# Patient Record
Sex: MALE | Race: BLACK OR AFRICAN AMERICAN
[De-identification: names, ages, dates, MRNs, and addresses within clinical notes are randomized per-mention and may not be internally consistent; named-entity substitution may affect disease eponyms.]

---

## 2017-03-29 ENCOUNTER — RX ONLY (RX ONLY)
Age: 57
End: 2017-03-29

## 2017-03-29 ENCOUNTER — DOCTOR'S OFFICE (OUTPATIENT)
Dept: URBAN - METROPOLITAN AREA CLINIC 137 | Facility: CLINIC | Age: 57
Setting detail: OPHTHALMOLOGY
End: 2017-03-29
Payer: COMMERCIAL

## 2017-03-29 DIAGNOSIS — H25.13: ICD-10-CM

## 2017-03-29 DIAGNOSIS — H04.123: ICD-10-CM

## 2017-03-29 DIAGNOSIS — H52.4: ICD-10-CM

## 2017-03-29 PROCEDURE — 92004 COMPRE OPH EXAM NEW PT 1/>: CPT | Performed by: OPHTHALMOLOGY

## 2017-03-29 PROCEDURE — 92015 DETERMINE REFRACTIVE STATE: CPT | Performed by: OPHTHALMOLOGY

## 2017-03-29 ASSESSMENT — REFRACTION_OUTSIDERX
OS_VA2: 20/20(J1+)
OU_VA: 20/
OS_VA1: 20/20
OD_VA1: 20/20
OD_SPHERE: PL
OS_SPHERE: PL
OD_ADD: +2.00
OS_ADD: +2.00
OS_VA3: 20/
OD_VA2: 20/20(J1+)
OD_VA3: 20/

## 2017-03-29 ASSESSMENT — REFRACTION_MANIFEST
OD_VA3: 20/
OS_VA2: 20/
OS_VA2: 20/
OD_VA2: 20/
OU_VA: 20/
OS_VA1: 20/
OS_VA1: 20/
OD_VA3: 20/
OS_VA3: 20/
OD_VA1: 20/
OD_VA1: 20/
OD_VA2: 20/
OS_VA3: 20/
OU_VA: 20/

## 2017-03-29 ASSESSMENT — VISUAL ACUITY
OD_BCVA: 20/20-2
OS_BCVA: 20/20

## 2017-03-29 ASSESSMENT — REFRACTION_AUTOREFRACTION
OS_SPHERE: -0.25
OS_CYLINDER: SPH
OD_SPHERE: PLANO
OD_CYLINDER: SPH

## 2017-03-29 ASSESSMENT — CONFRONTATIONAL VISUAL FIELD TEST (CVF)
OD_FINDINGS: FULL
OS_FINDINGS: FULL

## 2017-03-29 ASSESSMENT — REFRACTION_CURRENTRX
OS_OVR_VA: 20/
OS_OVR_VA: 20/
OD_OVR_VA: 20/
OS_OVR_VA: 20/

## 2017-08-04 ENCOUNTER — TRANSCRIBE ORDERS (OUTPATIENT)
Dept: ADMINISTRATIVE | Facility: HOSPITAL | Age: 57
End: 2017-08-04

## 2017-08-04 DIAGNOSIS — R59.0 SUPRACLAVICULAR ADENOPATHY: Primary | ICD-10-CM

## 2017-08-08 ENCOUNTER — HOSPITAL ENCOUNTER (OUTPATIENT)
Dept: RADIOLOGY | Facility: HOSPITAL | Age: 57
Discharge: HOME | End: 2017-08-08
Admitting: RADIOLOGY
Payer: COMMERCIAL

## 2017-08-08 DIAGNOSIS — R59.0 SUPRACLAVICULAR ADENOPATHY: ICD-10-CM

## 2017-08-08 PROCEDURE — 88305 TISSUE EXAM BY PATHOLOGIST: CPT | Performed by: SURGERY

## 2017-08-08 PROCEDURE — 88173 CYTOPATH EVAL FNA REPORT: CPT | Performed by: SURGERY

## 2017-08-08 PROCEDURE — 88184 FLOWCYTOMETRY/ TC 1 MARKER: CPT | Performed by: SURGERY

## 2017-08-08 PROCEDURE — 88185 FLOWCYTOMETRY/TC ADD-ON: CPT

## 2017-08-08 PROCEDURE — 88172 CYTP DX EVAL FNA 1ST EA SITE: CPT | Performed by: SURGERY

## 2017-08-08 PROCEDURE — 10022 HB FNA W/IMAGE: CPT

## 2017-08-08 PROCEDURE — 76942 ECHO GUIDE FOR BIOPSY: CPT

## 2017-08-08 RX ORDER — LIDOCAINE HYDROCHLORIDE 10 MG/ML
10 INJECTION, SOLUTION INFILTRATION; PERINEURAL ONCE
Status: COMPLETED | OUTPATIENT
Start: 2017-08-08 | End: 2017-08-08

## 2017-08-08 RX ADMIN — LIDOCAINE HYDROCHLORIDE 10 ML: 10 INJECTION, SOLUTION INFILTRATION; PERINEURAL at 11:51

## 2017-08-09 LAB — SCAN RESULT: NORMAL

## 2018-01-16 NOTE — MISCELLANEOUS
Provider Comments  Provider Comments:   Pt showed up over half hour late to his new pt  physical apt  when told he would have to re schedule he left with out rescheduling      Signatures   Electronically signed by : Toñito Strong DO; Aug 16 2016  2:00PM EST                       (Author)

## 2019-05-30 ENCOUNTER — DOCTOR'S OFFICE (OUTPATIENT)
Dept: URBAN - METROPOLITAN AREA CLINIC 136 | Facility: CLINIC | Age: 59
Setting detail: OPHTHALMOLOGY
End: 2019-05-30
Payer: MEDICAID

## 2019-05-30 DIAGNOSIS — H04.123: ICD-10-CM

## 2019-05-30 DIAGNOSIS — H25.13: ICD-10-CM

## 2019-05-30 DIAGNOSIS — H25.042: ICD-10-CM

## 2019-05-30 DIAGNOSIS — H35.373: ICD-10-CM

## 2019-05-30 PROCEDURE — 92014 COMPRE OPH EXAM EST PT 1/>: CPT | Performed by: OPHTHALMOLOGY

## 2019-05-30 PROCEDURE — 92134 CPTRZ OPH DX IMG PST SGM RTA: CPT | Performed by: OPHTHALMOLOGY

## 2019-05-30 ASSESSMENT — REFRACTION_CURRENTRX
OD_OVR_VA: 20/
OS_OVR_VA: 20/

## 2019-05-30 ASSESSMENT — REFRACTION_MANIFEST
OS_ADD: +2.00
OD_VA1: 20/
OS_VA2: 20/
OS_VA3: 20/
OU_VA: 20/
OS_VA1: 20/20
OS_VA1: 20/
OS_VA3: 20/
OD_VA3: 20/
OU_VA: 20/
OS_VA2: 20/20(J1+)
OD_VA2: 20/
OD_SPHERE: PL
OD_VA2: 20/20(J1+)
OD_VA1: 20/20
OD_ADD: +2.00
OD_VA3: 20/
OS_SPHERE: PL

## 2019-05-30 ASSESSMENT — VISUAL ACUITY
OS_BCVA: 20/20
OD_BCVA: 20/20

## 2019-05-30 ASSESSMENT — CONFRONTATIONAL VISUAL FIELD TEST (CVF)
OS_FINDINGS: FULL
OD_FINDINGS: FULL

## 2019-05-30 ASSESSMENT — REFRACTION_AUTOREFRACTION
OS_CYLINDER: SPH
OS_SPHERE: -0.25
OD_CYLINDER: SPH
OD_SPHERE: PLANO

## 2019-05-30 ASSESSMENT — CORNEAL DYSTROPHY - POSTERIOR
OD_POSTERIORDYSTROPHY: ABSENT
OS_POSTERIORDYSTROPHY: ABSENT

## 2019-08-01 ENCOUNTER — DOCTOR'S OFFICE (OUTPATIENT)
Dept: URBAN - METROPOLITAN AREA CLINIC 136 | Facility: CLINIC | Age: 59
Setting detail: OPHTHALMOLOGY
End: 2019-08-01
Payer: COMMERCIAL

## 2019-08-01 DIAGNOSIS — H25.042: ICD-10-CM

## 2019-08-01 DIAGNOSIS — H04.123: ICD-10-CM

## 2019-08-01 DIAGNOSIS — H25.13: ICD-10-CM

## 2019-08-01 DIAGNOSIS — H43.813: ICD-10-CM

## 2019-08-01 PROBLEM — H52.4 PRESBYOPIA ; BOTH EYES: Status: ACTIVE | Noted: 2017-03-29

## 2019-08-01 PROCEDURE — 92134 CPTRZ OPH DX IMG PST SGM RTA: CPT | Performed by: OPHTHALMOLOGY

## 2019-08-01 PROCEDURE — 92014 COMPRE OPH EXAM EST PT 1/>: CPT | Performed by: OPHTHALMOLOGY

## 2019-08-01 ASSESSMENT — VISUAL ACUITY
OS_BCVA: 20/20
OD_BCVA: 20/20

## 2019-08-01 ASSESSMENT — REFRACTION_CURRENTRX
OD_OVR_VA: 20/
OS_OVR_VA: 20/
OD_OVR_VA: 20/
OD_OVR_VA: 20/
OS_OVR_VA: 20/
OS_OVR_VA: 20/

## 2019-08-01 ASSESSMENT — REFRACTION_MANIFEST
OD_VA2: 20/20(J1+)
OS_VA3: 20/
OD_VA1: 20/20
OU_VA: 20/
OD_VA3: 20/
OS_ADD: +2.00
OS_VA3: 20/
OS_SPHERE: PL
OD_VA3: 20/
OS_VA2: 20/20(J1+)
OD_ADD: +2.00
OD_SPHERE: PL
OU_VA: 20/
OD_VA1: 20/
OS_VA1: 20/
OS_VA2: 20/
OS_VA1: 20/20
OD_VA2: 20/

## 2019-08-01 ASSESSMENT — REFRACTION_AUTOREFRACTION
OS_CYLINDER: SPH
OD_CYLINDER: SPH
OD_SPHERE: PLANO
OS_SPHERE: -0.25

## 2019-08-01 ASSESSMENT — CONFRONTATIONAL VISUAL FIELD TEST (CVF)
OD_FINDINGS: FULL
OS_FINDINGS: FULL

## 2019-08-01 ASSESSMENT — CORNEAL DYSTROPHY - POSTERIOR
OD_POSTERIORDYSTROPHY: ABSENT
OS_POSTERIORDYSTROPHY: ABSENT

## 2020-03-09 ENCOUNTER — TELEPHONE (OUTPATIENT)
Dept: SURGICAL ONCOLOGY | Facility: CLINIC | Age: 60
End: 2020-03-09

## 2020-03-09 NOTE — TELEPHONE ENCOUNTER
New Patient Encounter    New Patient Intake Form   Patient Details:  Natalia Cox  1960  [id number]    Background Information:  [address] starts by opening a telephone encounter and gathering the following information   Who is calling to schedule? If not self, relationship to patient? self   Referring Provider Dr Meera Cummings    pcp   What is the diagnosis? Mass in neck   Is this diagnosis confirmed? Yes   Ct scan   When was the diagnosis? 3/2020   Is there a confirmed diagnosis from a biopsy/tissue reviewed by pathology? no   Is patient aware of diagnosis? Yes   Is there a personal history of cancer and what kind? no   Is there a family history of cancer and what kind? unaware   Reason for visit? New Diagnosis   Have you had any imaging or labs done? If so: when, where? Yes   [facility]     Are records in [facility]? no   Was the patient told to bring a disk? yes   Does the patient smoke or Vape? no   If yes, how many packs or cartridges per day? Scheduling Information:   Preferred [place]:  Any     Requesting Specific Provider? laure   Are there any dates/time the patient cannot be seen? no   Miscellaneous: n/a   After completing the above information, please route to Financial Counselor and the appropriate Nurse Navigator for review

## 2020-03-10 ENCOUNTER — TELEPHONE (OUTPATIENT)
Dept: HEMATOLOGY ONCOLOGY | Facility: CLINIC | Age: 60
End: 2020-03-10

## 2020-03-23 ENCOUNTER — TELEPHONE (OUTPATIENT)
Dept: HEMATOLOGY ONCOLOGY | Facility: CLINIC | Age: 60
End: 2020-03-23

## 2020-03-23 NOTE — TELEPHONE ENCOUNTER
Patient called to confirm their appointment  Appointment confirmed for Dr Charlie Webster          Appointment date and time:          4/1/2020 @ 2:00      [place] location:              [facility]  Patient verbalized understanding of above

## 2020-04-01 ENCOUNTER — CONSULT (OUTPATIENT)
Dept: HEMATOLOGY ONCOLOGY | Facility: CLINIC | Age: 60
End: 2020-04-01
Payer: COMMERCIAL

## 2020-04-01 VITALS — RESPIRATION RATE: 16 BRPM | TEMPERATURE: 99.8 F | BODY MASS INDEX: 27.64 KG/M2 | WEIGHT: 176.5 LBS

## 2020-04-01 DIAGNOSIS — R59.0 CERVICAL LYMPHADENOPATHY: Primary | ICD-10-CM

## 2020-04-01 DIAGNOSIS — Z87.891 FORMER SMOKER: ICD-10-CM

## 2020-04-01 DIAGNOSIS — Z80.6 FAMILY HISTORY OF LEUKEMIA: ICD-10-CM

## 2020-04-01 DIAGNOSIS — D17.9 LIPOMA, UNSPECIFIED SITE: ICD-10-CM

## 2020-04-01 PROCEDURE — 99203 OFFICE O/P NEW LOW 30 MIN: CPT | Performed by: INTERNAL MEDICINE

## 2020-04-09 ENCOUNTER — HOSPITAL ENCOUNTER (OUTPATIENT)
Dept: RADIOLOGY | Age: 60
Discharge: HOME | End: 2020-04-09
Payer: COMMERCIAL

## 2020-04-09 ENCOUNTER — DOCUMENTATION (OUTPATIENT)
Dept: HEMATOLOGY ONCOLOGY | Facility: CLINIC | Age: 60
End: 2020-04-09

## 2020-04-09 ENCOUNTER — TELEPHONE (OUTPATIENT)
Dept: HEMATOLOGY ONCOLOGY | Facility: CLINIC | Age: 60
End: 2020-04-09

## 2020-04-09 DIAGNOSIS — R59.1 LYMPHADENOPATHY: Primary | ICD-10-CM

## 2020-04-09 DIAGNOSIS — D48.7 NEOPLASM OF UNCERTAIN BEHAVIOR OF LYMPH NODE: ICD-10-CM

## 2020-04-09 LAB — GLUCOSE SERPL-MCNC: 122 MG/DL (ref 65–140)

## 2020-04-09 PROCEDURE — A9552 F18 FDG: HCPCS

## 2020-04-09 PROCEDURE — 78815 PET IMAGE W/CT SKULL-THIGH: CPT

## 2020-04-09 PROCEDURE — 82948 REAGENT STRIP/BLOOD GLUCOSE: CPT

## 2020-04-10 ENCOUNTER — TELEPHONE (OUTPATIENT)
Dept: HEMATOLOGY ONCOLOGY | Facility: CLINIC | Age: 60
End: 2020-04-10

## 2020-04-10 DIAGNOSIS — R59.0 CERVICAL LYMPHADENOPATHY: Primary | ICD-10-CM

## 2020-04-22 ENCOUNTER — TELEPHONE (OUTPATIENT)
Dept: SURGICAL ONCOLOGY | Facility: CLINIC | Age: 60
End: 2020-04-22

## 2020-04-22 ENCOUNTER — TELEPHONE (OUTPATIENT)
Dept: HEMATOLOGY ONCOLOGY | Facility: CLINIC | Age: 60
End: 2020-04-22

## 2020-04-22 ENCOUNTER — HOSPITAL ENCOUNTER (OUTPATIENT)
Dept: RADIOLOGY | Facility: HOSPITAL | Age: 60
Discharge: HOME | End: 2020-04-22
Attending: INTERNAL MEDICINE

## 2020-04-22 DIAGNOSIS — R59.0 CERVICAL LYMPHADENOPATHY: ICD-10-CM

## 2020-04-22 RX ORDER — LIDOCAINE HYDROCHLORIDE 10 MG/ML
2 INJECTION, SOLUTION EPIDURAL; INFILTRATION; INTRACAUDAL; PERINEURAL ONCE
Status: DISCONTINUED | OUTPATIENT
Start: 2020-04-22 | End: 2020-04-22

## 2020-04-23 ENCOUNTER — TELEPHONE (OUTPATIENT)
Dept: HEMATOLOGY ONCOLOGY | Facility: CLINIC | Age: 60
End: 2020-04-23

## 2020-04-23 DIAGNOSIS — R59.0 MEDIASTINAL LYMPHADENOPATHY: Primary | ICD-10-CM

## 2020-04-24 ENCOUNTER — OFFICE VISIT (OUTPATIENT)
Dept: CARDIAC SURGERY | Facility: CLINIC | Age: 60
End: 2020-04-24
Payer: COMMERCIAL

## 2020-04-24 ENCOUNTER — APPOINTMENT (OUTPATIENT)
Dept: LAB | Facility: HOSPITAL | Age: 60
End: 2020-04-24
Payer: COMMERCIAL

## 2020-04-24 VITALS
TEMPERATURE: 98.2 F | WEIGHT: 177 LBS | HEIGHT: 67 IN | BODY MASS INDEX: 27.78 KG/M2 | HEART RATE: 98 BPM | SYSTOLIC BLOOD PRESSURE: 162 MMHG | DIASTOLIC BLOOD PRESSURE: 86 MMHG

## 2020-04-24 DIAGNOSIS — R59.0 MEDIASTINAL LYMPHADENOPATHY: ICD-10-CM

## 2020-04-24 DIAGNOSIS — R59.0 MEDIASTINAL LYMPHADENOPATHY: Primary | ICD-10-CM

## 2020-04-24 LAB
ANION GAP SERPL CALCULATED.3IONS-SCNC: 6 MMOL/L (ref 4–13)
APTT PPP: 29 SECONDS (ref 23–37)
BUN SERPL-MCNC: 14 MG/DL (ref 5–25)
CALCIUM SERPL-MCNC: 9.1 MG/DL (ref 8.3–10.1)
CHLORIDE SERPL-SCNC: 106 MMOL/L (ref 100–108)
CO2 SERPL-SCNC: 25 MMOL/L (ref 21–32)
CREAT SERPL-MCNC: 1.17 MG/DL (ref 0.6–1.3)
ERYTHROCYTE [DISTWIDTH] IN BLOOD BY AUTOMATED COUNT: 14.5 % (ref 11.6–15.1)
GFR SERPL CREATININE-BSD FRML MDRD: 67 ML/MIN/1.73SQ M
GLUCOSE SERPL-MCNC: 154 MG/DL (ref 65–140)
HCT VFR BLD AUTO: 49.7 % (ref 36.5–49.3)
HGB BLD-MCNC: 15.9 G/DL (ref 12–17)
INR PPP: 0.98 (ref 0.84–1.19)
MCH RBC QN AUTO: 27.9 PG (ref 26.8–34.3)
MCHC RBC AUTO-ENTMCNC: 32 G/DL (ref 31.4–37.4)
MCV RBC AUTO: 87 FL (ref 82–98)
PLATELET # BLD AUTO: 187 THOUSANDS/UL (ref 149–390)
PMV BLD AUTO: 10.6 FL (ref 8.9–12.7)
POTASSIUM SERPL-SCNC: 3.8 MMOL/L (ref 3.5–5.3)
PROTHROMBIN TIME: 12.6 SECONDS (ref 11.6–14.5)
RBC # BLD AUTO: 5.7 MILLION/UL (ref 3.88–5.62)
SODIUM SERPL-SCNC: 137 MMOL/L (ref 136–145)
WBC # BLD AUTO: 3.06 THOUSAND/UL (ref 4.31–10.16)

## 2020-04-24 PROCEDURE — 36415 COLL VENOUS BLD VENIPUNCTURE: CPT

## 2020-04-24 PROCEDURE — 85027 COMPLETE CBC AUTOMATED: CPT

## 2020-04-24 PROCEDURE — 85730 THROMBOPLASTIN TIME PARTIAL: CPT

## 2020-04-24 PROCEDURE — 85610 PROTHROMBIN TIME: CPT

## 2020-04-24 PROCEDURE — 80048 BASIC METABOLIC PNL TOTAL CA: CPT

## 2020-04-24 PROCEDURE — 99205 OFFICE O/P NEW HI 60 MIN: CPT | Performed by: PHYSICIAN ASSISTANT

## 2020-04-27 ENCOUNTER — ANESTHESIA EVENT (OUTPATIENT)
Dept: PERIOP | Facility: HOSPITAL | Age: 60
End: 2020-04-27
Payer: COMMERCIAL

## 2020-04-28 ENCOUNTER — HOSPITAL ENCOUNTER (OUTPATIENT)
Facility: HOSPITAL | Age: 60
Setting detail: OUTPATIENT SURGERY
Discharge: HOME | End: 2020-04-28
Attending: THORACIC SURGERY (CARDIOTHORACIC VASCULAR SURGERY) | Admitting: THORACIC SURGERY (CARDIOTHORACIC VASCULAR SURGERY)
Payer: COMMERCIAL

## 2020-04-28 ENCOUNTER — ANESTHESIA (OUTPATIENT)
Dept: PERIOP | Facility: HOSPITAL | Age: 60
End: 2020-04-28
Payer: COMMERCIAL

## 2020-04-28 VITALS
HEART RATE: 72 BPM | WEIGHT: 177 LBS | SYSTOLIC BLOOD PRESSURE: 126 MMHG | OXYGEN SATURATION: 98 % | TEMPERATURE: 96.6 F | BODY MASS INDEX: 27.78 KG/M2 | DIASTOLIC BLOOD PRESSURE: 86 MMHG | HEIGHT: 67 IN | RESPIRATION RATE: 16 BRPM

## 2020-04-28 DIAGNOSIS — R59.0 MEDIASTINAL LYMPHADENOPATHY: ICD-10-CM

## 2020-04-28 PROCEDURE — 88173 CYTOPATH EVAL FNA REPORT: CPT | Performed by: PATHOLOGY

## 2020-04-28 PROCEDURE — 88305 TISSUE EXAM BY PATHOLOGIST: CPT | Performed by: PATHOLOGY

## 2020-04-28 PROCEDURE — 31652 BRONCH EBUS SAMPLNG 1/2 NODE: CPT | Performed by: THORACIC SURGERY (CARDIOTHORACIC VASCULAR SURGERY)

## 2020-04-28 PROCEDURE — 88172 CYTP DX EVAL FNA 1ST EA SITE: CPT | Performed by: PATHOLOGY

## 2020-04-28 RX ORDER — ONDANSETRON 2 MG/ML
4 INJECTION INTRAMUSCULAR; INTRAVENOUS ONCE AS NEEDED
Status: DISCONTINUED | OUTPATIENT
Start: 2020-04-28 | End: 2020-04-28 | Stop reason: HOSPADM

## 2020-04-28 RX ORDER — LIDOCAINE HYDROCHLORIDE 10 MG/ML
0.5 INJECTION, SOLUTION EPIDURAL; INFILTRATION; INTRACAUDAL; PERINEURAL ONCE AS NEEDED
Status: COMPLETED | OUTPATIENT
Start: 2020-04-28 | End: 2020-04-28

## 2020-04-28 RX ORDER — PROPOFOL 10 MG/ML
INJECTION, EMULSION INTRAVENOUS AS NEEDED
Status: DISCONTINUED | OUTPATIENT
Start: 2020-04-28 | End: 2020-04-28 | Stop reason: SURG

## 2020-04-28 RX ORDER — MIDAZOLAM HYDROCHLORIDE 2 MG/2ML
INJECTION, SOLUTION INTRAMUSCULAR; INTRAVENOUS AS NEEDED
Status: DISCONTINUED | OUTPATIENT
Start: 2020-04-28 | End: 2020-04-28 | Stop reason: SURG

## 2020-04-28 RX ORDER — GLYCOPYRROLATE 0.2 MG/ML
INJECTION INTRAMUSCULAR; INTRAVENOUS AS NEEDED
Status: DISCONTINUED | OUTPATIENT
Start: 2020-04-28 | End: 2020-04-28 | Stop reason: SURG

## 2020-04-28 RX ORDER — FENTANYL CITRATE 50 UG/ML
INJECTION, SOLUTION INTRAMUSCULAR; INTRAVENOUS AS NEEDED
Status: DISCONTINUED | OUTPATIENT
Start: 2020-04-28 | End: 2020-04-28 | Stop reason: SURG

## 2020-04-28 RX ORDER — LABETALOL 20 MG/4 ML (5 MG/ML) INTRAVENOUS SYRINGE
AS NEEDED
Status: DISCONTINUED | OUTPATIENT
Start: 2020-04-28 | End: 2020-04-28 | Stop reason: SURG

## 2020-04-28 RX ORDER — SUCCINYLCHOLINE/SOD CL,ISO/PF 100 MG/5ML
SYRINGE (ML) INTRAVENOUS AS NEEDED
Status: DISCONTINUED | OUTPATIENT
Start: 2020-04-28 | End: 2020-04-28 | Stop reason: SURG

## 2020-04-28 RX ORDER — SODIUM CHLORIDE, SODIUM LACTATE, POTASSIUM CHLORIDE, CALCIUM CHLORIDE 600; 310; 30; 20 MG/100ML; MG/100ML; MG/100ML; MG/100ML
125 INJECTION, SOLUTION INTRAVENOUS CONTINUOUS
Status: DISCONTINUED | OUTPATIENT
Start: 2020-04-28 | End: 2020-04-28 | Stop reason: HOSPADM

## 2020-04-28 RX ORDER — ONDANSETRON 2 MG/ML
INJECTION INTRAMUSCULAR; INTRAVENOUS AS NEEDED
Status: DISCONTINUED | OUTPATIENT
Start: 2020-04-28 | End: 2020-04-28 | Stop reason: SURG

## 2020-04-28 RX ORDER — LIDOCAINE HYDROCHLORIDE 10 MG/ML
INJECTION, SOLUTION EPIDURAL; INFILTRATION; INTRACAUDAL; PERINEURAL AS NEEDED
Status: DISCONTINUED | OUTPATIENT
Start: 2020-04-28 | End: 2020-04-28 | Stop reason: SURG

## 2020-04-28 RX ORDER — FENTANYL CITRATE/PF 50 MCG/ML
25 SYRINGE (ML) INJECTION
Status: DISCONTINUED | OUTPATIENT
Start: 2020-04-28 | End: 2020-04-28 | Stop reason: HOSPADM

## 2020-04-28 RX ORDER — PROPOFOL 10 MG/ML
INJECTION, EMULSION INTRAVENOUS CONTINUOUS PRN
Status: DISCONTINUED | OUTPATIENT
Start: 2020-04-28 | End: 2020-04-28 | Stop reason: SURG

## 2020-04-28 RX ORDER — ROCURONIUM BROMIDE 10 MG/ML
INJECTION, SOLUTION INTRAVENOUS AS NEEDED
Status: DISCONTINUED | OUTPATIENT
Start: 2020-04-28 | End: 2020-04-28 | Stop reason: SURG

## 2020-04-28 RX ADMIN — LIDOCAINE HYDROCHLORIDE 50 MG: 10 INJECTION, SOLUTION EPIDURAL; INFILTRATION; INTRACAUDAL; PERINEURAL at 12:47

## 2020-04-28 RX ADMIN — FENTANYL CITRATE 50 MCG: 50 INJECTION, SOLUTION INTRAMUSCULAR; INTRAVENOUS at 12:54

## 2020-04-28 RX ADMIN — ROCURONIUM BROMIDE 20 MG: 10 INJECTION, SOLUTION INTRAVENOUS at 12:55

## 2020-04-28 RX ADMIN — ROCURONIUM BROMIDE 5 MG: 10 INJECTION, SOLUTION INTRAVENOUS at 13:15

## 2020-04-28 RX ADMIN — LIDOCAINE HYDROCHLORIDE 0.2 ML: 10 INJECTION, SOLUTION EPIDURAL; INFILTRATION; INTRACAUDAL; PERINEURAL at 10:58

## 2020-04-28 RX ADMIN — PROPOFOL 180 MG: 10 INJECTION, EMULSION INTRAVENOUS at 12:47

## 2020-04-28 RX ADMIN — MIDAZOLAM 2 MG: 1 INJECTION INTRAMUSCULAR; INTRAVENOUS at 12:36

## 2020-04-28 RX ADMIN — LABETALOL 20 MG/4 ML (5 MG/ML) INTRAVENOUS SYRINGE 5 MG: at 13:15

## 2020-04-28 RX ADMIN — ONDANSETRON 4 MG: 2 INJECTION INTRAMUSCULAR; INTRAVENOUS at 13:11

## 2020-04-28 RX ADMIN — SODIUM CHLORIDE, SODIUM LACTATE, POTASSIUM CHLORIDE, AND CALCIUM CHLORIDE: .6; .31; .03; .02 INJECTION, SOLUTION INTRAVENOUS at 13:40

## 2020-04-28 RX ADMIN — LABETALOL 20 MG/4 ML (5 MG/ML) INTRAVENOUS SYRINGE 5 MG: at 13:25

## 2020-04-28 RX ADMIN — SUGAMMADEX 200 MG: 100 INJECTION, SOLUTION INTRAVENOUS at 13:46

## 2020-04-28 RX ADMIN — Medication 160 MG: at 12:48

## 2020-04-28 RX ADMIN — GLYCOPYRROLATE 0.2 MG: 0.2 INJECTION, SOLUTION INTRAMUSCULAR; INTRAVENOUS at 13:01

## 2020-04-28 RX ADMIN — PROPOFOL 150 MCG/KG/MIN: 10 INJECTION, EMULSION INTRAVENOUS at 12:49

## 2020-04-28 RX ADMIN — SODIUM CHLORIDE, SODIUM LACTATE, POTASSIUM CHLORIDE, AND CALCIUM CHLORIDE 125 ML/HR: .6; .31; .03; .02 INJECTION, SOLUTION INTRAVENOUS at 10:58

## 2020-04-28 RX ADMIN — FENTANYL CITRATE 50 MCG: 50 INJECTION, SOLUTION INTRAMUSCULAR; INTRAVENOUS at 12:47

## 2020-04-28 RX ADMIN — ROCURONIUM BROMIDE 5 MG: 10 INJECTION, SOLUTION INTRAVENOUS at 13:30

## 2020-04-29 LAB — SCAN RESULT: NORMAL

## 2020-05-01 ENCOUNTER — TELEPHONE (OUTPATIENT)
Dept: CARDIAC SURGERY | Facility: CLINIC | Age: 60
End: 2020-05-01

## 2020-05-04 ENCOUNTER — TELEPHONE (OUTPATIENT)
Dept: CARDIAC SURGERY | Facility: CLINIC | Age: 60
End: 2020-05-04

## 2020-05-11 ENCOUNTER — TELEPHONE (OUTPATIENT)
Dept: CARDIAC SURGERY | Facility: CLINIC | Age: 60
End: 2020-05-11

## 2020-05-22 ENCOUNTER — TELEPHONE (OUTPATIENT)
Dept: HEMATOLOGY ONCOLOGY | Facility: CLINIC | Age: 60
End: 2020-05-22

## 2020-05-27 ENCOUNTER — TELEPHONE (OUTPATIENT)
Dept: HEMATOLOGY ONCOLOGY | Facility: CLINIC | Age: 60
End: 2020-05-27

## 2020-06-03 ENCOUNTER — OFFICE VISIT (OUTPATIENT)
Dept: HEMATOLOGY ONCOLOGY | Facility: CLINIC | Age: 60
End: 2020-06-03
Payer: COMMERCIAL

## 2020-06-03 VITALS
BODY MASS INDEX: 27 KG/M2 | RESPIRATION RATE: 16 BRPM | HEART RATE: 90 BPM | WEIGHT: 172 LBS | TEMPERATURE: 97.7 F | HEIGHT: 67 IN | DIASTOLIC BLOOD PRESSURE: 92 MMHG | SYSTOLIC BLOOD PRESSURE: 154 MMHG

## 2020-06-03 DIAGNOSIS — R59.0 MEDIASTINAL LYMPHADENOPATHY: Primary | ICD-10-CM

## 2020-06-03 DIAGNOSIS — D17.0 LIPOMA OF NECK: ICD-10-CM

## 2020-06-03 PROCEDURE — 3008F BODY MASS INDEX DOCD: CPT | Performed by: INTERNAL MEDICINE

## 2020-06-03 PROCEDURE — 99214 OFFICE O/P EST MOD 30 MIN: CPT | Performed by: INTERNAL MEDICINE

## 2020-06-03 PROCEDURE — 1036F TOBACCO NON-USER: CPT | Performed by: INTERNAL MEDICINE

## 2020-06-03 RX ORDER — LOSARTAN POTASSIUM AND HYDROCHLOROTHIAZIDE 12.5; 5 MG/1; MG/1
TABLET ORAL
COMMUNITY
Start: 2020-04-02 | End: 2020-06-03

## 2020-06-03 RX ORDER — PREDNISONE 20 MG/1
TABLET ORAL
COMMUNITY
Start: 2020-03-25 | End: 2020-06-15 | Stop reason: ALTCHOICE

## 2020-06-04 ENCOUNTER — TELEPHONE (OUTPATIENT)
Dept: CARDIAC SURGERY | Facility: CLINIC | Age: 60
End: 2020-06-04

## 2020-06-11 ENCOUNTER — TELEPHONE (OUTPATIENT)
Dept: CARDIAC SURGERY | Facility: CLINIC | Age: 60
End: 2020-06-11

## 2020-06-15 ENCOUNTER — OFFICE VISIT (OUTPATIENT)
Dept: FAMILY MEDICINE CLINIC | Facility: CLINIC | Age: 60
End: 2020-06-15
Payer: COMMERCIAL

## 2020-06-15 VITALS
SYSTOLIC BLOOD PRESSURE: 128 MMHG | RESPIRATION RATE: 18 BRPM | TEMPERATURE: 98.5 F | WEIGHT: 170.4 LBS | DIASTOLIC BLOOD PRESSURE: 80 MMHG | HEART RATE: 97 BPM | OXYGEN SATURATION: 97 % | HEIGHT: 67 IN | BODY MASS INDEX: 26.74 KG/M2

## 2020-06-15 DIAGNOSIS — M10.9 ARTHRITIS DUE TO GOUT: Primary | ICD-10-CM

## 2020-06-15 DIAGNOSIS — R60.0 LOCALIZED EDEMA: ICD-10-CM

## 2020-06-15 PROBLEM — I10 BENIGN ESSENTIAL HTN: Status: ACTIVE | Noted: 2018-01-11

## 2020-06-15 PROBLEM — R00.2 PALPITATIONS: Status: ACTIVE | Noted: 2018-01-11

## 2020-06-15 PROBLEM — M47.812 SPONDYLOSIS OF CERVICAL REGION WITHOUT MYELOPATHY OR RADICULOPATHY: Status: ACTIVE | Noted: 2019-03-27

## 2020-06-15 PROBLEM — M48.02 FORAMINAL STENOSIS OF CERVICAL REGION: Status: ACTIVE | Noted: 2019-03-27

## 2020-06-15 PROBLEM — G47.33 OSA ON CPAP: Status: ACTIVE | Noted: 2018-01-11

## 2020-06-15 PROBLEM — M50.30 DDD (DEGENERATIVE DISC DISEASE), CERVICAL: Status: ACTIVE | Noted: 2019-03-27

## 2020-06-15 PROBLEM — Z99.89 OSA ON CPAP: Status: ACTIVE | Noted: 2018-01-11

## 2020-06-15 PROBLEM — M19.022 OSTEOARTHRITIS OF LEFT ELBOW: Status: ACTIVE | Noted: 2018-03-08

## 2020-06-15 PROBLEM — Z98.890 HISTORY OF ELBOW SURGERY: Status: ACTIVE | Noted: 2018-10-10

## 2020-06-15 PROCEDURE — 3074F SYST BP LT 130 MM HG: CPT | Performed by: NURSE PRACTITIONER

## 2020-06-15 PROCEDURE — 1036F TOBACCO NON-USER: CPT | Performed by: NURSE PRACTITIONER

## 2020-06-15 PROCEDURE — 99213 OFFICE O/P EST LOW 20 MIN: CPT | Performed by: NURSE PRACTITIONER

## 2020-06-15 PROCEDURE — 3008F BODY MASS INDEX DOCD: CPT | Performed by: NURSE PRACTITIONER

## 2020-06-15 PROCEDURE — 3079F DIAST BP 80-89 MM HG: CPT | Performed by: NURSE PRACTITIONER

## 2020-06-15 RX ORDER — PREDNISONE 20 MG/1
20 TABLET ORAL 2 TIMES DAILY WITH MEALS
Qty: 6 TABLET | Refills: 0 | Status: SHIPPED | OUTPATIENT
Start: 2020-06-15 | End: 2020-06-18

## 2020-06-17 ENCOUNTER — APPOINTMENT (OUTPATIENT)
Dept: RADIOLOGY | Facility: CLINIC | Age: 60
End: 2020-06-17
Payer: COMMERCIAL

## 2020-06-17 DIAGNOSIS — R60.0 LOCALIZED EDEMA: ICD-10-CM

## 2020-06-17 DIAGNOSIS — M10.9 ARTHRITIS DUE TO GOUT: ICD-10-CM

## 2020-06-17 PROCEDURE — 73630 X-RAY EXAM OF FOOT: CPT

## 2020-06-18 ENCOUNTER — TELEMEDICINE (OUTPATIENT)
Dept: FAMILY MEDICINE CLINIC | Facility: CLINIC | Age: 60
End: 2020-06-18
Payer: COMMERCIAL

## 2020-06-18 DIAGNOSIS — I10 BENIGN ESSENTIAL HTN: Primary | ICD-10-CM

## 2020-06-18 DIAGNOSIS — R22.42 LOCALIZED SWELLING OF LEFT FOOT: ICD-10-CM

## 2020-06-18 DIAGNOSIS — R73.01 IMPAIRED FASTING GLUCOSE: ICD-10-CM

## 2020-06-18 LAB
ALBUMIN SERPL-MCNC: 4.5 G/DL (ref 3.8–4.8)
ALBUMIN/GLOB SERPL: 1.5 {RATIO} (ref 1.2–2.2)
ALP SERPL-CCNC: 71 IU/L (ref 39–117)
ALT SERPL-CCNC: 38 IU/L (ref 0–44)
AST SERPL-CCNC: 19 IU/L (ref 0–40)
BILIRUB SERPL-MCNC: 0.4 MG/DL (ref 0–1.2)
BUN SERPL-MCNC: 11 MG/DL (ref 8–27)
BUN/CREAT SERPL: 10 (ref 10–24)
CALCIUM SERPL-MCNC: 9.4 MG/DL (ref 8.6–10.2)
CHLORIDE SERPL-SCNC: 101 MMOL/L (ref 96–106)
CO2 SERPL-SCNC: 19 MMOL/L (ref 20–29)
CREAT SERPL-MCNC: 1.11 MG/DL (ref 0.76–1.27)
CRP SERPL-MCNC: 7 MG/L (ref 0–10)
ERYTHROCYTE [SEDIMENTATION RATE] IN BLOOD BY WESTERGREN METHOD: 47 MM/HR (ref 0–30)
GLOBULIN SER-MCNC: 3 G/DL (ref 1.5–4.5)
GLUCOSE SERPL-MCNC: 98 MG/DL (ref 65–99)
POTASSIUM SERPL-SCNC: 4.1 MMOL/L (ref 3.5–5.2)
PROT SERPL-MCNC: 7.5 G/DL (ref 6–8.5)
SL AMB EGFR AFRICAN AMERICAN: 82 ML/MIN/1.73
SL AMB EGFR NON AFRICAN AMERICAN: 71 ML/MIN/1.73
SODIUM SERPL-SCNC: 137 MMOL/L (ref 134–144)
URATE SERPL-MCNC: 7.3 MG/DL (ref 3.7–8.6)

## 2020-06-18 PROCEDURE — 99214 OFFICE O/P EST MOD 30 MIN: CPT | Performed by: FAMILY MEDICINE

## 2020-06-23 LAB — HBA1C MFR BLD: 6.2 % (ref 4.8–5.6)

## 2020-07-07 DIAGNOSIS — I10 ESSENTIAL HYPERTENSION: Primary | ICD-10-CM

## 2020-07-07 RX ORDER — LOSARTAN POTASSIUM 25 MG/1
25 TABLET ORAL DAILY
Qty: 90 TABLET | Refills: 1 | Status: SHIPPED | OUTPATIENT
Start: 2020-07-07 | End: 2020-07-16 | Stop reason: SDUPTHER

## 2020-07-13 DIAGNOSIS — I10 BENIGN ESSENTIAL HTN: Primary | ICD-10-CM

## 2020-07-13 RX ORDER — LOSARTAN POTASSIUM AND HYDROCHLOROTHIAZIDE 12.5; 5 MG/1; MG/1
1 TABLET ORAL DAILY
Qty: 90 TABLET | Refills: 1 | Status: SHIPPED | OUTPATIENT
Start: 2020-07-13 | End: 2020-07-16

## 2020-07-13 RX ORDER — LOSARTAN POTASSIUM AND HYDROCHLOROTHIAZIDE 12.5; 5 MG/1; MG/1
1 TABLET ORAL DAILY
COMMUNITY
End: 2020-07-13 | Stop reason: SDUPTHER

## 2020-07-14 DIAGNOSIS — I10 BENIGN ESSENTIAL HTN: Primary | ICD-10-CM

## 2020-07-14 RX ORDER — AMLODIPINE BESYLATE 10 MG/1
10 TABLET ORAL DAILY
COMMUNITY
End: 2020-07-14 | Stop reason: SDUPTHER

## 2020-07-15 RX ORDER — AMLODIPINE BESYLATE 10 MG/1
10 TABLET ORAL DAILY
Qty: 90 TABLET | Refills: 1 | Status: SHIPPED | OUTPATIENT
Start: 2020-07-15 | End: 2020-09-17

## 2020-07-16 ENCOUNTER — APPOINTMENT (OUTPATIENT)
Dept: RADIOLOGY | Facility: CLINIC | Age: 60
End: 2020-07-16
Payer: COMMERCIAL

## 2020-07-16 ENCOUNTER — OFFICE VISIT (OUTPATIENT)
Dept: FAMILY MEDICINE CLINIC | Facility: CLINIC | Age: 60
End: 2020-07-16
Payer: COMMERCIAL

## 2020-07-16 VITALS
OXYGEN SATURATION: 97 % | SYSTOLIC BLOOD PRESSURE: 130 MMHG | TEMPERATURE: 97.3 F | WEIGHT: 173.4 LBS | HEART RATE: 85 BPM | RESPIRATION RATE: 17 BRPM | HEIGHT: 67 IN | BODY MASS INDEX: 27.21 KG/M2 | DIASTOLIC BLOOD PRESSURE: 84 MMHG

## 2020-07-16 DIAGNOSIS — I10 ESSENTIAL HYPERTENSION: ICD-10-CM

## 2020-07-16 DIAGNOSIS — K21.9 GASTROESOPHAGEAL REFLUX DISEASE WITHOUT ESOPHAGITIS: ICD-10-CM

## 2020-07-16 DIAGNOSIS — M10.9 GOUT OF MULTIPLE SITES, UNSPECIFIED CAUSE, UNSPECIFIED CHRONICITY: ICD-10-CM

## 2020-07-16 DIAGNOSIS — I10 BENIGN ESSENTIAL HTN: Primary | ICD-10-CM

## 2020-07-16 DIAGNOSIS — M79.641 RIGHT HAND PAIN: ICD-10-CM

## 2020-07-16 DIAGNOSIS — Z23 ENCOUNTER FOR IMMUNIZATION: ICD-10-CM

## 2020-07-16 PROCEDURE — 3075F SYST BP GE 130 - 139MM HG: CPT | Performed by: FAMILY MEDICINE

## 2020-07-16 PROCEDURE — 90472 IMMUNIZATION ADMIN EACH ADD: CPT | Performed by: FAMILY MEDICINE

## 2020-07-16 PROCEDURE — 73130 X-RAY EXAM OF HAND: CPT

## 2020-07-16 PROCEDURE — 90750 HZV VACC RECOMBINANT IM: CPT | Performed by: FAMILY MEDICINE

## 2020-07-16 PROCEDURE — 1036F TOBACCO NON-USER: CPT | Performed by: FAMILY MEDICINE

## 2020-07-16 PROCEDURE — 3079F DIAST BP 80-89 MM HG: CPT | Performed by: FAMILY MEDICINE

## 2020-07-16 PROCEDURE — 90471 IMMUNIZATION ADMIN: CPT | Performed by: FAMILY MEDICINE

## 2020-07-16 PROCEDURE — 3008F BODY MASS INDEX DOCD: CPT | Performed by: FAMILY MEDICINE

## 2020-07-16 PROCEDURE — 90715 TDAP VACCINE 7 YRS/> IM: CPT | Performed by: FAMILY MEDICINE

## 2020-07-16 PROCEDURE — 99214 OFFICE O/P EST MOD 30 MIN: CPT | Performed by: FAMILY MEDICINE

## 2020-07-16 RX ORDER — LOSARTAN POTASSIUM 25 MG/1
25 TABLET ORAL DAILY
Qty: 90 TABLET | Refills: 1 | Status: SHIPPED | OUTPATIENT
Start: 2020-07-16 | End: 2020-09-17 | Stop reason: SDUPTHER

## 2020-07-16 RX ORDER — ALLOPURINOL 100 MG/1
100 TABLET ORAL DAILY
Qty: 90 TABLET | Refills: 1 | Status: SHIPPED | OUTPATIENT
Start: 2020-07-16 | End: 2020-09-17

## 2020-07-16 RX ORDER — OMEPRAZOLE 40 MG/1
40 CAPSULE, DELAYED RELEASE ORAL
Qty: 30 CAPSULE | Refills: 1 | Status: SHIPPED | OUTPATIENT
Start: 2020-07-16 | End: 2020-07-16

## 2020-07-16 RX ORDER — OMEPRAZOLE 40 MG/1
CAPSULE, DELAYED RELEASE ORAL
Qty: 90 CAPSULE | Refills: 1 | Status: SHIPPED | OUTPATIENT
Start: 2020-07-16 | End: 2022-02-02

## 2020-07-16 NOTE — PROGRESS NOTES
Assessment/Plan:    Will get x-ray of the right hand to evaluate for an for joint erosion will refer patient to orthopedic hand surgeon for evaluation  His uric acid level 7 6 with gouty attack recently and his kidney function sometime go up and blood pressure high even have gouty damage will protect his kidneys will start patient on allopurinol 100 mg daily  Advised to cut back insurance and stop drinking white vinegar because that can is acid-base his S reflux  Recent usage of prednisone for gout can also worsening gastro reflux  Will start patient on omeprazole 40 mg daily for 6 weeks will see him back in 6-8 weeks at that time is not improved will have him seen back with gastroenterologist for EGD  Continue amlodipine 5 mg and losartan 25 mg daily Tdap and Shingrix given to him today 2nd Shingrix read done in 2 months  Problem List Items Addressed This Visit        Digestive    Gastroesophageal reflux disease without esophagitis       Cardiovascular and Mediastinum    Benign essential HTN - Primary    Relevant Medications    losartan (COZAAR) 25 mg tablet       Other    Right hand pain    Relevant Orders    XR hand 3+ vw right    Ambulatory referral to Orthopedic Surgery    Gout of multiple sites    Relevant Medications    allopurinol (ZYLOPRIM) 100 mg tablet      Other Visit Diagnoses     Essential hypertension        Relevant Medications    losartan (COZAAR) 25 mg tablet    Encounter for immunization        Relevant Orders    Zoster Vaccine Recombinant IM (Completed)    TDAP VACCINE GREATER THAN OR EQUAL TO 6YO IM (Completed)            Subjective:      Patient ID: Shadi Summers is a 62 y o  male  20-year-old male follow-up essential hypertension and blood test results  Discussed blood test with patient show hemoglobin C6 0 2 with prediabetic range  His uric acid level 7 6  He had a gouty attack on his left foot that went away with diclofenac indomethacin and prednisone    X-rays show arthritis but no fracture  He also complained of right hand pain especially the base of the thumb for many months  Sharp stabbing achy pain and swollen the muscle at the palm size he wears brace that he try Tylenol and ice with no relief  He is right handed  He exercise and live weight only 5 lb but that does not any better  He felt that the past month his acid reflux is acting up  He was on omeprazole in the past that resolved  He had HD done 5 years ago that was a show some esophagitis  He is on amlodipine 5 mg daily and losartan 25 mg daily  He had lymph node biopsy that was benign for result  He denies any trauma to his hand  He eats a lot of stroke and he drinks organic white vinegar he heard the is good for his internal organs  The following portions of the patient's history were reviewed and updated as appropriate:   He has a past medical history of Arthritis, Asthma, Back pain, Gastroesophageal reflux disease without esophagitis (7/16/2020), GERD (gastroesophageal reflux disease), Gout of multiple sites (7/16/2020), Hiatal hernia, Hypertension, Impaired fasting glucose (6/18/2020), Lipoma of neck, Mediastinal lymphadenopathy, Osteoporosis, Right hand pain (7/16/2020), and Sleep disorder  ,  does not have any pertinent problems on file  ,   has a past surgical history that includes Lumbar fusion (07/11/2018); EGD (03/31/2017); Tonsillectomy (01/01/1980); Colonoscopy (10/07/2016); Elbow surgery (Left, 04/09/2018); Hernia repair (01/16/2019); Rotator cuff repair (Bilateral); Colonoscopy; Back surgery; pr bronchoscopy,diagnostic (N/A, 4/28/2020); and pr bronchoscopy needle bx trachea main stem&/bron (N/A, 4/28/2020)  ,  family history includes Hypertension in his father and mother  ,   reports that he quit smoking about 36 years ago  His smoking use included cigarettes  He has a 3 00 pack-year smoking history   He has never used smokeless tobacco  He reports that he drinks about 7 0 standard drinks of alcohol per week  He reports that he does not use drugs  ,  is allergic to nuts; peanut oil; codeine; latex; and dust mite extract     Current Outpatient Medications   Medication Sig Dispense Refill    amLODIPine (NORVASC) 10 mg tablet Take 1 tablet (10 mg total) by mouth daily 90 tablet 1    clotrimazole (LOTRIMIN) 1 % cream Apply topically 2 (two) times a day      Diclofenac Sodium 2 % SOLN Place on the skin      ketoconazole (NIZORAL) 2 % shampoo Apply topically once      allopurinol (ZYLOPRIM) 100 mg tablet Take 1 tablet (100 mg total) by mouth daily 90 tablet 1    fluticasone (FLONASE) 50 mcg/act nasal spray 2 sprays into each nostril daily      losartan (COZAAR) 25 mg tablet Take 1 tablet (25 mg total) by mouth daily 90 tablet 1    omeprazole (PriLOSEC) 40 MG capsule TAKE 1 CAPSULE(40 MG) BY MOUTH DAILY BEFORE BREAKFAST 90 capsule 1     No current facility-administered medications for this visit  Review of Systems   Constitutional: Negative for activity change, appetite change, fatigue, fever and unexpected weight change  HENT: Negative for dental problem and trouble swallowing  Eyes: Negative for photophobia and visual disturbance  Respiratory: Negative for cough and chest tightness  Cardiovascular: Negative for chest pain, palpitations and leg swelling  Gastrointestinal: Positive for nausea  Negative for abdominal pain, constipation and vomiting  Endocrine: Negative for cold intolerance, polydipsia and polyuria  Genitourinary: Negative for difficulty urinating, frequency and urgency  Musculoskeletal: Positive for arthralgias and joint swelling  Negative for myalgias and neck pain  Skin: Negative for color change, rash and wound  Allergic/Immunologic: Negative for environmental allergies  Neurological: Negative for dizziness, weakness and numbness  Hematological: Does not bruise/bleed easily     Psychiatric/Behavioral: Negative for decreased concentration, dysphoric mood, self-injury, sleep disturbance and suicidal ideas  Objective:  Vitals:    07/16/20 1338   BP: 130/84   BP Location: Left arm   Patient Position: Sitting   Cuff Size: Standard   Pulse: 85   Resp: 17   Temp: (!) 97 3 °F (36 3 °C)   TempSrc: Temporal   SpO2: 97%   Weight: 78 7 kg (173 lb 6 4 oz)   Height: 5' 7" (1 702 m)     Body mass index is 27 16 kg/m²  Physical Exam   Constitutional: He is oriented to person, place, and time  He appears well-developed and well-nourished  HENT:   Head: Normocephalic and atraumatic  Eyes: Pupils are equal, round, and reactive to light  EOM are normal    Neck: Normal range of motion  Neck supple  Pulmonary/Chest: Effort normal and breath sounds normal    Musculoskeletal: Normal range of motion  Right hand right thumb swollen tender on palpation no warmth no effusion   Neurological: He is alert and oriented to person, place, and time  Skin: Skin is warm and dry  Psychiatric: He has a normal mood and affect  His behavior is normal  Judgment and thought content normal    Nursing note and vitals reviewed

## 2020-07-21 ENCOUNTER — CONSULT (OUTPATIENT)
Dept: OBGYN CLINIC | Facility: CLINIC | Age: 60
End: 2020-07-21
Payer: COMMERCIAL

## 2020-07-21 VITALS
BODY MASS INDEX: 27.15 KG/M2 | HEIGHT: 67 IN | HEART RATE: 86 BPM | WEIGHT: 173 LBS | DIASTOLIC BLOOD PRESSURE: 86 MMHG | SYSTOLIC BLOOD PRESSURE: 145 MMHG

## 2020-07-21 DIAGNOSIS — M18.11 PRIMARY OSTEOARTHRITIS OF FIRST CARPOMETACARPAL JOINT OF RIGHT HAND: Primary | ICD-10-CM

## 2020-07-21 DIAGNOSIS — M79.641 RIGHT HAND PAIN: ICD-10-CM

## 2020-07-21 PROCEDURE — 20600 DRAIN/INJ JOINT/BURSA W/O US: CPT | Performed by: SURGERY

## 2020-07-21 PROCEDURE — 99204 OFFICE O/P NEW MOD 45 MIN: CPT | Performed by: SURGERY

## 2020-07-21 RX ORDER — TRIAMCINOLONE ACETONIDE 40 MG/ML
20 INJECTION, SUSPENSION INTRA-ARTICULAR; INTRAMUSCULAR
Status: COMPLETED | OUTPATIENT
Start: 2020-07-21 | End: 2020-07-21

## 2020-07-21 RX ORDER — BUPIVACAINE HYDROCHLORIDE 2.5 MG/ML
0.5 INJECTION, SOLUTION INFILTRATION; PERINEURAL
Status: COMPLETED | OUTPATIENT
Start: 2020-07-21 | End: 2020-07-21

## 2020-07-21 RX ADMIN — TRIAMCINOLONE ACETONIDE 20 MG: 40 INJECTION, SUSPENSION INTRA-ARTICULAR; INTRAMUSCULAR at 11:15

## 2020-07-21 RX ADMIN — BUPIVACAINE HYDROCHLORIDE 0.5 ML: 2.5 INJECTION, SOLUTION INFILTRATION; PERINEURAL at 11:15

## 2020-07-21 NOTE — PROGRESS NOTES
Vinay FRANCO  Attending, Orthopaedic Surgery  Hand, Wrist, and Elbow Surgery  [facility]      ORTHOPAEDIC HAND, WRIST, AND ELBOW OFFICE  VISIT       ASSESSMENT/PLAN:      63 yo male with right CMC arthritis  CSI done today and comfort cool brace provided  He was also given script for Voltaren gel and OT for hard brace to wear at night  Discussed that he can get these injections every 3 months for as long as they continue to help him  Down the line [facility] arthroplasty may be a possibility however would like to maximize non operative treatments 1st   Would like to follow up with him in about 6 weeks for repeat evaluation  The patient verbalized understanding of exam findings and treatment plan  We engaged in the shared decision-making process and treatment options were discussed at length with the patient  Surgical and conservative management discussed today along with risks and benefits  Diagnoses and all orders for this visit:    Primary osteoarthritis of first carpometacarpal joint of right hand  -     Ambulatory referral to PT/OT hand therapy; Future  -     diclofenac sodium (VOLTAREN) 1 %; Apply 2 g topically 4 (four) times a day    Right hand pain  -     Ambulatory referral to Orthopedic Surgery        Follow Up:  Return in about 6 weeks (around 9/1/2020) for Recheck  To Do Next Visit:  Re-evaluation of current issue      General Discussions:  [facility] Arthritis: The anatomy and physiology of carpometacarpal joint arthritis was discussed with the patient today in the office  Deterioration of the articular cartilage eventually leads to hypermobility at the thumb [facility] joint, resulting in joint subluxation, osteophyte formation, cystic changes within the trapezium and base of the first metacarpal, as well as subchondral sclerosis  Eventually, pain, limited mobility, and compensatory hyperextension at the metacarpophalangeal joint may develop    While normal activity and usage of the thumb joint may provide a painful experience to the patient, this typically does not result in damage to the thumb or hand  Treatment options include resting thumb spica splints to decreased joint edema, pain, and inflammation  Therapy exercises to strengthen the thenar musculature may relieve pain, but do not alter the overall continued development of osteoarthritis  Oral medications, topical medications, corticosteroid injections may decrease pain and increase overall function  Eventually, approximately 5% of patients may require surgical intervention  ____________________________________________________________________________________________________________________________________________      CHIEF COMPLAINT:  Chief Complaint   Patient presents with    Right Hand - Pain       SUBJECTIVE:  Radha Peña is a 62y o  year old RHD male seen in consultation requested by Dr Mignon Herzog who presents for evaluation of right thumb pain that has been ongoing for several months  He notes pain at the [facility] joint worse with lifting, gripping, and pinching  He does have a history of gout that attacks his first toe, currently on allopurinol  He denies numbness/tingling or injury to the area  He does wear a brace that his wife gave him but this does not help much         Pain/symptom timing:  Worse during the day when active  Pain/symptom context:  Worse with activites and work  Pain/symptom modifying factors:  Rest makes better, activities make worse  Pain/symptom associated signs/symptoms: none    Prior treatment   · NSAIDsYes   · Injections No   · Bracing/Orthotics Yes    Physical Therapy No     I have personally reviewed all the relevant PMH, PSH, SH, FH, Medications and allergies      PAST MEDICAL HISTORY:  Past Medical History:   Diagnosis Date    Arthritis     Asthma     Back pain     Gastroesophageal reflux disease without esophagitis 2020    GERD (gastroesophageal reflux disease)     Gout of multiple sites 2020    Hiatal hernia     High blood pressure     Hypertension     Impaired fasting glucose 2020    Lipoma of neck     Mediastinal lymphadenopathy     Osteoporosis     Right hand pain 2020    Sleep disorder        PAST SURGICAL HISTORY:  Past Surgical History:   Procedure Laterality Date    BACK SURGERY      COLONOSCOPY  10/07/2016    5 years (per dominique)    COLONOSCOPY      EGD  2017    ELBOW SURGERY Left 2018    open arthrotomy, capsulectomy, loose body removal (per dominique)    HERNIA REPAIR      umbilical with mesh (per dominique)    LUMBAR FUSION  2018    PA BRONCHOSCOPY NEEDLE BX TRACHEA MAIN STEM&/BRON N/A 2020    Procedure: ENDOBRONCHIAL ULTRASOUND (EBUS) WITH BIOPSY;  Surgeon: Mp Bansal MD;  [facility]: [facility];  Service: Thoracic    [address] N/A 2020    Procedure: Gely Logan;  Surgeon: Mp Bansal MD;  [facility];  Service: Thoracic    ROTATOR CUFF REPAIR Bilateral     TONSILLECTOMY  1980       FAMILY HISTORY:  Family History   Problem Relation Age of Onset    Hypertension Mother     Hypertension Father        SOCIAL HISTORY:  Social History     Tobacco Use    Smoking status: Former Smoker     Packs/day: 0 50     Years: 6 00     Pack years: 3 00     Types: Cigarettes     Last attempt to quit:      Years since quittin 5    Smokeless tobacco: Never Used   Substance Use Topics    Alcohol use: Yes     Alcohol/week: 7 0 standard drinks     Types: 7 Standard drinks or equivalent per week     Frequency: 4 or more times a week     Drinks per session: 1 or 2     Comment: one glass per night      Drug use: No       MEDICATIONS:    Current Outpatient Medications:    allopurinol (ZYLOPRIM) 100 mg tablet, Take 1 tablet (100 mg total) by mouth daily, Disp: 90 tablet, Rfl: 1    amLODIPine (NORVASC) 10 mg tablet, Take 1 tablet (10 mg total) by mouth daily, Disp: 90 tablet, Rfl: 1    clotrimazole (LOTRIMIN) 1 % cream, Apply topically 2 (two) times a day, Disp: , Rfl:     Diclofenac Sodium 2 % SOLN, Place on the skin, Disp: , Rfl:     ketoconazole (NIZORAL) 2 % shampoo, Apply topically once, Disp: , Rfl:     losartan (COZAAR) 25 mg tablet, Take 1 tablet (25 mg total) by mouth daily, Disp: 90 tablet, Rfl: 1    omeprazole (PriLOSEC) 40 MG capsule, TAKE 1 CAPSULE(40 MG) BY MOUTH DAILY BEFORE BREAKFAST, Disp: 90 capsule, Rfl: 1    diclofenac sodium (VOLTAREN) 1 %, Apply 2 g topically 4 (four) times a day, Disp: 1 Tube, Rfl: 2    fluticasone (FLONASE) 50 mcg/act nasal spray, 2 sprays into each nostril daily, Disp: , Rfl:     ALLERGIES:  Allergies   Allergen Reactions    Nuts Shortness Of Breath    Peanut Oil Anaphylaxis     Anything using peanuts    Codeine Other (See Comments)     Dizzy, light headed  Body of balance      Latex Rash     Allergic to products, wears cotton underwear    Dust Mite Extract            REVIEW OF SYSTEMS:  Review of Systems   Constitutional: Negative for chills, fatigue, fever and unexpected weight change  HENT: Negative for congestion, dental problem, facial swelling, hearing loss, sneezing, sore throat, trouble swallowing and voice change  Respiratory: Negative for chest tightness, shortness of breath, wheezing and stridor  Cardiovascular: Negative for chest pain, palpitations and leg swelling  Gastrointestinal: Negative for abdominal pain, diarrhea, nausea and vomiting  Endocrine: Negative for cold intolerance and heat intolerance  Musculoskeletal: Positive for arthralgias  Negative for joint swelling, myalgias and neck pain  Skin: Negative for color change, pallor, rash and wound     Neurological: Negative for tremors, facial asymmetry, speech difficulty, weakness and numbness  VITALS:  Vitals:    07/21/20 1031   BP: 145/86   Pulse: 86       LABS:  HgA1c:   Lab Results   Component Value Date    HGBA1C 6 2 (H) 06/17/2020     BMP:   Lab Results   Component Value Date    CALCIUM 9 1 04/24/2020    K 4 1 06/17/2020    CO2 19 (L) 06/17/2020     06/17/2020    BUN 11 06/17/2020    CREATININE 1 11 06/17/2020       _____________________________________________________  PHYSICAL EXAMINATION:  General: well developed and well nourished, alert, oriented times 3 and appears comfortable  Psychiatric: Normal  HEENT: Normocephalic, Atraumatic Trachea Midline, No torticollis  Pulmonary: No audible wheezing or respiratory distress   Cardiovascular: No pitting edema, 2+ radial pulse   Skin: No masses, erythema, lacerations, fluctation, ulcerations  Neurovascular: Sensation Intact to the Median, Ulnar, Radial Nerve, Motor Intact to the Median, Ulnar, Radial Nerve and Pulses Intact  Musculoskeletal: Normal, except as noted in detailed exam and in HPI        MUSCULOSKELETAL EXAMINATION:  right CMC Exam:  No adduction contracture  No hyperextension deformity of MCP joint  Positive localized tenderness over radial and dorsal aspect of thumb (CMC joint)  Grind test is Positive for pain and Positive for crepitus  Metacarpal load shift test positive for pain  No triggering or tenderness over the A1 pulley  No pain with Finkelsteins maneuver       ___________________________________________________  STUDIES REVIEWED:  I have personally reviewed AP lateral and oblique radiographs of right hand   which demonstrate severe cmc arthritis at first [facility] joint           PROCEDURES PERFORMED:  Small joint arthrocentesis: R thumb CMC  Date/Time: 7/21/2020 11:15 AM  Consent given by: patient  Site marked: site marked  Timeout: Immediately prior to procedure a time out was called to verify the correct patient, procedure, equipment, support staff and site/side marked as required   Supporting Documentation  Indications: pain   Procedure Details  Location: thumb - R thumb CMC  Needle size: 25 G  Ultrasound guidance: no  Approach: dorsal  Medications administered: 20 mg triamcinolone acetonide 40 mg/mL; 0 5 mL bupivacaine 0 25 %    Patient tolerance: patient tolerated the procedure well with no immediate complications  Dressing:  Sterile dressing applied             _____________________________________________________      Scribe Attestation    I,:   Katlyn Hussein PA-C am acting as a scribe while in the presence of the attending physician :        I,:   Gege Liao MD personally performed the services described in this documentation    as scribed in my presence :

## 2020-08-06 ENCOUNTER — OFFICE VISIT (OUTPATIENT)
Dept: OCCUPATIONAL THERAPY | Facility: CLINIC | Age: 60
End: 2020-08-06
Payer: COMMERCIAL

## 2020-08-06 DIAGNOSIS — M18.11 PRIMARY OSTEOARTHRITIS OF FIRST CARPOMETACARPAL JOINT OF RIGHT HAND: ICD-10-CM

## 2020-08-06 PROCEDURE — L3913 HFO W/O JOINTS CF: HCPCS | Performed by: OCCUPATIONAL THERAPIST

## 2020-08-06 NOTE — PROGRESS NOTES
Orthotic Fabrication     Diagnosis:   1  Arthritis of carpometacarpal (CMC) joint of right thumb  Ambulatory referral to PT/OT hand therapy     Indication: Joint Protection and Arthritic Condition    Location: Right  hand and thumb  Supplies: Custom Fit Orthotic  Splint type: Hand-Based SPICA  Wearing Schedule: Night only, wearing comfort cool during the day  Describe Position: Mid abduction and extension  Precautions: Universal (skin contact/breakdown)    Patient or Caregiver expresses understanding of wearing Schedule and Precautions? Yes  Patient or Caregiver able to don/doff orthotic independently? Yes    Written orders provided to patient?  Yes  Orders Obtained: Written  Orders Obtained from: [facility]    Return for evaluation and treatment No

## 2020-08-11 ENCOUNTER — OFFICE VISIT (OUTPATIENT)
Dept: PULMONOLOGY | Facility: CLINIC | Age: 60
End: 2020-08-11
Payer: COMMERCIAL

## 2020-08-11 VITALS
SYSTOLIC BLOOD PRESSURE: 148 MMHG | DIASTOLIC BLOOD PRESSURE: 86 MMHG | OXYGEN SATURATION: 98 % | TEMPERATURE: 98.1 F | BODY MASS INDEX: 27.06 KG/M2 | WEIGHT: 172.38 LBS | HEIGHT: 67 IN | HEART RATE: 82 BPM

## 2020-08-11 DIAGNOSIS — G47.33 OSA ON CPAP: Primary | ICD-10-CM

## 2020-08-11 DIAGNOSIS — Z99.89 OSA ON CPAP: Primary | ICD-10-CM

## 2020-08-11 DIAGNOSIS — I10 BENIGN ESSENTIAL HTN: ICD-10-CM

## 2020-08-11 DIAGNOSIS — R59.0 MEDIASTINAL LYMPHADENOPATHY: ICD-10-CM

## 2020-08-11 PROBLEM — G47.09 OTHER INSOMNIA: Status: ACTIVE | Noted: 2020-08-11

## 2020-08-11 PROCEDURE — 3079F DIAST BP 80-89 MM HG: CPT | Performed by: INTERNAL MEDICINE

## 2020-08-11 PROCEDURE — 99214 OFFICE O/P EST MOD 30 MIN: CPT | Performed by: INTERNAL MEDICINE

## 2020-08-11 PROCEDURE — 1036F TOBACCO NON-USER: CPT | Performed by: INTERNAL MEDICINE

## 2020-08-11 PROCEDURE — 3077F SYST BP >= 140 MM HG: CPT | Performed by: INTERNAL MEDICINE

## 2020-08-11 NOTE — ASSESSMENT & PLAN NOTE
Mr Genna Lee was diagnosed with obstructive sleep apnea before and was advised CPAP therapy  He had a repeat sleep study in view of his persistent insomnia on Feb 15 2019 and it showed moderate obstructive sleep apnea with oxygen desaturation and sleep fragmentation  During the same study it was determined that his sleep apnea can be treated with CPAP 7 cm of water  He was subsequently started on CPAP therapy and found it beneficial  he felt less tired and sleepy during the day  His sleep was also less interrupted  However he continues to have sleep maintenance insomnia  The snoring and reported apneic events had resolved  He has hypertension as comorbidity  Currently he is not using the CPAP regularly and his CPAP compliance is poor  His residual AHI however is low  I have highlighted to him the need for using CPAP regularly and the problems with untreated sleep apnea  He also has significant insomnia  I have instructed regarding sleep hygiene   I have referred him for a mask fit with  DME    I had a long discussion with him and answered all his questions

## 2020-08-11 NOTE — ASSESSMENT & PLAN NOTE
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

## 2020-08-11 NOTE — PROGRESS NOTES
Assessment/Plan:    JADYN on CPAP  Mr Ron Jordan was diagnosed with obstructive sleep apnea before and was advised CPAP therapy  He had a repeat sleep study in view of his persistent insomnia on Feb 15 2019 and it showed moderate obstructive sleep apnea with oxygen desaturation and sleep fragmentation  During the same study it was determined that his sleep apnea can be treated with CPAP 7 cm of water  He was subsequently started on CPAP therapy and found it beneficial  he felt less tired and sleepy during the day  His sleep was also less interrupted  However he continues to have sleep maintenance insomnia  The snoring and reported apneic events had resolved  He has hypertension as comorbidity  Currently he is not using the CPAP regularly and his CPAP compliance is poor  His residual AHI however is low  I have highlighted to him the need for using CPAP regularly and the problems with untreated sleep apnea  He also has significant insomnia  I have instructed regarding sleep hygiene   I have referred him for a mask fit with  DME    I had a long discussion with him and answered all his questions  Other insomnia  She has history of insomnia for a long time  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  Currently he sleeps for about 4 hours from 2 AM to 6 AM every night  His sleep is interrupted, though better after starting on CPAP therapy  He takes melatonin occasionally  He is not using oxycodone regularly  currently he has mainly sleep maintenance insomnia  I have advised him regarding sleep hygiene  He also consumes alcohol about 3 times a week  Mediastinal lymphadenopathy  He has history of mediastinal lymphadenopathy diagnosed in 2017 and had reportedly underwent bronchoscopy and biopsy at that time  He received prednisone therapy for some time  His CT PET scan from April 20, 2020 showed FDG avid mediastinal lymphadenopathy knee underwent EBUS and biopsy which were reportedly negative for malignancy  He also had cervical lymphadenopathy which was negative for malignancy  Follows with Dr Kameron Valle from Oncology    Benign essential HTN  He has history of hypertension and is currently on treatment with amlodipine and losartan       Diagnoses and all orders for this visit:    JADYN on CPAP  -     Mask fitting only; Future    Mediastinal lymphadenopathy    Benign essential HTN          Subjective:      Patient ID: Ino Wan is a 62 y o  male  Mr Kermit Shaffer has come for follow-up for his obstructive sleep apnea previously on CPAP therapy  He was diagnosed with mild to moderate obstructive sleep apnea with oxygen desaturation and was advised CPAP therapy at 7 cm of water  He used CPAP for some time and stopped using it after he had problems with the mask and pressure  When he used it he found benefit from CPAP therapy  Currently his sleep is disturbed and he has sleep maintenance insomnia  He also occasionally feels sleepy during the day  He has no morning headache  His sleep is fragmented  He was previously with [facility] and he is not satisfied with their service  He wants to try another DME  He has hypertension as comorbidity  He has sleep maintenance insomnia and has been having this for a while  He states that he used to work shifts that night and his sleep pattern has been always disturbed  Currently he uses melatonin and states that it is helping him  He also consumes cornea 3 to 4 times a week  He does not feel that alcohol is the cause for his insomnia  Currently he is not on CPAP therapy also  He has history of mediastinal lymphadenopathy diagnosed in 2017 and had underwent bronchoscopy and biopsy according to him  He was put on prednisone for few months by his pulmonologist at that time  His CT scan of the chest had shown  multiple lymphadenopathy including mediastinal and supraclavicular    He was found to have right posterior neck lymphadenopathy and underwent biopsy which did not show any evidence of malignancy  In April 2020 he also underwent EBUS biopsy for mediastinal lymphadenopathy which was FDG avid  The results from this biopsy were also not suggestive of any malignancy  He follows with Dr Igor Lam now  He denies any weight loss or anorexia or fever or chills  He denies any lymphadenopathy currently  The following portions of the patient's history were reviewed and updated as appropriate: allergies, current medications, past family history, past medical history, past social history, past surgical history and problem list     Review of Systems   Constitutional: Negative for chills, fatigue, fever and unexpected weight change  HENT: Positive for sneezing  Negative for congestion, dental problem, hearing loss, postnasal drip, rhinorrhea, sore throat, trouble swallowing and voice change  Respiratory: Negative for cough, chest tightness, shortness of breath, wheezing and stridor  Cardiovascular: Negative for palpitations and leg swelling  Gastrointestinal: Negative for abdominal distention, constipation, diarrhea and vomiting  Genitourinary: Negative for difficulty urinating, frequency and urgency  Musculoskeletal: Positive for arthralgias and back pain  Negative for gait problem and myalgias  Skin: Negative for pallor and rash  Allergic/Immunologic: Positive for environmental allergies  Neurological: Negative for dizziness, tremors, seizures, speech difficulty and light-headedness  Psychiatric/Behavioral: Positive for sleep disturbance  Negative for agitation and confusion  Objective:      /86 (BP Location: Left arm, Patient Position: Sitting, Cuff Size: Adult)   Pulse 82   Temp 98 1 °F (36 7 °C) (Tympanic)   Ht 5' 7" (1 702 m)   Wt 78 2 kg (172 lb 6 oz)   SpO2 98%   BMI 27 00 kg/m²          Physical Exam  Constitutional:       General: He is not in acute distress  Appearance: He is not ill-appearing, toxic-appearing or diaphoretic  HENT:      Head: Normocephalic  Eyes:      General: No scleral icterus  Conjunctiva/sclera: Conjunctivae normal    Neck:      Musculoskeletal: No neck rigidity or muscular tenderness  Cardiovascular:      Rate and Rhythm: Normal rate and regular rhythm  Heart sounds: Normal heart sounds  No murmur  Pulmonary:      Effort: Pulmonary effort is normal  No respiratory distress  Breath sounds: Normal breath sounds  No stridor  No wheezing, rhonchi or rales  Chest:      Chest wall: No tenderness  Abdominal:      General: Bowel sounds are normal       Palpations: Abdomen is soft  Musculoskeletal:         General: No swelling or tenderness  Right lower leg: No edema  Left lower leg: No edema  Lymphadenopathy:      Cervical: No cervical adenopathy  Skin:     General: Skin is warm and dry  Coloration: Skin is not jaundiced or pale  Findings: No rash  Neurological:      Mental Status: He is alert and oriented to person, place, and time  Psychiatric:         Mood and Affect: Mood normal          Thought Content:  Thought content normal          Judgment: Judgment normal

## 2020-08-11 NOTE — LETTER
August 14, 2020     MD [facility] [address]  [address]    Patient: Raymundo Sorto   YOB: 1960   Date of Visit: 8/11/2020       Dear Dr Katie Bland Recipients: Thank you for referring Alea Crawford to me for evaluation  Below are my notes for this consultation  If you have questions, please do not hesitate to call me  I look forward to following your patient along with you  Sincerely,        Leah Stevens MD        CC: No Recipients  Leah Stevens MD  8/14/2020 10:48 AM  Sign when Signing Visit  Assessment/Plan:    JADYN on CPAP  Mr Daron Alcantara was diagnosed with obstructive sleep apnea before and was advised CPAP therapy  He had a repeat sleep study in view of his persistent insomnia on Feb 15 2019 and it showed moderate obstructive sleep apnea with oxygen desaturation and sleep fragmentation  During the same study it was determined that his sleep apnea can be treated with CPAP 7 cm of water  He was subsequently started on CPAP therapy and found it beneficial  he felt less tired and sleepy during the day  His sleep was also less interrupted  However he continues to have sleep maintenance insomnia  The snoring and reported apneic events had resolved  He has hypertension as comorbidity  Currently he is not using the CPAP regularly and his CPAP compliance is poor  His residual AHI however is low  I have highlighted to him the need for using CPAP regularly and the problems with untreated sleep apnea  He also has significant insomnia  I have instructed regarding sleep hygiene   I have referred him for a mask fit with  DME    I had a long discussion with him and answered all his questions  Other insomnia  She has history of insomnia for a long time  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  Currently he sleeps for about 4 hours from 2 AM to 6 AM every night  His sleep is interrupted, though better after starting on CPAP therapy   He takes melatonin occasionally  He is not using oxycodone regularly  currently he has mainly sleep maintenance insomnia  I have advised him regarding sleep hygiene  He also consumes alcohol about 3 times a week  Mediastinal lymphadenopathy  He has history of mediastinal lymphadenopathy diagnosed in 2017 and had reportedly underwent bronchoscopy and biopsy at that time  He received prednisone therapy for some time  His CT PET scan from April 20, 2020 showed FDG avid mediastinal lymphadenopathy knee underwent EBUS and biopsy which were reportedly negative for malignancy  He also had cervical lymphadenopathy which was negative for malignancy  Follows with Dr Shankar Doctor from Oncology    Benign essential HTN  He has history of hypertension and is currently on treatment with amlodipine and losartan       Diagnoses and all orders for this visit:    JADYN on CPAP  -     Mask fitting only; Future    Mediastinal lymphadenopathy    Benign essential HTN          Subjective:      Patient ID: Kimmie Tariq is a 62 y o  male  Mr Dang Yanes has come for follow-up for his obstructive sleep apnea previously on CPAP therapy  He was diagnosed with mild to moderate obstructive sleep apnea with oxygen desaturation and was advised CPAP therapy at 7 cm of water  He used CPAP for some time and stopped using it after he had problems with the mask and pressure  When he used it he found benefit from CPAP therapy  Currently his sleep is disturbed and he has sleep maintenance insomnia  He also occasionally feels sleepy during the day  He has no morning headache  His sleep is fragmented  He was previously with [facility] and he is not satisfied with their service  He wants to try another DME  He has hypertension as comorbidity  He has sleep maintenance insomnia and has been having this for a while  He states that he used to work shifts that night and his sleep pattern has been always disturbed    Currently he uses melatonin and states that it is helping him  He also consumes cornea 3 to 4 times a week  He does not feel that alcohol is the cause for his insomnia  Currently he is not on CPAP therapy also  He has history of mediastinal lymphadenopathy diagnosed in 2017 and had underwent bronchoscopy and biopsy according to him  He was put on prednisone for few months by his pulmonologist at that time  His CT scan of the chest had shown  multiple lymphadenopathy including mediastinal and supraclavicular  He was found to have right posterior neck lymphadenopathy and underwent biopsy which did not show any evidence of malignancy  In April 2020 he also underwent EBUS biopsy for mediastinal lymphadenopathy which was FDG avid  The results from this biopsy were also not suggestive of any malignancy  He follows with Dr Summer Solis now  He denies any weight loss or anorexia or fever or chills  He denies any lymphadenopathy currently  The following portions of the patient's history were reviewed and updated as appropriate: allergies, current medications, past family history, past medical history, past social history, past surgical history and problem list     Review of Systems   Constitutional: Negative for chills, fatigue, fever and unexpected weight change  HENT: Positive for sneezing  Negative for congestion, dental problem, hearing loss, postnasal drip, rhinorrhea, sore throat, trouble swallowing and voice change  Respiratory: Negative for cough, chest tightness, shortness of breath, wheezing and stridor  Cardiovascular: Negative for palpitations and leg swelling  Gastrointestinal: Negative for abdominal distention, constipation, diarrhea and vomiting  Genitourinary: Negative for difficulty urinating, frequency and urgency  Musculoskeletal: Positive for arthralgias and back pain  Negative for gait problem and myalgias  Skin: Negative for pallor and rash  Allergic/Immunologic: Positive for environmental allergies     Neurological: Negative for dizziness, tremors, seizures, speech difficulty and light-headedness  Psychiatric/Behavioral: Positive for sleep disturbance  Negative for agitation and confusion  Objective:      /86 (BP Location: Left arm, Patient Position: Sitting, Cuff Size: Adult)   Pulse 82   Temp 98 1 °F (36 7 °C) (Tympanic)   Ht 5' 7" (1 702 m)   Wt 78 2 kg (172 lb 6 oz)   SpO2 98%   BMI 27 00 kg/m²          Physical Exam  Constitutional:       General: He is not in acute distress  Appearance: He is not ill-appearing, toxic-appearing or diaphoretic  HENT:      Head: Normocephalic  Eyes:      General: No scleral icterus  Conjunctiva/sclera: Conjunctivae normal    Neck:      Musculoskeletal: No neck rigidity or muscular tenderness  Cardiovascular:      Rate and Rhythm: Normal rate and regular rhythm  Heart sounds: Normal heart sounds  No murmur  Pulmonary:      Effort: Pulmonary effort is normal  No respiratory distress  Breath sounds: Normal breath sounds  No stridor  No wheezing, rhonchi or rales  Chest:      Chest wall: No tenderness  Abdominal:      General: Bowel sounds are normal       Palpations: Abdomen is soft  Musculoskeletal:         General: No swelling or tenderness  Right lower leg: No edema  Left lower leg: No edema  Lymphadenopathy:      Cervical: No cervical adenopathy  Skin:     General: Skin is warm and dry  Coloration: Skin is not jaundiced or pale  Findings: No rash  Neurological:      Mental Status: He is alert and oriented to person, place, and time  Psychiatric:         Mood and Affect: Mood normal          Thought Content:  Thought content normal          Judgment: Judgment normal

## 2020-08-12 ENCOUNTER — TELEPHONE (OUTPATIENT)
Dept: FAMILY MEDICINE CLINIC | Facility: CLINIC | Age: 60
End: 2020-08-12

## 2020-08-12 NOTE — TELEPHONE ENCOUNTER
Patient called as took 2 doses of losartan accidentally, contacted pharm and poison control and was told it is ok no need to go to ER but wanted to know if they should take dose of amlodopine too today

## 2020-08-14 NOTE — ASSESSMENT & PLAN NOTE
He has history of mediastinal lymphadenopathy diagnosed in 2017 and had reportedly underwent bronchoscopy and biopsy at that time  He received prednisone therapy for some time  His CT PET scan from April 20, 2020 showed FDG avid mediastinal lymphadenopathy knee underwent EBUS and biopsy which were reportedly negative for malignancy  He also had cervical lymphadenopathy which was negative for malignancy    Follows with Dr Anna Salmon from Oncology

## 2020-08-19 ENCOUNTER — TRANSCRIBE ORDERS (OUTPATIENT)
Dept: SLEEP CENTER | Facility: CLINIC | Age: 60
End: 2020-08-19

## 2020-08-20 ENCOUNTER — OFFICE VISIT (OUTPATIENT)
Dept: OBGYN CLINIC | Facility: CLINIC | Age: 60
End: 2020-08-20
Payer: COMMERCIAL

## 2020-08-20 VITALS
DIASTOLIC BLOOD PRESSURE: 90 MMHG | WEIGHT: 169 LBS | BODY MASS INDEX: 26.53 KG/M2 | SYSTOLIC BLOOD PRESSURE: 158 MMHG | HEIGHT: 67 IN | HEART RATE: 88 BPM

## 2020-08-20 DIAGNOSIS — M18.11 PRIMARY OSTEOARTHRITIS OF FIRST CARPOMETACARPAL JOINT OF RIGHT HAND: Primary | ICD-10-CM

## 2020-08-20 PROCEDURE — 3080F DIAST BP >= 90 MM HG: CPT | Performed by: SURGERY

## 2020-08-20 PROCEDURE — 99213 OFFICE O/P EST LOW 20 MIN: CPT | Performed by: SURGERY

## 2020-08-20 PROCEDURE — 1036F TOBACCO NON-USER: CPT | Performed by: SURGERY

## 2020-08-20 PROCEDURE — 3077F SYST BP >= 140 MM HG: CPT | Performed by: SURGERY

## 2020-08-20 PROCEDURE — 3008F BODY MASS INDEX DOCD: CPT | Performed by: SURGERY

## 2020-08-20 NOTE — PROGRESS NOTES
ASSESSMENT/PLAN:      61 yo male with right CMC arthritis   Patient had some improvement with 1st steroid injection but does still have pain  He was offered the injection today using Celestone and Toradol however he is unsure whether his insurance will cover this early  He would like to continue with conservative measures for another 6 weeks prior to trying another shot  Continue wearing braces and using Voltaren gel as needed  Follow-up in 6 week    The patient verbalized understanding of exam findings and treatment plan  We engaged in the shared decision-making process and treatment options were discussed at length with the patient  Surgical and conservative management discussed today along with risks and benefits  Diagnoses and all orders for this visit:    Primary osteoarthritis of first carpometacarpal joint of right hand        Follow Up:  Return in about 6 weeks (around 10/1/2020) for Recheck  To Do Next Visit:  Re-evaluation of current issue    ____________________________________________________________________________________________________________________________________________      CHIEF COMPLAINT:  Chief Complaint   Patient presents with    Right Hand - Follow-up       SUBJECTIVE:  Sally Osman is a 62y o  year old RHD male who presents for follow-up evaluation of right CMC arthritis  Patient received a steroid injection at his last visit, he was also provided a Comfort Cool brace and script for Voltaren gel  He was also provided with a custom hard brace by OT for nighttime use  Patient reports the shot helped to reduce his pain but he does still get twinges of pain  He wears his Comfort Cool brace regularly which does help  He has not tried hard brace at and has not gotten much relief from the Voltaren gel  I have personally reviewed all the relevant PMH, PSH, SH, FH, Medications and allergies       PAST MEDICAL HISTORY:  Past Medical History:   Diagnosis Date    Arthritis  Asthma     Back pain     Gastroesophageal reflux disease without esophagitis 2020    GERD (gastroesophageal reflux disease)     Gout of multiple sites 2020    Hiatal hernia     High blood pressure     Hypertension     Impaired fasting glucose 2020    Lipoma of neck     Mediastinal lymphadenopathy     Osteoporosis     Right hand pain 2020    Sleep disorder        PAST SURGICAL HISTORY:  Past Surgical History:   Procedure Laterality Date    BACK SURGERY      COLONOSCOPY  10/07/2016    5 years (per dominique)    COLONOSCOPY      EGD  2017    ELBOW SURGERY Left 2018    open arthrotomy, capsulectomy, loose body removal (per dominique)    HERNIA REPAIR      umbilical with mesh (per dominique)    LUMBAR FUSION  2018    IA BRONCHOSCOPY NEEDLE BX TRACHEA MAIN STEM&/BRON N/A 2020    Procedure: ENDOBRONCHIAL ULTRASOUND (EBUS) WITH BIOPSY;  Surgeon: Michelle Vásquez MD;  Location: [facility];  Service: Thoracic    [place] [address] N/A 2020    Procedure: Justin Cottrell;  Surgeon: Michelle Vásquez MD;  Location: [facility];  Service: Thoracic    ROTATOR CUFF REPAIR Bilateral     TONSILLECTOMY  1980       FAMILY HISTORY:  Family History   Problem Relation Age of Onset    Hypertension Mother     Hypertension Father        SOCIAL HISTORY:  Social History     Tobacco Use    Smoking status: Former Smoker     Packs/day: 0 50     Years: 6 00     Pack years: 3 00     Types: Cigarettes     Last attempt to quit:      Years since quittin 6    Smokeless tobacco: Never Used   Substance Use Topics    Alcohol use: Yes     Alcohol/week: 7 0 standard drinks     Types: 7 Standard drinks or equivalent per week     Frequency: 4 or more times a week     Drinks per session: 1 or 2     Comment: one glass per night      Drug use: No       MEDICATIONS:    Current Outpatient Medications:     allopurinol (ZYLOPRIM) 100 mg tablet, Take 1 tablet (100 mg total) by mouth daily, Disp: 90 tablet, Rfl: 1    amLODIPine (NORVASC) 10 mg tablet, Take 1 tablet (10 mg total) by mouth daily, Disp: 90 tablet, Rfl: 1    clotrimazole (LOTRIMIN) 1 % cream, Apply topically 2 (two) times a day, Disp: , Rfl:     diclofenac sodium (VOLTAREN) 1 %, Apply 2 g topically 4 (four) times a day, Disp: 1 Tube, Rfl: 2    Diclofenac Sodium 2 % SOLN, Place on the skin, Disp: , Rfl:     fluticasone (FLONASE) 50 mcg/act nasal spray, 2 sprays into each nostril daily, Disp: , Rfl:     ketoconazole (NIZORAL) 2 % shampoo, Apply topically once, Disp: , Rfl:     losartan (COZAAR) 25 mg tablet, Take 1 tablet (25 mg total) by mouth daily, Disp: 90 tablet, Rfl: 1    omeprazole (PriLOSEC) 40 MG capsule, TAKE 1 CAPSULE(40 MG) BY MOUTH DAILY BEFORE BREAKFAST, Disp: 90 capsule, Rfl: 1    ALLERGIES:  Allergies   Allergen Reactions    Nuts Shortness Of Breath    Peanut Oil Anaphylaxis     Anything using peanuts    Codeine Other (See Comments)     Dizzy, light headed  Body of balance      Latex Rash     Allergic to products, wears cotton underwear    Dust Mite Extract        REVIEW OF SYSTEMS:  Review of Systems   Constitutional: Negative for chills, fatigue, fever and unexpected weight change  HENT: Negative for congestion, dental problem, facial swelling, hearing loss, sneezing, sore throat, trouble swallowing and voice change  Respiratory: Negative for chest tightness, shortness of breath, wheezing and stridor  Cardiovascular: Negative for chest pain, palpitations and leg swelling  Gastrointestinal: Negative for abdominal pain, diarrhea, nausea and vomiting  Endocrine: Negative for cold intolerance and heat intolerance  Musculoskeletal: Positive for arthralgias  Negative for joint swelling, myalgias and neck pain  Skin: Negative for color change, pallor, rash and wound  Neurological: Negative for tremors, facial asymmetry, speech difficulty, weakness and numbness  VITALS:  Vitals:    08/20/20 0909   BP: 158/90   Pulse: 88       LABS:  HgA1c:   Lab Results   Component Value Date    HGBA1C 6 2 (H) 06/17/2020     BMP:   Lab Results   Component Value Date    CALCIUM 9 1 04/24/2020    K 4 1 06/17/2020    CO2 19 (L) 06/17/2020     06/17/2020    BUN 11 06/17/2020    CREATININE 1 11 06/17/2020       _____________________________________________________  PHYSICAL EXAMINATION:  General: well developed and well nourished, alert, oriented times 3 and appears comfortable  Psychiatric: Normal  HEENT: Normocephalic, Atraumatic Trachea Midline, No torticollis  Pulmonary: No audible wheezing or respiratory distress   Cardiovascular: No pitting edema, 2+ radial pulse   Skin: No masses, erythema, lacerations, fluctation, ulcerations  Neurovascular: Sensation Intact to the Median, Ulnar, Radial Nerve, Motor Intact to the Median, Ulnar, Radial Nerve and Pulses Intact  Musculoskeletal: Normal, except as noted in detailed exam and in HPI  MUSCULOSKELETAL EXAMINATION:  right CMC Exam:  No adduction contracture  No hyperextension deformity of MCP joint  Mild localized tenderness over radial and dorsal aspect of thumb (CMC joint)  Grind test is Positive for pain and Positive for crepitus  Metacarpal load shift test mildly positive  No triggering or tenderness over the A1 pulley  No pain with Finkelsteins maneuver       ___________________________________________________  STUDIES REVIEWED:  No new studies to review          PROCEDURES PERFORMED:  Procedures  No Procedures performed today    _____________________________________________________    NI Rodriguez Cosign: I supervised the physician assistant and discussed the case with them  I personally performed history and physical exam  I agree with the assessment and plan of care as documented by the physician assistant

## 2020-09-08 ENCOUNTER — TELEPHONE (OUTPATIENT)
Dept: HEMATOLOGY ONCOLOGY | Facility: MEDICAL CENTER | Age: 60
End: 2020-09-08

## 2020-09-08 NOTE — TELEPHONE ENCOUNTER
Patient called in to confirm his CT scan for 09/09/2020 asked if he had any oop expenses I advised to call his insurance

## 2020-09-09 ENCOUNTER — HOSPITAL ENCOUNTER (OUTPATIENT)
Dept: CT IMAGING | Facility: HOSPITAL | Age: 60
Discharge: HOME | End: 2020-09-09
Attending: INTERNAL MEDICINE
Payer: COMMERCIAL

## 2020-09-09 ENCOUNTER — OFFICE VISIT (OUTPATIENT)
Dept: FAMILY MEDICINE CLINIC | Facility: CLINIC | Age: 60
End: 2020-09-09
Payer: COMMERCIAL

## 2020-09-09 VITALS
RESPIRATION RATE: 16 BRPM | HEIGHT: 67 IN | OXYGEN SATURATION: 97 % | DIASTOLIC BLOOD PRESSURE: 76 MMHG | TEMPERATURE: 97.9 F | BODY MASS INDEX: 26.84 KG/M2 | SYSTOLIC BLOOD PRESSURE: 124 MMHG | WEIGHT: 171 LBS | HEART RATE: 82 BPM

## 2020-09-09 DIAGNOSIS — H60.331 ACUTE SWIMMER'S EAR OF RIGHT SIDE: Primary | ICD-10-CM

## 2020-09-09 DIAGNOSIS — R59.0 MEDIASTINAL LYMPHADENOPATHY: ICD-10-CM

## 2020-09-09 PROCEDURE — 99213 OFFICE O/P EST LOW 20 MIN: CPT | Performed by: NURSE PRACTITIONER

## 2020-09-09 PROCEDURE — 70491 CT SOFT TISSUE NECK W/DYE: CPT

## 2020-09-09 PROCEDURE — 71260 CT THORAX DX C+: CPT

## 2020-09-09 RX ORDER — NEOMYCIN SULFATE, POLYMYXIN B SULFATE AND HYDROCORTISONE 10; 3.5; 1 MG/ML; MG/ML; [USP'U]/ML
4 SUSPENSION/ DROPS AURICULAR (OTIC) 4 TIMES DAILY
Qty: 10 ML | Refills: 0 | Status: SHIPPED | OUTPATIENT
Start: 2020-09-09 | End: 2020-09-17

## 2020-09-09 RX ADMIN — IOHEXOL 100 ML: 350 INJECTION, SOLUTION INTRAVENOUS at 09:11

## 2020-09-14 ENCOUNTER — TELEPHONE (OUTPATIENT)
Dept: HEMATOLOGY ONCOLOGY | Facility: CLINIC | Age: 60
End: 2020-09-14

## 2020-09-14 NOTE — PATIENT INSTRUCTIONS
Otitis Externa   WHAT YOU NEED TO KNOW:   Otitis externa, or swimmer's ear, is an infection in the outer ear canal  This canal goes from the outside of the ear to the eardrum  DISCHARGE INSTRUCTIONS:   Return to the emergency department if:   · You have severe ear pain  · You are suddenly unable to hear at all  · You have new swelling in your face, behind your ears, or in your neck  · You suddenly cannot move part of your face  · Your face suddenly feels numb  Contact your healthcare provider if:   · You have a fever  · Your signs and symptoms do not get better after 2 days of treatment  · Your signs and symptoms go away for a time, but then come back  · You have questions or concerns about your condition or care  Medicines:   · NSAIDs , such as ibuprofen, help decrease swelling, pain, and fever  This medicine is available with or without a doctor's order  NSAIDs can cause stomach bleeding or kidney problems in certain people  If you take blood thinner medicine, always ask if NSAIDs are safe for you  Always read the medicine label and follow directions  Do not give these medicines to children under 10months of age without direction from your child's healthcare provider  · Acetaminophen  decreases pain and fever  It is available without a doctor's order  Ask how much to take and how often to take it  Follow directions  Acetaminophen can cause liver damage if not taken correctly  · Ear drops  that contain an antibiotic may be given  The antibiotic helps treat a bacterial infection  You may also be given steroid medicine  The steroid helps decrease redness, swelling, and pain  · Take your medicine as directed  Contact your healthcare provider if you think your medicine is not helping or if you have side effects  Tell him or her if you are allergic to any medicine  Keep a list of the medicines, vitamins, and herbs you take  Include the amounts, and when and why you take them  Bring the list or the pill bottles to follow-up visits  Carry your medicine list with you in case of an emergency  Follow up with your healthcare provider as directed:  Write down your questions so you remember to ask them during your visits  How to use eardrops:   · Lie down on your side with your infected ear facing up  · Carefully drip the correct number of eardrops into your ear  Have another person help you if possible  · Gently move the outside part of your ear back and forth to help the medicine reach your ear canal      · Stay lying down in the same position (with your ear facing up) for 3 to 5 minutes  Prevent otitis externa:   · Do not put cotton swabs or foreign objects in your ears  · Wrap a clean moist washcloth around your finger, and use it to clean your outer ear and remove extra ear wax  · Use ear plugs when you swim  Dry your outer ears completely after you swim or bathe  © 2017 [facility] Information is for End User's use only and may not be sold, redistributed or otherwise used for commercial purposes  All illustrations and images included in CareNotes® are the copyrighted property of [employer]  or [employer]  The above information is an  only  It is not intended as medical advice for individual conditions or treatments  Talk to your doctor, nurse or pharmacist before following any medical regimen to see if it is safe and effective for you

## 2020-09-14 NOTE — PROGRESS NOTES
Assessment/Plan:  Patient of Dr Robert Alas in office with right ear pain since this morning   He was cleaning his ears and noticed pain     Does have irritation to the ear canal and mild excoriation   Discussed not using q tips for cleaning daily as it irritates the ear canal    Discussed treatment plan TMs intact b/l            Problem List Items Addressed This Visit     None      Visit Diagnoses     Acute swimmer's ear of right side    -  Primary    Relevant Medications    neomycin-polymyxin-hydrocortisone (CORTISPORIN) 0 35%-10,000 units/mL-1% otic suspension            Subjective:      Patient ID: Lindsey Gray is a 56 y o  male  Patient of Dr Robert Alas in office with right ear pain since this morning   He was cleaning his ears and noticed pain     Denies headaches   Denies congestion   Denies fevers   Denies cough nausea or vomiting       The following portions of the patient's history were reviewed and updated as appropriate:   He has a past medical history of Arthritis, Asthma, Back pain, Gastroesophageal reflux disease without esophagitis (7/16/2020), GERD (gastroesophageal reflux disease), Gout of multiple sites (7/16/2020), Hiatal hernia, High blood pressure, Hypertension, Impaired fasting glucose (6/18/2020), Lipoma of neck, Mediastinal lymphadenopathy, Osteoporosis, Right hand pain (7/16/2020), and Sleep disorder  ,  does not have any pertinent problems on file  ,   has a past surgical history that includes Lumbar fusion (07/11/2018); EGD (03/31/2017); Tonsillectomy (01/01/1980); Colonoscopy (10/07/2016); Elbow surgery (Left, 04/09/2018); Hernia repair (01/16/2019); Rotator cuff repair (Bilateral); Colonoscopy; Back surgery; pr bronchoscopy,diagnostic (N/A, 4/28/2020); and pr bronchoscopy needle bx trachea main stem&/bron (N/A, 4/28/2020)  ,  family history includes Hypertension in his father and mother  ,   reports that he quit smoking about 36 years ago  His smoking use included cigarettes  He has a 3 00 pack-year smoking history  He has never used smokeless tobacco  He reports current alcohol use of about 7 0 standard drinks of alcohol per week  He reports that he does not use drugs  ,  is allergic to nuts; peanut oil; codeine; latex; and dust mite extract     Current Outpatient Medications   Medication Sig Dispense Refill    allopurinol (ZYLOPRIM) 100 mg tablet Take 1 tablet (100 mg total) by mouth daily 90 tablet 1    amLODIPine (NORVASC) 10 mg tablet Take 1 tablet (10 mg total) by mouth daily 90 tablet 1    clotrimazole (LOTRIMIN) 1 % cream Apply topically 2 (two) times a day      diclofenac sodium (VOLTAREN) 1 % Apply 2 g topically 4 (four) times a day 1 Tube 2    Diclofenac Sodium 2 % SOLN Place on the skin      ketoconazole (NIZORAL) 2 % shampoo Apply topically once      losartan (COZAAR) 25 mg tablet Take 1 tablet (25 mg total) by mouth daily 90 tablet 1    omeprazole (PriLOSEC) 40 MG capsule TAKE 1 CAPSULE(40 MG) BY MOUTH DAILY BEFORE BREAKFAST 90 capsule 1    fluticasone (FLONASE) 50 mcg/act nasal spray 2 sprays into each nostril daily      neomycin-polymyxin-hydrocortisone (CORTISPORIN) 0 35%-10,000 units/mL-1% otic suspension Administer 4 drops to the right ear 4 (four) times a day for 10 days 10 mL 0     No current facility-administered medications for this visit  Review of Systems   Constitutional: Negative for activity change, appetite change, fatigue, fever and unexpected weight change  HENT: Positive for ear pain (right ear pain )  Negative for dental problem and trouble swallowing  Eyes: Negative for photophobia and visual disturbance  Respiratory: Negative for cough and chest tightness  Cardiovascular: Negative for chest pain, palpitations and leg swelling  Gastrointestinal: Negative for abdominal pain, constipation, nausea and vomiting  Endocrine: Negative for cold intolerance, polydipsia and polyuria     Genitourinary: Negative for difficulty urinating, frequency and urgency  Musculoskeletal: Negative for arthralgias, joint swelling, myalgias and neck pain  Skin: Negative for color change, rash and wound  Allergic/Immunologic: Negative for environmental allergies  Neurological: Negative for dizziness, weakness and numbness  Hematological: Does not bruise/bleed easily  Psychiatric/Behavioral: Negative for decreased concentration, dysphoric mood, self-injury, sleep disturbance and suicidal ideas  Objective:  Vitals:    09/09/20 1147   BP: 124/76   BP Location: Right arm   Patient Position: Sitting   Cuff Size: Adult   Pulse: 82   Resp: 16   Temp: 97 9 °F (36 6 °C)   TempSrc: Temporal   SpO2: 97%   Weight: 77 6 kg (171 lb)   Height: 5' 7" (1 702 m)     Body mass index is 26 78 kg/m²  Physical Exam  Vitals signs and nursing note reviewed  Constitutional:       Appearance: Normal appearance  Comments: BMI 26    HENT:      Head: Normocephalic and atraumatic  Ears:      Comments: Erythema and excoriation to ear canal noted to right ear      Nose: Nose normal    Neck:      Musculoskeletal: Normal range of motion  Cardiovascular:      Rate and Rhythm: Normal rate and regular rhythm  Pulmonary:      Effort: Pulmonary effort is normal       Breath sounds: Normal breath sounds  Musculoskeletal: Normal range of motion  Neurological:      Mental Status: He is alert and oriented to person, place, and time     Psychiatric:         Mood and Affect: Mood normal

## 2020-09-15 ENCOUNTER — TELEMEDICINE (OUTPATIENT)
Dept: HEMATOLOGY ONCOLOGY | Facility: CLINIC | Age: 60
End: 2020-09-15
Payer: COMMERCIAL

## 2020-09-15 ENCOUNTER — TELEPHONE (OUTPATIENT)
Dept: HEMATOLOGY ONCOLOGY | Facility: CLINIC | Age: 60
End: 2020-09-15

## 2020-09-15 DIAGNOSIS — R59.0 CERVICAL LYMPHADENOPATHY: ICD-10-CM

## 2020-09-15 DIAGNOSIS — D17.0 LIPOMA OF NECK: ICD-10-CM

## 2020-09-15 DIAGNOSIS — R59.0 MEDIASTINAL LYMPHADENOPATHY: Primary | ICD-10-CM

## 2020-09-15 PROCEDURE — 99442 PR PHYS/QHP TELEPHONE EVALUATION 11-20 MIN: CPT | Performed by: INTERNAL MEDICINE

## 2020-09-15 NOTE — TELEPHONE ENCOUNTER
Patient called stating he has a head cold and does not want to come in for appt  Patient would like a telephone visit    I did inform patient it would not be exactly at 11:00am  Patient understood

## 2020-09-15 NOTE — PROGRESS NOTES
Virtual Brief Visit    Assessment/Plan:    Problem List Items Addressed This Visit     None                Reason for visit is No chief complaint on file  Encounter provider Yesica Gibbs MD PhD    Provider located at [address] [address]    Recent Visits  Date Type Provider Dept   09/14/20 Telephone Emily Shannon [facility]   Showing recent visits within past 7 days and meeting all other requirements     Future Appointments  No visits were found meeting these conditions  Showing future appointments within next 150 days and meeting all other requirements        After connecting through telephone, the patient was identified by name and date of birth  Gumaro Sheridan was informed that this is a telemedicine visit and that the visit is being conducted through telephone  My office door was closed  No one else was in the room  He acknowledged consent and understanding of privacy and security of the platform  The patient has agreed to participate and understands he can discontinue the visit at any time  Patient is aware this is a billable service  Subjective    Gumaro Sheridan is a 56 y o  male  Patient reported doing well, subjective there is no new lumps bumps, body weight stable no constitutional symptoms  Reported still feeling right side neck lump very mild pain when turning head  Assessment plan    -  Diffuse lymphadenopathy:  In the neck and mediastinum area, repeat CT scan showed condition is fairly stable, previously mediastinal lymph nodes biopsy has showed negative for malignancy  Okay to observe clinically  I will follow patient 6 months no need further lab or imaging study       - Lipoma:   In right neck, I believe this is will cause mild pain, offered patient to consider surgical resection by General surgery, patient deferred given this is only causing very mild symptoms, will clinically observe patient           HPI     Past Medical History:   Diagnosis Date    Arthritis     Asthma     Back pain     Gastroesophageal reflux disease without esophagitis 7/16/2020    GERD (gastroesophageal reflux disease)     Gout of multiple sites 7/16/2020    Hiatal hernia     High blood pressure     Hypertension     Impaired fasting glucose 6/18/2020    Lipoma of neck     Mediastinal lymphadenopathy     Osteoporosis     Right hand pain 7/16/2020    Sleep disorder        Past Surgical History:   Procedure Laterality Date    BACK SURGERY      COLONOSCOPY  10/07/2016    5 years (per dominique)    COLONOSCOPY      EGD  03/31/2017    ELBOW SURGERY Left 04/09/2018    open arthrotomy, capsulectomy, loose body removal (per dominique)   [address]  30/48/0511    umbilical with mesh (per dominique)    LUMBAR FUSION  07/11/2018    VT BRONCHOSCOPY NEEDLE BX TRACHEA MAIN STEM&/BRON N/A 4/28/2020    Procedure: ENDOBRONCHIAL ULTRASOUND (EBUS) WITH BIOPSY;  Surgeon: Cheryl Reid MD;  Location: [facility];  Service: Thoracic    VT [address] N/A 4/28/2020    Procedure: BRONCHOSCOPY FLEXIBLE;  Surgeon: Cheryl Reid MD;  Location: [facility];  Service: Thoracic    ROTATOR CUFF REPAIR Bilateral     TONSILLECTOMY  01/01/1980       Current Outpatient Medications   Medication Sig Dispense Refill    allopurinol (ZYLOPRIM) 100 mg tablet Take 1 tablet (100 mg total) by mouth daily 90 tablet 1    amLODIPine (NORVASC) 10 mg tablet Take 1 tablet (10 mg total) by mouth daily 90 tablet 1    clotrimazole (LOTRIMIN) 1 % cream Apply topically 2 (two) times a day      diclofenac sodium (VOLTAREN) 1 % Apply 2 g topically 4 (four) times a day 1 Tube 2    Diclofenac Sodium 2 % SOLN Place on the skin      fluticasone (FLONASE) 50 mcg/act nasal spray 2 sprays into each nostril daily      ketoconazole (NIZORAL) 2 % shampoo Apply topically once      losartan (COZAAR) 25 mg tablet Take 1 tablet (25 mg total) by mouth daily 90 tablet 1    neomycin-polymyxin-hydrocortisone (CORTISPORIN) 0 35%-10,000 units/mL-1% otic suspension Administer 4 drops to the right ear 4 (four) times a day for 10 days 10 mL 0    omeprazole (PriLOSEC) 40 MG capsule TAKE 1 CAPSULE(40 MG) BY MOUTH DAILY BEFORE BREAKFAST 90 capsule 1     No current facility-administered medications for this visit  Allergies   Allergen Reactions    Nuts Shortness Of Breath    Peanut Oil Anaphylaxis     Anything using peanuts    Codeine Other (See Comments)     Dizzy, light headed  Body of balance      Latex Rash     Allergic to products, wears cotton underwear    Dust Mite Extract        Review of Systems    There were no vitals filed for this visit  I spent 15 minutes directly with the patient during this visit    VIRTUAL VISIT [facility] acknowledges that he has consented to an online visit or consultation  He understands that the online visit is based solely on information provided by him, and that, in the absence of a face-to-face physical evaluation by the physician, the diagnosis he receives is both limited and provisional in terms of accuracy and completeness  This is not intended to replace a full medical face-to-face evaluation by the physician  Renuka Hale understands and accepts these terms

## 2020-09-17 ENCOUNTER — OFFICE VISIT (OUTPATIENT)
Dept: FAMILY MEDICINE CLINIC | Facility: CLINIC | Age: 60
End: 2020-09-17
Payer: COMMERCIAL

## 2020-09-17 VITALS
SYSTOLIC BLOOD PRESSURE: 136 MMHG | DIASTOLIC BLOOD PRESSURE: 84 MMHG | HEIGHT: 67 IN | TEMPERATURE: 97.5 F | BODY MASS INDEX: 26.78 KG/M2

## 2020-09-17 DIAGNOSIS — Z23 ENCOUNTER FOR IMMUNIZATION: ICD-10-CM

## 2020-09-17 DIAGNOSIS — B34.9 VIRAL ILLNESS: ICD-10-CM

## 2020-09-17 DIAGNOSIS — K21.9 GASTROESOPHAGEAL REFLUX DISEASE WITHOUT ESOPHAGITIS: Primary | ICD-10-CM

## 2020-09-17 DIAGNOSIS — I10 ESSENTIAL HYPERTENSION: ICD-10-CM

## 2020-09-17 DIAGNOSIS — I10 BENIGN ESSENTIAL HTN: ICD-10-CM

## 2020-09-17 DIAGNOSIS — M10.9 GOUT OF MULTIPLE SITES, UNSPECIFIED CAUSE, UNSPECIFIED CHRONICITY: ICD-10-CM

## 2020-09-17 PROCEDURE — 90750 HZV VACC RECOMBINANT IM: CPT | Performed by: FAMILY MEDICINE

## 2020-09-17 PROCEDURE — 90471 IMMUNIZATION ADMIN: CPT | Performed by: FAMILY MEDICINE

## 2020-09-17 PROCEDURE — 99214 OFFICE O/P EST MOD 30 MIN: CPT | Performed by: FAMILY MEDICINE

## 2020-09-17 PROCEDURE — U0003 INFECTIOUS AGENT DETECTION BY NUCLEIC ACID (DNA OR RNA); SEVERE ACUTE RESPIRATORY SYNDROME CORONAVIRUS 2 (SARS-COV-2) (CORONAVIRUS DISEASE [COVID-19]), AMPLIFIED PROBE TECHNIQUE, MAKING USE OF HIGH THROUGHPUT TECHNOLOGIES AS DESCRIBED BY CMS-2020-01-R: HCPCS | Performed by: FAMILY MEDICINE

## 2020-09-17 RX ORDER — LOSARTAN POTASSIUM 50 MG/1
50 TABLET ORAL DAILY
Qty: 30 TABLET | Refills: 1 | Status: SHIPPED | OUTPATIENT
Start: 2020-09-17 | End: 2020-09-17

## 2020-09-17 RX ORDER — LOSARTAN POTASSIUM 50 MG/1
TABLET ORAL
Qty: 90 TABLET | Refills: 1 | Status: ON HOLD | OUTPATIENT
Start: 2020-09-17 | End: 2021-01-23 | Stop reason: SDUPTHER

## 2020-09-17 NOTE — PROGRESS NOTES
Assessment/Plan:    Exam benign however with patient's symptoms will have him to get tested for covert 19 since his neighbor had covert  Advised for Flonase and A1c supplementation  His blood pressure is stable however he does not like to take medication was simplified regimen for him  Will stop amlodipine since it can cause periodically swelling in his legs will increase losartan to 50 mg daily advised to check blood pressure goal is less than 130/80  Will see him back in 1 month for blood pressure check at that time if blood pressure not improve with increased losartan 100 mg daily  He has normal gouty flare up so he does not take amlodipine allopurinol will stop that his GERD also resolved will stop PP I  Second shingle vaccine given to him today  Do not recommend flu vaccine currently since he does not feel well  I have spent 25 minutes with Patient  today in which greater than 50% of this time was spent in counseling/coordination of care regarding Risks and benefits of tx options  Problem List Items Addressed This Visit        Digestive    Gastroesophageal reflux disease without esophagitis - Primary       Cardiovascular and Mediastinum    Benign essential HTN    Relevant Medications    losartan (COZAAR) 50 mg tablet       Other    Gout of multiple sites      Other Visit Diagnoses     Viral illness        Relevant Orders    PAT Covid Screening    Essential hypertension        Relevant Medications    losartan (COZAAR) 50 mg tablet            Subjective:      Patient ID: Tobin Varner is a 56 y o  male  57-year-old male follow-up essential hypertension immune deficiency he needs checked 2nd round of shingle vaccine, gout and GERD  Last visit he was started on allopurinol 100 mg daily and omeprazole 40 mg daily  He took for 2 weeks then his gastritis improved and resolved so he stop and since then he has been doing well without medication    Regarding blood pressure he was on losartan/ hydrochlorothiazide medication was  discontinued due to gout flare he was then transitioned to amlodipine 10 mg daily and losartan 25 mg daily  His blood pressure then since then has been much improved  He only takes amlodipine 5 mg daily  He has seen orthopedic for his hand and he is using Voltaren gel and at brace  He also seen hematologist for live adenopathy in his neck imaging has been normal biopsy has been normal   Currently complaint one-week history of cough productive phlegm clear nasal drainage sneezing runny nose no sore throat no fever no myalgia  His neighbor has covert 23  He denied exposure to anyone else he denied exposure to his neighbors  He has not take any antipyretics within the 72 hours  The following portions of the patient's history were reviewed and updated as appropriate:   He has a past medical history of Arthritis, Asthma, Back pain, Gastroesophageal reflux disease without esophagitis (7/16/2020), GERD (gastroesophageal reflux disease), Gout of multiple sites (7/16/2020), Hiatal hernia, High blood pressure, Hypertension, Impaired fasting glucose (6/18/2020), Lipoma of neck, Mediastinal lymphadenopathy, Osteoporosis, Right hand pain (7/16/2020), and Sleep disorder  ,  does not have any pertinent problems on file  ,   has a past surgical history that includes Lumbar fusion (07/11/2018); EGD (03/31/2017); Tonsillectomy (01/01/1980); Colonoscopy (10/07/2016); Elbow surgery (Left, 04/09/2018); Hernia repair (01/16/2019); Rotator cuff repair (Bilateral); Colonoscopy; Back surgery; pr bronchoscopy,diagnostic (N/A, 4/28/2020); and pr bronchoscopy needle bx trachea main stem&/bron (N/A, 4/28/2020)  ,  family history includes Hypertension in his father and mother  ,   reports that he quit smoking about 36 years ago  His smoking use included cigarettes  He has a 3 00 pack-year smoking history   He has never used smokeless tobacco  He reports current alcohol use of about 7 0 standard drinks of alcohol per week  He reports that he does not use drugs  ,  is allergic to nuts; peanut oil; codeine; latex; and dust mite extract     Current Outpatient Medications   Medication Sig Dispense Refill    clotrimazole (LOTRIMIN) 1 % cream Apply topically 2 (two) times a day      diclofenac sodium (VOLTAREN) 1 % Apply 2 g topically 4 (four) times a day 1 Tube 2    Diclofenac Sodium 2 % SOLN Place on the skin      ketoconazole (NIZORAL) 2 % shampoo Apply topically once      losartan (COZAAR) 50 mg tablet Take 1 tablet (50 mg total) by mouth daily 30 tablet 1    fluticasone (FLONASE) 50 mcg/act nasal spray 2 sprays into each nostril daily       No current facility-administered medications for this visit  Review of Systems   Constitutional: Negative for activity change, appetite change, fatigue, fever and unexpected weight change  HENT: Positive for postnasal drip, rhinorrhea and sneezing  Negative for dental problem and trouble swallowing  Eyes: Negative for photophobia and visual disturbance  Respiratory: Positive for cough  Negative for chest tightness  Cardiovascular: Negative for chest pain, palpitations and leg swelling  Gastrointestinal: Negative for abdominal pain, constipation and vomiting  Endocrine: Negative for cold intolerance, polydipsia and polyuria  Genitourinary: Negative for difficulty urinating, frequency and urgency  Musculoskeletal: Negative for arthralgias, joint swelling, myalgias and neck pain  Skin: Negative for color change, rash and wound  Allergic/Immunologic: Negative for environmental allergies  Neurological: Negative for dizziness, weakness and numbness  Hematological: Does not bruise/bleed easily  Psychiatric/Behavioral: Negative for decreased concentration, dysphoric mood, self-injury, sleep disturbance and suicidal ideas           Objective:  Vitals:    09/17/20 1041   BP: 136/84   BP Location: Left arm   Patient Position: Sitting   Cuff Size: Standard   Temp: 97 5 °F (36 4 °C)   TempSrc: Temporal   Height: 5' 7" (1 702 m)     Body mass index is 26 78 kg/m²  Physical Exam  Vitals signs and nursing note reviewed  Constitutional:       Appearance: Normal appearance  He is normal weight  HENT:      Head: Normocephalic and atraumatic  Right Ear: Tympanic membrane normal       Left Ear: Tympanic membrane normal       Nose: Congestion and rhinorrhea present  Mouth/Throat:      Mouth: Mucous membranes are moist    Eyes:      Conjunctiva/sclera: Conjunctivae normal    Neck:      Musculoskeletal: Normal range of motion and neck supple  Cardiovascular:      Rate and Rhythm: Normal rate and regular rhythm  Heart sounds: Normal heart sounds  Pulmonary:      Effort: Pulmonary effort is normal       Breath sounds: Normal breath sounds  Musculoskeletal: Normal range of motion  General: No swelling  Neurological:      General: No focal deficit present  Mental Status: He is alert and oriented to person, place, and time  Mental status is at baseline  Psychiatric:         Mood and Affect: Mood normal          Behavior: Behavior normal          Thought Content:  Thought content normal          Judgment: Judgment normal

## 2020-09-18 LAB — SARS-COV-2 RNA SPEC QL NAA+PROBE: NOT DETECTED

## 2020-09-21 ENCOUNTER — TELEPHONE (OUTPATIENT)
Dept: FAMILY MEDICINE CLINIC | Facility: CLINIC | Age: 60
End: 2020-09-21

## 2020-09-21 NOTE — TELEPHONE ENCOUNTER
Pt states that he still has a head cold, runny nose and headache  Negative for COVID and saw Dr Chung Mast on Thursday for this  Is asking for an antibiotic      [facility] on [address]

## 2020-09-21 NOTE — TELEPHONE ENCOUNTER
Please add him as telehealth visit to re-evaluate and we can order something if needed   Or he can come in office

## 2020-09-22 ENCOUNTER — TELEPHONE (OUTPATIENT)
Dept: FAMILY MEDICINE CLINIC | Facility: CLINIC | Age: 60
End: 2020-09-22

## 2020-09-22 NOTE — TELEPHONE ENCOUNTER
Pt states that bottle of: losartan (COZAAR) 50 mg tablet says to take 1 tab a day, however states that Dr Fitz Castro said to take (2) a day for this med  Calling for clarification

## 2020-09-24 NOTE — TELEPHONE ENCOUNTER
Pharmacy faxed automatic refill order for Amlodipine 10mg and Allopurinol 100mg tablets on 9/15/2020  Patient was removed from medication during appointment on 9/17 with Dr Earnestine Gloria

## 2020-09-30 ENCOUNTER — TELEPHONE (OUTPATIENT)
Dept: OBGYN CLINIC | Facility: HOSPITAL | Age: 60
End: 2020-09-30

## 2020-09-30 NOTE — TELEPHONE ENCOUNTER
Patient has an appointment schedule for tomorrow 10/01 with Dr Argelia Ferreira, he wants to know, if he needs to pay a Copay? He is asking for a call back because if he needs to pay, he can't keep his appointment     # [phone]

## 2020-09-30 NOTE — TELEPHONE ENCOUNTER
cld pt, lvm to call back    Pt must pay copay as he has not paid a copay in the past and he does have a balance due in which I had a prior discussion w/him regarding the balance on the account at time of a previous check in when he did not pay the copay

## 2020-11-25 ENCOUNTER — OFFICE VISIT (OUTPATIENT)
Dept: FAMILY MEDICINE CLINIC | Facility: CLINIC | Age: 60
End: 2020-11-25
Payer: COMMERCIAL

## 2020-11-25 VITALS
BODY MASS INDEX: 26.87 KG/M2 | HEART RATE: 74 BPM | DIASTOLIC BLOOD PRESSURE: 88 MMHG | SYSTOLIC BLOOD PRESSURE: 138 MMHG | TEMPERATURE: 97.9 F | WEIGHT: 171.2 LBS | HEIGHT: 67 IN | OXYGEN SATURATION: 98 %

## 2020-11-25 DIAGNOSIS — L03.012 PARONYCHIA OF FINGER OF LEFT HAND: Primary | ICD-10-CM

## 2020-11-25 PROCEDURE — 99213 OFFICE O/P EST LOW 20 MIN: CPT | Performed by: NURSE PRACTITIONER

## 2020-11-25 RX ORDER — CEPHALEXIN 500 MG/1
500 CAPSULE ORAL EVERY 8 HOURS SCHEDULED
Qty: 15 CAPSULE | Refills: 0 | Status: SHIPPED | OUTPATIENT
Start: 2020-11-25 | End: 2020-11-30

## 2020-11-28 ENCOUNTER — APPOINTMENT (OUTPATIENT)
Dept: RADIOLOGY | Facility: CLINIC | Age: 60
End: 2020-11-28
Payer: COMMERCIAL

## 2020-11-28 DIAGNOSIS — L03.012 PARONYCHIA OF FINGER OF LEFT HAND: ICD-10-CM

## 2020-11-28 PROCEDURE — 73140 X-RAY EXAM OF FINGER(S): CPT

## 2020-11-30 ENCOUNTER — TELEPHONE (OUTPATIENT)
Dept: FAMILY MEDICINE CLINIC | Facility: CLINIC | Age: 60
End: 2020-11-30

## 2020-12-03 ENCOUNTER — TELEMEDICINE (OUTPATIENT)
Dept: FAMILY MEDICINE CLINIC | Facility: CLINIC | Age: 60
End: 2020-12-03
Payer: COMMERCIAL

## 2020-12-03 VITALS — WEIGHT: 171 LBS | HEIGHT: 67 IN | BODY MASS INDEX: 26.84 KG/M2

## 2020-12-03 DIAGNOSIS — L03.012 CELLULITIS OF INDEX FINGER, LEFT: Primary | ICD-10-CM

## 2020-12-03 PROCEDURE — 99213 OFFICE O/P EST LOW 20 MIN: CPT | Performed by: NURSE PRACTITIONER

## 2020-12-30 ENCOUNTER — TELEPHONE (OUTPATIENT)
Dept: SURGERY | Facility: CLINIC | Age: 60
End: 2020-12-30

## 2021-01-11 ENCOUNTER — TELEMEDICINE (OUTPATIENT)
Dept: FAMILY MEDICINE CLINIC | Facility: CLINIC | Age: 61
End: 2021-01-11
Payer: COMMERCIAL

## 2021-01-11 VITALS — TEMPERATURE: 99.9 F | HEIGHT: 67 IN | WEIGHT: 171 LBS | BODY MASS INDEX: 26.84 KG/M2

## 2021-01-11 DIAGNOSIS — Z11.52 ENCOUNTER FOR SCREENING FOR COVID-19: Primary | ICD-10-CM

## 2021-01-11 PROCEDURE — 99213 OFFICE O/P EST LOW 20 MIN: CPT | Performed by: NURSE PRACTITIONER

## 2021-01-11 PROCEDURE — 87637 SARSCOV2&INF A&B&RSV AMP PRB: CPT | Performed by: NURSE PRACTITIONER

## 2021-01-11 NOTE — PROGRESS NOTES
BMI Counseling: Body mass index is 26 78 kg/m²  The BMI is above normal  Nutrition recommendations include decreasing portion sizes, encouraging healthy choices of fruits and vegetables, decreasing fast food intake, consuming healthier snacks, limiting drinks that contain sugar, moderation in carbohydrate intake, increasing intake of lean protein, reducing intake of saturated and trans fat and reducing intake of cholesterol  Exercise recommendations include vigorous physical activity 75 minutes/week, exercising 3-5 times per week, obtaining a gym membership and strength training exercises  No pharmacotherapy was ordered  Patient referred to nutritionist due to patient being overweight  Depression Screening and Follow-up Plan: Clincally patient does not have depression  No treatment is required  COVID-19 Virtual Visit     Assessment/Plan:    Problem List Items Addressed This Visit        Other    Encounter for screening for COVID-19 - Primary         25 MINUTES COVID-19 COUNSELING  Encounter provider Jemima Zamudio    Provider located at [address] [place] [id number] [phone]    Recent Visits  No visits were found meeting these conditions  Showing recent visits within past 7 days and meeting all other requirements     Today's Visits  Date Type Provider Dept   01/11/21 Telemedicine HEATHER Zamudio 112 today's visits and meeting all other requirements     Future Appointments  No visits were found meeting these conditions  Showing future appointments within next 150 days and meeting all other requirements      This virtual check-in was done via [employer] and patient was informed that this is not a secure, HIPAA-compliant platform  He agrees to proceed      Patient agrees to participate in a virtual check in via telephone or video visit instead of presenting to the office to address urgent/immediate medical needs  Patient is aware this is a billable service  After connecting through [facility], the patient was identified by name and date of birth  Erika Still was informed that this was a telemedicine visit and that the exam was being conducted confidentially over secure lines  My office door was closed  No one else was in the room  Erika Still acknowledged consent and understanding of privacy and security of the telemedicine visit  I informed the patient that I have reviewed his record in Epic and presented the opportunity for him to ask any questions regarding the visit today  The patient agreed to participate  Subjective:   Erika Still is a 56 y o  male who is concerned about COVID-19  Patient's symptoms include fever, chills, nasal congestion and diarrhea  Patient denies fatigue, malaise, rhinorrhea, sore throat, anosmia, loss of taste, cough, shortness of breath, chest tightness, abdominal pain, nausea, vomiting, myalgias and headaches       Exposure:   Contact with a person who is under investigation (PUI) for or who is positive for COVID-19 within the last 14 days?: No    Hospitalized recently for fever and/or lower respiratory symptoms?: No      Currently a healthcare worker that is involved in direct patient care?: No      Works in a special setting where the risk of COVID-19 transmission may be high? (this may include long-term care, correctional and residential facilities; homeless shelters; assisted-living facilities and group homes ): No      Resident in a special setting where the risk of COVID-19 transmission may be high? (this may include long-term care, correctional and residential facilities; homeless shelters; assisted-living facilities and group homes ): No      Lab Results   Component Value Date    SARSCOV2 Not Detected 09/17/2020     Past Medical History:   Diagnosis Date    Arthritis     Asthma     Back pain     Gastroesophageal reflux disease without esophagitis 7/16/2020    GERD (gastroesophageal reflux disease)     Gout of multiple sites 7/16/2020    Hiatal hernia     High blood pressure     Hypertension     Impaired fasting glucose 6/18/2020    Lipoma of neck     Mediastinal lymphadenopathy     Osteoporosis     Right hand pain 7/16/2020    Sleep disorder      Past Surgical History:   Procedure Laterality Date    BACK SURGERY      COLONOSCOPY  10/07/2016    5 years (per dominique)    COLONOSCOPY      EGD  03/31/2017    ELBOW SURGERY Left 04/09/2018    open arthrotomy, capsulectomy, loose body removal (per dominique)    HERNIA REPAIR  37/35/5257    umbilical with mesh (per dominique)    LUMBAR FUSION  07/11/2018    NM BRONCHOSCOPY NEEDLE BX TRACHEA MAIN STEM&/BRON N/A 4/28/2020    Procedure: ENDOBRONCHIAL ULTRASOUND (EBUS) WITH BIOPSY;  Surgeon: Noel Webb MD;  [facility];  Service: Thoracic    NM [address] N/A 4/28/2020    Procedure: BRONCHOSCOPY FLEXIBLE;  Surgeon: Noel Webb MD;  [facility];  Service: Thoracic    ROTATOR CUFF REPAIR Bilateral     TONSILLECTOMY  01/01/1980     Current Outpatient Medications   Medication Sig Dispense Refill    clotrimazole (LOTRIMIN) 1 % cream Apply topically 2 (two) times a day      diclofenac sodium (VOLTAREN) 1 % Apply 2 g topically 4 (four) times a day 1 Tube 2    ketoconazole (NIZORAL) 2 % shampoo Apply topically once      losartan (COZAAR) 50 mg tablet TAKE 1 TABLET(50 MG) BY MOUTH DAILY 90 tablet 1    mupirocin (BACTROBAN) 2 % ointment Apply topically 3 (three) times a day 22 g 0    fluticasone (FLONASE) 50 mcg/act nasal spray 2 sprays into each nostril daily       No current facility-administered medications for this visit        Allergies   Allergen Reactions    Nuts Shortness Of Breath    Peanut Oil Anaphylaxis     Anything using peanuts    Codeine Other (See Comments)     Dizzy, light headed  Body of balance      Latex Rash     Allergic to products, wears cotton underwear    Dust Mite Extract        Review of Systems   Constitutional: Positive for chills and fever  Negative for fatigue  HENT: Positive for congestion  Negative for rhinorrhea and sore throat  Respiratory: Negative for cough, chest tightness and shortness of breath  Gastrointestinal: Positive for diarrhea  Negative for abdominal pain, nausea and vomiting  Musculoskeletal: Negative for myalgias  Neurological: Negative for headaches  Objective:    Vitals:    01/11/21 1330   Temp: 99 9 °F (37 7 °C)   TempSrc: Oral   Weight: 77 6 kg (171 lb)   Height: 5' 7" (1 702 m)       Physical Exam  VIRTUAL VISIT [facility] acknowledges that he has consented to an online visit or consultation  He understands that the online visit is based solely on information provided by him, and that, in the absence of a face-to-face physical evaluation by the physician, the diagnosis he receives is both limited and provisional in terms of accuracy and completeness  This is not intended to replace a full medical face-to-face evaluation by the physician  Laura Rendon understands and accepts these terms

## 2021-01-11 NOTE — PATIENT INSTRUCTIONS
COVID-19 (Coronavirus Disease 2019)   WHAT YOU NEED TO KNOW:   COVID-19 is the disease caused by the novel (new) coronavirus first discovered in December 2019  Coronaviruses generally cause upper respiratory (nose, throat, and lung) infections, such as a cold  The new virus can also cause serious lower respiratory conditions, such as pneumonia or acute respiratory distress syndrome (ARDS)  Anyone can develop serious problems from the new virus, but your risk is higher if you are 65 or older  A weak immune system, diabetes, or a heart or lung condition can also increase your risk  DISCHARGE INSTRUCTIONS:   If you think you or someone you know may be infected:  Do the following to protect others:  · If emergency care is needed,  tell the  about the possible infection, or call ahead and tell the emergency department  · Call a healthcare provider  for instructions if symptoms are mild  Anyone who may be infected should not  arrive without calling first  The provider will need to protect staff members and other patients  · The person who may be infected needs to wear a face covering  while getting medical care  This will help lower the risk of infecting others  Coverings are not used for anyone who is younger than 2 years, has breathing problems, or cannot remove it  The provider can give you instructions for anyone who cannot wear a covering  Call your local emergency number (911 in the [address] [facility]) or go to the emergency department if:   · You have trouble breathing or shortness of breath at rest     · You have chest pain or pressure that lasts longer than 5 minutes  · You become confused or hard to wake  · Your lips or face are blue  · You have a fever of 104°F (40°C) or higher  Call your doctor if:   · You do not  have symptoms of COVID-19 but had close physical contact within 14 days with someone who tested positive  · You have questions or concerns about your condition or care      Medicines: You may need any of the following for mild symptoms:  · Decongestants  help reduce nasal congestion and help you breathe more easily  If you take decongestant pills, they may make you feel restless or cause problems with your sleep  Do not use decongestant sprays for more than a few days  · Cough suppressants  help reduce coughing  Ask your healthcare provider which type of cough medicine is best for you  · Acetaminophen  decreases pain and fever  It is available without a doctor's order  Ask how much to take and how often to take it  Follow directions  Read the labels of all other medicines you are using to see if they also contain acetaminophen, or ask your doctor or pharmacist  Acetaminophen can cause liver damage if not taken correctly  Do not use more than 4 grams (4,000 milligrams) total of acetaminophen in one day  · NSAIDs , such as ibuprofen, help decrease swelling, pain, and fever  NSAIDs can cause stomach bleeding or kidney problems in certain people  If you take blood thinner medicine, always ask your healthcare provider if NSAIDs are safe for you  Always read the medicine label and follow directions  · Take your medicine as directed  Contact your healthcare provider if you think your medicine is not helping or if you have side effects  Tell him or her if you are allergic to any medicine  Keep a list of the medicines, vitamins, and herbs you take  Include the amounts, and when and why you take them  Bring the list or the pill bottles to follow-up visits  Carry your medicine list with you in case of an emergency  How the 2019 coronavirus spreads: The virus spreads quickly and easily  You can become infected if you are in contact with a large amount of the virus, even for a short time  You can also become infected by being around a small amount of virus for a long time   The following are ways the virus is thought to spread, but more information may be coming:  · Droplets are the most common way all coronaviruses spread  The virus can travel in droplets that form when a person talks, coughs, or sneezes  Anyone who breathes in the droplets or gets them in his or her eyes can become infected with the virus  Close personal contact with an infected person is thought to be the main way the virus spreads  Close personal contact means you are within 6 feet (2 meters) of the person  · Person-to-person contact can spread the virus  For example, a person with the virus on his or her hands can spread it by shaking hands with someone  At this time, it does not appear that the virus can be passed to a baby during pregnancy or delivery  The baby can be infected after he or she is born through person-to-person contact  The virus also does not appear to spread in breast milk  If you are pregnant or breastfeeding, talk to your healthcare provider or obstetrician about any concerns you have  · The virus can stay on objects and surfaces  A person can get the virus on his or her hands by touching the object or surface  Infection happens if the person then touches his or her eyes or mouth with unwashed hands  It is not yet known how long the virus can stay on an object or surface  That is why it is important to clean all surfaces that are used regularly  · An infected animal may be able to infect a person who touches it  This may happen at live markets or on a farm  How everyone can lower the risk for COVID-19:  The best way to prevent infection is to avoid anyone who is infected, but this can be hard to do  An infected person can spread the virus before signs or symptoms begin, or even if signs or symptoms never develop  The following can help lower the risk for infection:      · Wash your hands often throughout the day  Use soap and water  Rub your soapy hands together, lacing your fingers  Wash the front and back of each hand, and in between your fingers   Use the fingers of one hand to scrub under the fingernails of the other hand  Wash for at least 20 seconds  Rinse with warm, running water for several seconds  Then dry your hands with a clean towel or paper towel  Use hand  that contains alcohol if soap and water are not available  Do not touch your eyes, nose, or mouth without washing your hands first  Teach children how to wash their hands and use hand   · Cover a sneeze or cough  This prevents droplets from traveling from you to others  Turn your face away and cover your mouth and nose with a tissue  Throw the tissue away  Use the bend of your arm if a tissue is not available  Then wash your hands well with soap and water or use hand   Turn and cover your face if you are around someone who is sneezing or coughing  Teach children how to cover a cough or sneeze  · Follow worldwide, national, and local social distancing guidelines  Social distancing means people avoid close physical contact so the virus cannot spread from one person to another  Keep at least 6 feet (2 meters) between you and others  Also keep this distance from anyone who comes to your home, such as someone making a delivery  · Make a habit of not touching your face  It is not known how long the virus can stay on objects and surfaces  If you get the virus on your hands, you can transfer it to your eyes, nose, or mouth and become infected  You can also transfer it to objects, surfaces, or people  Be aware of what you touch when you go out  Examples include handrails and elevator buttons  Try not to touch anything with bare hands unless it is necessary  Wash your hands before you leave your home and when you return  · Clean and disinfect high-touch surfaces and objects often  Use a disinfecting solution or wipes  You can make a solution by diluting 4 teaspoons of bleach in 1 quart (4 cups) of water   Clean and disinfect even if you think no one living in or coming to your home is infected with the virus  You can wipe items with a disinfecting cloth before you bring them into your home  Wash your hands after you handle anything you bring into your home  · Make your immune system as healthy as possible  A weakened immune system makes you more vulnerable to the new coronavirus  No COVID-19 vaccine is available yet  Vaccines such as the flu and pneumonia vaccines can help your immune system  Your healthcare provider can tell you which vaccines to get, and when to get them  Keep your immune system as strong as possible  Do not smoke  Eat healthy foods, exercise regularly, and try to manage stress  Go to bed and wake up at the same times each day  Social distancing guidelines:  National and local social distancing rules vary  Rules may change over time as restrictions are lifted  Restrictions may return if an outbreak happens where you live  It is important to know and follow all current social distancing rules in your area  The following are general guidelines:  · Limit trips out of your home  You may be able to have food, medicines, and other supplies delivered  If possible, have delivered items left at your door or other area  Try not to have someone hand you an item  You will be so close to the person that the virus can spread between you  · Do not have close physical contact with anyone who does not live in your home  Do not shake hands with, hug, or kiss a person as a greeting  Stand or walk as far from others as possible  If you must use public transportation (such as a bus or subway), try to sit or stand away from others  You can stay safely connected with others through phone calls, e-mail messages, social media websites, and video chats  Check in on anyone who may be having a hard time socially distancing, or who lives alone  Ask administrators at nursing homes or long-term care facilities how you can safely communicate with someone living there      · Wear a cloth face covering around others who do not live in your home  Face coverings help prevent the virus from spreading to others in droplets  You can use a clear face covering if someone needs to read your lips  This is a cloth covering that has plastic over the mouth area so your lips can be seen  Do not use coverings that have breathing valves or vents  The virus can travel out of the valve or vent and be spread to others  Do not take your covering off to talk, cough, or sneeze  Do not use coverings on children younger than 2 years or on anyone who has breathing problems or cannot remove it  · Only allow medical or other necessary professionals into your home  Wear your face covering, and remind professionals to wear a face covering  Remind them to wash their hands when they arrive and before they leave  Do not  let anyone who does not live in your home in, even if the person is not sick  A person can pass the virus to others before symptoms of COVID-19 begin  Some people never even develop symptoms  Children commonly have mild symptoms or no symptoms  It may be hard to tell a child not to hug or kiss you  Explain that this is how he or she can help you stay healthy  · Do not go to someone else's home unless it is necessary  Do not go over to visit, even if the person is lonely  Only go if you need to help him or her  Make sure you both wear face coverings while you are there  · Avoid large gatherings and crowds  Gatherings or crowds of 10 or more individuals can cause the virus to spread  Examples of gatherings include parties, sporting events, Yazidism services, and conferences  Crowds may form at beaches, [place], and tourist attractions  Protect yourself by staying away from large gatherings and crowds  · Ask your healthcare provider for other ways to have appointments  You may be able to have appointments without having to go into the provider's office  Some providers offer phone, video, or other types of appointments  You may also be able to get prescriptions for a few months of your medicines at a time  · Stay safe if you must go out to work  You may have a job that can only be done outside your home  Keep physical distance between you and other workers as much as possible  Follow your employer's rules so everyone stays safe  If you have COVID-19 and are recovering at home:  Healthcare providers will give you specific instructions to follow  The following are general guidelines to remind you how to keep others safe until you are well:  · Wash your hands often  Use soap and water as much as possible  You can use hand  that contains alcohol if soap and water are not available  Do not share towels with anyone  If you use paper towels, throw them away in a lined trash can kept in your room or area  Use a covered trash can, if possible  · Do not go out of your home unless it is necessary  You may have to go to your healthcare provider's office for check-ups or to get prescription refills  Do not arrive at the provider's office without an appointment  Providers have to make their offices safe for staff and other patients  · Do not have close physical contact with anyone unless it is necessary  Only have close physical contact with a person giving direct care, or a baby or child you must care for  Family members and friends should not visit you  If possible, stay in a separate area or room of your home if you live with others  No one should go into the area or room except to give you care  You can visit with others by phone, video chat, e-mail, or similar systems  It is important to stay connected with others in your life while you recover  · Wear a face covering while others are near you  This can help prevent droplets from spreading the virus when you talk, sneeze, or cough  Put the covering on before anyone comes into your room or area   Remind the person to cover his or her nose and mouth before going in to provide care for you  · Do not share items  Do not share dishes, towels, or other items with anyone  Items need to be washed after you use them  · Protect your baby  Wash your hands with soap and water often throughout the day  Wear a clean face covering while you breastfeed, or while you express or pump breast milk  If possible, ask someone who is well to care for your baby  You can put breast milk in bottles for the person to use, if needed  Talk to your healthcare provider if you have any questions or concerns about caring for or bonding with your baby  He or she will tell you when to bring your baby in for check-ups and vaccines  He or she will also tell you what to do if you think your baby was infected with the new virus  · Do not handle live animals  Until more is known, it is best not to touch, play with, or handle live animals  Some animals, including pets, have been infected with the new coronavirus  Do not handle or care for animals until you are well  Care includes feeding, petting, and cuddling your pet  Do not let your pet lick you or share your food  Ask someone who is not infected to take care of your pet, if possible  If you must care for a pet, wear a face covering  Wash your hands before and after you give care  · Follow directions from your healthcare provider for being around others after you recover  You will need to wait at least 10 days after symptoms first appeared  Then you will need to have no fever for 24 hours without fever medicine, and no other symptoms  A loss of taste or smell may continue for several months  It is considered okay to be around others if this is your only symptom  It is not known for sure if or for how long a recovered person can pass the virus to others  Your provider may give you instructions, such as continuing social distancing or wearing a face covering around others      How to take care of someone who has COVID-19:  If the person lives in another home, arrange for a time to give care  Remember to bring a few pairs of disposable gloves and a cloth face covering  The following are general guidelines to help you safely care for anyone who has COVID-19:  · Wash your hands often  Wash before and after you go into the person's home, area, or room  Throw paper towels away in a lined trash can that has a lid, if possible  · Do not allow others to go near the person  No one should come into the person's home unless it is necessary  If possible, the person should be in a separate area or room if he or she lives with others  Keep the room's door shut unless you need to go in or out  Have others call, video chat, or e-mail the person if he or she is feeling well enough  The person may feel lonely if he or she is kept separate for a long period of time  Safe communication can help him or her stay connected to family and friends  · Make sure the person's room has good air flow  You may be able to open the window if the weather allows  An air conditioner can also be turned on to help air move  · Contact the person before you go in to give care  Make sure the person is wearing a face covering  Remind him or her to wash his or her hands with soap and water  He or she can use hand  that contains alcohol if soap and water are not available  Put on a face covering before you go in to give care  · Wear gloves while you give care and clean  Clean items the person uses often  Clean countertops, cooking surfaces, and the fronts and insides of the microwave and refrigerator  Clean the shower, toilet, the area around the toilet, the sink, the area around the sink, and faucets  Gather used laundry or bedding  Wash and dry items on the warmest settings the fabric allows  Wash dishes and silverware in hot, soapy water or in a   · Anything you throw away needs to go into a lined trash can    When you need to empty the trash, close the open end of the lining and tie it closed  This helps prevent items the virus is on from spilling out of the trash  Remove your gloves and throw them away  Wash your hands  Follow up with your doctor as directed:  Write down your questions so you remember to ask them during your visits  For more information:   · Centers for Disease Control and Prevention  [address] , [address]  Phone: 5- 160 - [phone]  Web Address: [employer] com br    © Copyright [address] Information is for End User's use only and may not be sold, redistributed or otherwise used for commercial purposes  All illustrations and images included in CareNotes® are the copyrighted property of [employer] D A M , Inc  or [facility] Marlon Sheehan   The above information is an  only  It is not intended as medical advice for individual conditions or treatments  Talk to your doctor, nurse or pharmacist before following any medical regimen to see if it is safe and effective for you  COVID-19: Slow the Coronavirus Spread   WHAT YOU NEED TO KNOW:   The virus that causes coronavirus disease 2019 (COVID-19) is highly contagious (easily spread)  COVID-19 can cause severe or life-threatening health problems in some people  Signs and symptoms of infection can take up to 14 days to begin, or may not develop at all  This means you or someone around you may have the virus and pass it to others without knowing it  No vaccine is available yet for the new coronavirus  You can help slow the spread of the virus by following worldwide and local guidelines for infection prevention  DISCHARGE INSTRUCTIONS:   Call your doctor if:   · You have questions about social distancing or ways to keep your home and family safe  · You have questions or concerns about the new coronavirus or COVID-19  How the 2019 coronavirus spreads: The virus spreads quickly and easily   You can become infected if you are in contact with a large amount of the virus, even for a short time  You can also become infected by being around a small amount of virus for a long time  The following are ways the virus is thought to spread, but more information may be coming:  · Droplets are the most common way all coronaviruses spread  The virus can travel in droplets that form when a person talks, coughs, or sneezes  Anyone who breathes in the droplets or gets them in his or her eyes can become infected with the virus  Close personal contact with an infected person is thought to be the main way the virus spreads  Close personal contact means you are within 6 feet (2 meters) of the person  · Person-to-person contact can spread the virus  For example, a person with the virus on his or her hands can spread it by shaking hands with someone  At this time, it does not appear that the virus can be passed to a baby during pregnancy or delivery  The baby can be infected after he or she is born through person-to-person contact  The virus also does not appear to spread in breast milk  If you are pregnant or breastfeeding, talk to your healthcare provider or obstetrician about any concerns you have  · The virus can stay on objects and surfaces  A person can get the virus on his or her hands by touching the object or surface  Infection happens if the person then touches his or her eyes or mouth with unwashed hands  It is not yet known how long the virus can stay on an object or surface  That is why it is important to clean all objects and surfaces that are used regularly  · An infected animal may be able to infect a person who touches it  This may happen at live markets or on a farm  How to wash your hands to help prevent the virus from spreading:  Use soap and water  Rub your soapy hands together, lacing your fingers  Wash the front and back of each hand, and in between your fingers  Use the fingers of one hand to scrub under the fingernails of the other hand  Wash for at least 20 seconds  Rinse with warm, running water for several seconds  Then dry your hands with a clean towel or paper towel  Do not touch your eyes, nose, or mouth without washing your hands first  Wash your hands often throughout the day  Use hand  that contains alcohol if soap and water are not available  Teach children how to wash their hands and use hand   How to cover sneezes and coughs to help prevent the virus from spreading:  Turn your face away and cover your mouth and nose with a tissue  Throw the tissue away  Use the bend of your arm if a tissue is not available  Then wash your hands well with soap and water or use hand   Turn your head and cover your face when you are around someone who is sneezing or coughing  Teach children how to cover a cough or sneeze  Remind them to wash their hands  How social distancing will help prevent the virus from spreading: The best way to prevent infection is to avoid anyone who is infected, but this can be hard to do  An infected person can spread the virus before signs or symptoms begin, or even if signs or symptoms never develop  Social distancing means people avoid close personal contact so the virus cannot spread from one person to another  National and local social distancing rules vary  Rules may change over time as restrictions are lifted  Restrictions may return if an outbreak happens where you live  It is important to know and follow all current social distancing rules in your area  The following are general guidelines:  · Limit trips out of your home  You may be able to have food, medicines, and other supplies delivered  If possible, have delivered items left at your door or other area  Try not to have someone hand you an item  You will be so close to the person that the virus can spread between you  · Do not have close physical contact with anyone who does not live in your home  Do not shake hands with, hug, or kiss a person as a greeting  Stand or walk as far from others as possible, especially around anyone who is sneezing or coughing  If you must use public transportation (such as a bus or subway), try to sit or stand away from others  You can stay safely connected with others through phone calls, e-mail messages, social media websites, and video chats  Check in on anyone who may be having a hard time socially distancing, or who lives alone  Ask administrators at nursing homes or long-term care facilities how you can safely communicate with someone living there  · Wear a cloth face covering (mask) when you must go out  A face covering helps prevent the virus from spreading in droplets  Make a covering out of a thick cloth to save medical masks for healthcare workers and first responders  Choose a fabric that holds its shape after it is washed and dried, such as cotton  Check that you can breathe through the fabric when it is folded into several layers  Put the top half of a paper coffee filter in the middle of the fabric to create an extra filter for you  Fold the fabric into a long band  Put each end through a rubber band or hair tie to create ear loops  Fold the ends of the fabric toward the middle  Hold the covering to your face  Adjust it so it covers your nose and mouth completely  Hook the loops onto your ears to keep the covering in place  The following are important points to remember:    ? Do not use a plastic face shield instead of a cloth covering  A face shield will not protect others from droplets  If a face shield must be used, choose one that wraps around both sides of the face and goes below the chin  Do not use disposable shields more than 1 time  Almer [employer]let that can be used again need to be cleaned and disinfected between uses  Do not put a face shield or a cloth covering on a  or infant  These increase the risk for sudden infant death syndrome (SIDS)  ? Continue social distancing while you are out    A face covering does not mean you can have close physical contact with others  You still need to stay at least 6 feet (2 meters) away from others  ? Do not touch your face covering or eyes while you are wearing the covering  Your eyes will not be covered by the cloth  You can be infected if the virus is on your hand and you touch your eyes  Wash your hands with soap and water for 20 seconds or use hand  before you remove your face covering  ? Do not remove your face covering  to talk, sneeze, or cough  ? Wash face coverings often  Wash them in the warmest water the fabric allows  Make sure the fabric is completely dry before you use the face covering again  Only wear clean coverings when you go out  Use a new coffee filter each time  ? Certain individuals should not use face coverings  These include children younger than 2 years and anyone who has breathing problems or cannot remove the covering  ? You can use a clear face covering if someone needs to read your lips  A clear covering is made of cloth but has plastic over the mouth area  This will help the person see your lips more easily  Do not use a plastic face shield instead  ? Do not use face coverings that have breathing valves or vents  Valves or vents on the sides are designed to allow breathed air to go out  This makes breathing easier, but the virus can travel out of the valve or vent and be spread to others  · Only allow medical or other necessary professionals into your home  Wear your face covering, and remind professionals to wear a face covering  Remind them to wash their hands when they arrive and before they leave  Do not  let in anyone who does not live in your home, even if the person is not sick  A person can pass the virus to others before symptoms of COVID-19 begin  Some people never even develop symptoms  Children commonly have mild symptoms or no symptoms  It may be hard to tell a child not to hug or kiss you   Explain that this is how he or she can help you stay healthy  · Do not go to someone else's home unless it is necessary  Do not go over to visit, even if the person is lonely  Only go if you need to help him or her  · Avoid large gatherings and crowds  Gatherings or crowds of 10 or more individuals can cause the virus to spread  Examples of gatherings include parties, sporting events, Scient[employer] services, and conferences  Crowds may form at beaches, [place], and tourist attractions  Protect yourself by staying away from large gatherings and crowds  · Ask your healthcare provider for other ways to have appointments  Some providers offer phone, video, or other types of appointments  You may also be able to get prescriptions for a few months of your medicines at a time  · Stay safe if you must go out to work  You may have a job that can only be done outside your home  Keep physical distance between you and other workers as much as possible  Follow your employer's rules so everyone stays safe  Prevent an infection in your home:   · Wash your hands often  Make it a habit to wash your hands throughout the day  Wash before and after you care for anyone who thinks or knows he or she has COVID-19  Use soap and water  Remember to wash for at least 20 seconds  You can use hand  that contains alcohol if soap and water are not available  · Clean and disinfect your home often  Do this even if you think no one living in or coming to your home is infected with the virus  A person can spread the virus before symptoms begin, or even if no symptoms develop  Clean and disinfect to kill and remove the virus from high-touch surfaces and items  This prevents anyone from getting the virus on his or her hands and becoming infected or spreading the virus to others  The following are general guidelines:    ? Wear disposable gloves while you are cleaning  Do not touch surfaces or items with your bare hands  ?  Use disinfecting wipes or a disinfecting solution  You can make a solution by diluting 4 teaspoons of bleach in 1 quart (4 cups) of water  Clean with a sponge or cloth that can be thrown away or washed in hot, soapy water and reused  ? Be careful with   Open windows or have circulating air as you clean  Do not  mix ammonia with bleach  This will create toxic fumes  Read the labels of all products you use  ? Clean and disinfect surfaces throughout your home  Surfaces include countertops, cupboard doors, desks, handrails, doorknobs, toilet handles, faucets, chairs, and light switches  ? Clean and disinfect items well  Objects include keys, phones, computer keyboards and mice, video games, remote controls, and children's toys  ? Clean used dishes and utensils well  Use hot, soapy water or a   ? Wash clothing and bedding well  You can use regular laundry detergent  Follow instructions on the clothing or bedding label  Wash and dry on the warmest settings for the fabric  · Do not share items with anyone  This includes food, drinks, dishes, and eating utensils  · Prepare surfaces any time you are going to bring something into your home  Find space you can use to place items you bring in  Find space you can clean and disinfect well, such as a kitchen countertop  · Safely handle packages delivered to your home  It is not known for sure how long the virus can stay on cardboard or other packaging  You may want to wipe packages with sanitizing wipes  Open the package  Wash your hands  Then move the contents of the package onto a clean surface  Throw the packaging away outside your home  Then wash your hands for at least 20 seconds with soap and water  Clean the surface you laid the package on when you brought it in  Remember to clean and disinfect anything else you touched, such as doorknobs or faucet handles      · Safely handle food you have delivered or  from a restaurant  If possible, have food left at your door or placed into your car  This helps prevent close physical contact with anyone  Open the delivery containers and put the contents into clean dishes or containers  Throw the delivery containers away outside your home  Wash your hands  · Keep anyone who is infected away from others in the home  A person who has COVID-19 needs to stay at home until healthcare providers say it is okay to be around others  This is important, but it can also lead to others in the home becoming infected  If possible, keep the infected person isolated (away from others in the home)  The person should have his or her own dishes and eating utensils  Items the person uses need to be cleaned separately  Anyone who touches the person's items, clothing, or bedding needs to wear gloves that can be thrown away after use  It is important for the person to feel connected with others  Isolation can be lonely  The person may feel anxious or depressed  He or she can safely stay connected through phone calls, video chats, social media, and e-mail messages  Stay safe if you must go out of your home: It is not known for sure how long the virus can live on objects and surfaces  Until more is known, follow these and any local recommendations to prevent infection:  · Before you go out:      ? Remember to wash your hands  Wash before you leave your home, so you do not bring the virus with you  Wash again when you get home, after you put away any items you bought  ? Bring disinfecting wipes or hand  with you  Hold a wipe or tissue as you open any doors  Use hand  after you touch elevator buttons or other commonly used surfaces or items  Apply hand  or use a wipe for your hands before you use the keys to unlock or start the car     ? Make a list of items to buy before you go out  This will limit the items you touch in the store   The virus may live on surfaces and objects for up to several days  The more items you handle, the more risk you take of getting the virus on your hands  ? Prepare surfaces for when you return  Find space you can use to place items you bought  Find space you can clean and disinfect well, such as a kitchen countertop  · While you are out:      ? Wear your cloth face covering  Do not touch the covering or your eyes while you are out  ? Use disinfecting wipes to clean items you need to use to shop  Clean shopping cart handles  Clean the area a toddler will sit on or where you will put a baby carrier  Wipe a cart made for children to drive in the store before your toddler gets into it  Wipe the seat, steering wheel, and any part your child must touch  ? If possible, use a debit or credit card to pay  The virus may be able to stay on paper money  You can become infected when you touch the money  · When you get home:      ? Carefully take the face covering off and wash your hands  Put used coverings together  If possible, keep them in a closed laundry bag or basket until you can wash them  ? Unpack your items safely  Wipe or wash your hands before you open packaging  Put your items on the clean surface you prepared  You can use a disinfecting wipe to clean items before you put them away  Wash fruits and vegetables before you put them away  If possible, scrub the skins with a vegetable brush  ? Clean countertops or other surfaces any shopping bags touched  When you are finished putting your items away, wash your hands  Use disinfecting wipes or a solution to clean surfaces  You can also wipe the vehicle you drove  Wipe the area the bags had been  Wipe anything you touched inside the car  Examples include the steering wheel, seatbelt, inner and outer door handles, turn signals, and radio or other buttons  After items are put away and areas cleaned, remember to wash your hands      For more information:   · Centers for Disease Control and Prevention  [address] [place] , [address]  Phone: 7- 093 - 4669249  Phone: 0- 784 - 9405052  Web Address: [employer] com br    © Copyright [address] [place] [address] Drive Information is for End User's use only and may not be sold, redistributed or otherwise used for commercial purposes  All illustrations and images included in CareNotes® are the copyrighted property of [employer]  or [facility] Marlon Sheehan   The above information is an  only  It is not intended as medical advice for individual conditions or treatments  Talk to your doctor, nurse or pharmacist before following any medical regimen to see if it is safe and effective for you

## 2021-01-12 LAB
FLUAV RNA NPH QL NAA+PROBE: NOT DETECTED
FLUBV RNA NPH QL NAA+PROBE: NOT DETECTED
RSV RNA NPH QL NAA+PROBE: NOT DETECTED
SARS-COV-2 RNA NPH QL NAA+PROBE: DETECTED

## 2021-01-15 ENCOUNTER — TELEPHONE (OUTPATIENT)
Dept: FAMILY MEDICINE CLINIC | Facility: CLINIC | Age: 61
End: 2021-01-15

## 2021-01-15 NOTE — TELEPHONE ENCOUNTER
Pt called and would like to know if there's anything that he should be doing about his Pos COVID diagnosis  He has very mild symptoms, fatigue and a bit nose congestion  He had initially asked if you could call him, but he is OK with directions from you

## 2021-01-21 ENCOUNTER — HOSPITAL ENCOUNTER (OUTPATIENT)
Facility: HOSPITAL | Age: 61
Setting detail: OBSERVATION
Discharge: HOME | End: 2021-01-23
Attending: EMERGENCY MEDICINE | Admitting: INTERNAL MEDICINE
Payer: COMMERCIAL

## 2021-01-21 ENCOUNTER — TELEMEDICINE (OUTPATIENT)
Dept: FAMILY MEDICINE CLINIC | Facility: CLINIC | Age: 61
End: 2021-01-21
Payer: COMMERCIAL

## 2021-01-21 ENCOUNTER — TELEPHONE (OUTPATIENT)
Dept: FAMILY MEDICINE CLINIC | Facility: HOSPITAL | Age: 61
End: 2021-01-21

## 2021-01-21 ENCOUNTER — APPOINTMENT (EMERGENCY)
Dept: RADIOLOGY | Facility: HOSPITAL | Age: 61
End: 2021-01-21
Payer: COMMERCIAL

## 2021-01-21 VITALS — BODY MASS INDEX: 26.84 KG/M2 | HEIGHT: 67 IN | WEIGHT: 171 LBS

## 2021-01-21 DIAGNOSIS — U07.1 COVID-19: ICD-10-CM

## 2021-01-21 DIAGNOSIS — U07.1 COVID-19 VIRUS INFECTION: ICD-10-CM

## 2021-01-21 DIAGNOSIS — N17.9 AKI (ACUTE KIDNEY INJURY) (HCC): Primary | ICD-10-CM

## 2021-01-21 DIAGNOSIS — T73.2XXA FATIGUE DUE TO EXPOSURE, INITIAL ENCOUNTER: Primary | ICD-10-CM

## 2021-01-21 DIAGNOSIS — R06.02 SHORTNESS OF BREATH: ICD-10-CM

## 2021-01-21 DIAGNOSIS — I10 ESSENTIAL HYPERTENSION: ICD-10-CM

## 2021-01-21 PROBLEM — R74.01 TRANSAMINITIS: Status: ACTIVE | Noted: 2021-01-21

## 2021-01-21 LAB
ALBUMIN SERPL BCP-MCNC: 2.9 G/DL (ref 3.5–5)
ALP SERPL-CCNC: 75 U/L (ref 46–116)
ALT SERPL W P-5'-P-CCNC: 242 U/L (ref 12–78)
ANION GAP SERPL CALCULATED.3IONS-SCNC: 10 MMOL/L (ref 4–13)
AST SERPL W P-5'-P-CCNC: 121 U/L (ref 5–45)
BASOPHILS # BLD MANUAL: 0 THOUSAND/UL (ref 0–0.1)
BASOPHILS NFR MAR MANUAL: 0 % (ref 0–1)
BILIRUB SERPL-MCNC: 0.69 MG/DL (ref 0.2–1)
BUN SERPL-MCNC: 48 MG/DL (ref 5–25)
CALCIUM ALBUM COR SERPL-MCNC: 9.5 MG/DL (ref 8.3–10.1)
CALCIUM SERPL-MCNC: 8.6 MG/DL (ref 8.3–10.1)
CHLORIDE SERPL-SCNC: 105 MMOL/L (ref 100–108)
CO2 SERPL-SCNC: 22 MMOL/L (ref 21–32)
CREAT SERPL-MCNC: 1.61 MG/DL (ref 0.6–1.3)
EOSINOPHIL # BLD MANUAL: 0 THOUSAND/UL (ref 0–0.4)
EOSINOPHIL NFR BLD MANUAL: 0 % (ref 0–6)
ERYTHROCYTE [DISTWIDTH] IN BLOOD BY AUTOMATED COUNT: 14.7 % (ref 11.6–15.1)
GFR SERPL CREATININE-BSD FRML MDRD: 45 ML/MIN/1.73SQ M
GLUCOSE SERPL-MCNC: 147 MG/DL (ref 65–140)
HCT VFR BLD AUTO: 57.3 % (ref 36.5–49.3)
HGB BLD-MCNC: 18 G/DL (ref 12–17)
HOLD SPECIMEN: NORMAL
LG PLATELETS BLD QL SMEAR: PRESENT
LYMPHOCYTES # BLD AUTO: 1 THOUSAND/UL (ref 0.6–4.47)
LYMPHOCYTES # BLD AUTO: 16 % (ref 14–44)
MCH RBC QN AUTO: 27.5 PG (ref 26.8–34.3)
MCHC RBC AUTO-ENTMCNC: 31.4 G/DL (ref 31.4–37.4)
MCV RBC AUTO: 88 FL (ref 82–98)
MONOCYTES # BLD AUTO: 0.19 THOUSAND/UL (ref 0–1.22)
MONOCYTES NFR BLD: 3 % (ref 4–12)
NEUTROPHILS # BLD MANUAL: 5.07 THOUSAND/UL (ref 1.85–7.62)
NEUTS BAND NFR BLD MANUAL: 1 % (ref 0–8)
NEUTS SEG NFR BLD AUTO: 80 % (ref 43–75)
NRBC BLD AUTO-RTO: 0 /100 WBCS
PLATELET # BLD AUTO: 286 THOUSANDS/UL (ref 149–390)
PLATELET BLD QL SMEAR: ADEQUATE
PMV BLD AUTO: 10.6 FL (ref 8.9–12.7)
POTASSIUM SERPL-SCNC: 4.5 MMOL/L (ref 3.5–5.3)
PROT SERPL-MCNC: 9.1 G/DL (ref 6.4–8.2)
RBC # BLD AUTO: 6.55 MILLION/UL (ref 3.88–5.62)
SODIUM SERPL-SCNC: 137 MMOL/L (ref 136–145)
TOTAL CELLS COUNTED SPEC: 100
WBC # BLD AUTO: 6.26 THOUSAND/UL (ref 4.31–10.16)

## 2021-01-21 PROCEDURE — 71045 X-RAY EXAM CHEST 1 VIEW: CPT

## 2021-01-21 PROCEDURE — 93005 ELECTROCARDIOGRAM TRACING: CPT

## 2021-01-21 PROCEDURE — 85027 COMPLETE CBC AUTOMATED: CPT | Performed by: EMERGENCY MEDICINE

## 2021-01-21 PROCEDURE — 99213 OFFICE O/P EST LOW 20 MIN: CPT | Performed by: FAMILY MEDICINE

## 2021-01-21 PROCEDURE — 99285 EMERGENCY DEPT VISIT HI MDM: CPT

## 2021-01-21 PROCEDURE — 80053 COMPREHEN METABOLIC PANEL: CPT | Performed by: EMERGENCY MEDICINE

## 2021-01-21 PROCEDURE — 99285 EMERGENCY DEPT VISIT HI MDM: CPT | Performed by: EMERGENCY MEDICINE

## 2021-01-21 PROCEDURE — 85007 BL SMEAR W/DIFF WBC COUNT: CPT | Performed by: EMERGENCY MEDICINE

## 2021-01-21 PROCEDURE — 99220 PR INITIAL OBSERVATION CARE/DAY 70 MINUTES: CPT | Performed by: INTERNAL MEDICINE

## 2021-01-21 PROCEDURE — 36415 COLL VENOUS BLD VENIPUNCTURE: CPT

## 2021-01-21 RX ORDER — LOPERAMIDE HYDROCHLORIDE 2 MG/1
2 CAPSULE ORAL 3 TIMES DAILY PRN
Status: DISCONTINUED | OUTPATIENT
Start: 2021-01-21 | End: 2021-01-23 | Stop reason: HOSPADM

## 2021-01-21 RX ORDER — MELATONIN
2000 DAILY
Status: DISCONTINUED | OUTPATIENT
Start: 2021-01-22 | End: 2021-01-23 | Stop reason: HOSPADM

## 2021-01-21 RX ORDER — ONDANSETRON 2 MG/ML
4 INJECTION INTRAMUSCULAR; INTRAVENOUS EVERY 6 HOURS PRN
Status: DISCONTINUED | OUTPATIENT
Start: 2021-01-21 | End: 2021-01-23 | Stop reason: HOSPADM

## 2021-01-21 RX ORDER — ZINC SULFATE 50(220)MG
220 CAPSULE ORAL DAILY
Status: DISCONTINUED | OUTPATIENT
Start: 2021-01-22 | End: 2021-01-23 | Stop reason: HOSPADM

## 2021-01-21 RX ORDER — FLUTICASONE PROPIONATE 50 MCG
2 SPRAY, SUSPENSION (ML) NASAL DAILY
Status: DISCONTINUED | OUTPATIENT
Start: 2021-01-22 | End: 2021-01-23 | Stop reason: HOSPADM

## 2021-01-21 RX ORDER — ASCORBIC ACID 500 MG
1000 TABLET ORAL EVERY 12 HOURS SCHEDULED
Status: DISCONTINUED | OUTPATIENT
Start: 2021-01-21 | End: 2021-01-23 | Stop reason: HOSPADM

## 2021-01-21 RX ORDER — ACETAMINOPHEN 325 MG/1
650 TABLET ORAL EVERY 6 HOURS PRN
Status: DISCONTINUED | OUTPATIENT
Start: 2021-01-21 | End: 2021-01-23 | Stop reason: HOSPADM

## 2021-01-21 RX ORDER — METOPROLOL TARTRATE 5 MG/5ML
5 INJECTION INTRAVENOUS EVERY 8 HOURS PRN
Status: DISCONTINUED | OUTPATIENT
Start: 2021-01-21 | End: 2021-01-22

## 2021-01-21 RX ORDER — MULTIVITAMIN/IRON/FOLIC ACID 18MG-0.4MG
1 TABLET ORAL DAILY
Status: DISCONTINUED | OUTPATIENT
Start: 2021-01-29 | End: 2021-01-23 | Stop reason: HOSPADM

## 2021-01-21 RX ORDER — SODIUM CHLORIDE 9 MG/ML
100 INJECTION, SOLUTION INTRAVENOUS CONTINUOUS
Status: DISCONTINUED | OUTPATIENT
Start: 2021-01-21 | End: 2021-01-23

## 2021-01-21 RX ORDER — HEPARIN SODIUM 5000 [USP'U]/ML
5000 INJECTION, SOLUTION INTRAVENOUS; SUBCUTANEOUS EVERY 8 HOURS SCHEDULED
Status: DISCONTINUED | OUTPATIENT
Start: 2021-01-21 | End: 2021-01-23 | Stop reason: HOSPADM

## 2021-01-21 RX ADMIN — HEPARIN SODIUM 5000 UNITS: 5000 INJECTION INTRAVENOUS; SUBCUTANEOUS at 22:13

## 2021-01-21 RX ADMIN — OXYCODONE HYDROCHLORIDE AND ACETAMINOPHEN 1000 MG: 500 TABLET ORAL at 22:13

## 2021-01-21 RX ADMIN — SODIUM CHLORIDE 1000 ML: 0.9 INJECTION, SOLUTION INTRAVENOUS at 19:48

## 2021-01-21 RX ADMIN — SODIUM CHLORIDE 100 ML/HR: 0.9 INJECTION, SOLUTION INTRAVENOUS at 22:20

## 2021-01-21 NOTE — ED PROVIDER NOTES
History  Chief Complaint   Patient presents with    Weakness - Generalized     covid + as of 21, c/o extreme weakness and unable to tolerate po intake x1 week  History provided by:  Patient   used: No      Patient is a 69-year-old male presenting to emergency department with weakness fatigue and unable to eat or drink  Patient was diagnosed with COVID 10 days ago  Has had diarrhea  No chest pain  Not short of breath  No fever at this time  Smoker  Social alcohol use  No drugs  No neuro deficit  MDM concern for dehydration, 1st nurse labs show a KI and dehydration  Started on fluids  Will admit to hospital      Prior to Admission Medications   Prescriptions Last Dose Informant Patient Reported? Taking?    clotrimazole (LOTRIMIN) 1 % cream More than a month at Unknown time  Yes No   Sig: Apply topically 2 (two) times a day   diclofenac sodium (VOLTAREN) 1 % More than a month at Unknown time  No No   Sig: Apply 2 g topically 4 (four) times a day   fluticasone (FLONASE) 50 mcg/act nasal spray Past Month at Unknown time  Yes Yes   Si sprays into each nostril daily   ketoconazole (NIZORAL) 2 % shampoo More than a month at Unknown time  Yes No   Sig: Apply topically once   losartan (COZAAR) 50 mg tablet Past Week at Unknown time  No Yes   Sig: TAKE 1 TABLET(50 MG) BY MOUTH DAILY   mupirocin (BACTROBAN) 2 % ointment   No No   Sig: Apply topically 3 (three) times a day      Facility-Administered Medications: None       Past Medical History:   Diagnosis Date    Arthritis     Asthma     Back pain     COVID-19 2021    Gastroesophageal reflux disease without esophagitis 2020    GERD (gastroesophageal reflux disease)     Gout of multiple sites 2020    Hiatal hernia     High blood pressure     Hypertension     Impaired fasting glucose 2020    Lipoma of neck     Mediastinal lymphadenopathy     Osteoporosis     Right hand pain 2020  Sleep disorder        Past Surgical History:   Procedure Laterality Date    BACK SURGERY      COLONOSCOPY  10/07/2016    5 years (per dominique)    COLONOSCOPY      EGD  2017    ELBOW SURGERY Left 2018    open arthrotomy, capsulectomy, loose body removal (per dominique)    HERNIA REPAIR      umbilical with mesh (per dominique)    LUMBAR FUSION  2018    IA BRONCHOSCOPY NEEDLE BX TRACHEA MAIN STEM&/BRON N/A 2020    Procedure: ENDOBRONCHIAL ULTRASOUND (EBUS) WITH BIOPSY;  Surgeon: Noel Webb MD;  [facility];  Service: Thoracic    [address] N/A 2020    Procedure: Gayla Singletary;  Surgeon: Noel Webb MD;  [facility];  Service: Thoracic    ROTATOR CUFF REPAIR Bilateral     TONSILLECTOMY  1980       Family History   Problem Relation Age of Onset    Hypertension Mother     Hypertension Father      I have reviewed and agree with the history as documented  E-Cigarette/Vaping    E-Cigarette Use Never User      E-Cigarette/Vaping Substances    Nicotine No     THC No     CBD No     Flavoring No     Other No     Unknown No      Social History     Tobacco Use    Smoking status: Former Smoker     Packs/day: 0 50     Years: 6 00     Pack years: 3 00     Types: Cigarettes     Quit date:      Years since quittin 0    Smokeless tobacco: Never Used   Substance Use Topics    Alcohol use: Yes     Alcohol/week: 7 0 standard drinks     Types: 7 Standard drinks or equivalent per week     Frequency: 4 or more times a week     Drinks per session: 1 or 2     Comment: one glass per night   Drug use: No       Review of Systems   Constitutional: Positive for fatigue  Negative for chills, diaphoresis and fever  Respiratory: Negative for cough, shortness of breath, wheezing and stridor  Cardiovascular: Negative for chest pain, palpitations and leg swelling  Gastrointestinal: Positive for nausea   Negative for abdominal pain, blood in stool, diarrhea and vomiting  Genitourinary: Negative for dysuria, frequency and urgency  Musculoskeletal: Negative for neck pain and neck stiffness  Skin: Negative for pallor and rash  Neurological: Positive for weakness  Negative for dizziness, syncope, light-headedness and headaches  All other systems reviewed and are negative  Physical Exam  Physical Exam  Vitals signs reviewed  Constitutional:       Appearance: He is well-developed  HENT:      Head: Normocephalic and atraumatic  Eyes:      Pupils: Pupils are equal, round, and reactive to light  Neck:      Musculoskeletal: Neck supple  Cardiovascular:      Rate and Rhythm: Normal rate and regular rhythm  Heart sounds: Normal heart sounds  Pulmonary:      Effort: Pulmonary effort is normal  No respiratory distress  Breath sounds: Normal breath sounds  Abdominal:      General: Bowel sounds are normal       Palpations: Abdomen is soft  Tenderness: There is no abdominal tenderness  Musculoskeletal: Normal range of motion  General: No swelling or tenderness  Skin:     General: Skin is warm and dry  Capillary Refill: Capillary refill takes less than 2 seconds  Neurological:      General: No focal deficit present  Mental Status: He is alert and oriented to person, place, and time           Vital Signs  ED Triage Vitals   Temperature Pulse Respirations Blood Pressure SpO2   01/21/21 1651 01/21/21 1651 01/21/21 1651 01/21/21 1841 01/21/21 1651   98 5 °F (36 9 °C) 99 18 142/95 94 %      Temp Source Heart Rate Source Patient Position - Orthostatic VS BP Location FiO2 (%)   01/21/21 1651 01/21/21 1651 01/21/21 1841 01/21/21 1841 --   Tympanic Monitor Sitting Left arm       Pain Score       --                  Vitals:    01/21/21 1651 01/21/21 1841   BP:  142/95   Pulse: 99    Patient Position - Orthostatic VS:  Sitting         Visual Acuity      ED Medications  Medications   sodium chloride 0 9 % bolus 1,000 mL (1,000 mL Intravenous New Bag 1/21/21 1948)       Diagnostic Studies  Results Reviewed     Procedure Component Value Units Date/Time    CBC and differential [[id number]]  (Abnormal) Collected: 01/21/21 1656    Lab Status: Final result Specimen: Blood from Arm, Right Updated: 01/21/21 1752     WBC 6 26 Thousand/uL      RBC 6 55 Million/uL      Hemoglobin 18 0 g/dL      Hematocrit 57 3 %      MCV 88 fL      MCH 27 5 pg      MCHC 31 4 g/dL      RDW 14 7 %      MPV 10 6 fL      Platelets 092 Thousands/uL      nRBC 0 /100 WBCs     Narrative: This is an appended report  These results have been appended to a previously verified report      Manual Differential(PHLEBS Do Not Order) [[id number]]  (Abnormal) Collected: 01/21/21 1656    Lab Status: Final result Specimen: Blood from Arm, Right Updated: 01/21/21 1752     Segmented % 80 %      Bands % 1 %      Lymphocytes % 16 %      Monocytes % 3 %      Eosinophils, % 0 %      Basophils % 0 %      Absolute Neutrophils 5 07 Thousand/uL      Lymphocytes Absolute 1 00 Thousand/uL      Monocytes Absolute 0 19 Thousand/uL      Eosinophils Absolute 0 00 Thousand/uL      Basophils Absolute 0 00 Thousand/uL      Total Counted 100     Platelet Estimate Adequate     Large Platelet Present    Comprehensive metabolic panel [[id number]]  (Abnormal) Collected: 01/21/21 1656    Lab Status: Final result Specimen: Blood from Arm, Right Updated: 01/21/21 1718     Sodium 137 mmol/L      Potassium 4 5 mmol/L      Chloride 105 mmol/L      CO2 22 mmol/L      ANION GAP 10 mmol/L      BUN 48 mg/dL      Creatinine 1 61 mg/dL      Glucose 147 mg/dL      Calcium 8 6 mg/dL      Corrected Calcium 9 5 mg/dL       U/L       U/L      Alkaline Phosphatase 75 U/L      Total Protein 9 1 g/dL      Albumin 2 9 g/dL      Total Bilirubin 0 69 mg/dL      eGFR 45 ml/min/1 73sq m     Narrative:      [place] guidelines for Chronic Kidney Disease (CKD):   Stage 1 with normal or high GFR (GFR > 90 mL/min/1 73 square meters)    Stage 2 Mild CKD (GFR = 60-89 mL/min/1 73 square meters)    Stage 3A Moderate CKD (GFR = 45-59 mL/min/1 73 square meters)    Stage 3B Moderate CKD (GFR = 30-44 mL/min/1 73 square meters)    Stage 4 Severe CKD (GFR = 15-29 mL/min/1 73 square meters)    Stage 5 End Stage CKD (GFR <15 mL/min/1 73 square meters)  Note: GFR calculation is accurate only with a steady state creatinine                 XR chest 1 view portable   ED Interpretation by Kristin Regalado MD (01/21 1207)   Viral pneumonia                 Procedures  Procedures         ED Course                                           MDM    Disposition  Final diagnoses:   [facility] kidney [facility]) [facility]   COVID-19     Time reflects when diagnosis was documented in both MDM as applicable and the Disposition within this note     Time User Action Codes Description Comment    1/21/2021  7:11 PM Tadevosyan, Martha Add [N17 9] [facility]     1/21/2021  7:11 PM Tadevosyan, Martha Add [E86 0] Dehydration     1/21/2021  7:11 PM Tadevosyan, Martha Add [U07 1] COVID-19     1/21/2021  7:11 PM Corrina Bence, Ara Remove [E86 0] Dehydration       ED Disposition     ED Disposition Condition Date/Time Comment    Admit Stable Thu Jan 21, 2021  7:11 PM Case was discussed with medicine resident and the patient's admission status was agreed to be Admission Status: observation status to the service of Dr Dalton Amos   Follow-up Information    None         Patient's Medications   Discharge Prescriptions    No medications on file     No discharge procedures on file      PDMP Review     None          ED Provider  Electronically Signed by           Kristin Regalado MD  01/21/21 2025

## 2021-01-21 NOTE — PROGRESS NOTES
Virtual Regular Visit      Assessment/Plan:    Problem List Items Addressed This Visit        Other    COVID-19      Other Visit Diagnoses     Fatigue due to exposure, initial encounter    -  Primary    Shortness of breath                   Reason for visit is   Chief Complaint   Patient presents with    COVID-19     Positive 1/11/2021 - Hoarseness, No appetite, Fatigue        Encounter provider Neptali Collins MD    Provider located at [address] [place] [id number] [phone]      Recent Visits  Date Type Provider Dept   01/15/21 Telephone Neptali Collins [place] [facility] [id number] [address] recent visits within past 7 days and meeting all other requirements     Today's Visits  Date Type Provider Dept   01/21/21 Telemedicine Neptali Collins MD [facility] [address] [facility] today's visits and meeting all other requirements     Future Appointments  No visits were found meeting these conditions  Showing future appointments within next 150 days and meeting all other requirements        The patient was identified by name and date of birth  Erika Still was informed that this is a telemedicine visit and that the visit is being conducted through {AMB CORONAVIRUS VISIT MAWYLD:83542}  {Telemedicine confidentiality :73610} {Telemedicine participants:59967}  He acknowledged consent and understanding of privacy and security of the video platform  The patient has agreed to participate and understands they can discontinue the visit at any time  Patient is aware this is a billable service  Subjective  Erika Still is a 56 y o  male ***         HPI     Past Medical History:   Diagnosis Date    Arthritis     Asthma     Back pain     COVID-19 1/21/2021 1/11/2021    Gastroesophageal reflux disease without esophagitis 7/16/2020    GERD (gastroesophageal reflux disease)     Gout of multiple sites 7/16/2020    Hiatal hernia     High blood pressure     Hypertension     Impaired fasting glucose 6/18/2020    Lipoma of neck     Mediastinal lymphadenopathy     Osteoporosis     Right hand pain 7/16/2020    Sleep disorder        Past Surgical History:   Procedure Laterality Date    BACK SURGERY      COLONOSCOPY  10/07/2016    5 years (per dominique)    COLONOSCOPY      EGD  03/31/2017    ELBOW SURGERY Left 04/09/2018    open arthrotomy, capsulectomy, loose body removal (per dominique)   [phone]  73/39/1577    umbilical with mesh (per dominique)    LUMBAR FUSION  07/11/2018    NH BRONCHOSCOPY NEEDLE BX TRACHEA MAIN STEM&/BRON N/A 4/28/2020    Procedure: ENDOBRONCHIAL ULTRASOUND (EBUS) WITH BIOPSY;  Surgeon: Meme Moss MD;  [facility];  Service: Thoracic    NH [address] N/A 4/28/2020    Procedure: Oral Kush;  Surgeon: Meme Moss MD;  [facility];  Service: Thoracic    ROTATOR CUFF REPAIR Bilateral     TONSILLECTOMY  01/01/1980       Current Outpatient Medications   Medication Sig Dispense Refill    clotrimazole (LOTRIMIN) 1 % cream Apply topically 2 (two) times a day      diclofenac sodium (VOLTAREN) 1 % Apply 2 g topically 4 (four) times a day 1 Tube 2    ketoconazole (NIZORAL) 2 % shampoo Apply topically once      losartan (COZAAR) 50 mg tablet TAKE 1 TABLET(50 MG) BY MOUTH DAILY 90 tablet 1    mupirocin (BACTROBAN) 2 % ointment Apply topically 3 (three) times a day 22 g 0    fluticasone (FLONASE) 50 mcg/act nasal spray 2 sprays into each nostril daily       No current facility-administered medications for this visit           Allergies   Allergen Reactions    Nuts Shortness Of Breath    Peanut Oil Anaphylaxis     Anything using peanuts    Codeine Other (See Comments)     Dizzy, light headed  Body of balance      Latex Rash     Allergic to products, wears cotton underwear    Dust Mite Extract        Review of Systems    Video Exam    Vitals:    01/21/21 1124 Weight: 77 6 kg (171 lb)   Height: 5' 7" (1 702 m)       Physical Exam     {covid time spent:79486}      VIRTUAL VISIT [facility] acknowledges that he has consented to an online visit or consultation  He understands that the online visit is based solely on information provided by him, and that, in the absence of a face-to-face physical evaluation by the physician, the diagnosis he receives is both limited and provisional in terms of accuracy and completeness  This is not intended to replace a full medical face-to-face evaluation by the physician  Madalyn Gaitan understands and accepts these terms

## 2021-01-21 NOTE — TELEPHONE ENCOUNTER
Pt's wife wanted me to let you know that she is currently taking pt to [facility]   She wanted me to let you know and asking to "keep an eye on him "

## 2021-01-21 NOTE — PROGRESS NOTES
COVID-19 Virtual Visit     Assessment/Plan:      Patient's appear very sick very ill weak voice shallow breathing  Has been going for 10 days since diagnosis of COVID 19  At this point recommendation for ER evaluation to assess for oxygen status and dehydration  Concern for azotemia with  unable to eat and continue fever  ER attending discussed and patient advised to come for evaluation close monitor needed  He has risk factor of obstructive sleep apnea on CPAP and lymphadenopathy has been follow-up with hematologist in the past   And high blood pressure  Problem List Items Addressed This Visit        Other    [id number]      Other Visit Diagnoses     Fatigue due to exposure, initial encounter    -  Primary    Shortness of breath             Disposition:     I referred patient to the Emergency Department at: [address] ED  I have spent 15 minutes directly with the patient  Greater than 50% of this time was spent in counseling/coordination of care regarding: prognosis, risks and benefits of treatment options, instructions for management, patient and family education and impressions  Encounter provider Akhil Rosa MD    Provider located at [address] [place] [id number] [phone]    Recent Visits  Date Type Provider Dept   01/15/21 Telephone Akhil Rosa MD [facility] [place]   Showing recent visits within past 7 days and meeting all other requirements     Today's Visits  Date Type Provider Dept   01/21/21 Telemedicine Akhil Rosa MD P[facility] [phone] [address] today's visits and meeting all other requirements     Future Appointments  No visits were found meeting these conditions  Showing future appointments within next 150 days and meeting all other requirements      This virtual check-in was done via [employer] and patient was informed that this is not a secure, HIPAA-compliant platform   He agrees to proceed  Patient agrees to participate in a virtual check in via telephone or video visit instead of presenting to the office to address urgent/immediate medical needs  Patient is aware this is a billable service  After connecting through [facility], the patient was identified by name and date of birth  Saji Isabel was informed that this was a telemedicine visit and that the exam was being conducted confidentially over secure lines  My office door was closed  No one else was in the room  Saji Isabel acknowledged consent and understanding of privacy and security of the telemedicine visit  I informed the patient that I have reviewed his record in Epic and presented the opportunity for him to ask any questions regarding the visit today  The patient agreed to participate  Subjective:   Saji Isabel is a 56 y o  male who is concerned about COVID-19  Patient's symptoms include fever, chills, fatigue, malaise, sore throat, anosmia, loss of taste, cough, shortness of breath, nausea, myalgias and headache       Date of symptom onset: 1/11/2021  Date of exposure: 1/11/2021    Exposure:   Contact with a person who is under investigation (PUI) for or who is positive for COVID-19 within the last 14 days?: Yes    Hospitalized recently for fever and/or lower respiratory symptoms?: Yes      Currently a healthcare worker that is involved in direct patient care?: Yes      Works in a special setting where the risk of COVID-19 transmission may be high? (this may include long-term care, correctional and [facility] facilities; homeless shelters; assisted-living facilities and group homes ): Yes      Resident in a special setting where the risk of COVID-19 transmission may be high? (this may include long-term care, correctional and [facility] facilities; homeless shelters; assisted-living facilities and group homes ): Yes      Continued with fever for 10 days, dark urine, unable to eat/drink, short of breath, coughing, myalgia    Lab Results   Component Value Date    SARSCOV2 Detected (A) 01/11/2021    SARSCOV2 Not Detected 09/17/2020     Past Medical History:   Diagnosis Date    Arthritis     Asthma     Back pain     COVID-19 1/21/2021 1/11/2021    Gastroesophageal reflux disease without esophagitis 7/16/2020    GERD (gastroesophageal reflux disease)     Gout of multiple sites 7/16/2020    Hiatal hernia     High blood pressure     Hypertension     Impaired fasting glucose 6/18/2020    Lipoma of neck     Mediastinal lymphadenopathy     Osteoporosis     Right hand pain 7/16/2020    Sleep disorder      Past Surgical History:   Procedure Laterality Date    BACK SURGERY      COLONOSCOPY  10/07/2016    5 years (per dominique)    COLONOSCOPY      EGD  03/31/2017    ELBOW SURGERY Left 04/09/2018    open arthrotomy, capsulectomy, loose body removal (per dominique)    HERNIA REPAIR  14/27/7752    umbilical with mesh (per dominique)    LUMBAR FUSION  07/11/2018    [place] BRONCHOSCOPY NEEDLE BX TRACHEA MAIN STEM&/BRON N/A 4/28/2020    Procedure: ENDOBRONCHIAL ULTRASOUND (EBUS) WITH BIOPSY;  Surgeon: Noel Webb MD;  [facility];  Service: Thoracic    [place] [address] N/A 4/28/2020    Procedure: BRONCHOSCOPY FLEXIBLE;  Surgeon: Noel Webb MD;  [facility];  Service: Thoracic    ROTATOR CUFF REPAIR Bilateral     TONSILLECTOMY  01/01/1980     Current Outpatient Medications   Medication Sig Dispense Refill    clotrimazole (LOTRIMIN) 1 % cream Apply topically 2 (two) times a day      diclofenac sodium (VOLTAREN) 1 % Apply 2 g topically 4 (four) times a day 1 Tube 2    ketoconazole (NIZORAL) 2 % shampoo Apply topically once      losartan (COZAAR) 50 mg tablet TAKE 1 TABLET(50 MG) BY MOUTH DAILY 90 tablet 1    mupirocin (BACTROBAN) 2 % ointment Apply topically 3 (three) times a day 22 g 0    fluticasone (FLONASE) 50 mcg/act nasal spray 2 sprays into each nostril daily No current facility-administered medications for this visit  Allergies   Allergen Reactions    Nuts Shortness Of Breath    Peanut Oil Anaphylaxis     Anything using peanuts    Codeine Other (See Comments)     Dizzy, light headed  Body of balance      Latex Rash     Allergic to products, wears cotton underwear    Dust Mite Extract        Review of Systems   Constitutional: Positive for chills, fatigue and fever  HENT: Positive for sore throat  Respiratory: Positive for cough and shortness of breath  Gastrointestinal: Positive for nausea  Musculoskeletal: Positive for myalgias  Neurological: Positive for headaches  Objective:    Vitals:    01/21/21 1124   Weight: 77 6 kg (171 lb)   Height: 5' 7" (1 702 m)       Physical Exam  Vitals signs and nursing note reviewed  Constitutional:       General: He is in acute distress  Appearance: He is ill-appearing  Pulmonary:      Comments: Shallow breathing  Neurological:      Mental Status: He is oriented to person, place, and time  Psychiatric:         Mood and Affect: Mood normal          Behavior: Behavior normal          Thought Content: Thought content normal          Judgment: Judgment normal        VIRTUAL VISIT [facility] acknowledges that he has consented to an online visit or consultation  He understands that the online visit is based solely on information provided by him, and that, in the absence of a face-to-face physical evaluation by the physician, the diagnosis he receives is both limited and provisional in terms of accuracy and completeness  This is not intended to replace a full medical face-to-face evaluation by the physician  Jamey Lowery understands and accepts these terms

## 2021-01-22 PROBLEM — I10 HYPERTENSION: Status: ACTIVE | Noted: 2021-01-22

## 2021-01-22 PROBLEM — R74.8 ELEVATED LIVER ENZYMES: Status: ACTIVE | Noted: 2021-01-21

## 2021-01-22 PROBLEM — I10 BENIGN ESSENTIAL HTN: Status: RESOLVED | Noted: 2018-01-11 | Resolved: 2021-01-22

## 2021-01-22 LAB
ALBUMIN SERPL BCP-MCNC: 2.7 G/DL (ref 3.5–5)
ALP SERPL-CCNC: 71 U/L (ref 46–116)
ALT SERPL W P-5'-P-CCNC: 226 U/L (ref 12–78)
ANION GAP SERPL CALCULATED.3IONS-SCNC: 8 MMOL/L (ref 4–13)
AST SERPL W P-5'-P-CCNC: 92 U/L (ref 5–45)
ATRIAL RATE: 105 BPM
BASOPHILS # BLD MANUAL: 0 THOUSAND/UL (ref 0–0.1)
BASOPHILS NFR MAR MANUAL: 0 % (ref 0–1)
BILIRUB SERPL-MCNC: 0.64 MG/DL (ref 0.2–1)
BUN SERPL-MCNC: 31 MG/DL (ref 5–25)
CALCIUM ALBUM COR SERPL-MCNC: 9.3 MG/DL (ref 8.3–10.1)
CALCIUM SERPL-MCNC: 8.3 MG/DL (ref 8.3–10.1)
CHLORIDE SERPL-SCNC: 107 MMOL/L (ref 100–108)
CO2 SERPL-SCNC: 26 MMOL/L (ref 21–32)
CREAT SERPL-MCNC: 1.35 MG/DL (ref 0.6–1.3)
CRP SERPL QL: 52.9 MG/L
D DIMER PPP FEU-MCNC: 1.56 UG/ML FEU
EOSINOPHIL # BLD MANUAL: 0 THOUSAND/UL (ref 0–0.4)
EOSINOPHIL NFR BLD MANUAL: 0 % (ref 0–6)
ERYTHROCYTE [DISTWIDTH] IN BLOOD BY AUTOMATED COUNT: 14.2 % (ref 11.6–15.1)
FERRITIN SERPL-MCNC: 1465 NG/ML (ref 8–388)
GFR SERPL CREATININE-BSD FRML MDRD: 56 ML/MIN/1.73SQ M
GLUCOSE SERPL-MCNC: 112 MG/DL (ref 65–140)
HCT VFR BLD AUTO: 54 % (ref 36.5–49.3)
HGB BLD-MCNC: 16.3 G/DL (ref 12–17)
LG PLATELETS BLD QL SMEAR: PRESENT
LYMPHOCYTES # BLD AUTO: 0.46 THOUSAND/UL (ref 0.6–4.47)
LYMPHOCYTES # BLD AUTO: 12 % (ref 14–44)
MCH RBC QN AUTO: 26.9 PG (ref 26.8–34.3)
MCHC RBC AUTO-ENTMCNC: 30.2 G/DL (ref 31.4–37.4)
MCV RBC AUTO: 89 FL (ref 82–98)
MONOCYTES # BLD AUTO: 0.23 THOUSAND/UL (ref 0–1.22)
MONOCYTES NFR BLD: 6 % (ref 4–12)
NEUTROPHILS # BLD MANUAL: 3.16 THOUSAND/UL (ref 1.85–7.62)
NEUTS SEG NFR BLD AUTO: 82 % (ref 43–75)
NRBC BLD AUTO-RTO: 0 /100 WBCS
P AXIS: 58 DEGREES
PLATELET # BLD AUTO: 285 THOUSANDS/UL (ref 149–390)
PLATELET BLD QL SMEAR: ADEQUATE
PMV BLD AUTO: 10.6 FL (ref 8.9–12.7)
POTASSIUM SERPL-SCNC: 4.4 MMOL/L (ref 3.5–5.3)
PR INTERVAL: 130 MS
PROCALCITONIN SERPL-MCNC: 0.32 NG/ML
PROT SERPL-MCNC: 8.4 G/DL (ref 6.4–8.2)
QRS AXIS: -11 DEGREES
QRSD INTERVAL: 68 MS
QT INTERVAL: 318 MS
QTC INTERVAL: 415 MS
RBC # BLD AUTO: 6.05 MILLION/UL (ref 3.88–5.62)
SODIUM SERPL-SCNC: 141 MMOL/L (ref 136–145)
T WAVE AXIS: 16 DEGREES
TOTAL CELLS COUNTED SPEC: 100
VENTRICULAR RATE: 102 BPM
WBC # BLD AUTO: 3.85 THOUSAND/UL (ref 4.31–10.16)

## 2021-01-22 PROCEDURE — 93010 ELECTROCARDIOGRAM REPORT: CPT | Performed by: INTERNAL MEDICINE

## 2021-01-22 PROCEDURE — 85027 COMPLETE CBC AUTOMATED: CPT | Performed by: INTERNAL MEDICINE

## 2021-01-22 PROCEDURE — 85379 FIBRIN DEGRADATION QUANT: CPT | Performed by: INTERNAL MEDICINE

## 2021-01-22 PROCEDURE — 86140 C-REACTIVE PROTEIN: CPT | Performed by: INTERNAL MEDICINE

## 2021-01-22 PROCEDURE — 85007 BL SMEAR W/DIFF WBC COUNT: CPT | Performed by: INTERNAL MEDICINE

## 2021-01-22 PROCEDURE — 82728 ASSAY OF FERRITIN: CPT | Performed by: INTERNAL MEDICINE

## 2021-01-22 PROCEDURE — 84145 PROCALCITONIN (PCT): CPT | Performed by: INTERNAL MEDICINE

## 2021-01-22 PROCEDURE — 99225 PR SBSQ OBSERVATION CARE/DAY 25 MINUTES: CPT | Performed by: INTERNAL MEDICINE

## 2021-01-22 PROCEDURE — 80053 COMPREHEN METABOLIC PANEL: CPT | Performed by: INTERNAL MEDICINE

## 2021-01-22 RX ORDER — HYDRALAZINE HYDROCHLORIDE 20 MG/ML
10 INJECTION INTRAMUSCULAR; INTRAVENOUS EVERY 6 HOURS PRN
Status: DISCONTINUED | OUTPATIENT
Start: 2021-01-22 | End: 2021-01-23 | Stop reason: HOSPADM

## 2021-01-22 RX ADMIN — HEPARIN SODIUM 5000 UNITS: 5000 INJECTION INTRAVENOUS; SUBCUTANEOUS at 21:31

## 2021-01-22 RX ADMIN — HEPARIN SODIUM 5000 UNITS: 5000 INJECTION INTRAVENOUS; SUBCUTANEOUS at 06:10

## 2021-01-22 RX ADMIN — HEPARIN SODIUM 5000 UNITS: 5000 INJECTION INTRAVENOUS; SUBCUTANEOUS at 13:23

## 2021-01-22 RX ADMIN — ZINC SULFATE 220 MG (50 MG) CAPSULE 220 MG: CAPSULE at 08:57

## 2021-01-22 RX ADMIN — SODIUM CHLORIDE 100 ML/HR: 0.9 INJECTION, SOLUTION INTRAVENOUS at 15:39

## 2021-01-22 RX ADMIN — OXYCODONE HYDROCHLORIDE AND ACETAMINOPHEN 1000 MG: 500 TABLET ORAL at 08:57

## 2021-01-22 RX ADMIN — Medication 2000 UNITS: at 08:57

## 2021-01-22 RX ADMIN — OXYCODONE HYDROCHLORIDE AND ACETAMINOPHEN 1000 MG: 500 TABLET ORAL at 21:31

## 2021-01-22 NOTE — ASSESSMENT & PLAN NOTE
· POA: [id number] POSITIVE on 1/11/21, 8 days of significant generalized weakness with decreased p o  Intake    ·  chest x-ray showing development of infiltrates bilaterally consistent with COVID-19 infection  ·  Patient is currently on 1 L of supplemental oxygen, will continue to wean as tolerated for O2 saturations >90%  · admitted under mild treatment protocol  · inflammatory markers, pending based on results well decided patient will require steroids or not  · continue vitamin-D, vitamin-C, zinc D1/7  ·  self pronng  If able  ·  frequent use incentive spirometry  · Procalcitonin currently pending, will hold on starting antibiotics for the  time being if procalcitonin is elevated will start antibiotics

## 2021-01-22 NOTE — PLAN OF CARE
Problem: PAIN - ADULT  Goal: Verbalizes/displays adequate comfort level or baseline comfort level  Description: Interventions:  - Encourage patient to monitor pain and request assistance  - Assess pain using appropriate pain scale  - Administer analgesics based on type and severity of pain and evaluate response  - Implement non-pharmacological measures as appropriate and evaluate response  - Consider cultural and social influences on pain and pain management  - Notify physician/advanced practitioner if interventions unsuccessful or patient reports new pain  Outcome: Progressing     Problem: INFECTION - ADULT  Goal: Absence or prevention of progression during hospitalization  Description: INTERVENTIONS:  - Assess and monitor for signs and symptoms of infection  - Monitor lab/diagnostic results  - Monitor all insertion sites, i e  indwelling lines, tubes, and drains  - Monitor endotracheal if appropriate and nasal secretions for changes in amount and color  - [place] appropriate cooling/warming therapies per order  - Administer medications as ordered  - Instruct and encourage patient and family to use good hand hygiene technique  - Identify and instruct in appropriate isolation precautions for identified infection/condition  Outcome: Progressing  Goal: Absence of fever/infection during neutropenic period  Description: INTERVENTIONS:  - Monitor WBC    Outcome: Progressing     Problem: SAFETY ADULT  Goal: Patient will remain free of falls  Description: INTERVENTIONS:  - Assess patient frequently for physical needs  -  Identify cognitive and physical deficits and behaviors that affect risk of falls    -  [place] fall precautions as indicated by assessment   - Educate patient/family on patient safety including physical limitations  - Instruct patient to call for assistance with activity based on assessment  - Modify environment to reduce risk of injury  - Consider OT/PT consult to assist with strengthening/mobility  Outcome: Progressing  Goal: Maintain or return to baseline ADL function  Description: INTERVENTIONS:  -  Assess patient's ability to carry out ADLs; assess patient's baseline for ADL function and identify physical deficits which impact ability to perform ADLs (bathing, care of mouth/teeth, toileting, grooming, dressing, etc )  - Assess/evaluate cause of self-care deficits   - Assess range of motion  - Assess patient's mobility; develop plan if impaired  - Assess patient's need for assistive devices and provide as appropriate  - Encourage maximum independence but intervene and supervise when necessary  - Involve family in performance of ADLs  - Assess for home care needs following discharge   - Consider OT consult to assist with ADL evaluation and planning for discharge  - Provide patient education as appropriate  Outcome: Progressing  Goal: Maintain or return mobility status to optimal level  Description: INTERVENTIONS:  - Assess patient's baseline mobility status (ambulation, transfers, stairs, etc )    - Identify cognitive and physical deficits and behaviors that affect mobility  - Identify mobility aids required to assist with transfers and/or ambulation (gait belt, sit-to-stand, lift, walker, cane, etc )  - [place] fall precautions as indicated by assessment  - Record patient progress and toleration of activity level on Mobility SBAR; progress patient to next Phase/Stage  - Instruct patient to call for assistance with activity based on assessment  - Consider rehabilitation consult to assist with strengthening/weightbearing, etc   Outcome: Progressing     Problem: DISCHARGE PLANNING  Goal: Discharge to home or other facility with appropriate resources  Description: INTERVENTIONS:  - Identify barriers to discharge w/patient and caregiver  - Arrange for needed discharge resources and transportation as appropriate  - Identify discharge learning needs (meds, wound care, etc )  - Arrange for interpretive services to assist at discharge as needed  - Refer to Case Management Department for coordinating discharge planning if the patient needs post-hospital services based on physician/advanced practitioner order or complex needs related to functional status, cognitive ability, or social support system  Outcome: Progressing     Problem: Potential for Falls  Goal: Patient will remain free of falls  Description: INTERVENTIONS:  - Assess patient frequently for physical needs  -  Identify cognitive and physical deficits and behaviors that affect risk of falls  -  [place] fall precautions as indicated by assessment   - Educate patient/family on patient safety including physical limitations  - Instruct patient to call for assistance with activity based on assessment  - Modify environment to reduce risk of injury  - Consider OT/PT consult to assist with strengthening/mobility  Outcome: Progressing     Problem: Knowledge Deficit  Goal: Patient/family/caregiver demonstrates understanding of disease process, treatment plan, medications, and discharge instructions  Description: Complete learning assessment and assess knowledge base  Interventions:  - Provide teaching at level of understanding  - Provide teaching via preferred learning methods  Outcome: Progressing     Problem: Nutrition/Hydration-ADULT  Goal: Nutrient/Hydration intake appropriate for improving, restoring or maintaining nutritional needs  Description: Monitor and assess patient's nutrition/hydration status for malnutrition  Collaborate with interdisciplinary team and initiate plan and interventions as ordered  Monitor patient's weight and dietary intake as ordered or per policy  Utilize nutrition screening tool and intervene as necessary  Determine patient's food preferences and provide high-protein, high-caloric foods as appropriate       INTERVENTIONS:  - Monitor oral intake, urinary output, labs, and treatment plans  - Assess nutrition and hydration status and recommend course of action  - Evaluate amount of meals eaten  - Assist patient with eating if necessary   - Allow adequate time for meals  - Recommend/ encourage appropriate diets, oral nutritional supplements, and vitamin/mineral supplements  - Order, calculate, and assess calorie counts as needed  - Recommend, monitor, and adjust tube feedings and TPN/PPN based on assessed needs  - Assess need for intravenous fluids  - Provide specific nutrition/hydration education as appropriate  - Include patient/family/caregiver in decisions related to nutrition  Outcome: Progressing

## 2021-01-22 NOTE — ASSESSMENT & PLAN NOTE
· Baseline creatinine 1 1  Likely secondary to prerenal azotemia secondary to poor oral intake    · Creatinine now back to baseline  · Ensure adequate oral fluid hydration  · Resume losartan tomorrow 1/24/2021

## 2021-01-22 NOTE — ASSESSMENT & PLAN NOTE
· Blood pressure currently stable   · Losartan on hold due to YOAV  · Add hydralazine p r n   For SBP >180  · Continue to monitor blood pressure

## 2021-01-22 NOTE — H&P
H&P- [address] 1960, 56 y o  male MRN: [id number]    Unit/Bed#: ED 06 Encounter: [id number]    Primary Care Provider: Manan Shabazz MD   Date and time admitted to hospital: 1/21/2021  5:49 PM        * YOAV (acute kidney injury) [facility])  Assessment & Plan  Baseline creatinine 1 1  Likely secondary to prerenal azotemia secondary to poor oral intake  Plan:  · Continue IV fluid hydration  · Monitor I&Os  · Avoid hypotension/nephrotoxins (losartan on hold)  · Monitor renal function      Transaminitis  Assessment & Plan  Likely secondary to COVID-19 infection  Continue to monitor    COVID-19 virus infection  Assessment & Plan  Lab Results   Component Value Date    SARSCOV2 Detected (A) 01/11/2021    [place] Not Detected 09/17/2020       Patient presenting with symptoms of COVID-19 SYMPTOMS: fatigue    Workup:  · Imaging:  No results found  · Labs:     No results for input(s): FERRITIN, CRP, DDIMER in the last 72 hours  · Oxygen:  Saturating 94% on room air    COVID Stage: MILD    Plan:  · Labs:  · Inflammatory Markers - CRP, D-Dimer, Ferritin  · CMP tomorrow a m   · CBC with diff tomorrow a m  · Meds  · Vitamin D3 2000 units daily  · Vitamin C 1000 BID  · Zinc 220 mg x 7 days then multivitamin    · Anticoag: Therapeutic Anticoagulation per guidelines  · Non-Pharmacologic interventions  · Self proning if able  · Incentive Spirometry    Benign essential HTN  Assessment & Plan  Blood pressure currently stable and in acceptable range    · Losartan on hold due to YOAV  · Continue to monitor blood pressure  · Lopressor p r n  For systolic blood pressure >770      VTE Prophylaxis: Heparin  / sequential compression device   Code Status:  Full code  POLST: POLST form is not discussed and not completed at this time  Anticipated Length of Stay:  Patient will be admitted on an Observation basis with an anticipated length of stay of  less than 2 midnights     Justification for Hospital Stay:  YOAV requiring IV fluid hydration    Chief Complaint:   Generalized weakness    History of Present Illness:    Kalpesh Limon is a 56 y o  male with medical history of hypertension who presents with 8 days of generalized weakness  He was tested positive for COVID-19 on 1/11/2021 and since then he has been having poor oral intake and intermittent diarrhea and worsening fatigue  He denies any fever, chills, sweating, shortness of breath, cough, focal weakness  He was found to have elevated creatinine in the ED and patient will be admitted to [facility] for observation for IV fluid hydration for his YOAV  Patient is saturating well on room air therefore will place patient on mild COVID protocol for supportive care  Will hold off on steroid, remdesivir as patient does not have any respiratory symptoms  Review of Systems:    Review of Systems   Constitutional: Positive for appetite change and fatigue  Negative for activity change, chills, diaphoresis and fever  HENT: Negative for congestion and sore throat  Respiratory: Negative for cough, shortness of breath and wheezing  Cardiovascular: Negative for chest pain, palpitations and leg swelling  Gastrointestinal: Negative for abdominal pain, nausea and vomiting  Genitourinary: Negative for difficulty urinating and dysuria  Musculoskeletal: Positive for myalgias  Negative for back pain and gait problem  Neurological: Negative for dizziness, light-headedness and headaches  Psychiatric/Behavioral: Negative for confusion  The patient is not nervous/anxious          Past Medical and Surgical History:     Past Medical History:   Diagnosis Date    Arthritis     Asthma     Back pain     COVID-19 1/21/2021 1/11/2021    Gastroesophageal reflux disease without esophagitis 7/16/2020    GERD (gastroesophageal reflux disease)     Gout of multiple sites 7/16/2020    Hiatal hernia     High blood pressure     Hypertension     Impaired fasting glucose 6/18/2020    Lipoma of neck     Mediastinal lymphadenopathy     Osteoporosis     Right hand pain 7/16/2020    Sleep disorder        Past Surgical History:   Procedure Laterality Date    BACK SURGERY      COLONOSCOPY  10/07/2016    5 years (per dominique)    COLONOSCOPY      EGD  03/31/2017    ELBOW SURGERY Left 04/09/2018    open arthrotomy, capsulectomy, loose body removal (per dominique)   [phone]  [id number]    umbilical with mesh (per dominique)    LUMBAR FUSION  07/11/2018    ME BRONCHOSCOPY NEEDLE BX TRACHEA MAIN STEM&/BRON N/A 4/28/2020    Procedure: ENDOBRONCHIAL ULTRASOUND (EBUS) WITH BIOPSY;  Surgeon: Marcelo Turner MD;  [facility];  Service: Thoracic    ME [address] N/A 4/28/2020    Procedure: Dayanara Dill;  Surgeon: Marcelo Turner MD;  [facility];  Service: Thoracic    ROTATOR CUFF REPAIR Bilateral     TONSILLECTOMY  01/01/1980       Meds/Allergies:    Prior to Admission medications    Medication Sig Start Date End Date Taking?  Authorizing Provider   fluticasone (FLONASE) 50 mcg/act nasal spray 2 sprays into each nostril daily 5/9/19 1/21/21 Yes Historical Provider, MD   losartan (COZAAR) 50 mg tablet TAKE 1 TABLET(50 MG) BY MOUTH DAILY 9/17/20  Yes Ade Robins MD   clotrimazole (LOTRIMIN) 1 % cream Apply topically 2 (two) times a day    Historical Provider, MD   diclofenac sodium (VOLTAREN) 1 % Apply 2 g topically 4 (four) times a day 7/21/20   Laura Rangel PA-C   ketoconazole (NIZORAL) 2 % shampoo Apply topically once    Historical Provider, MD   mupirocin (BACTROBAN) 2 % ointment Apply topically 3 (three) times a day 11/25/20   MEGHNA Melissa   allopurinol (ZYLOPRIM) 100 mg tablet Take 1 tablet (100 mg total) by mouth daily 7/16/20 9/17/20  Ade Robins MD   amLODIPine (NORVASC) 10 mg tablet Take 1 tablet (10 mg total) by mouth daily 7/15/20 9/17/20  Ade Robins MD   losartan-hydrochlorothiazide (HYZAAR) 50-12 5 mg per tablet Take 1 tablet by mouth daily 20  Ade Robins MD   neomycin-polymyxin-hydrocortisone (CORTISPORIN) 0 35%-10,000 units/mL-1% otic suspension Administer 4 drops to the right ear 4 (four) times a day for 10 days 20  TruongramezMEGHNA Mitchell   omeprazole (PriLOSEC) 40 MG capsule TAKE 1 CAPSULE(40 MG) BY MOUTH DAILY BEFORE BREAKFAST 20  Ade Robins MD     I have reviewed home medications with patient personally  Allergies: Allergies   Allergen Reactions    Nuts Shortness Of Breath    Peanut Oil Anaphylaxis     Anything using peanuts    Codeine Other (See Comments)     Dizzy, light headed  Body of balance      Latex Rash     Allergic to products, wears cotton underwear    Dust Mite Extract        Social History:     Marital Status: /Civil Union   Occupation:   Patient Pre-hospital Living Situation:  Home  Patient Pre-hospital Level of Mobility:  Independent  Patient Pre-hospital Diet Restrictions:  None  Substance Use History:   Social History     Substance and Sexual Activity   Alcohol Use Yes    Alcohol/week: 7 0 standard drinks    Types: 7 Standard drinks or equivalent per week    Frequency: 4 or more times a week    Drinks per session: 1 or 2    Comment: one glass per night       Social History     Tobacco Use   Smoking Status Former Smoker    Packs/day: 0 50    Years: 6 00    Pack years: 3 00    Types: Cigarettes    Quit date: 46    Years since quittin 0   Smokeless Tobacco Never Used     Social History     Substance and Sexual Activity   Drug Use No       Family History:    Family History   Problem Relation Age of Onset    Hypertension Mother     Hypertension Father        Physical Exam:     Vitals:   Blood Pressure: 142/95 (21 1841)  Pulse: 99 (21)  Temperature: 98 5 °F (36 9 °C) (21)  Temp Source: Tympanic (21)  Respirations: 18 (21)  SpO2: 94 % (21)    Physical Exam  Vitals signs and nursing note reviewed  Constitutional:       General: He is not in acute distress  Appearance: He is not diaphoretic  HENT:      Head: Normocephalic and atraumatic  Mouth/Throat:      Mouth: Mucous membranes are dry  Pharynx: Oropharynx is clear  Eyes:      General: No scleral icterus  Extraocular Movements: Extraocular movements intact  Conjunctiva/sclera: Conjunctivae normal    Neck:      Musculoskeletal: Neck supple  Cardiovascular:      Rate and Rhythm: Normal rate and regular rhythm  Heart sounds: Normal heart sounds  No murmur  Pulmonary:      Effort: Pulmonary effort is normal  No respiratory distress  Breath sounds: Normal breath sounds  No wheezing or rales  Abdominal:      General: Bowel sounds are normal       Palpations: Abdomen is soft  Tenderness: There is no abdominal tenderness  Musculoskeletal: Normal range of motion  Right lower leg: No edema  Left lower leg: No edema  Lymphadenopathy:      Cervical: No cervical adenopathy  Skin:     General: Skin is dry  Capillary Refill: Capillary refill takes 2 to 3 seconds  Neurological:      General: No focal deficit present  Mental Status: He is alert and oriented to person, place, and time  Psychiatric:         Mood and Affect: Mood normal          Behavior: Behavior normal              Additional Data:     Lab Results: I have personally reviewed pertinent reports  Results from last 7 days   Lab Units 01/21/21  1656   WBC Thousand/uL 6 26   HEMOGLOBIN g/dL 18 0*   HEMATOCRIT % 57 3*   PLATELETS Thousands/uL 286   LYMPHO PCT % 16   MONO PCT % 3*   EOS PCT % 0     Results from last 7 days   Lab Units 01/21/21  1656   POTASSIUM mmol/L 4 5   CHLORIDE mmol/L 105   CO2 mmol/L 22   BUN mg/dL 48*   CREATININE mg/dL 1 61*   CALCIUM mg/dL 8 6   ALK PHOS U/L 75   ALT U/L 242*   AST U/L 121*           Imaging: I have personally reviewed pertinent reports        No results found  EKG, Pathology, and Other Studies Reviewed on Admission:       [facility] / Care Everywhere Records Reviewed: Yes     ** Please Note: This note has been constructed using a voice recognition system   **

## 2021-01-22 NOTE — ASSESSMENT & PLAN NOTE
·  Blood pressure currently stable  ·  on losartan 50 mg daily, will continue to hold given Skyler   ·  add hydralazine p r n  for SBP >180  ·  closely monitor vital signs

## 2021-01-22 NOTE — ASSESSMENT & PLAN NOTE
Baseline creatinine 1 1  Likely secondary to prerenal azotemia secondary to poor oral intake      Plan:  · Continue IV fluid hydration  · Monitor I&Os  · Avoid hypotension/nephrotoxins (losartan on hold)  · Monitor renal function

## 2021-01-22 NOTE — ASSESSMENT & PLAN NOTE
Lab Results   Component Value Date    SARSCOV2 Detected (A) 01/11/2021    [address] [address] Not Detected 09/17/2020       Patient presenting with symptoms of COVID-19 SYMPTOMS: fatigue    Workup:  · Imaging:  No results found  · Labs:     No results for input(s): FERRITIN, CRP, DDIMER in the last 72 hours  · Oxygen:  Saturating 94% on room air    COVID Stage: MILD    Plan:  · Labs:  · Inflammatory Markers - CRP, D-Dimer, Ferritin  · CMP tomorrow a m   · CBC with diff tomorrow a m  · Meds  · Vitamin D3 2000 units daily  · Vitamin C 1000 BID  · Zinc 220 mg x 7 days then multivitamin    · Anticoag:  Therapeutic Anticoagulation per guidelines  · Non-Pharmacologic interventions  · PT and OT eval and treat  · Self proning if able  · Incentive Spirometry

## 2021-01-22 NOTE — ASSESSMENT & PLAN NOTE
Blood pressure currently stable and in acceptable range    · Losartan on hold due to YOAV  · Continue to monitor blood pressure  · Lopressor p r n   For systolic blood pressure >707

## 2021-01-22 NOTE — ASSESSMENT & PLAN NOTE
· Likely secondary to COVID-19 infection    · AST/ALT trending down  · Denies any abdominal pain  · Recommend repeat CMP in 1 week -- discuss with PCP

## 2021-01-22 NOTE — ASSESSMENT & PLAN NOTE
·  Blood pressure currently stable  ·  on losartan 50 mg daily, this was held on admission and during hospitalization due to YOAV, now that creatinine is back to baseline can resume losartan tomorrow 1/24/2021 · Paroxysmal atrial fibrillation anticoagulated on warfarin    Results from last 7 days   Lab Units 08/07/20  0436 08/06/20  0531 08/05/20  0547 08/04/20  1812   INR  2 93* 2 79* 2 71* 2 68*

## 2021-01-22 NOTE — ASSESSMENT & PLAN NOTE
· Baseline creatinine 1 1  Likely secondary to prerenal azotemia secondary to poor oral intake    · Continue current IV fluid hydration to be discontinued if creatinine back to baseline  · Monitor I&Os  · Avoid hypotension/nephrotoxins (losartan on hold)  · Monitor renal function

## 2021-01-22 NOTE — ASSESSMENT & PLAN NOTE
· POA: DLCSA-09 POSITIVE on 1/11/21, 8 days of significant generalized weakness with decreased p o  Intake    ·  chest x-ray showing development of infiltrates bilaterally consistent with COVID-19 infection  ·  Patient is currently room air with SpO2>90%, pulse oximetry while ambulating was also checked and noted to be 90% on room air  · Was on mild covid protocol during admission   · Continue Vitamin C and Zinc for 4 more days to complete a total of 7 days and then switch to multivitamins  · D dimer 1 8 -- no anticoagulation on discharge  · Follow up with PCP as soon as possible on discharge  · Continue isolation, social distancing, frequent handwashing  · Ensure adequate oral fluid hydration

## 2021-01-22 NOTE — PROGRESS NOTES
Progress Note - Peola Ebbing 1960, 56 y o  male MRN: [id number]    Unit/Bed#: S -01 Encounter: [id number]    Primary Care Provider: Linn Troy MD   Date and time admitted to hospital: 1/21/2021  5:49 PM        * COVID-19 virus infection  Assessment & Plan  · POA: COVID-19 POSITIVE on 1/11/21, 8 days of significant generalized weakness with decreased p o  Intake  ·  chest x-ray showing development of infiltrates bilaterally consistent with COVID-19 infection  ·  Patient is currently on 1 L of supplemental oxygen, will continue to wean as tolerated for O2 saturations >90%  · admitted under mild treatment protocol  · inflammatory markers:  Ferritin 1456, CRP 52 9  · continue vitamin-D, vitamin-C, zinc D1/7  ·  self pronng  If able  ·  frequent use incentive spirometry  · Procalcitonin currently pending, will hold on starting antibiotics for the  time being if procalcitonin is elevated will start antibiotics         SKYLER (acute kidney injury) ([facility] [address] )  Assessment & Plan  · Baseline creatinine 1 1  Likely secondary to prerenal azotemia secondary to poor oral intake  · Creatinine is currently improving 1 35  · Continue current IV fluid hydration to be discontinued if creatinine back to baseline  · Monitor I&Os  · Avoid hypotension/nephrotoxins (losartan on hold)  · Monitor renal function      Hypertension  Assessment & Plan  ·  Blood pressure currently stable  ·  on losartan 50 mg daily, will continue to hold given Skyler   ·  add hydralazine p r n  for SBP >180  ·  closely monitor vital signs    Elevated liver enzymes  Assessment & Plan  · Likely secondary to COVID-19 infection  · Continue to monitor      VTE Pharmacologic Prophylaxis:   Pharmacologic: Heparin  Mechanical VTE Prophylaxis in Place: Yes    Discussions with Specialists or Other Care Team Provider:  Primary medical team, case management  Education and Discussions with Family / Patient:  Discussed with patient his current plan of care  Attempt to call the patient's wife with no answer, voicemail was left    Current Length of Stay: 0 day(s)    Current Patient Status: Observation     Discharge Plan / Estimated Discharge Date: We were able to wean the patient off supplemental oxygen, and his kidney numbers improved back to his baseline will consider discharge  Code Status: Level 1 - Full Code      Subjective:   Patient is lying in bed comfortable in no acute distress, alert oriented, he still reported significant weakness very low energy levels  Denies any fevers, chills, chest pain, shortness breath abdominal pain or diarrhea  Objective:     Vitals:   Temp (24hrs), Av 1 °F (36 7 °C), Min:97 8 °F (36 6 °C), Max:98 5 °F (36 9 °C)    Temp:  [97 8 °F (36 6 °C)-98 5 °F (36 9 °C)] 97 8 °F (36 6 °C)  HR:  [73-99] 81  Resp:  [18-26] 26  BP: (142-149)/(73-95) 146/79  SpO2:  [92 %-95 %] 95 %  Body mass index is 26 63 kg/m²  Input and Output Summary (last 24 hours): Intake/Output Summary (Last 24 hours) at 2021 1513  Last data filed at 2021 2208  Gross per 24 hour   Intake 1000 ml   Output --   Net 1000 ml       Physical Exam:     Physical Exam  Vitals signs and nursing note reviewed  Constitutional:       General: He is not in acute distress  Appearance: He is not diaphoretic  HENT:      Head: Normocephalic and atraumatic  Mouth/Throat:      Mouth: Mucous membranes are dry  Pharynx: Oropharynx is clear  Eyes:      General: No scleral icterus  Extraocular Movements: Extraocular movements intact  Conjunctiva/sclera: Conjunctivae normal    Neck:      Musculoskeletal: Neck supple  Cardiovascular:      Rate and Rhythm: Normal rate and regular rhythm  Heart sounds: Normal heart sounds  No murmur  Pulmonary:      Effort: Pulmonary effort is normal  No respiratory distress  Breath sounds: Normal breath sounds  No wheezing or rales     Abdominal:      General: Bowel sounds are normal  Palpations: Abdomen is soft  Tenderness: There is no abdominal tenderness  Musculoskeletal: Normal range of motion  Right lower leg: No edema  Left lower leg: No edema  Lymphadenopathy:      Cervical: No cervical adenopathy  Skin:     General: Skin is dry  Neurological:      General: No focal deficit present  Mental Status: He is alert and oriented to person, place, and time  Psychiatric:         Mood and Affect: Mood normal          Behavior: Behavior normal            Additional Data:     Labs:    Results from last 7 days   Lab Units 01/22/21  1323   WBC Thousand/uL 3 85*   HEMOGLOBIN g/dL 16 3   HEMATOCRIT % 54 0*   PLATELETS Thousands/uL 285   LYMPHO PCT % 12*   MONO PCT % 6   EOS PCT % 0     Results from last 7 days   Lab Units 01/22/21  1323   POTASSIUM mmol/L 4 4   CHLORIDE mmol/L 107   CO2 mmol/L 26   BUN mg/dL 31*   CREATININE mg/dL 1 35*   CALCIUM mg/dL 8 3   ALK PHOS U/L 71   ALT U/L 226*   AST U/L 92*           * I Have Reviewed All Lab Data Listed Above  * Additional Pertinent Lab Tests Reviewed:  [address] Admission Reviewed    Imaging:    Imaging Reports Reviewed Today Include:  Chest x-ray  Imaging Personally Reviewed by Myself Includes:  Chest x-ray  Recent Cultures (last 7 days):           Last 24 Hours Medication List:   Current Facility-Administered Medications   Medication Dose Route Frequency Provider Last Rate    acetaminophen  650 mg Oral Q6H PRN Audrey Villavicencio MD      ascorbic acid  1,000 mg Oral Q12H [address] Audrey Villavicencio MD      cholecalciferol  2,000 Units Oral Daily Audrey Villavicencio MD      fluticasone  2 spray Nasal Daily Audrey Villavicencio MD      heparin (porcine)  5,000 Units Subcutaneous Q8H [address] Audrey Villavicencio MD      hydrALAZINE  10 mg Intravenous Q6H PRN Isaura Strong MD      loperamide  2 mg Oral TID PRN Audrey Villavicencio MD      zinc sulfate  220 mg Oral Daily Audrey Villavicencio MD      Followed by   Claudia Engel ON 1/29/2021] multivitamin-minerals  1 tablet Oral Daily Blank Chávez MD      ondansetron  4 mg Intravenous Q6H PRN Blank Chávez MD      sodium chloride  100 mL/hr Intravenous Continuous Blank Chávez  mL/hr (01/21/21 2220)        Today, Patient Was Seen By: John Beatty MD    ** Please Note: This note has been constructed using a voice recognition system   **

## 2021-01-22 NOTE — UTILIZATION REVIEW
Initial Clinical Review    Admission: Date/Time/Statement:   Admission Orders (From admission, onward)     Ordered        01/21/21 1912  Place in Observation  Once                   Orders Placed This Encounter   Procedures    Place in Observation     Standing Status:   Standing     Number of Occurrences:   1     Order Specific Question:   Level of Care     Answer:   Med Surg [16]     ED Arrival Information     Expected Arrival Acuity Means of Arrival Escorted By Service Admission Type    - 1/21/2021 15:52 Urgent Walk-In Spouse Hospitalist Urgent    Arrival Complaint    Weakness/COVID+        Chief Complaint   Patient presents with    Weakness - Generalized     covid + as of 1/11/21, c/o extreme weakness and unable to tolerate po intake x1 week  Assessment/Plan: 57 yo male to ED from home presents with 8 days of generalized weakness with + COVID test 1/11/2021  Reports poor oral intake w diarrhea & worsening fatigue  IN ED: elevated creatinine, CXR reveals bibasiliar infiltrates  suspicion for COVID PNA  IN ED he received IVF, Heparin sc & vit C  Admit OBSERVATION for COVID 19 infection, YOAV, transaminitis  Mild treatment pathway for COVID: vitamin cocktail, monitor daily inflammatory markers, CBC, CMP  Cont IV Hydration monitor I&O, avoid nephrotoxins, hypotension  Hold ARB monitor BP   Currently no O2    1/22/2021 Provider  On mild treatment for COVID, vit cocktail, IVF for YOAV; if creatinine at baseline may DC IVF  ED Triage Vitals   Temperature Pulse Respirations Blood Pressure SpO2   01/21/21 1651 01/21/21 1651 01/21/21 1651 01/21/21 1841 01/21/21 1651   98 5 °F (36 9 °C) 99 18 142/95 94 %      Temp Source Heart Rate Source Patient Position - Orthostatic VS BP Location FiO2 (%)   01/21/21 1651 01/21/21 1651 01/21/21 1841 01/21/21 1841 --   Tympanic Monitor Sitting Left arm       Pain Score       01/22/21 0334       No Pain          Wt Readings from Last 1 Encounters:   01/22/21 77 1 kg (170 lb) Additional Vital Signs:   Date/Time  Temp  Pulse  Resp  BP  MAP (mmHg)  SpO2  Calculated FIO2 (%) - Nasal Cannula  Nasal Cannula O2 Flow Rate (L/min)  O2 Device  Patient Position - Orthostatic VS   01/22/21 0700  97 8 °F (36 6 °C)  81  26Abnormal   146/79  104  95 %  24  1 L/min  Nasal cannula  Lying   01/22/21 0334  98 3 °F (36 8 °C)  73  22  146/73  101  92 %  24  1 L/min  Nasal cannula  Lying   01/21/21 2342  97 8 °F (36 6 °C)  82  18  149/79  108  95 %  --  --  None (Room air)  Lying   01/21/21 2208  --  84  18  147/90  --  93 %  --  --  None (Room air)  Sitting   01/21/21 2121  --  --  --  --  --  --  --  --  None (Room air)  --   01/21/21 1841  --  --  --  142/95  --  --  --  --  --  Sitting   01/21/21 1651  98 5 °F (36 9 °C)  99  18  --  --  94 %  --  --  None (Room air)  --      Weights (last 14 days)    Date/Time  Weight  Weight Method  Height   01/22/21 0334  77 1 kg (170 lb)  Bed scale  5' 7" (1 702 m)       Pertinent Labs/Diagnostic Test Results:       Results from last 7 days   Lab Units 01/21/21  1656   WBC Thousand/uL 6 26   HEMOGLOBIN g/dL 18 0*   HEMATOCRIT % 57 3*   PLATELETS Thousands/uL 286   BANDS PCT % 1         Results from last 7 days   Lab Units 01/21/21  1656   SODIUM mmol/L 137   POTASSIUM mmol/L 4 5   CHLORIDE mmol/L 105   CO2 mmol/L 22   ANION GAP mmol/L 10   BUN mg/dL 48*   CREATININE mg/dL 1 61*   EGFR ml/min/1 73sq m 45   CALCIUM mg/dL 8 6     Results from last 7 days   Lab Units 01/21/21  1656   AST U/L 121*   ALT U/L 242*   ALK PHOS U/L 75   TOTAL PROTEIN g/dL 9 1*   ALBUMIN g/dL 2 9*   TOTAL BILIRUBIN mg/dL 0 69         Results from last 7 days   Lab Units 01/21/21  1656   GLUCOSE RANDOM mg/dL 147*         Results from last 7 days   Lab Units 01/21/21  1656   TOTAL COUNTED  100     1/21/2021 ekg  Sinus tachycardia with Premature atrial complexes  Abnormal ECG  1/22 cxr Development of bibasal infiltrates, suspicious for Covid pneumonia      ED Treatment:   Medication Administration from 01/21/2021 1552 to 01/22/2021 0334       Date/Time Order Dose Route Action     01/21/2021 1948 sodium chloride 0 9 % bolus 1,000 mL 1,000 mL Intravenous New Bag     01/21/2021 2220 sodium chloride 0 9 % infusion 100 mL/hr Intravenous New Bag     01/21/2021 2213 heparin (porcine) subcutaneous injection 5,000 Units 5,000 Units Subcutaneous Given     01/21/2021 2213 ascorbic acid (VITAMIN C) tablet 1,000 mg 1,000 mg Oral Given        Past Medical History:   Diagnosis Date    Arthritis     Asthma     Back pain     COVID-19 1/21/2021 1/11/2021    Gastroesophageal reflux disease without esophagitis 7/16/2020    GERD (gastroesophageal reflux disease)     Gout of multiple sites 7/16/2020    Hiatal hernia     High blood pressure     Hypertension     Impaired fasting glucose 6/18/2020    Lipoma of neck     Mediastinal lymphadenopathy     Osteoporosis     Right hand pain 7/16/2020    Sleep disorder      Present on Admission:   Benign essential HTN   YOAV (acute kidney injury) ([facility] )   Transaminitis      Admitting Diagnosis: Weakness [R53 1]  YOAV (acute kidney injury) ([facility] ) [N17 9]  COVID-19 [U07 1]  Age/Sex: 56 y o  male  Admission Orders:  Contact& airborne isolation  scd  NC keep sats >= 90%   Scheduled Medications:  ascorbic acid, 1,000 mg, Oral, Q12H [address]  cholecalciferol, 2,000 Units, Oral, Daily  fluticasone, 2 spray, Nasal, Daily  heparin (porcine), 5,000 Units, Subcutaneous, Q8H [address]  zinc sulfate, 220 mg, Oral, Daily    Followed by  Farhad Gomez ON 1/29/2021] multivitamin-minerals, 1 tablet, Oral, Daily      Continuous IV Infusions:  sodium chloride, 100 mL/hr, Intravenous, Continuous      PRN Meds:  acetaminophen, 650 mg, Oral, Q6H PRN  loperamide, 2 mg, Oral, TID PRN  metoprolol, 5 mg, Intravenous, Q8H PRN  ondansetron, 4 mg, Intravenous, Q6H PRN      Network Utilization Review Department  ATTENTION: Please call with any questions or concerns to [phone] and carefully listen to the prompts so that you are directed to the right person  All voicemails are confidential   Delgadolizeth Ma all requests for admission clinical reviews, approved or denied determinations and any other requests to dedicated fax number below belonging to the campus where the patient is receiving treatment   List of dedicated fax numbers for the Facilities:  [address] DENIALS (Administrative/Medical Necessity) [phone]   [address] (Maternity/NICU/Pediatrics) [phone] [address] [place] [address] Dr Judith Herrera [phone] (  Christa Salas "Elise" 103) [address] [place] [address] Kermit Carroll 1481 [address] [place] [address] [phone]

## 2021-01-23 VITALS
SYSTOLIC BLOOD PRESSURE: 141 MMHG | HEART RATE: 80 BPM | OXYGEN SATURATION: 90 % | WEIGHT: 170 LBS | TEMPERATURE: 98.9 F | HEIGHT: 67 IN | BODY MASS INDEX: 26.68 KG/M2 | DIASTOLIC BLOOD PRESSURE: 94 MMHG | RESPIRATION RATE: 18 BRPM

## 2021-01-23 LAB
ALBUMIN SERPL BCP-MCNC: 2.2 G/DL (ref 3.5–5)
ALP SERPL-CCNC: 56 U/L (ref 46–116)
ALT SERPL W P-5'-P-CCNC: 156 U/L (ref 12–78)
ANION GAP SERPL CALCULATED.3IONS-SCNC: 8 MMOL/L (ref 4–13)
AST SERPL W P-5'-P-CCNC: 60 U/L (ref 5–45)
BASOPHILS # BLD MANUAL: 0 THOUSAND/UL (ref 0–0.1)
BASOPHILS NFR MAR MANUAL: 0 % (ref 0–1)
BILIRUB SERPL-MCNC: 0.55 MG/DL (ref 0.2–1)
BUN SERPL-MCNC: 20 MG/DL (ref 5–25)
CALCIUM ALBUM COR SERPL-MCNC: 9.2 MG/DL (ref 8.3–10.1)
CALCIUM SERPL-MCNC: 7.8 MG/DL (ref 8.3–10.1)
CHLORIDE SERPL-SCNC: 110 MMOL/L (ref 100–108)
CO2 SERPL-SCNC: 24 MMOL/L (ref 21–32)
CREAT SERPL-MCNC: 1.13 MG/DL (ref 0.6–1.3)
D DIMER PPP FEU-MCNC: 1.8 UG/ML FEU
EOSINOPHIL # BLD MANUAL: 0.04 THOUSAND/UL (ref 0–0.4)
EOSINOPHIL NFR BLD MANUAL: 1 % (ref 0–6)
ERYTHROCYTE [DISTWIDTH] IN BLOOD BY AUTOMATED COUNT: 14.2 % (ref 11.6–15.1)
GFR SERPL CREATININE-BSD FRML MDRD: 69 ML/MIN/1.73SQ M
GLUCOSE SERPL-MCNC: 94 MG/DL (ref 65–140)
HCT VFR BLD AUTO: 47.4 % (ref 36.5–49.3)
HGB BLD-MCNC: 14.3 G/DL (ref 12–17)
LG PLATELETS BLD QL SMEAR: PRESENT
LYMPHOCYTES # BLD AUTO: 0.13 THOUSAND/UL (ref 0.6–4.47)
LYMPHOCYTES # BLD AUTO: 3 % (ref 14–44)
MCH RBC QN AUTO: 26.6 PG (ref 26.8–34.3)
MCHC RBC AUTO-ENTMCNC: 30.2 G/DL (ref 31.4–37.4)
MCV RBC AUTO: 88 FL (ref 82–98)
MONOCYTES # BLD AUTO: 0.31 THOUSAND/UL (ref 0–1.22)
MONOCYTES NFR BLD: 7 % (ref 4–12)
NEUTROPHILS # BLD MANUAL: 3.79 THOUSAND/UL (ref 1.85–7.62)
NEUTS SEG NFR BLD AUTO: 86 % (ref 43–75)
NRBC BLD AUTO-RTO: 0 /100 WBCS
PLATELET # BLD AUTO: 262 THOUSANDS/UL (ref 149–390)
PLATELET BLD QL SMEAR: ADEQUATE
PMV BLD AUTO: 10.8 FL (ref 8.9–12.7)
POTASSIUM SERPL-SCNC: 4.5 MMOL/L (ref 3.5–5.3)
PROCALCITONIN SERPL-MCNC: 0.21 NG/ML
PROT SERPL-MCNC: 6.8 G/DL (ref 6.4–8.2)
RBC # BLD AUTO: 5.38 MILLION/UL (ref 3.88–5.62)
RBC MORPH BLD: NORMAL
SODIUM SERPL-SCNC: 142 MMOL/L (ref 136–145)
TOTAL CELLS COUNTED SPEC: 100
VARIANT LYMPHS # BLD AUTO: 3 %
WBC # BLD AUTO: 4.41 THOUSAND/UL (ref 4.31–10.16)

## 2021-01-23 PROCEDURE — 80053 COMPREHEN METABOLIC PANEL: CPT | Performed by: INTERNAL MEDICINE

## 2021-01-23 PROCEDURE — 99217 PR OBSERVATION CARE DISCHARGE MANAGEMENT: CPT | Performed by: INTERNAL MEDICINE

## 2021-01-23 PROCEDURE — 85027 COMPLETE CBC AUTOMATED: CPT | Performed by: INTERNAL MEDICINE

## 2021-01-23 PROCEDURE — 84145 PROCALCITONIN (PCT): CPT | Performed by: INTERNAL MEDICINE

## 2021-01-23 PROCEDURE — 85379 FIBRIN DEGRADATION QUANT: CPT | Performed by: INTERNAL MEDICINE

## 2021-01-23 PROCEDURE — 85007 BL SMEAR W/DIFF WBC COUNT: CPT | Performed by: INTERNAL MEDICINE

## 2021-01-23 RX ORDER — ZINC SULFATE 50(220)MG
220 CAPSULE ORAL DAILY
Qty: 5 CAPSULE | Refills: 0
Start: 2021-01-24 | End: 2021-03-10

## 2021-01-23 RX ORDER — DOXYCYCLINE HYCLATE 100 MG/1
100 CAPSULE ORAL EVERY 12 HOURS
Status: DISCONTINUED | OUTPATIENT
Start: 2021-01-23 | End: 2021-01-23

## 2021-01-23 RX ORDER — LOSARTAN POTASSIUM 50 MG/1
50 TABLET ORAL DAILY
Refills: 0
Start: 2021-01-24 | End: 2021-03-19 | Stop reason: ALTCHOICE

## 2021-01-23 RX ORDER — LOPERAMIDE HYDROCHLORIDE 2 MG/1
2 CAPSULE ORAL 2 TIMES DAILY PRN
Qty: 4 CAPSULE | Refills: 0 | Status: SHIPPED | OUTPATIENT
Start: 2021-01-23 | End: 2021-01-25

## 2021-01-23 RX ORDER — LOPERAMIDE HYDROCHLORIDE 2 MG/1
2 CAPSULE ORAL 2 TIMES DAILY
Status: DISCONTINUED | OUTPATIENT
Start: 2021-01-23 | End: 2021-01-23 | Stop reason: HOSPADM

## 2021-01-23 RX ADMIN — FLUTICASONE PROPIONATE 2 SPRAY: 50 SPRAY, METERED NASAL at 08:27

## 2021-01-23 RX ADMIN — ZINC SULFATE 220 MG (50 MG) CAPSULE 220 MG: CAPSULE at 08:27

## 2021-01-23 RX ADMIN — Medication 2000 UNITS: at 08:27

## 2021-01-23 RX ADMIN — OXYCODONE HYDROCHLORIDE AND ACETAMINOPHEN 1000 MG: 500 TABLET ORAL at 08:27

## 2021-01-23 RX ADMIN — SODIUM CHLORIDE 100 ML/HR: 0.9 INJECTION, SOLUTION INTRAVENOUS at 01:26

## 2021-01-23 RX ADMIN — HEPARIN SODIUM 5000 UNITS: 5000 INJECTION INTRAVENOUS; SUBCUTANEOUS at 05:05

## 2021-01-23 RX ADMIN — LOPERAMIDE HYDROCHLORIDE 2 MG: 2 CAPSULE ORAL at 09:55

## 2021-01-23 NOTE — DISCHARGE INSTR - AVS FIRST PAGE
Dear Delia Pendleton,     It was our pleasure to care for you here at [facility]  It is our hope that we were always able to exceed the expected standards for your care during your stay  You were hospitalized due to generalized weakness and poor oral intake  You were cared for on the 3rd floor by Rafaela Juarez MD under the service of Gwendolyn De Dios,  with the [facility] who covers for your primary care physician (PCP), Rohini Moscoso MD, while you were hospitalized  If you have any questions or concerns related to this hospitalization, you may contact us at [phone]  For follow up as well as any medication refills, we recommend that you follow up with your primary care physician  A registered nurse will reach out to you by phone within a few days after your discharge to answer any additional questions that you may have after going home  However, at this time we provide for you here, the most important instructions / recommendations at discharge:     · Notable Medication Adjustments -   · Resume losartan 50 mg once a day starting tomorrow 01/24/2021  · Testing Required after Discharge -   · Recommend checking CMP 1 week after discharge (please discuss this with your primary care doctor)  · Important follow up information -   · Please set up an appointment with your primary care doctor as soon as possible within 1 week of discharge  · Other Instructions -   · Please make sure you keep yourself hydrated  · Continue with self-isolation for 10 days after discharge  · Continue to take vitamin C 1g twice a day and Zinc 220 mg daily for 4 more days only and then switch to multivitamins daily (I did not send these medications to your pharmacy since you reported that you have these vitamins at home when it was given by your PCP)  · If you continue to have watery stools, you may take Imodium 2 mg every 12 hours as needed for 2 days only   If no improvement and worsening noted, please contact your PCP or return to ED if with worsening diarrhea, chest pain, shortness of breath, nausea or vomiting  · Continue frequent handwashing and social distancing  · Please review this entire after visit summary as additional general instructions including medication list, appointments, activity, diet, any pertinent wound care, and other additional recommendations from your care team that may be provided for you        Sincerely,     Jazmyne Sánchez MD

## 2021-01-23 NOTE — DISCHARGE SUMMARY
Discharge- [address] [facility] 1960, 56 y o  male MRN: [id number]    Unit/Bed#: S -01 Encounter: [id number]    Primary Care Provider: Ray Hernandes MD   Date and time admitted to hospital: 1/21/2021  5:49 PM        Hypertension  Assessment & Plan  ·  Blood pressure currently stable  ·  on losartan 50 mg daily, this was held on admission and during hospitalization due to YOAV, now that creatinine is back to baseline can resume losartan tomorrow 1/24/2021    Elevated liver enzymes  Assessment & Plan  · Likely secondary to COVID-19 infection  · AST/ALT trending down  · Denies any abdominal pain  · Recommend repeat CMP in 1 week -- discuss with PCP    YOAV (acute kidney injury) ([facility] )  Assessment & Plan  · Baseline creatinine 1 1  Likely secondary to prerenal azotemia secondary to poor oral intake  · Creatinine now back to baseline  · Ensure adequate oral fluid hydration  · Resume losartan tomorrow 1/24/2021      * COVID-19 virus infection  Assessment & Plan  · POA: COVID-19 POSITIVE on 1/11/21, 8 days of significant generalized weakness with decreased p o  Intake    ·  chest x-ray showing development of infiltrates bilaterally consistent with COVID-19 infection  ·  Patient is currently room air with SpO2>90%, pulse oximetry while ambulating was also checked and noted to be >90% on room air  · Was on mild covid protocol during admission   · Continue Vitamin C and Zinc for 4 more days to complete a total of 7 days and then switch to multivitamins  · D dimer 1 8 -- no anticoagulation on discharge  · Follow up with PCP as soon as possible on discharge  · Continue isolation, social distancing, frequent handwashing  · Ensure adequate oral fluid hydration        Discharging Resident Physician: Nidia Higgins MD  Attending: Fran Domingo DO  PCP: Ray Hernandes MD  Admission Date: 1/21/2021  Discharge Date: 01/23/21    Disposition:     Home    Reason for Admission:  Generalized weakness    Consultations During Hospital Stay:  · None    Procedures Performed:     · None    Significant Findings / Test Results:     · Chest x-ray on 01/22/2021: Development of bibasal infiltrates, suspicious for Covid pneumonia  · On admission, , ; on discharge, AST 60,     Incidental Findings:   · None    Test Results Pending at Discharge (will require follow up): · None     Outpatient Tests Requested:  · None (recommend checking CMP in 1 week to monitor AST/ALT which have been trending down)    Complications:  None    Hospital Course:     Saji Isabel is a 56 y o  male patient with past medical history of hypertension, who originally presented to the hospital on 1/21/2021 due to generalized weakness  Patient tested positive for COVID on 01/11/2021 and has been having poor oral intake, worsening fatigue and intermittent diarrhea since diagnosis  Patient then presented to the ED and was subsequently admitted  Was found to have elevated creatinine on admission, IV fluids were started, over the subsequent days creatinine was noted to be trending down and today on the day of discharge, is now back at baseline  LFTs were also noted to be elevated on admission, these have been trending down  Patient currently on room air and the maximum oxygen he needed during this admission was 1 liter/minute  Pulse oximetry was also checked while ambulating today and was noted to be 90% on room air  Patient also reports that subjectively he feels well and without increased shortness of breath when ambulating  Patient was instructed to resume losartan tomorrow (this was held initially on admission due to YOAV), he will also continue to take vitamin-C and zinc for 4 more days to complete a total of 7 days and then switch to multivitamins  He will set up an appointment with his primary care doctor as soon as possible      Patient also had 1 episode of nonbloody watery stool on the day of discharge, advised to take Imodium twice daily as needed for 2 days  Condition at Discharge: stable     Discharge Day Visit / Exam:     Subjective:  Patient reports he feels overall improved compared to prior to admission  He denies any chest pain, chest tightness, abdominal pain, nausea or vomiting, or cough  He did report 1 episode of nonbloody loose stool this morning  Vitals: Blood Pressure: 141/94 (01/23/21 0700)  Pulse: 80 (01/23/21 0700)  Temperature: 98 9 °F (37 2 °C) (01/23/21 0700)  Temp Source: Oral (01/23/21 0700)  Respirations: 18 (01/23/21 0700)  Height: 5' 7" (170 2 cm) (01/22/21 0334)  Weight - Scale: 77 1 kg (170 lb) (01/22/21 0334)  SpO2: 91 % (01/23/21 0700)     Exam:   Physical Exam  Vitals signs reviewed  Constitutional:       General: He is not in acute distress  Appearance: He is not ill-appearing  HENT:      Nose: No congestion  Eyes:      General: No scleral icterus  Cardiovascular:      Rate and Rhythm: Normal rate  Heart sounds: No murmur  Pulmonary:      Effort: Pulmonary effort is normal  No respiratory distress  Breath sounds: No wheezing or rales  Abdominal:      General: Bowel sounds are normal  There is no distension  Palpations: Abdomen is soft  Musculoskeletal:         General: No swelling  Skin:     General: Skin is warm  Capillary Refill: Capillary refill takes less than 2 seconds  Coloration: Skin is not jaundiced  Neurological:      General: No focal deficit present  Mental Status: He is alert and oriented to person, place, and time  Psychiatric:         Mood and Affect: Mood normal          Thought Content: Thought content normal           Discussion with Family:  Spoke with patient's wife over the phone    Discharge instructions/Information to patient and family:   See after visit summary for information provided to patient and family        Provisions for Follow-Up Care:  See after visit summary for information related to follow-up care and any pertinent home health orders  Planned Readmission: none     Discharge Medications:  See after visit summary for reconciled discharge medications provided to patient and family        ** Please Note: This note has been constructed using a voice recognition system **

## 2021-01-23 NOTE — UTILIZATION REVIEW
Continued Stay Review    Date: 1/23/21                          Current Patient Class: OBS  Current Level of Care: MS    HPI:60 y o  male with hx of positive COVID 1/11/21 initially admitted on 1/21/21 thru the ED as OBS  with COVID 19 infection-mild pathway, YOAV, transaminitis  CXR suspiciousfor covid 19 PNA  Plan is for IV hydration, vitamin cocktail,inflammatory marker trending, CBC, CMP,avoid nephrotoxins and hypotension, hold ARB, monitor BP, assess O2 needs  Assessment/Plan: 1/23/21  IVF d/c'ed 1/23 with creat wnl  Pt on RA with sat 91 %  RA sat with ambulation >90%   Pt written for d/c today  Creat back to baseline, resume losatran 1/24, AST/ALT trending down  Pertinent Labs/Diagnostic Results:       Results from last 7 days   Lab Units 01/23/21  0512 01/22/21  1323 01/21/21  1656   WBC Thousand/uL 4 41 3 85* 6 26   HEMOGLOBIN g/dL 14 3 16 3 18 0*   HEMATOCRIT % 47 4 54 0* 57 3*   PLATELETS Thousands/uL 262 285 286   BANDS PCT %  --   --  1         Results from last 7 days   Lab Units 01/23/21  0512 01/22/21  1323 01/21/21  1656   SODIUM mmol/L 142 141 137   POTASSIUM mmol/L 4 5 4 4 4 5   CHLORIDE mmol/L 110* 107 105   CO2 mmol/L 24 26 22   ANION GAP mmol/L 8 8 10   BUN mg/dL 20 31* 48*   CREATININE mg/dL 1 13 1 35* 1 61*   EGFR ml/min/1 73sq m 69 56 45   CALCIUM mg/dL 7 8* 8 3 8 6     Results from last 7 days   Lab Units 01/23/21  0512 01/22/21  1323 01/21/21  1656   AST U/L 60* 92* 121*   ALT U/L 156* 226* 242*   ALK PHOS U/L 56 71 75   TOTAL PROTEIN g/dL 6 8 8 4* 9 1*   ALBUMIN g/dL 2 2* 2 7* 2 9*   TOTAL BILIRUBIN mg/dL 0 55 0 64 0 69         Results from last 7 days   Lab Units 01/23/21  0512 01/22/21  1323 01/21/21  1656   GLUCOSE RANDOM mg/dL 94 112 147*           Results from last 7 days   Lab Units 01/23/21  0512 01/22/21  1841   D-DIMER QUANTITATIVE ug/ml FEU 1 80* 1 56*             Results from last 7 days   Lab Units 01/22/21  1323   PROCALCITONIN ng/ml 0 32*           Results from last 7 days Lab Units 01/22/21  1323   FERRITIN ng/mL 1,465*         Results from last 7 days   Lab Units 01/22/21  1323   CRP mg/L 52 9*       Results from last 7 days   Lab Units 01/23/21  0512 01/22/21  1323 01/21/21  1656   TOTAL COUNTED  100 100 100         Vital Signs:   Date/Time  Temp  Pulse  Resp  BP  MAP (mmHg)  SpO2  Calculated FIO2 (%) - Nasal Cannula  Nasal Cannula O2 Flow Rate (L/min)  O2 Device    01/23/21 0700  98 9 °F (37 2 °C)  80  18  141/94  113  91 %  --  --  None (Room air)    01/22/21 2304  98 2 °F (36 8 °C)  80  24Abnormal   167/86  --  92 %  --  --  None (Room air)    01/22/21 1624  97 7 °F (36 5 °C)  78  24Abnormal   130/94  --  94 %  --  --  None (Room air)    01/22/21 0700  97 8 °F (36 6 °C)  81  26Abnormal   146/79  104  95 %  24  1 L/min  Nasal cannula    01/22/21 0334  98 3 °F (36 8 °C)  73  22  146/73  101  92 %  24  1 L/min  Nasal cannula            Medications:   Scheduled Medications:  ascorbic acid, 1,000 mg, Oral, Q12H [address]  cholecalciferol, 2,000 Units, Oral, Daily  fluticasone, 2 spray, Nasal, Daily  heparin (porcine), 5,000 Units, Subcutaneous, Q8H IFRAH  loperamide, 2 mg, Oral, BID  zinc sulfate, 220 mg, Oral, Daily    Followed by  Yaron Zambrano ON 1/29/2021] multivitamin-minerals, 1 tablet, Oral, Daily      Continuous IV Infusions: sodium chloride 0 9 % infusion   Rate: 100 mL/hr Dose: 100 mL/hr  Freq: Continuous Route: IV  Last Dose: Stopped (01/23/21 0955)     PRN Meds:  acetaminophen, 650 mg, Oral, Q6H PRN  hydrALAZINE, 10 mg, Intravenous, Q6H PRN  loperamide, 2 mg, Oral, TID PRN  ondansetron, 4 mg, Intravenous, Q6H PRN        Discharge Plan:D  Network Utilization Review Department  ATTENTION: Please call with any questions or concerns to [phone] and carefully listen to the prompts so that you are directed to the right person   All voicemails are confidential   Roe Philippe all requests for admission clinical reviews, approved or denied determinations and any other requests to dedicated fax number below belonging to the campus where the patient is receiving treatment   List of dedicated fax numbers for the Facilities:  [address] [place] [address] DENIALS (Administrative/Medical Necessity) [phone]   [address] (Maternity/NICU/Pediatrics) [phone] [address] Dr Jduith Herrera 1638 (  Christa Salas "Elise" 103) [address] [address] Ganesh Acevedo 1481 [address] [place] [address] [phone]

## 2021-01-23 NOTE — ASSESSMENT & PLAN NOTE
· POA: [id number] POSITIVE on 1/11/21, 8 days of significant generalized weakness with decreased p o  Intake    ·  chest x-ray showing development of infiltrates bilaterally consistent with COVID-19 infection  ·  Patient is currently room air with SpO2>90%, pulse oximetry while ambulating was also checked and noted to be 90% on room air  · Was on mild covid protocol during admission   · Continue Vitamin C and Zinc for 4 more days to complete a total of 7 days and then switch to multivitamins   · D dimer 1 8 -- no anticoagulation on discharge  · Follow up with PCP as soon as possible on discharge  · Continue isolation, social distancing, frequent handwashing  · Ensure adequate oral fluid hydration

## 2021-01-23 NOTE — PLAN OF CARE
Problem: PAIN - ADULT  Goal: Verbalizes/displays adequate comfort level or baseline comfort level  Description: Interventions:  - Encourage patient to monitor pain and request assistance  - Assess pain using appropriate pain scale  - Administer analgesics based on type and severity of pain and evaluate response  - Implement non-pharmacological measures as appropriate and evaluate response  - Consider cultural and social influences on pain and pain management  - Notify physician/advanced practitioner if interventions unsuccessful or patient reports new pain  Outcome: Progressing     Problem: INFECTION - ADULT  Goal: Absence or prevention of progression during hospitalization  Description: INTERVENTIONS:  - Assess and monitor for signs and symptoms of infection  - Monitor lab/diagnostic results  - Monitor all insertion sites, i e  indwelling lines, tubes, and drains  - Monitor endotracheal if appropriate and nasal secretions for changes in amount and color  - [place] appropriate cooling/warming therapies per order  - Administer medications as ordered  - Instruct and encourage patient and family to use good hand hygiene technique  - Identify and instruct in appropriate isolation precautions for identified infection/condition  Outcome: Progressing  Goal: Absence of fever/infection during neutropenic period  Description: INTERVENTIONS:  - Monitor WBC    Outcome: Progressing     Problem: SAFETY ADULT  Goal: Patient will remain free of falls  Description: INTERVENTIONS:  - Assess patient frequently for physical needs  -  Identify cognitive and physical deficits and behaviors that affect risk of falls    -  [place] fall precautions as indicated by assessment   - Educate patient/family on patient safety including physical limitations  - Instruct patient to call for assistance with activity based on assessment  - Modify environment to reduce risk of injury  - Consider OT/PT consult to assist with strengthening/mobility  Outcome: Progressing  Goal: Maintain or return to baseline ADL function  Description: INTERVENTIONS:  -  Assess patient's ability to carry out ADLs; assess patient's baseline for ADL function and identify physical deficits which impact ability to perform ADLs (bathing, care of mouth/teeth, toileting, grooming, dressing, etc )  - Assess/evaluate cause of self-care deficits   - Assess range of motion  - Assess patient's mobility; develop plan if impaired  - Assess patient's need for assistive devices and provide as appropriate  - Encourage maximum independence but intervene and supervise when necessary  - Involve family in performance of ADLs  - Assess for home care needs following discharge   - Consider OT consult to assist with ADL evaluation and planning for discharge  - Provide patient education as appropriate  Outcome: Progressing  Goal: Maintain or return mobility status to optimal level  Description: INTERVENTIONS:  - Assess patient's baseline mobility status (ambulation, transfers, stairs, etc )    - Identify cognitive and physical deficits and behaviors that affect mobility  - Identify mobility aids required to assist with transfers and/or ambulation (gait belt, sit-to-stand, lift, walker, cane, etc )  - [place] fall precautions as indicated by assessment  - Record patient progress and toleration of activity level on Mobility SBAR; progress patient to next Phase/Stage  - Instruct patient to call for assistance with activity based on assessment  - Consider rehabilitation consult to assist with strengthening/weightbearing, etc   Outcome: Progressing     Problem: DISCHARGE PLANNING  Goal: Discharge to home or other facility with appropriate resources  Description: INTERVENTIONS:  - Identify barriers to discharge w/patient and caregiver  - Arrange for needed discharge resources and transportation as appropriate  - Identify discharge learning needs (meds, wound care, etc )  - Arrange for interpretive services to assist at discharge as needed  - Refer to Case Management Department for coordinating discharge planning if the patient needs post-hospital services based on physician/advanced practitioner order or complex needs related to functional status, cognitive ability, or social support system  Outcome: Progressing     Problem: Knowledge Deficit  Goal: Patient/family/caregiver demonstrates understanding of disease process, treatment plan, medications, and discharge instructions  Description: Complete learning assessment and assess knowledge base  Interventions:  - Provide teaching at level of understanding  - Provide teaching via preferred learning methods  Outcome: Progressing     Problem: Potential for Falls  Goal: Patient will remain free of falls  Description: INTERVENTIONS:  - Assess patient frequently for physical needs  -  Identify cognitive and physical deficits and behaviors that affect risk of falls  -  [place] fall precautions as indicated by assessment   - Educate patient/family on patient safety including physical limitations  - Instruct patient to call for assistance with activity based on assessment  - Modify environment to reduce risk of injury  - Consider OT/PT consult to assist with strengthening/mobility  Outcome: Progressing     Problem: Nutrition/Hydration-ADULT  Goal: Nutrient/Hydration intake appropriate for improving, restoring or maintaining nutritional needs  Description: Monitor and assess patient's nutrition/hydration status for malnutrition  Collaborate with interdisciplinary team and initiate plan and interventions as ordered  Monitor patient's weight and dietary intake as ordered or per policy  Utilize nutrition screening tool and intervene as necessary  Determine patient's food preferences and provide high-protein, high-caloric foods as appropriate       INTERVENTIONS:  - Monitor oral intake, urinary output, labs, and treatment plans  - Assess nutrition and hydration status and recommend course of action  - Evaluate amount of meals eaten  - Assist patient with eating if necessary   - Allow adequate time for meals  - Recommend/ encourage appropriate diets, oral nutritional supplements, and vitamin/mineral supplements  - Order, calculate, and assess calorie counts as needed  - Recommend, monitor, and adjust tube feedings and TPN/PPN based on assessed needs  - Assess need for intravenous fluids  - Provide specific nutrition/hydration education as appropriate  - Include patient/family/caregiver in decisions related to nutrition  Outcome: Progressing

## 2021-01-25 ENCOUNTER — TRANSITIONAL CARE MANAGEMENT (OUTPATIENT)
Dept: FAMILY MEDICINE CLINIC | Facility: CLINIC | Age: 61
End: 2021-01-25

## 2021-01-25 ENCOUNTER — OFFICE VISIT (OUTPATIENT)
Dept: FAMILY MEDICINE CLINIC | Facility: CLINIC | Age: 61
End: 2021-01-25
Payer: COMMERCIAL

## 2021-01-25 VITALS — WEIGHT: 170 LBS | HEIGHT: 67 IN | BODY MASS INDEX: 26.68 KG/M2

## 2021-01-25 DIAGNOSIS — R74.8 ELEVATED LIVER ENZYMES: ICD-10-CM

## 2021-01-25 DIAGNOSIS — J12.82 PNEUMONIA DUE TO COVID-19 VIRUS: ICD-10-CM

## 2021-01-25 DIAGNOSIS — U07.1 PNEUMONIA DUE TO COVID-19 VIRUS: ICD-10-CM

## 2021-01-25 DIAGNOSIS — N17.9 AKI (ACUTE KIDNEY INJURY) (HCC): ICD-10-CM

## 2021-01-25 DIAGNOSIS — U07.1 COVID-19 VIRUS INFECTION: Primary | ICD-10-CM

## 2021-01-25 PROCEDURE — 99496 TRANSJ CARE MGMT HIGH F2F 7D: CPT | Performed by: FAMILY MEDICINE

## 2021-01-25 RX ORDER — AZITHROMYCIN 250 MG/1
TABLET, FILM COATED ORAL
Qty: 6 TABLET | Refills: 0 | Status: SHIPPED | OUTPATIENT
Start: 2021-01-25 | End: 2021-01-29

## 2021-01-25 RX ORDER — PREDNISONE 20 MG/1
20 TABLET ORAL DAILY
Qty: 10 TABLET | Refills: 0 | Status: SHIPPED | OUTPATIENT
Start: 2021-01-25 | End: 2021-02-04

## 2021-01-25 NOTE — UTILIZATION REVIEW
Notification of Observation Admission/Observation Authorization Request   This is a Notification of Observation Admission for Spike  Be advised that this patient was admitted to our facility under Observation Status  Contact Marlene Domínguez at [phone] for additional admission information  [facility]  DEDICATED [phone]  Patient Name:   Marleny Worthy   YOB: 1960       [address]   [address]:   [address]  Tax ID: [id number]  NPI: [id number] Attending Provider/NPI: Zack Hook Do [[id number]]   Place of Service Code: 25     Place of Service Name:  CPT Code for Observation:  On [address] / CPT [address]   Start Date:      Discharge Date & Time: 1/23/2021  3:50 PM    Type of Admission: Observation Status Discharge Disposition   (if discharged): Home/Self Care   Patient Diagnoses: Weakness [R53 1]  YOAV (acute kidney injury) ([facility] [address] ) [N17 9]  COVID-19 [U07 1]     Orders: Admission Orders (From admission, onward)     Ordered        01/21/21 1912  Place in Observation  Once                    Assigned Utilization Review Contact: Marlene Domínguez  Utilization   Network Utilization Review Department  Phone: [phone]; Fax [phone]  Email: [email or web address]   ATTENTION PAYERS: Please call the assigned Utilization  directly with any questions or concerns ALL voicemails in the department are confidential  Send all requests for admission clinical reviews, approved or denied determinations and any other requests to dedicated fax number belonging to the campus where the patient is receiving treatment        Initial Clinical Review    Admission: Date/Time/Statement:   Admission Orders (From admission, onward)     Ordered        01/21/21 1912  Place in Observation  Once                   Orders Placed This Encounter   Procedures    Place in Observation     Standing Status:   Standing     Number of Occurrences:   1     Order Specific Question:   Level of Care     Answer:   Med Surg [16]     ED Arrival Information     Expected Arrival Acuity Means of Arrival Escorted By Service Admission Type    - 1/21/2021 15:52 Urgent Walk-In Spouse Hospitalist Urgent    Arrival Complaint    Weakness/COVID+        Chief Complaint   Patient presents with    Weakness - Generalized     covid + as of 1/11/21, c/o extreme weakness and unable to tolerate po intake x1 week  Assessment/Plan: 55 yo male to ED from home presents with 8 days of generalized weakness with + COVID test 1/11/2021  Reports poor oral intake w diarrhea & worsening fatigue  IN ED: elevated creatinine, CXR reveals bibasiliar infiltrates  suspicion for COVID PNA  IN ED he received IVF, Heparin sc & vit C  Admit OBSERVATION for COVID 19 infection, YOAV, transaminitis  Mild treatment pathway for COVID: vitamin cocktail, monitor daily inflammatory markers, CBC, CMP  Cont IV Hydration monitor I&O, avoid nephrotoxins, hypotension  Hold ARB monitor BP   Currently no O2    1/22/2021 Provider  On mild treatment for COVID, vit cocktail, IVF for YOAV; if creatinine at baseline may DC IVF  ED Triage Vitals   Temperature Pulse Respirations Blood Pressure SpO2   01/21/21 1651 01/21/21 1651 01/21/21 1651 01/21/21 1841 01/21/21 1651   98 5 °F (36 9 °C) 99 18 142/95 94 %      Temp Source Heart Rate Source Patient Position - Orthostatic VS BP Location FiO2 (%)   01/21/21 1651 01/21/21 1651 01/21/21 1841 01/21/21 1841 --   Tympanic Monitor Sitting Left arm       Pain Score       01/22/21 0334       No Pain          Wt Readings from Last 1 Encounters:   01/22/21 77 1 kg (170 lb)     Additional Vital Signs:   Date/Time  Temp  Pulse  Resp  BP  MAP (mmHg)  SpO2  Calculated FIO2 (%) - Nasal Cannula  Nasal Cannula O2 Flow Rate (L/min)  O2 Device  Patient Position - Orthostatic VS   01/22/21 0700  97 8 °F (36 6 °C)  81 26Abnormal   146/79  104  95 %  24  1 L/min  Nasal cannula  Lying   01/22/21 0334  98 3 °F (36 8 °C)  73  22  146/73  101  92 %  24  1 L/min  Nasal cannula  Lying   01/21/21 2342  97 8 °F (36 6 °C)  82  18  149/79  108  95 %  --  --  None (Room air)  Lying   01/21/21 2208  --  84  18  147/90  --  93 %  --  --  None (Room air)  Sitting   01/21/21 2121  --  --  --  --  --  --  --  --  None (Room air)  --   01/21/21 1841  --  --  --  142/95  --  --  --  --  --  Sitting   01/21/21 1651  98 5 °F (36 9 °C)  99  18  --  --  94 %  --  --  None (Room air)  --      Weights (last 14 days)    Date/Time  Weight  Weight Method  Height   01/22/21 0334  77 1 kg (170 lb)  Bed scale  5' 7" (1 702 m)       Pertinent Labs/Diagnostic Test Results:       Results from last 7 days   Lab Units 01/23/21  0512 01/22/21  1323 01/21/21  1656   WBC Thousand/uL 4 41 3 85* 6 26   HEMOGLOBIN g/dL 14 3 16 3 18 0*   HEMATOCRIT % 47 4 54 0* 57 3*   PLATELETS Thousands/uL 262 285 286   BANDS PCT %  --   --  1         Results from last 7 days   Lab Units 01/23/21 0512 01/22/21  1323 01/21/21  1656   SODIUM mmol/L 142 141 137   POTASSIUM mmol/L 4 5 4 4 4 5   CHLORIDE mmol/L 110* 107 105   CO2 mmol/L 24 26 22   ANION GAP mmol/L 8 8 10   BUN mg/dL 20 31* 48*   CREATININE mg/dL 1 13 1 35* 1 61*   EGFR ml/min/1 73sq m 69 56 45   CALCIUM mg/dL 7 8* 8 3 8 6     Results from last 7 days   Lab Units 01/23/21  0512 01/22/21  1323 01/21/21  1656   AST U/L 60* 92* 121*   ALT U/L 156* 226* 242*   ALK PHOS U/L 56 71 75   TOTAL PROTEIN g/dL 6 8 8 4* 9 1*   ALBUMIN g/dL 2 2* 2 7* 2 9*   TOTAL BILIRUBIN mg/dL 0 55 0 64 0 69         Results from last 7 days   Lab Units 01/23/21  0512 01/22/21  1323 01/21/21  1656   GLUCOSE RANDOM mg/dL 94 112 147*         Results from last 7 days   Lab Units 01/23/21  0512 01/22/21  1323 01/21/21  1656   TOTAL COUNTED  100 100 100     1/21/2021 ekg  Sinus tachycardia with Premature atrial complexes  Abnormal ECG  1/22 cxr Development of bibasal infiltrates, suspicious for Covid pneumonia      ED Treatment:   Medication Administration from 01/21/2021 1552 to 01/22/2021 0334       Date/Time Order Dose Route Action     01/21/2021 1948 sodium chloride 0 9 % bolus 1,000 mL 1,000 mL Intravenous New Bag     01/21/2021 2220 sodium chloride 0 9 % infusion 100 mL/hr Intravenous New Bag     01/21/2021 2213 heparin (porcine) subcutaneous injection 5,000 Units 5,000 Units Subcutaneous Given     01/21/2021 2213 ascorbic acid (VITAMIN C) tablet 1,000 mg 1,000 mg Oral Given        Past Medical History:   Diagnosis Date    Arthritis     Asthma     Back pain     COVID-19 1/21/2021 1/11/2021    Gastroesophageal reflux disease without esophagitis 7/16/2020    GERD (gastroesophageal reflux disease)     Gout of multiple sites 7/16/2020    Hiatal hernia     High blood pressure     Hypertension     Impaired fasting glucose 6/18/2020    Lipoma of neck     Mediastinal lymphadenopathy     Osteoporosis     Right hand pain 7/16/2020    Sleep disorder      Present on Admission:   YOAV (acute kidney injury) ([facility] [address] )   Elevated liver enzymes      Admitting Diagnosis: Weakness [R53 1]  YOAV (acute kidney injury) ([facility] [address] ) [N17 9]  COVID-19 [U07 1]  Age/Sex: 56 y o  male  Admission Orders:  Contact& airborne isolation  scd  NC keep sats >= 90%   Scheduled Medications:  ascorbic acid, 1,000 mg, Oral, Q12H [address]  cholecalciferol, 2,000 Units, Oral, Daily  fluticasone, 2 spray, Nasal, Daily  heparin (porcine), 5,000 Units, Subcutaneous, Q8H [address]  zinc sulfate, 220 mg, Oral, Daily    Followed by  Janeane Prime ON 1/29/2021] multivitamin-minerals, 1 tablet, Oral, Daily      Continuous IV Infusions:  sodium chloride, 100 mL/hr, Intravenous, Continuous      PRN Meds:  acetaminophen, 650 mg, Oral, Q6H PRN  loperamide, 2 mg, Oral, TID PRN  metoprolol, 5 mg, Intravenous, Q8H PRN  ondansetron, 4 mg, Intravenous, Q6H PRN      Network Utilization Review Department  ATTENTION: Please call with any questions or concerns to [phone] and carefully listen to the prompts so that you are directed to the right person  All voicemails are confidential   Eliezer Omid all requests for admission clinical reviews, approved or denied determinations and any other requests to dedicated fax number below belonging to the campus where the patient is receiving treatment   List of dedicated fax numbers for the Facilities:  [address] DENIALS (Administrative/Medical Necessity) [phone]   [address] (Maternity/NICU/Pediatrics) [phone]   [address] Judith Herrera 2600 (  Christa Salas "Elise" 103) [address] [place] [address] Kermit Ganesh [address] [place]) [address] [place] [address] [phone]

## 2021-01-25 NOTE — PROGRESS NOTES
Assessment/Plan:   Review hospitalization records lab results in great detail  Patient advised to resume losartan at half a tablet only daily until he is able to eat better and back on his feet that he can resume 50 mg losartan daily  Continue aspirin 325 mg daily for blood clot prevention  Will need to repeat liver enzyme and kidney function in a week will set up for nurse visit to home to draw blood  Advised for hydration 3 L today  Advised to sleep prone position  Will start azithromycin and prednisone for him to help with COVID pneumonia  Will recheck in a week  Close monitor needed  Problem List Items Addressed This Visit        Respiratory    Pneumonia due to COVID-19 virus    Relevant Medications    azithromycin (ZITHROMAX) 250 mg tablet    predniSONE 20 mg tablet       Genitourinary    YOAV (acute kidney injury) ([facility] [address] )       Other    COVID-19 virus infection - Primary    Elevated liver enzymes    Relevant Orders    Comprehensive metabolic panel    Amylase    Lipase             Reason for visit is TCM    Encounter provider Debbie Hernández MD       Provider located at [address] [place] [id number] [phone]      Recent Visits  Date Type Provider Dept   01/21/21 Telephone Debbie Hernández MD [facility] Covid Vaccine   01/21/21 Telemedicine Ade Horton MD [facility] [place]   Showing recent visits within past 7 days and meeting all other requirements     Today's Visits  Date Type Provider Dept   01/25/21 Office Visit Debbie Hernández MD [facility] [phone] [address] Shah today's visits and meeting all other requirements     Future Appointments  No visits were found meeting these conditions  Showing future appointments within next 150 days and meeting all other requirements        After connecting through [employer], the patient was identified by name and date of birth   Flavio Mackenzie was informed that this is a telemedicine visit and that the visit is being conducted through [employer] and patient was informed that this is not a secure, HIPAA-compliant platform  He agrees to proceed     My office door was closed  No one else was in the room  He acknowledged consent and understanding of privacy and security of the video platform  The patient has agreed to participate and understands they can discontinue the visit at any time  Patient is aware this is a billable service  Subjective:     Patient ID: Odin Spears is a 56 y o  male  66-year-old male follow-up transition care management  Patient was admitted January 21st to 1/ 23rd at [facility] for complication of [id number]  Patient was diagnosed COVID-19 on January 11th but then he consistently have fever weakness fatigue poor oral intake for 10 days after he was evaluated and referred to ER for evaluation  In hospital he was found to have prerenal azotemia and elevated liver enzyme which is new for patient  Baseline kidney functions 1 1 went up to 1 6 and liver enzyme went up to 200s  His D-dimer was high chest x-ray confirmed COVID pneumonia  Patient was on heparin injection for DVT prevention  He was hydrated on IVF, oxygenation because his pulse ox dropped to 90% after 40 hours hydration his kidney function back to normal he feels better and he was discharged home on no medication  Losartan was held due to kidney function elevation and dehydration  Currently he is able to eat a low bit more and his energy level is a little bit improved  Still have little bit cough but no more fever  Review of Systems   Constitutional: Positive for fatigue  Negative for activity change, appetite change, fever and unexpected weight change  HENT: Negative for dental problem and trouble swallowing  Eyes: Negative for photophobia and visual disturbance  Respiratory: Positive for cough  Negative for chest tightness      Cardiovascular: Negative for chest pain, palpitations and leg swelling  Gastrointestinal: Negative for abdominal pain, constipation and vomiting  Endocrine: Negative for cold intolerance, polydipsia and polyuria  Genitourinary: Negative for difficulty urinating, frequency and urgency  Musculoskeletal: Positive for myalgias  Negative for arthralgias, joint swelling and neck pain  Skin: Negative for color change, rash and wound  Allergic/Immunologic: Negative for environmental allergies  Neurological: Positive for weakness  Negative for dizziness and numbness  Hematological: Does not bruise/bleed easily  Psychiatric/Behavioral: Negative for decreased concentration, dysphoric mood, self-injury, sleep disturbance and suicidal ideas  Objective:    Vitals:    01/25/21 1826   Weight: 77 1 kg (170 lb)   Height: 5' 7" (1 702 m)       Physical Exam  Vitals signs and nursing note reviewed  Pulmonary:      Effort: Pulmonary effort is normal    Neurological:      Mental Status: He is alert and oriented to person, place, and time  Mental status is at baseline  Psychiatric:         Mood and Affect: Mood normal          Behavior: Behavior normal          Thought Content: Thought content normal          Judgment: Judgment normal              Transitional Care Management Review:  Jonas Dawson is a 56 y o  male here for TCM follow up  During the TCM phone call patient stated:    TCM Call (since 12/25/2020)     Date and time call was made  1/25/2021  5:30 PM    Hospital care reviewed  Records reviewed    Patient was hospitialized at  [address] [place] [address]        Date of Admission  01/21/21    Date of discharge  01/23/21    Diagnosis  Covid-19    Disposition  Home    Were the patients medications reviewed and updated  Yes    Current Symptoms  Weakness;  Fatigue    Weakness severity  Moderate    Fatigue severity  Moderate      TCM Call (since 12/25/2020)     Post hospital issues  Reduced activity    Should patient be enrolled in [employer] monitoring? No    Scheduled for follow up? Yes    Did you obtain your prescribed medications  No    Why were you unable to obtain your medications  None needed    Do you need help managing your prescriptions or medications  No    Is transportation to your appointment needed  No    I have advised the patient to call PCP with any new or worsening symptoms  Chanel Park LPN    Living Arrangements  Family members    Support System  Family    The type of support provided  Emotional; Physical    Do you have social support  Yes, as much as I need    Are you recieving home care services  No    Are you using any community resources  No    Have you fallen in the last 12 months  No    Interperter language line needed  No          I spent 15 minutes with the patient during this visit      Silvano Bello MD

## 2021-02-01 ENCOUNTER — TELEMEDICINE (OUTPATIENT)
Dept: FAMILY MEDICINE CLINIC | Facility: CLINIC | Age: 61
End: 2021-02-01
Payer: COMMERCIAL

## 2021-02-01 VITALS — WEIGHT: 170 LBS | BODY MASS INDEX: 26.68 KG/M2 | HEIGHT: 67 IN

## 2021-02-01 DIAGNOSIS — U07.1 PNEUMONIA DUE TO COVID-19 VIRUS: Primary | ICD-10-CM

## 2021-02-01 DIAGNOSIS — J12.82 PNEUMONIA DUE TO COVID-19 VIRUS: Primary | ICD-10-CM

## 2021-02-01 DIAGNOSIS — R74.8 ELEVATED LIVER ENZYMES: ICD-10-CM

## 2021-02-01 DIAGNOSIS — N17.9 AKI (ACUTE KIDNEY INJURY) (HCC): ICD-10-CM

## 2021-02-01 PROCEDURE — 99213 OFFICE O/P EST LOW 20 MIN: CPT | Performed by: FAMILY MEDICINE

## 2021-02-01 NOTE — PROGRESS NOTES
Virtual Regular Visit      Assessment/Plan:    Problem List Items Addressed This Visit        Respiratory    Pneumonia due to COVID-19 virus - Primary       Genitourinary    YOAV (acute kidney injury) ([facility] [address] )       Other    Elevated liver enzymes               Reason for visit is   Chief Complaint   Patient presents with    Follow-up     Covid-19        Encounter provider Adela Sierra MD    Provider located at [address] [facility] [id number] [phone]      Recent Visits  Date Type Provider Dept   01/25/21 Office Visit [facility]   Showing recent visits within past 7 days and meeting all other requirements     Today's Visits  Date Type Provider Dept   02/01/21 Telemedicine Adela Sierra MD [facility] [id number] [address] today's visits and meeting all other requirements     Future Appointments  No visits were found meeting these conditions  Showing future appointments within next 150 days and meeting all other requirements        The patient was identified by name and date of birth  Corie Colindres was informed that this is a telemedicine visit and that the visit is being conducted through [employer] and patient was informed that this is not a secure, HIPAA-compliant platform  He agrees to proceed     My office door was closed  No one else was in the room  He acknowledged consent and understanding of privacy and security of the video platform  The patient has agreed to participate and understands they can discontinue the visit at any time  Patient is aware this is a billable service  Subjective  Corie Colindres is a 56 y o  male fu covid 23 pneumonia dx on 1/11/2021  Pt developed YOAV and elevate LFT was hospitalized for 2 days  Pt finished azithromycin and prednisone, feeling better  Able to drink/eat/shower  Urine is clear, drinking bone broth  Pt advised to resume asa 325 mg daily until 2/11  Resume losartan 50 mg daily on 2/11, currently pt taking 25 mg losartan due to YOAV  Need f/u cr and LFT  F/u 3 m in office f/u bp  Pt refused covid vaccine      HPI     Past Medical History:   Diagnosis Date    Arthritis     Asthma     Back pain     COVID-19 1/21/2021 1/11/2021    Gastroesophageal reflux disease without esophagitis 7/16/2020    GERD (gastroesophageal reflux disease)     Gout of multiple sites 7/16/2020    Hiatal hernia     High blood pressure     Hypertension     Impaired fasting glucose 6/18/2020    Lipoma of neck     Mediastinal lymphadenopathy     Osteoporosis     Pneumonia due to COVID-19 virus 1/25/2021    Right hand pain 7/16/2020    Sleep disorder        Past Surgical History:   Procedure Laterality Date    BACK SURGERY      COLONOSCOPY  10/07/2016    5 years (per dominique)    COLONOSCOPY      EGD  03/31/2017    ELBOW SURGERY Left 04/09/2018    open arthrotomy, capsulectomy, loose body removal (per dominique)   [address]  11/18/7979    umbilical with mesh (per dominique)    LUMBAR FUSION  07/11/2018    MT BRONCHOSCOPY NEEDLE BX TRACHEA MAIN STEM&/BRON N/A 4/28/2020    Procedure: ENDOBRONCHIAL ULTRASOUND (EBUS) WITH BIOPSY;  Surgeon: Octavio Macias MD;  [facility];  Service: Thoracic    MT [address] N/A 4/28/2020    Procedure: BRONCHOSCOPY FLEXIBLE;  Surgeon: Octavio Macias MD;  [facility];  Service: Thoracic    ROTATOR CUFF REPAIR Bilateral     TONSILLECTOMY  01/01/1980       Current Outpatient Medications   Medication Sig Dispense Refill    clotrimazole (LOTRIMIN) 1 % cream Apply topically 2 (two) times a day as needed       diclofenac sodium (VOLTAREN) 1 % Apply 2 g topically 4 (four) times a day (Patient taking differently: Apply 2 g topically 4 (four) times a day as needed ) 1 Tube 2    ketoconazole (NIZORAL) 2 % shampoo Apply topically once      losartan (COZAAR) 50 mg tablet Take 1 tablet (50 mg total) by mouth daily  0    predniSONE 20 mg tablet Take 1 tablet (20 mg total) by mouth daily for 10 days 10 tablet 0    ascorbic acid (VITAMIN C) 1000 MG tablet Take 1 tablet (1,000 mg total) by mouth every 12 (twelve) hours for 5 days 10 tablet 0    fluticasone (FLONASE) 50 mcg/act nasal spray 2 sprays into each nostril daily      zinc sulfate (ZINCATE) 220 mg capsule Take 1 capsule (220 mg total) by mouth daily for 4 days 5 capsule 0     No current facility-administered medications for this visit  Allergies   Allergen Reactions    Nuts Shortness Of Breath    Peanut Oil Anaphylaxis     Anything using peanuts    Codeine Other (See Comments)     Dizzy, light headed  Body of balance      Latex Rash     Allergic to products, wears cotton underwear    Dust Mite Extract        Review of Systems   Constitutional: Negative for activity change, appetite change, fatigue, fever and unexpected weight change  HENT: Negative for dental problem and trouble swallowing  Eyes: Negative for photophobia and visual disturbance  Respiratory: Negative for cough and chest tightness  Cardiovascular: Negative for chest pain, palpitations and leg swelling  Gastrointestinal: Negative for abdominal pain, constipation and vomiting  Endocrine: Negative for cold intolerance, polydipsia and polyuria  Genitourinary: Negative for difficulty urinating, frequency and urgency  Musculoskeletal: Negative for arthralgias, joint swelling, myalgias and neck pain  Skin: Negative for color change, rash and wound  Allergic/Immunologic: Negative for environmental allergies  Neurological: Negative for dizziness, weakness and numbness  Hematological: Does not bruise/bleed easily  Psychiatric/Behavioral: Negative for decreased concentration, dysphoric mood, self-injury, sleep disturbance and suicidal ideas         Video Exam    Vitals:    02/01/21 1045   Weight: 77 1 kg (170 lb)   Height: 5' 7" (1 702 m)       Physical Exam  Vitals signs and nursing note reviewed  Constitutional:       Appearance: Normal appearance  Pulmonary:      Effort: Pulmonary effort is normal    Neurological:      Mental Status: He is alert and oriented to person, place, and time  Psychiatric:         Mood and Affect: Mood normal          Behavior: Behavior normal          Thought Content: Thought content normal          Judgment: Judgment normal           I spent 15 minutes directly with the patient during this visit      VIRTUAL VISIT [address] [facility] acknowledges that he has consented to an online visit or consultation  He understands that the online visit is based solely on information provided by him, and that, in the absence of a face-to-face physical evaluation by the physician, the diagnosis he receives is both limited and provisional in terms of accuracy and completeness  This is not intended to replace a full medical face-to-face evaluation by the physician  Manolo Ortiz understands and accepts these terms

## 2021-02-09 ENCOUNTER — LAB (OUTPATIENT)
Dept: LAB | Facility: CLINIC | Age: 61
End: 2021-02-09
Payer: COMMERCIAL

## 2021-02-09 ENCOUNTER — TRANSCRIBE ORDERS (OUTPATIENT)
Dept: LAB | Facility: CLINIC | Age: 61
End: 2021-02-09

## 2021-02-09 ENCOUNTER — TELEMEDICINE (OUTPATIENT)
Dept: FAMILY MEDICINE CLINIC | Facility: CLINIC | Age: 61
End: 2021-02-09
Payer: COMMERCIAL

## 2021-02-09 DIAGNOSIS — U07.1 LAB TEST POSITIVE FOR DETECTION OF COVID-19 VIRUS: ICD-10-CM

## 2021-02-09 DIAGNOSIS — R74.8 ELEVATED LIVER ENZYMES: ICD-10-CM

## 2021-02-09 DIAGNOSIS — J30.89 ENVIRONMENTAL AND SEASONAL ALLERGIES: Primary | ICD-10-CM

## 2021-02-09 LAB
ALBUMIN SERPL BCP-MCNC: 3.4 G/DL (ref 3.5–5)
ALP SERPL-CCNC: 73 U/L (ref 46–116)
ALT SERPL W P-5'-P-CCNC: 62 U/L (ref 12–78)
AMYLASE SERPL-CCNC: 114 IU/L (ref 25–115)
ANION GAP SERPL CALCULATED.3IONS-SCNC: 4 MMOL/L (ref 4–13)
AST SERPL W P-5'-P-CCNC: 19 U/L (ref 5–45)
BILIRUB SERPL-MCNC: 0.6 MG/DL (ref 0.2–1)
BUN SERPL-MCNC: 8 MG/DL (ref 5–25)
CALCIUM ALBUM COR SERPL-MCNC: 9.3 MG/DL (ref 8.3–10.1)
CALCIUM SERPL-MCNC: 8.8 MG/DL (ref 8.3–10.1)
CHLORIDE SERPL-SCNC: 105 MMOL/L (ref 100–108)
CO2 SERPL-SCNC: 28 MMOL/L (ref 21–32)
CREAT SERPL-MCNC: 1.09 MG/DL (ref 0.6–1.3)
GFR SERPL CREATININE-BSD FRML MDRD: 72 ML/MIN/1.73SQ M
GLUCOSE P FAST SERPL-MCNC: 107 MG/DL (ref 65–99)
LIPASE SERPL-CCNC: 210 U/L (ref 73–393)
POTASSIUM SERPL-SCNC: 4.5 MMOL/L (ref 3.5–5.3)
PROT SERPL-MCNC: 7.4 G/DL (ref 6.4–8.2)
SODIUM SERPL-SCNC: 137 MMOL/L (ref 136–145)

## 2021-02-09 PROCEDURE — 83690 ASSAY OF LIPASE: CPT

## 2021-02-09 PROCEDURE — 82150 ASSAY OF AMYLASE: CPT

## 2021-02-09 PROCEDURE — 99213 OFFICE O/P EST LOW 20 MIN: CPT | Performed by: NURSE PRACTITIONER

## 2021-02-09 PROCEDURE — 36415 COLL VENOUS BLD VENIPUNCTURE: CPT

## 2021-02-09 PROCEDURE — 80053 COMPREHEN METABOLIC PANEL: CPT

## 2021-02-09 RX ORDER — FLUTICASONE PROPIONATE 50 MCG
2 SPRAY, SUSPENSION (ML) NASAL DAILY
Qty: 1 BOTTLE | Refills: 5 | Status: SHIPPED | OUTPATIENT
Start: 2021-02-09 | End: 2021-02-22 | Stop reason: SDUPTHER

## 2021-02-09 NOTE — PROGRESS NOTES
Virtual Regular Visit    Presents in office post COVID follow up   He is feeling better   He is not as fatigued   BP improved and wanting to know if he should resume his BP meds and prior  Denies any cough   C/o nasal congestion possibly related to dry air - recommended humidifier , and using the Flonase with saline prior and after as he is getting some nose bleeds   Questions answered follow up as needed       Assessment/Plan:    Problem List Items Addressed This Visit     None      Visit Diagnoses     Environmental and seasonal allergies    -  Primary    Relevant Medications    fluticasone (FLONASE) 50 mcg/act nasal spray    Lab test positive for detection of COVID-19 virus        symptoms resolved           BMI Counseling: There is no height or weight on file to calculate BMI  The BMI is above normal  Nutrition recommendations include decreasing portion sizes, encouraging healthy choices of fruits and vegetables, decreasing fast food intake, consuming healthier snacks, limiting drinks that contain sugar, moderation in carbohydrate intake, increasing intake of lean protein, reducing intake of saturated and trans fat and reducing intake of cholesterol  Exercise recommendations include exercising 3-5 times per week  Patient referred to PCP due to patient being overweight  Reason for visit is   Chief Complaint   Patient presents with    Other     Medication questions, quarantined for over 14 days   Virtual Regular Visit        Encounter provider MEGHNA Woodruff    Provider located at [address] [place] [address] [place] [id number] [phone]      Recent Visits  No visits were found meeting these conditions     Showing recent visits within past 7 days and meeting all other requirements     Today's Visits  Date Type Provider Dept   02/09/21 Telemedicine MEGHNA Woodruff  [facility] [place]   Showing today's visits and meeting all other requirements     Future Appointments  No visits were found meeting these conditions  Showing future appointments within next 150 days and meeting all other requirements        The patient was identified by name and date of birth  Cheryle Hires was informed that this is a telemedicine visit and that the visit is being conducted through [employer] and patient was informed that this is not a secure, HIPAA-compliant platform  He agrees to proceed     My office door was closed  No one else was in the room  He acknowledged consent and understanding of privacy and security of the video platform  The patient has agreed to participate and understands they can discontinue the visit at any time  Patient is aware this is a billable service  Subjective  Cheryle Hires is a 56 y o  male   Patient Active Problem List   Diagnosis    Mediastinal lymphadenopathy    Spondylosis of cervical region without myelopathy or radiculopathy    Osteoarthritis of left elbow    Palpitations    JADYN on CPAP    History of elbow surgery    Foraminal stenosis of cervical region    DDD (degenerative disc disease), cervical    Impaired fasting glucose    Right hand pain    Gout of multiple sites    Gastroesophageal reflux disease without esophagitis    Other insomnia    Lipoma of neck    Cervical lymphadenopathy    Cellulitis of index finger, left    Encounter for screening for COVID-19    COVID-19 virus infection    YOAV [facility]acute [facility] injury) ([facility] [address] )    Elevated liver enzymes    Hypertension    Pneumonia due to COVID-19 virus     irtual Regular Visit    Presents in office post COVID follow up   He is feeling better   He is not as fatigued   BP improved and wanting to know if he should resume his BP meds and prior  Denies any cough   C/o nasal congestion possibly related to dry air - recommended humidifier , and using the Flonase with saline prior and after as he is getting some nose bleeds               Past Medical History:   Diagnosis Date    Arthritis     Asthma     Back pain     COVID-19 1/21/2021 1/11/2021    Gastroesophageal reflux disease without esophagitis 7/16/2020    GERD (gastroesophageal reflux disease)     Gout of multiple sites 7/16/2020    Hiatal hernia     High blood pressure     Hypertension     Impaired fasting glucose 6/18/2020    Lipoma of neck     Mediastinal lymphadenopathy     Osteoporosis     Pneumonia due to COVID-19 virus 1/25/2021    Right hand pain 7/16/2020    Sleep disorder        Past Surgical History:   Procedure Laterality Date    BACK SURGERY      COLONOSCOPY  10/07/2016    5 years (per dominique)    COLONOSCOPY      EGD  03/31/2017    ELBOW SURGERY Left 04/09/2018    open arthrotomy, capsulectomy, loose body removal (per dominique)    HERNIA REPAIR  58/45/0927    umbilical with mesh (per dominique)    LUMBAR FUSION  07/11/2018    NH BRONCHOSCOPY NEEDLE BX TRACHEA MAIN STEM&/BRON N/A 4/28/2020    Procedure: ENDOBRONCHIAL ULTRASOUND (EBUS) WITH BIOPSY;  Surgeon: Artis Osman MD;  [facility];  Service: Thoracic    [address] N/A 4/28/2020    Procedure: BRONCHOSCOPY FLEXIBLE;  Surgeon: Artis Osman MD;  [facility];  Service: Thoracic    ROTATOR CUFF REPAIR Bilateral     TONSILLECTOMY  01/01/1980       Current Outpatient Medications   Medication Sig Dispense Refill    ascorbic acid (VITAMIN C) 1000 MG tablet Take 1 tablet (1,000 mg total) by mouth every 12 (twelve) hours for 5 days 10 tablet 0    clotrimazole (LOTRIMIN) 1 % cream Apply topically 2 (two) times a day as needed       diclofenac sodium (VOLTAREN) 1 % Apply 2 g topically 4 (four) times a day (Patient taking differently: Apply 2 g topically 4 (four) times a day as needed ) 1 Tube 2    fluticasone (FLONASE) 50 mcg/act nasal spray 2 sprays into each nostril daily 1 Bottle 5    ketoconazole (NIZORAL) 2 % shampoo Apply topically once      losartan (COZAAR) 50 mg tablet Take 1 tablet (50 mg total) by mouth daily  0    zinc sulfate (ZINCATE) 220 mg capsule Take 1 capsule (220 mg total) by mouth daily for 4 days 5 capsule 0     No current facility-administered medications for this visit  Allergies   Allergen Reactions    Nuts Shortness Of Breath    Peanut Oil Anaphylaxis     Anything using peanuts    Codeine Other (See Comments)     Dizzy, light headed  Body of balance      Latex Rash     Allergic to products, wears cotton underwear    Dust Mite Extract        Review of Systems   Constitutional: Negative for chills, fatigue and fever  HENT: Negative for congestion, sinus pain and sore throat  Eyes: Negative  Respiratory: Negative for cough and shortness of breath  Cardiovascular: Negative for chest pain and palpitations  Gastrointestinal: Negative for abdominal distention and abdominal pain  Genitourinary: Negative for difficulty urinating and flank pain  Musculoskeletal: Negative for myalgias  Neurological: Negative for headaches  Psychiatric/Behavioral: Negative for sleep disturbance and suicidal ideas  The patient is not nervous/anxious  Video Exam    There were no vitals filed for this visit  Physical Exam  Constitutional:       Appearance: Normal appearance  HENT:      Head: Atraumatic  Eyes:      Extraocular Movements: Extraocular movements intact  Pulmonary:      Effort: Pulmonary effort is normal    Neurological:      Mental Status: He is alert and oriented to person, place, and time  Psychiatric:         Mood and Affect: Mood normal          Behavior: Behavior normal           I spent 20 minutes directly with the patient during this visit      VIRTUAL VISIT [address] [facility] acknowledges that he has consented to an online visit or consultation   He understands that the online visit is based solely on information provided by him, and that, in the absence of a face-to-face physical evaluation by the physician, the diagnosis he receives is both limited and provisional in terms of accuracy and completeness  This is not intended to replace a full medical face-to-face evaluation by the physician  Ghada Perry understands and accepts these terms

## 2021-02-10 ENCOUNTER — TELEPHONE (OUTPATIENT)
Dept: FAMILY MEDICINE CLINIC | Facility: CLINIC | Age: 61
End: 2021-02-10

## 2021-02-10 NOTE — TELEPHONE ENCOUNTER
----- Message from Luis Alberto Ramos MD sent at 2/10/2021 11:28 AM EST -----  Please let pt know his blood tests show his liver and kidney functions are back to normal, no concerns

## 2021-02-11 ENCOUNTER — TELEPHONE (OUTPATIENT)
Dept: FAMILY MEDICINE CLINIC | Facility: CLINIC | Age: 61
End: 2021-02-11

## 2021-02-11 NOTE — TELEPHONE ENCOUNTER
Called and left message on patient voicemail that liver and kidney functions are back to normal and if any  Questions to call office    Morelia Hernandez

## 2021-02-11 NOTE — TELEPHONE ENCOUNTER
----- Message from Andreea Leonard MD sent at 2/10/2021 11:28 AM EST -----  Please let pt know his blood tests show his liver and kidney functions are back to normal, no concerns

## 2021-02-13 NOTE — PATIENT INSTRUCTIONS
Healthy Lifestyle After a Heart Attack   AMBULATORY CARE:   A healthy lifestyle  after a heart attack can help you recover and also prevent another heart attack  A healthy lifestyle includes managing health conditions that increase your risk for another heart attack  It also includes eating healthy foods and exercising to give you more energy to do the things you enjoy in your life  Go to cardiac rehabilitation as directed: This is a program run by specialists who will help you safely strengthen your heart and prevent more heart disease  This plan includes exercise, relaxation, stress management, and heart-healthy nutrition  Healthcare providers will also check to make sure any medicines you are taking are working  The plan may also include instructions for when you can drive, return to work, and do other normal daily activities  Do not smoke:  Nicotine and other chemicals in cigarettes and cigars can cause lung and heart damage  Ask your healthcare provider for information if you currently smoke and need help to quit  E-cigarettes or smokeless tobacco still contain nicotine  Talk to your healthcare provider before you use these products  Manage other medical conditions:  Diabetes and high cholesterol increases your risk for another heart attack and stroke  Talk to your healthcare provider about your management plan  He or she will make a plan that helps you manage your conditions  Follow a heart-healthy diet:  A heart-healthy diet is an eating plan low in total fat, unhealthy fats, and sodium (salt)  A heart-healthy diet helps decrease your risk for heart disease and stroke  Limit the amount of fat you eat to 25% to 35% of your total daily calories  Your healthcare provider may recommend the DASH (Dietary Approaches to Stop Hypertension) Eating Plan to help lower high blood pressure and LDL (bad) cholesterol  The plan is low in sodium, sugar, unhealthy fats, and total fat   It is high in potassium, calcium, magnesium, and fiber  Ask for more information about this plan  Limit sodium (salt) as directed: Too much sodium can affect your fluid balance  Check labels to find low-sodium or no-salt-added foods  Some low-sodium foods use potassium salts for flavor  Too much potassium can also cause health problems  Your healthcare provider will tell you how much sodium and potassium are safe for you to have in a day  He or she may recommend that you limit sodium to 2,000 to 2,300 mg a day  Exercise as directed:  Ask your healthcare provider about the best exercise plan for you  Exercise makes your heart stronger, lowers blood pressure, and helps prevent a heart attack  The goal is 30 to 60 minutes a day, 5 to 7 days a week  You may have to work up to this goal  Healthcare providers can help you reach this goal, starting in cardiac rehab sessions  Maintain a healthy weight:  Extra weight puts stress on your heart and makes it work harder  Ask your healthcare provider how much you should weigh  He or she can help you create a safe weight loss plan if you are overweight  Manage stress:  Stress may increase your risk for another heart attack  Learn ways to control stress, such as relaxation, deep breathing, and music  Talk to someone about things that upset you  Check your blood pressure at home:  A high blood pressure can increase your risk for another heart attack  Sit and rest for 5 minutes before you take your blood pressure  Extend your arm and support it on a flat surface  Your arm should be at the same level as your heart  Follow the directions that came with your monitor  If possible, take at least 2 readings each time  Take your blood pressure at least 2 times each day at the same times, such as mornings and evenings  Keep a record of your readings and bring it to your follow-up visits  Ask your healthcare provider what your blood pressure should be        Get a flu vaccine every year as soon as it is available: The vaccine will help prevent the flu  A heart attack will make it harder for you to fight off the flu virus on your own  The flu may also be worse for you than for a person who has not had a heart attack  Ask about other vaccinations you may need  Do not take any medicines without talking to your healthcare provider first:  Some medicines, such as NSAIDs and hormones, may cause problems if taken with your heart medicines  Always ask your healthcare provider before you take any other medicines  Follow up with your doctor or cardiologist as directed:  Write down your questions so you remember to ask them during your visits  © Copyright [facility] [address] Information is for End User's use only and may not be sold, redistributed or otherwise used for commercial purposes  All illustrations and images included in CareNotes® are the copyrighted property of [employer]  or Shirley Romano  The above information is an  only  It is not intended as medical advice for individual conditions or treatments  Talk to your doctor, nurse or pharmacist before following any medical regimen to see if it is safe and effective for you

## 2021-02-17 ENCOUNTER — HOSPITAL ENCOUNTER (OUTPATIENT)
Dept: CT IMAGING | Facility: HOSPITAL | Age: 61
Discharge: HOME | End: 2021-02-17
Payer: COMMERCIAL

## 2021-02-17 ENCOUNTER — LAB (OUTPATIENT)
Dept: LAB | Facility: CLINIC | Age: 61
End: 2021-02-17
Payer: COMMERCIAL

## 2021-02-17 ENCOUNTER — OFFICE VISIT (OUTPATIENT)
Dept: FAMILY MEDICINE CLINIC | Facility: CLINIC | Age: 61
End: 2021-02-17
Payer: COMMERCIAL

## 2021-02-17 VITALS
TEMPERATURE: 98.4 F | HEIGHT: 67 IN | BODY MASS INDEX: 25.3 KG/M2 | HEART RATE: 96 BPM | DIASTOLIC BLOOD PRESSURE: 110 MMHG | RESPIRATION RATE: 17 BRPM | SYSTOLIC BLOOD PRESSURE: 182 MMHG | WEIGHT: 161.2 LBS | OXYGEN SATURATION: 98 %

## 2021-02-17 DIAGNOSIS — R00.0 TACHYCARDIA: ICD-10-CM

## 2021-02-17 DIAGNOSIS — R00.2 PALPITATIONS: ICD-10-CM

## 2021-02-17 DIAGNOSIS — R06.02 SHORTNESS OF BREATH: ICD-10-CM

## 2021-02-17 DIAGNOSIS — I10 ESSENTIAL HYPERTENSION: ICD-10-CM

## 2021-02-17 DIAGNOSIS — R79.89 POSITIVE D DIMER: ICD-10-CM

## 2021-02-17 DIAGNOSIS — I10 ESSENTIAL HYPERTENSION: Primary | ICD-10-CM

## 2021-02-17 LAB
ALBUMIN SERPL BCP-MCNC: 3.6 G/DL (ref 3.5–5)
ALP SERPL-CCNC: 85 U/L (ref 46–116)
ALT SERPL W P-5'-P-CCNC: 56 U/L (ref 12–78)
ANION GAP SERPL CALCULATED.3IONS-SCNC: 11 MMOL/L (ref 4–13)
AST SERPL W P-5'-P-CCNC: 23 U/L (ref 5–45)
BASOPHILS # BLD AUTO: 0.03 THOUSANDS/ΜL (ref 0–0.1)
BASOPHILS NFR BLD AUTO: 1 % (ref 0–1)
BILIRUB SERPL-MCNC: 0.69 MG/DL (ref 0.2–1)
BUN SERPL-MCNC: 8 MG/DL (ref 5–25)
CALCIUM SERPL-MCNC: 9 MG/DL (ref 8.3–10.1)
CHLORIDE SERPL-SCNC: 106 MMOL/L (ref 100–108)
CO2 SERPL-SCNC: 24 MMOL/L (ref 21–32)
CREAT SERPL-MCNC: 1.09 MG/DL (ref 0.6–1.3)
D DIMER PPP FEU-MCNC: 1.44 UG/ML FEU
EOSINOPHIL # BLD AUTO: 0.06 THOUSAND/ΜL (ref 0–0.61)
EOSINOPHIL NFR BLD AUTO: 2 % (ref 0–6)
ERYTHROCYTE [DISTWIDTH] IN BLOOD BY AUTOMATED COUNT: 14.4 % (ref 11.6–15.1)
GFR SERPL CREATININE-BSD FRML MDRD: 72 ML/MIN/1.73SQ M
GLUCOSE SERPL-MCNC: 96 MG/DL (ref 65–140)
HCT VFR BLD AUTO: 47.4 % (ref 36.5–49.3)
HGB BLD-MCNC: 14.8 G/DL (ref 12–17)
IMM GRANULOCYTES # BLD AUTO: 0.01 THOUSAND/UL (ref 0–0.2)
IMM GRANULOCYTES NFR BLD AUTO: 0 % (ref 0–2)
LYMPHOCYTES # BLD AUTO: 1.05 THOUSANDS/ΜL (ref 0.6–4.47)
LYMPHOCYTES NFR BLD AUTO: 29 % (ref 14–44)
MCH RBC QN AUTO: 26.8 PG (ref 26.8–34.3)
MCHC RBC AUTO-ENTMCNC: 31.2 G/DL (ref 31.4–37.4)
MCV RBC AUTO: 86 FL (ref 82–98)
MONOCYTES # BLD AUTO: 0.33 THOUSAND/ΜL (ref 0.17–1.22)
MONOCYTES NFR BLD AUTO: 9 % (ref 4–12)
NEUTROPHILS # BLD AUTO: 2.21 THOUSANDS/ΜL (ref 1.85–7.62)
NEUTS SEG NFR BLD AUTO: 59 % (ref 43–75)
NRBC BLD AUTO-RTO: 0 /100 WBCS
NT-PROBNP SERPL-MCNC: 47 PG/ML
PLATELET # BLD AUTO: 205 THOUSANDS/UL (ref 149–390)
PMV BLD AUTO: 10.1 FL (ref 8.9–12.7)
POTASSIUM SERPL-SCNC: 4 MMOL/L (ref 3.5–5.3)
PROT SERPL-MCNC: 7.9 G/DL (ref 6.4–8.2)
RBC # BLD AUTO: 5.52 MILLION/UL (ref 3.88–5.62)
SODIUM SERPL-SCNC: 141 MMOL/L (ref 136–145)
TROPONIN I SERPL-MCNC: <0.02 NG/ML
WBC # BLD AUTO: 3.69 THOUSAND/UL (ref 4.31–10.16)

## 2021-02-17 PROCEDURE — 84484 ASSAY OF TROPONIN QUANT: CPT

## 2021-02-17 PROCEDURE — 71250 CT THORAX DX C-: CPT

## 2021-02-17 PROCEDURE — G1004 CDSM NDSC: HCPCS

## 2021-02-17 PROCEDURE — 83880 ASSAY OF NATRIURETIC PEPTIDE: CPT

## 2021-02-17 PROCEDURE — 80053 COMPREHEN METABOLIC PANEL: CPT

## 2021-02-17 PROCEDURE — 85025 COMPLETE CBC W/AUTO DIFF WBC: CPT

## 2021-02-17 PROCEDURE — 36415 COLL VENOUS BLD VENIPUNCTURE: CPT

## 2021-02-17 PROCEDURE — 85379 FIBRIN DEGRADATION QUANT: CPT

## 2021-02-17 PROCEDURE — 99215 OFFICE O/P EST HI 40 MIN: CPT | Performed by: NURSE PRACTITIONER

## 2021-02-17 RX ORDER — METOPROLOL SUCCINATE 25 MG/1
25 TABLET, EXTENDED RELEASE ORAL DAILY
Qty: 30 TABLET | Refills: 1 | Status: SHIPPED | OUTPATIENT
Start: 2021-02-17 | End: 2021-02-23 | Stop reason: SDUPTHER

## 2021-02-17 RX ORDER — ASPIRIN 81 MG/1
81 TABLET, CHEWABLE ORAL DAILY
COMMUNITY
End: 2021-08-02

## 2021-02-17 NOTE — PROGRESS NOTES
Assessment/Plan:    Presents in office for follow up elevated Bps post COVID infection  1/11/2021  Initially they were running low - and he had decreased the Losaratant to half, restarted full dose few days ago after our follow up discussion via telemedicine however noticed at home Bps are high  180's/100  Repeated here 178/98 --> will start on Toprol daily   Continue Losartan at 50 mg po daily for now   HR is in the 90s   Still elevated   EKG done showed NSR with possible enlargement/ abnormal EKG  - there were EKG abnormalities in the hospital     Will order CT chest due to intermittent shortness of breath at night  And intermittent  Called and scheduled for Stat CT - will go from here and get labs done as discussed   If worsening of symptoms--> ER     Update 11pm - reviewed CT scan - d dimer elevated scheduled patient for CTA to r/o PE  Zithromax ordered to treat one more time and I will have him follow up with PULM     Will start elaquis if needed until CTA done do to weather - if  done tomorrow we will read and discuss      Problem List Items Addressed This Visit        Cardiovascular and Mediastinum    Hypertension - Primary    Relevant Medications    metoprolol succinate (Toprol XL) 25 mg 24 hr tablet    Other Relevant Orders    CT chest wo contrast (Completed)    Comprehensive metabolic panel (Completed)    CBC and differential (Completed)    Troponin I (Completed)    NT-BNP PRO (Completed)    D-dimer, quantitative (Completed)    POCT ECG    Ambulatory referral to Cardiology    CTA chest pe study       Other    Palpitations    Relevant Orders    CT chest wo contrast (Completed)    Comprehensive metabolic panel (Completed)    CBC and differential (Completed)    Troponin I (Completed)    NT-BNP PRO (Completed)    D-dimer, quantitative (Completed)    POCT ECG    Ambulatory referral to Cardiology    CTA chest pe study      Other Visit Diagnoses     Shortness of breath        Relevant Orders    CT chest wo contrast (Completed)    Comprehensive metabolic panel (Completed)    CBC and differential (Completed)    Troponin I (Completed)    NT-BNP PRO (Completed)    D-dimer, quantitative (Completed)    POCT ECG    Ambulatory referral to Cardiology    CTA chest pe study    Tachycardia        Relevant Orders    CT chest wo contrast (Completed)    Comprehensive metabolic panel (Completed)    CBC and differential (Completed)    Troponin I (Completed)    NT-BNP PRO (Completed)    D-dimer, quantitative (Completed)    POCT ECG    Ambulatory referral to Cardiology    CTA chest pe study    Positive D dimer        Relevant Orders    CTA chest pe study            Subjective:      Patient ID: Jamey Lowery is a 56 y o  male  HPI    The following portions of the patient's history were reviewed and updated as appropriate:   Past Medical History:  He has a past medical history of Arthritis, Asthma, Back pain, COVID-19 (1/21/2021), Gastroesophageal reflux disease without esophagitis (7/16/2020), GERD (gastroesophageal reflux disease), Gout of multiple sites (7/16/2020), Hiatal hernia, High blood pressure, Hypertension, Impaired fasting glucose (6/18/2020), Lipoma of neck, Mediastinal lymphadenopathy, Osteoporosis, Pneumonia due to COVID-19 virus (1/25/2021), Right hand pain (7/16/2020), and Sleep disorder  ,  _______________________________________________________________________  Medical Problems:  does not have any pertinent problems on file ,  _______________________________________________________________________  Past Surgical History:   has a past surgical history that includes Lumbar fusion (07/11/2018); EGD (03/31/2017); Tonsillectomy (01/01/1980); Colonoscopy (10/07/2016); Elbow surgery (Left, 04/09/2018); Hernia repair (01/16/2019); Rotator cuff repair (Bilateral);  Colonoscopy; Back surgery; pr bronchoscopy,diagnostic (N/A, 4/28/2020); and pr bronchoscopy needle bx trachea main stem&/bron (N/A, 4/28/2020)  ,  _______________________________________________________________________  Family History:  family history includes Hypertension in his father and mother ,  _______________________________________________________________________  Social History:   reports that he quit smoking about 37 years ago  His smoking use included cigarettes  He has a 3 00 pack-year smoking history  He has never used smokeless tobacco  He reports current alcohol use of about 7 0 standard drinks of alcohol per week  He reports that he does not use drugs  ,  _______________________________________________________________________  Allergies:  is allergic to nuts; peanut oil; codeine; latex; and dust mite extract     _______________________________________________________________________  Current Outpatient Medications   Medication Sig Dispense Refill    ascorbic acid (VITAMIN C) 1000 MG tablet Take 1 tablet (1,000 mg total) by mouth every 12 (twelve) hours for 5 days 10 tablet 0    aspirin 81 mg chewable tablet Chew 81 mg daily      clotrimazole (LOTRIMIN) 1 % cream Apply topically 2 (two) times a day as needed       diclofenac sodium (VOLTAREN) 1 % Apply 2 g topically 4 (four) times a day (Patient taking differently: Apply 2 g topically 4 (four) times a day as needed ) 1 Tube 2    fluticasone (FLONASE) 50 mcg/act nasal spray 2 sprays into each nostril daily 1 Bottle 5    ketoconazole (NIZORAL) 2 % shampoo Apply topically once      losartan (COZAAR) 50 mg tablet Take 1 tablet (50 mg total) by mouth daily  0    zinc sulfate (ZINCATE) 220 mg capsule Take 1 capsule (220 mg total) by mouth daily for 4 days 5 capsule 0    apixaban (ELIQUIS) 5 mg Take 1 tablet (5 mg total) by mouth 2 (two) times a day 60 tablet 0    azithromycin (ZITHROMAX) 500 MG tablet Take 1 tablet (500 mg total) by mouth daily for 5 days 5 tablet 0    metoprolol succinate (Toprol XL) 25 mg 24 hr tablet Take 1 tablet (25 mg total) by mouth daily 30 tablet 1 No current facility-administered medications for this visit       _______________________________________________________________________  Review of Systems   Constitutional: Positive for fatigue  Negative for chills and fever  HENT: Negative for congestion, sinus pain and sore throat  Eyes: Negative  Respiratory: Positive for shortness of breath (intermittent shortness of breath )  Negative for cough  Cardiovascular: Negative for chest pain and palpitations  Abnormal EKG   Elevated BP and HR    Gastrointestinal: Negative for abdominal distention, abdominal pain and nausea  Endocrine: Negative  Genitourinary: Negative for difficulty urinating and flank pain  Musculoskeletal: Positive for arthralgias  Allergic/Immunologic: Positive for environmental allergies  Neurological: Negative for weakness and headaches  Psychiatric/Behavioral: Negative for sleep disturbance and suicidal ideas  The patient is not nervous/anxious  Objective:  Vitals:    02/17/21 1622   BP: (!) 182/110   BP Location: Left arm   Patient Position: Sitting   Cuff Size: Standard   Pulse: 96   Resp: 17   Temp: 98 4 °F (36 9 °C)   TempSrc: Temporal   SpO2: 98%   Weight: 73 1 kg (161 lb 3 2 oz)   Height: 5' 7" (1 702 m)     Body mass index is 25 25 kg/m²  Physical Exam  Vitals signs and nursing note reviewed  Constitutional:       Appearance: Normal appearance  HENT:      Head: Atraumatic  Eyes:      Extraocular Movements: Extraocular movements intact  Neck:      Musculoskeletal: Normal range of motion  Cardiovascular:      Rate and Rhythm: Normal rate and regular rhythm  Pulses: Normal pulses  Heart sounds: Normal heart sounds  Comments: Elevated HR   Pulmonary:      Effort: Pulmonary effort is normal       Breath sounds: Normal breath sounds  Abdominal:      Palpations: Abdomen is soft  Neurological:      Mental Status: He is alert and oriented to person, place, and time  Psychiatric:         Mood and Affect: Mood normal          Behavior: Behavior normal        Contains abnormal data Comprehensive metabolic panel  Order: [id number]  Status:  Final result   Visible to patient:  Yes ([facility])   Next appt:  02/22/2021 at 10:00 AM in [facility] [place], [address])   Dx:  Elevated liver enzymes  (important suggestion)  Newer results are available  Click to view them now  Ref Range & Units 2/9/21 11:46 AM   Sodium 136 - 145 mmol/L 137    Potassium 3 5 - 5 3 mmol/L 4 5    Chloride 100 - 108 mmol/L 105    CO2 21 - 32 mmol/L 28    ANION GAP 4 - 13 mmol/L 4    BUN 5 - 25 mg/dL 8    Creatinine 0 60 - 1 30 mg/dL 1 09    Comment: Standardized to IDMS reference method   Glucose, Fasting 65 - 99 mg/dL 107High     Comment: Specimen collection should occur prior to Sulfasalazine administration due to the potential for falsely depressed results  Specimen collection should occur prior to Sulfapyridine administration due to the potential for falsely elevated results  Calcium 8 3 - 10 1 mg/dL 8 8    Corrected Calcium 8 3 - 10 1 mg/dL 9 3    AST 5 - 45 U/L 19    Comment: Specimen collection should occur prior to Sulfasalazine administration due to the potential for falsely depressed results  ALT 12 - 78 U/L 62    Comment: Specimen collection should occur prior to Sulfasalazine administration due to the potential for falsely depressed results  Alkaline Phosphatase 46 - 116 U/L 73    Total Protein 6 4 - 8 2 g/dL 7 4    Albumin 3 5 - 5 0 g/dL 3 4Low     Total Bilirubin 0 20 - 1 00 mg/dL 0 60    Comment: Use of this assay is not recommended for patients undergoing treatment with eltrombopag due to the potential for falsely elevated results     eGFR ml/min/1 73sq m 72       Narrative    National Kidney Disease Foundation guidelines for Chronic Kidney Disease (CKD):     Stage 1 with normal or high GFR (GFR > 90 mL/min/1 73 square meters)     Stage 2 Mild CKD (GFR = 60-89 mL/min/1 73 square meters)     Stage 3A Moderate CKD (GFR = 45-59 mL/min/1 73 square meters)     Stage 3B Moderate CKD (GFR = 30-44 mL/min/1 73 square meters)     Stage 4 Severe CKD (GFR = 15-29 mL/min/1 73 square meters)     Stage 5 End Stage CKD (GFR <15 mL/min/1 73 square meters)   Note: GFR calculation is accurate only with a steady state creatinine      Specimen Collected: 02/09/21 11:46 AM   Last Resulted: 02/09/21  1:14 PM        Lab Flowsheet     Order Details     View Encounter     Lab and Collection Details     Routing     Result History           Related Result Highlights           Amylase  Final result 2/9/2021             Lipase  Final result 2/9/2021               Result Communications          Comprehensive metabolic panel  Order: [id number]  Status:  Final result   Visible to patient:  Yes (  [facility])   Next appt:  02/22/2021 at 10:00 AM in [facility] [place])   Dx:  Palpitations; Shortness of breath; Es    Ref Range & Units 2/17/21 5:24 PM   Sodium 136 - 145 mmol/L 141    Potassium 3 5 - 5 3 mmol/L 4 0    Chloride 100 - 108 mmol/L 106    CO2 21 - 32 mmol/L 24    ANION GAP 4 - 13 mmol/L 11    BUN 5 - 25 mg/dL 8    Creatinine 0 60 - 1 30 mg/dL 1 09    Comment: Standardized to IDMS reference method   Glucose 65 - 140 mg/dL 96    Comment: If the patient is fasting, the ADA then defines impaired fasting glucose as > 100 mg/dL and diabetes as > or equal to 123 mg/dL  Specimen collection should occur prior to Sulfasalazine administration due to the potential for falsely depressed results  Specimen collection should occur prior to Sulfapyridine administration due to the potential for falsely elevated results  Calcium 8 3 - 10 1 mg/dL 9 0    AST 5 - 45 U/L 23    Comment: Specimen collection should occur prior to Sulfasalazine administration due to the potential for falsely depressed results      ALT 12 - 78 U/L 56    Comment: Specimen collection should occur prior to Sulfasalazine administration due to the potential for falsely depressed results  Alkaline Phosphatase 46 - 116 U/L 85    Total Protein 6 4 - 8 2 g/dL 7 9    Albumin 3 5 - 5 0 g/dL 3 6    Total Bilirubin 0 20 - 1 00 mg/dL 0 69    Comment: Use of this assay is not recommended for patients undergoing treatment with eltrombopag due to the potential for falsely elevated results  eGFR ml/min/1 73sq m 72            Contains abnormal data CBC and differential  Order: [id number]  Status:  Final result   Visible to patient:  Yes ([facility])   Next appt:  02/22/2021 at 10:00 AM in [facility] [place])   Dx:  Palpitations; Shortness of breath; Es    Ref Range & Units 2/17/21 5:24 PM   WBC 4 31 - 10 16 Thousand/uL 3 69Low     RBC 3 88 - 5 62 Million/uL 5 52    Hemoglobin 12 0 - 17 0 g/dL 14 8    Hematocrit 36 5 - 49 3 % 47 4    MCV 82 - 98 fL 86    MCH 26 8 - 34 3 pg 26 8    MCHC 31 4 - 37 4 g/dL 31 2Low     RDW 11 6 - 15 1 % 14 4    MPV 8 9 - 12 7 fL 10 1    Platelets 182 - 813 Thousands/uL 205    nRBC /100 WBCs 0    Neutrophils Relative 43 - 75 % 59    Immat GRANS % 0 - 2 % 0    Lymphocytes Relative 14 - 44 % 29    Monocytes Relative 4 - 12 % 9    Eosinophils Relative 0 - 6 % 2    Basophils Relative 0 - 1 % 1    Neutrophils Absolute 1 85 - 7 62 Thousands/µL 2 21    Immature Grans Absolute 0 00 - 0 20 Thousand/uL 0 01    Lymphocytes Absolute 0 60 - 4 47 Thousands/µL 1 05    Monocytes Absolute 0 17 - 1 22 Thousand/µL 0 33    Eosinophils Absolute 0 00 - 0 61 Thousand/µL 0 06    Basophils Absolute 0 00 - 0 10 Thousands/µL 0 03          Contains abnormal data CBC and differential  Order: [id number]  Status:  Final result   Visible to patient:  Yes ([facility])   Next appt:  02/22/2021 at 10:00 AM in [facility] [place] CR[facility])   Dx:  Palpitations; Shortness of breath; Es       Ref Range & Units 2/17/21 5:24 PM   WBC 4 31 - 10 16 Thousand/uL 3 69Low     RBC 3 88 - 5 62 Million/uL 5 52    Hemoglobin 12 0 - 17 0 g/dL 14 8    Hematocrit 36 5 - 49 3 % 47 4    MCV 82 - 98 fL 86    MCH 26 8 - 34 3 pg 26 8    MCHC 31 4 - 37 4 g/dL 31 2Low     RDW 11 6 - 15 1 % 14 4    MPV 8 9 - 12 7 fL 10 1    Platelets 577 - 694 Thousands/uL 205    nRBC /100 WBCs 0    Neutrophils Relative 43 - 75 % 59    Immat GRANS % 0 - 2 % 0    Lymphocytes Relative 14 - 44 % 29    Monocytes Relative 4 - 12 % 9    Eosinophils Relative 0 - 6 % 2    Basophils Relative 0 - 1 % 1    Neutrophils Absolute 1 85 - 7 62 Thousands/µL 2 21    Immature Grans Absolute 0 00 - 0 20 Thousand/uL 0 01    Lymphocytes Absolute 0 60 - 4 47 Thousands/µL 1 05    Monocytes Absolute 0 17 - 1 22 Thousand/µL 0 33    Eosinophils Absolute 0 00 - 0 61 Thousand/µL 0 06    Basophils Absolute 0 00 - 0 10 Thousands/µL 0 03          NT-BNP PRO  Order: [id number]  Status:  Final result   Visible to patient:  Yes ([facility])   Next appt:  [facility]21 [facility]   Dx:  Palpitations; Shortness of breath; Es    Ref Range & Units 2/17/21 5:24 PM   NT-proBNP <125 pg/mL 47          Specimen Collected: 02/17/21  5:24 PM   Last Resulted: 02/17/21  7:13 PM           Contains abnormal data D-dimer, quantitative  Order: [id number]  Status:  Final result   Visible to patient:  Yes ([facility])   [facility]  [facility]   Dx:  Palpitations; Shortness of breath; Es    Ref Range & Units 2/17/21 5:24 PM   D-Dimer, Quant <0 50 ug/ml FEU 1  44High     Comment: Reference and upper limits to exclude DVT and PE are the same   Do not use to exclude if clinical symptoms are present           Specimen Collected: 02/17/21  5:24 PM   Last Resulted: 02/17/21  5:56 PM              FINDINGS:     LUNGS:  Very minimal scattered bilateral groundglass opacities are noted for example in the right upper lobe image 3/47 and in the left lower lobe image 3/87 in keeping with mild Covid 19 pneumonia    There is no tracheal or endobronchial lesion      PLEURA:  Unremarkable      HEART/GREAT VESSELS:  Unremarkable for patient's age      MEDIASTINUM AND KANDY:  Stable upper paraesophageal nodes measuring 2 1 x 1 8 cm on the right and 2 2 x 1 6 cm on the left      CHEST WALL AND LOWER NECK:   Unremarkable      VISUALIZED STRUCTURES IN THE UPPER ABDOMEN:  Unremarkable      OSSEOUS STRUCTURES:  No acute fracture or destructive osseous lesion      IMPRESSION:     Very minimal scattered bilateral groundglass opacities have developed for example in the right upper lobe image 3/47 and in the left lower lobe image 3/87 in keeping with mild Covid 19 pneumonia        Stable upper paraesophageal nodes measuring 2 1 x 1 8 cm on the right and 2 2 x 1 6 cm on the left

## 2021-02-18 ENCOUNTER — HOSPITAL ENCOUNTER (OUTPATIENT)
Dept: CT IMAGING | Facility: HOSPITAL | Age: 61
Discharge: HOME | End: 2021-02-18
Payer: COMMERCIAL

## 2021-02-18 DIAGNOSIS — I10 ESSENTIAL HYPERTENSION: ICD-10-CM

## 2021-02-18 DIAGNOSIS — R00.2 PALPITATIONS: ICD-10-CM

## 2021-02-18 DIAGNOSIS — R00.0 TACHYCARDIA: ICD-10-CM

## 2021-02-18 DIAGNOSIS — R79.89 POSITIVE D DIMER: ICD-10-CM

## 2021-02-18 DIAGNOSIS — R06.02 SHORTNESS OF BREATH: ICD-10-CM

## 2021-02-18 LAB — ECG INTERP BEFORE EX: ABNORMAL MS

## 2021-02-18 PROCEDURE — G1004 CDSM NDSC: HCPCS

## 2021-02-18 PROCEDURE — 93000 ELECTROCARDIOGRAM COMPLETE: CPT | Performed by: NURSE PRACTITIONER

## 2021-02-18 PROCEDURE — 71275 CT ANGIOGRAPHY CHEST: CPT

## 2021-02-18 RX ADMIN — IOHEXOL 100 ML: 350 INJECTION, SOLUTION INTRAVENOUS at 16:00

## 2021-02-18 NOTE — PATIENT INSTRUCTIONS
Hypertension   WHAT YOU NEED TO KNOW:   Hypertension is high blood pressure  Your blood pressure is the force of your blood moving against the walls of your arteries  Hypertension causes your blood pressure to get so high that your heart has to work much harder than normal  This can damage your heart  The cause of hypertension may not be known  This is called essential or primary hypertension  Hypertension caused by another medical condition, such as kidney disease, is called secondary hypertension  DISCHARGE INSTRUCTIONS:   Call 911 for any of the following:   · You have chest pain  · You have any of the following signs of a heart attack:      ? Squeezing, pressure, or pain in your chest    ? You may  also have any of the following:     § Discomfort or pain in your back, neck, jaw, stomach, or arm    § Shortness of breath    § Nausea or vomiting    § Lightheadedness or a sudden cold sweat    · You become confused or have difficulty speaking  · You suddenly feel lightheaded or have trouble breathing  Return to the emergency department if:   · You have a severe headache or vision loss  · You have weakness in an arm or leg  Contact your healthcare provider if:   · You feel faint, dizzy, confused, or drowsy  · You have been taking your blood pressure medicine but your pressure is higher than your provider says it should be  · You have questions or concerns about your condition or care  Medicines: You may  need any of the following:  · Antihypertensives  may be used to help lower your blood pressure  Several kinds of medicines are available  Your healthcare provider will choose medicines based on the kind of hypertension you have  You may need more than one type of medicine  Take the medicine exactly as directed  · Diuretics  help decrease extra fluid that collects in your body  This will help lower your blood pressure   You may urinate more often while you take this medicine  · Cholesterol medicine  helps lower your cholesterol level  A low cholesterol level helps prevent heart disease and makes it easier to control your blood pressure  · Take your medicine as directed  Contact your healthcare provider if you think your medicine is not helping or if you have side effects  Tell him or her if you are allergic to any medicine  Keep a list of the medicines, vitamins, and herbs you take  Include the amounts, and when and why you take them  Bring the list or the pill bottles to follow-up visits  Carry your medicine list with you in case of an emergency  Follow up with your healthcare provider as directed: You will need to return to have your blood pressure checked and to have other lab tests done  Write down your questions so you remember to ask them during your visits  Stages of hypertension:       · Normal blood pressure is 119/79 or lower   Your healthcare provider may only check your blood pressure each year if it stays at a normal level  · Elevated blood pressure is 120/79 to 129/79   This is sometimes called prehypertension  Your healthcare provider may suggest lifestyle changes to help lower your blood pressure to a normal level  He or she may then check it again in 3 to 6 months  · Stage 1 hypertension is 130/80  to 139/89   Your provider may recommend lifestyle changes, medication, and checks every 3 to 6 months until your blood pressure is controlled  · Stage 2 hypertension is 140/90 or higher   Your provider will recommend lifestyle changes and have you take 2 kinds of hypertension medicines  You will also need to have your blood pressure checked monthly until it is controlled  Manage hypertension:   · Check your blood pressure at home  Avoid smoking, caffeine, and exercise at least 30 minutes before checking your blood pressure  Sit and rest for 5 minutes before you take your blood pressure  Extend your arm and support it on a flat surface   Your arm should be at the same level as your heart  Follow the directions that came with your blood pressure monitor  Check your blood pressure 2 times, 1 minute apart, before you take your medicine in the morning  Also check your blood pressure before your evening meal  Keep a record of your readings and bring it to your follow-up visits  Ask your healthcare provider what your blood pressure should be  · Manage any other health conditions you have  Health conditions such as diabetes can increase your risk for hypertension  Follow your healthcare provider's instructions and take all your medicines as directed  · Ask about all medicines  Certain medicines can increase your blood pressure  Examples include oral birth control pills, decongestants, herbal supplements, and NSAIDs, such as ibuprofen  Your healthcare provider can tell you which medicines are safe for you to take  This includes prescription and over-the-counter medicines  Lifestyle changes you can make to manage hypertension:   · Limit sodium (salt) as directed  Too much sodium can affect your fluid balance  Check labels to find low-sodium or no-salt-added foods  Some low-sodium foods use potassium salts for flavor  Too much potassium can also cause health problems  Your healthcare provider will tell you how much sodium and potassium are safe for you to have in a day  He or she may recommend that you limit sodium to 2,300 mg a day  · Follow the meal plan recommended by your healthcare provider  A dietitian or your provider can give you more information on low-sodium plans or the DASH (Dietary Approaches to Stop Hypertension) eating plan  The DASH plan is low in sodium, unhealthy fats, and total fat  It is high in potassium, calcium, and fiber  · Exercise to maintain a healthy weight  Exercise at least 30 minutes per day, on most days of the week  This will help decrease your blood pressure   Ask your healthcare provider about the best exercise plan for you  · Decrease stress  This may help lower your blood pressure  Learn ways to relax, such as deep breathing or listening to music  · Limit alcohol as directed  Alcohol can increase your blood pressure  A drink of alcohol is 12 ounces of beer, 5 ounces of wine, or 1½ ounces of liquor  · Do not smoke  Nicotine and other chemicals in cigarettes and cigars can increase your blood pressure and also cause lung damage  Ask your healthcare provider for information if you currently smoke and need help to quit  E-cigarettes or smokeless tobacco still contain nicotine  Talk to your healthcare provider before you use these products  © Copyright [address] Information is for End User's use only and may not be sold, redistributed or otherwise used for commercial purposes  All illustrations and images included in CareNotes® are the copyrighted property of A D A M , Inc  or [facility] Marlon Sheehan   The above information is an  only  It is not intended as medical advice for individual conditions or treatments  Talk to your doctor, nurse or pharmacist before following any medical regimen to see if it is safe and effective for you

## 2021-02-22 ENCOUNTER — OFFICE VISIT (OUTPATIENT)
Dept: FAMILY MEDICINE CLINIC | Facility: CLINIC | Age: 61
End: 2021-02-22
Payer: COMMERCIAL

## 2021-02-22 VITALS
OXYGEN SATURATION: 98 % | WEIGHT: 164.4 LBS | RESPIRATION RATE: 16 BRPM | DIASTOLIC BLOOD PRESSURE: 80 MMHG | HEART RATE: 72 BPM | TEMPERATURE: 98.2 F | HEIGHT: 67 IN | SYSTOLIC BLOOD PRESSURE: 146 MMHG | BODY MASS INDEX: 25.8 KG/M2

## 2021-02-22 DIAGNOSIS — Z00.00 MEDICARE ANNUAL WELLNESS VISIT, SUBSEQUENT: ICD-10-CM

## 2021-02-22 DIAGNOSIS — I10 ESSENTIAL HYPERTENSION: Primary | ICD-10-CM

## 2021-02-22 DIAGNOSIS — J18.9 PNEUMONIA OF BOTH UPPER LOBES DUE TO INFECTIOUS ORGANISM: ICD-10-CM

## 2021-02-22 DIAGNOSIS — H54.7 DECREASED VISION: ICD-10-CM

## 2021-02-22 DIAGNOSIS — Z12.5 SCREENING FOR PROSTATE CANCER: ICD-10-CM

## 2021-02-22 DIAGNOSIS — J30.89 ENVIRONMENTAL AND SEASONAL ALLERGIES: ICD-10-CM

## 2021-02-22 DIAGNOSIS — R73.01 ELEVATED FASTING GLUCOSE: ICD-10-CM

## 2021-02-22 DIAGNOSIS — F51.01 PRIMARY INSOMNIA: ICD-10-CM

## 2021-02-22 DIAGNOSIS — Z12.11 SCREENING FOR COLON CANCER: ICD-10-CM

## 2021-02-22 PROCEDURE — G0439 PPPS, SUBSEQ VISIT: HCPCS | Performed by: NURSE PRACTITIONER

## 2021-02-22 PROCEDURE — 99214 OFFICE O/P EST MOD 30 MIN: CPT | Performed by: NURSE PRACTITIONER

## 2021-02-22 RX ORDER — FLUTICASONE PROPIONATE 50 MCG
2 SPRAY, SUSPENSION (ML) NASAL DAILY
Qty: 1 BOTTLE | Refills: 5 | Status: SHIPPED | OUTPATIENT
Start: 2021-02-22 | End: 2022-07-28

## 2021-02-22 RX ORDER — HYDROXYZINE HYDROCHLORIDE 10 MG/1
10 TABLET, FILM COATED ORAL
Qty: 30 TABLET | Refills: 0 | Status: SHIPPED | OUTPATIENT
Start: 2021-02-22 | End: 2021-03-11 | Stop reason: HOSPADM

## 2021-02-22 NOTE — PROGRESS NOTES
Assessment and Plan:     Problem List Items Addressed This Visit     None        BMI Counseling: Body mass index is 25 75 kg/m²  The BMI is above normal  Nutrition recommendations include decreasing portion sizes, encouraging healthy choices of fruits and vegetables, decreasing fast food intake, consuming healthier snacks, limiting drinks that contain sugar, moderation in carbohydrate intake, increasing intake of lean protein, reducing intake of saturated and trans fat and reducing intake of cholesterol  Exercise recommendations include exercising 3-5 times per week and strength training exercises  No pharmacotherapy was ordered  Patient referred to PCP due to patient being overweight  Depression Screening and Follow-up Plan: Patient's depression screening was positive with a PHQ-2 score of 1  Clincally patient does not have depression  No treatment is required  Preventive health issues were discussed with patient, and age appropriate screening tests were ordered as noted in patient's After Visit Summary  Personalized health advice and appropriate referrals for health education or preventive services given if needed, as noted in patient's After Visit Summary       History of Present Illness:     Patient presents for Medicare Annual Wellness visit    Patient Care Team:  Debbie Hernández MD as PCP - General (Family Medicine)  Gonzalez Irizarry MD as Surgeon (Thoracic Surgery)     Problem List:     Patient Active Problem List   Diagnosis    Mediastinal lymphadenopathy    Spondylosis of cervical region without myelopathy or radiculopathy    Osteoarthritis of left elbow    Palpitations    JADYN on CPAP    History of elbow surgery    Foraminal stenosis of cervical region    DDD (degenerative disc disease), cervical    Impaired fasting glucose    Right hand pain    Gout of multiple sites    Gastroesophageal reflux disease without esophagitis    Other insomnia    Lipoma of neck    Cervical lymphadenopathy    Cellulitis of index finger, left    Encounter for screening for COVID-19    COVID-19 virus infection    YOAV (acute kidney injury) ([facility] [address] )    Elevated liver enzymes    Hypertension    Pneumonia due to COVID-19 virus      Past Medical and Surgical History:     Past Medical History:   Diagnosis Date    Arthritis     Asthma     Back pain     COVID-19 1/21/2021 1/11/2021    Gastroesophageal reflux disease without esophagitis 7/16/2020    GERD (gastroesophageal reflux disease)     Gout of multiple sites 7/16/2020    Hiatal hernia     High blood pressure     Hypertension     Impaired fasting glucose 6/18/2020    Lipoma of neck     Mediastinal lymphadenopathy     Osteoporosis     Pneumonia due to COVID-19 virus 1/25/2021    Right hand pain 7/16/2020    Sleep disorder      Past Surgical History:   Procedure Laterality Date    BACK SURGERY      COLONOSCOPY  10/07/2016    5 years (per dominique)    COLONOSCOPY      EGD  03/31/2017    ELBOW SURGERY Left 04/09/2018    open arthrotomy, capsulectomy, loose body removal (per dominique)    HERNIA REPAIR  13/54/8898    umbilical with mesh (per dominique)    LUMBAR FUSION  07/11/2018    GA BRONCHOSCOPY NEEDLE BX TRACHEA MAIN STEM&/BRON N/A 4/28/2020    Procedure: ENDOBRONCHIAL ULTRASOUND (EBUS) WITH BIOPSY;  Surgeon: Jani Carrillo MD;  [facility];  Service: Thoracic    GA [address] N/A 4/28/2020    Procedure: Hung Perez;  Surgeon: Jani Carrillo MD;  [facility];  Service: Thoracic    ROTATOR CUFF REPAIR Bilateral     TONSILLECTOMY  01/01/1980      Family History:     Family History   Problem Relation Age of Onset    Hypertension Mother     Hypertension Father       Social History:     E-Cigarette/Vaping    E-Cigarette Use Never User      E-Cigarette/Vaping Substances    Nicotine No     THC No     CBD No     Flavoring No     Other No     Unknown No      Social History Socioeconomic History    Marital status: /Civil Union     Spouse name: Not on file    Number of children: Not on file    Years of education: Not on file    Highest education level: Not on file   Occupational History    Occupation: unemployed   Social Needs    Financial resource strain: Not on file    Food insecurity     Worry: Not on file     Inability: Not on file   [employer] needs     Medical: Not on file     Non-medical: Not on file   Tobacco Use    Smoking status: Former Smoker     Packs/day: 0 50     Years: 6 00     Pack years: 3 00     Types: Cigarettes     Quit date:      Years since quittin 1    Smokeless tobacco: Never Used   Substance and Sexual Activity    Alcohol use: Yes     Alcohol/week: 7 0 standard drinks     Types: 7 Standard drinks or equivalent per week     Frequency: 4 or more times a week     Drinks per session: 1 or 2     Comment: one glass per night   Drug use: No    Sexual activity: Yes   Lifestyle    Physical activity     Days per week: Not on file     Minutes per session: Not on file    Stress: Not on file   Relationships    Social connections     Talks on phone: Not on file     Gets together: Not on file     Attends Mu-ism service: Not on file     Active member of club or organization: Not on file     Attends meetings of clubs or organizations: Not on file     Relationship status: Not on file    Intimate partner violence     Fear of current or ex partner: Not on file     Emotionally abused: Not on file     Physically abused: Not on file     Forced sexual activity: Not on file   Other Topics Concern    Not on file   Social History Narrative    Most recent tobacco use screenin2020      Do you currently or have you served in the [employer]:  No      Were you activated, into active duty, as a member of the [employer] or as a Reservist:  No      Sexual orientation:  Heterosexual      Exercise level:   Moderate      Diet: Regular      General stress level:  Medium      Caffeine intake:  Occasional      Guns present in home:  No      Seat belts used routinely:  Yes      Smoke alarm in home:  Yes      Advance directive:  No      Passive smoke exposure:  No      Live alone or with others:  with others      Are you currently employed:  No      Asbestos exposure:  No      TB exposure:  No      Environmental exposure:  No      Animal exposure:  Yes 1 dog     (per dominique)      Medications and Allergies:     Current Outpatient Medications   Medication Sig Dispense Refill    ascorbic acid (VITAMIN C) 1000 MG tablet Take 1 tablet (1,000 mg total) by mouth every 12 (twelve) hours for 5 days 10 tablet 0    aspirin 81 mg chewable tablet Chew 81 mg daily      azithromycin (ZITHROMAX) 500 MG tablet Take 1 tablet (500 mg total) by mouth daily for 5 days 5 tablet 0    clotrimazole (LOTRIMIN) 1 % cream Apply topically 2 (two) times a day as needed       diclofenac sodium (VOLTAREN) 1 % Apply 2 g topically 4 (four) times a day (Patient taking differently: Apply 2 g topically 4 (four) times a day as needed ) 1 Tube 2    fluticasone (FLONASE) 50 mcg/act nasal spray 2 sprays into each nostril daily 1 Bottle 5    ketoconazole (NIZORAL) 2 % shampoo Apply topically once      losartan (COZAAR) 50 mg tablet Take 1 tablet (50 mg total) by mouth daily  0    metoprolol succinate (Toprol XL) 25 mg 24 hr tablet Take 1 tablet (25 mg total) by mouth daily 30 tablet 1    zinc sulfate (ZINCATE) 220 mg capsule Take 1 capsule (220 mg total) by mouth daily for 4 days 5 capsule 0     No current facility-administered medications for this visit        Allergies   Allergen Reactions    Nuts Shortness Of Breath    Peanut Oil Anaphylaxis     Anything using peanuts    Codeine Other (See Comments)     Dizzy, light headed  Body of balance      Latex Rash     Allergic to products, wears cotton underwear    Dust Mite Extract       Immunizations:     Immunization History   Administered Date(s) Administered    Tdap 07/16/2020    Zoster Vaccine Recombinant 07/16/2020, 09/17/2020      Health Maintenance:         Topic Date Due    Hepatitis C Screening  1960    HIV Screening  08/21/1973    Colorectal Cancer Screening  08/21/2008         Topic Date Due    Pneumococcal Vaccine: Pediatrics (0 to 5 Years) and At-Risk Patients (6 to 59 Years) (1 of 3 - PCV13) 08/21/1966      Medicare Health Risk Assessment:     There were no vitals taken for this visit  Jacki Tanishaalexa is here for his Welcome to Medicare visit  Last Medicare Wellness visit information reviewed, patient interviewed, no change since last AWV  Health Risk Assessment:   Patient rates overall health as good  Patient feels that their physical health rating is slightly better  Eyesight was rated as same  Hearing was rated as same  Patient feels that their emotional and mental health rating is same  Pain experienced in the last 7 days has been a lot  Patient's pain rating has been 7/10  Patient states that he has experienced no weight loss or gain in last 6 months  Depression Screening:   PHQ-2 Score: 1      Fall Risk Screening: In the past year, patient has experienced: history of falling in past year    Number of falls: 1  Injured during fall?: No    Feels unsteady when standing or walking?: Yes    Worried about falling?: Yes      Home Safety:  Patient does not have trouble with stairs inside or outside of their home  Patient has working smoke alarms and has working carbon monoxide detector  Home safety hazards include: loose rugs on the floor  Nutrition:   Current diet is Other (please comment)  Vegetarian diet; no red meat    Medications:   Patient is currently taking over-the-counter supplements  OTC medications include: see medication list  Patient is able to manage medications       Activities of Daily Living (ADLs)/Instrumental Activities of Daily Living (IADLs):   Walk and transfer into and out of bed and chair?: Yes  Dress and groom yourself?: Yes    Bathe or shower yourself?: Yes    Feed yourself? Yes  Do your laundry/housekeeping?: Yes  Manage your money, pay your bills and track your expenses?: Yes  Make your own meals?: Yes    Do your own shopping?: Yes    Previous Hospitalizations:   Any hospitalizations or ED visits within the last 12 months?: Yes      Advance Care Planning:   Living will: No    Advanced directive: No    Advanced directive counseling given: Yes    Five wishes given: Yes    End of Life Decisions reviewed with patient: No      Cognitive Screening:   Provider or family/friend/caregiver concerned regarding cognition?: No    PREVENTIVE SCREENINGS        Diabetes Screening:     General: Screening Current      Colorectal Cancer Screening:     General: Risks and Benefits Discussed and Screening Current    Due for: Cologuard      Prostate Cancer Screening:    General: Risks and Benefits Discussed    Due for: PSA      Abdominal Aortic Aneurysm (AAA) Screening:    Risk factors include: tobacco use        Lung Cancer Screening:     General: Screening Not Indicated, Risks and Benefits Discussed and Screening Current      Preventive Screening Comments: Juts had CT of the lungs     Other Counseling Topics:   Alcohol use counseling, car/seat belt/driving safety, skin self-exam, sunscreen and calcium and vitamin D intake and regular weightbearing exercise         MEGHNA Sawyer

## 2021-02-22 NOTE — PATIENT INSTRUCTIONS

## 2021-02-22 NOTE — PROGRESS NOTES
BMI Counseling: Body mass index is 25 75 kg/m²  The BMI is above normal  Nutrition recommendations include decreasing portion sizes, encouraging healthy choices of fruits and vegetables, decreasing fast food intake, consuming healthier snacks, limiting drinks that contain sugar, moderation in carbohydrate intake, increasing intake of lean protein, reducing intake of saturated and trans fat and reducing intake of cholesterol  Exercise recommendations include exercising 3-5 times per week and strength training exercises  No pharmacotherapy was ordered  Patient referred to PCP due to patient being overweight  BMI 25 75      Depression Screening and Follow-up Plan: Patient's depression screening was positive with a PHQ-2 score of 1  Clincally patient does not have depression  No treatment is required  Assessment/Plan:    Presents in office for follow up   HTN - much improved now on both Losartan 50 mg and Metoprolol 25 mg po daily  Pneumonia - treated with z pack almost done --> discussed follow up with PULM at some point - will repeta x rays in 4-6 weeks / prior to next appt in 3 months   Labs reviewed  Liver functions and kidney functions stable   Lab counts showed decreased  WBC  Will follow up in 3 months  Elevated D dimer CTA done and was negative for PEs   Reviewed results of CT of lungs / underlying pneumonia probably due to recent history of COVID     Discussed reportable s/s and follow up as needed   Problem List Items Addressed This Visit        Cardiovascular and Mediastinum    Hypertension - Primary    Relevant Orders    CBC and differential    Comprehensive metabolic panel    Lipid panel    Hepatitis C antibody    HIV 1/2 Antigen/Antibody (4th Generation) w Reflex [facility]    TSH, 3rd generation    HEMOGLOBIN A1C W/ EAG ESTIMATION      Other Visit Diagnoses     Pneumonia of both upper lobes due to infectious organism        Relevant Orders    CBC and differential    Comprehensive metabolic panel Lipid panel    Hepatitis C antibody    HIV 1/2 Antigen/Antibody (4th Generation) w Reflex [facility]    TSH, 3rd generation    HEMOGLOBIN A1C W/ EAG ESTIMATION    XR chest pa & lateral    Medicare annual wellness visit, subsequent        Relevant Orders    CBC and differential    Comprehensive metabolic panel    Lipid panel    Hepatitis C antibody    HIV 1/2 Antigen/Antibody (4th Generation) w Reflex [facility]    TSH, 3rd generation    HEMOGLOBIN A1C W/ EAG ESTIMATION    Screening for colon cancer        Relevant Orders    Cologuard    CBC and differential    Comprehensive metabolic panel    Lipid panel    Hepatitis C antibody    HIV 1/2 Antigen/Antibody (4th Generation) w Reflex [facility]    TSH, 3rd generation    HEMOGLOBIN A1C W/ EAG ESTIMATION    Screening for prostate cancer        Relevant Orders    PSA, Total Screen    Elevated fasting glucose        Relevant Orders    CBC and differential    Comprehensive metabolic panel    Lipid panel    Hepatitis C antibody    HIV 1/2 Antigen/Antibody (4th Generation) w Reflex [facility]    TSH, 3rd generation    HEMOGLOBIN A1C W/ EAG ESTIMATION    Primary insomnia        Relevant Medications    hydrOXYzine HCL (ATARAX) 10 mg tablet    Decreased vision        Relevant Orders    Visual acuity screening    Ambulatory referral to Ophthalmology            Subjective:      Patient ID: Jonas Dawson is a 56 y o  male  Presents in office for follow up   HTN - much improved now on both Losartan 50 mg and Metoprolol 25 mg po daily  Pneumonia - treated with z pack almost done --> discussed follow up with PULM at some point - will repeta x rays in 4-6 weeks / prior to next appt in 3 months   Labs reviewed  Liver functions and kidney functions stable   Lab counts showed decreased  WBC  Will follow up in 3 months  Elevated D dimer CTA done and was negative for PEs   Reviewed results of CT of lungs / underlying pneumonia probably due to recent history of COVID           The following portions of the patient's history were reviewed and updated as appropriate:   Past Medical History:  He has a past medical history of Arthritis, Asthma, Back pain, COVID-19 (1/21/2021), Gastroesophageal reflux disease without esophagitis (7/16/2020), GERD (gastroesophageal reflux disease), Gout of multiple sites (7/16/2020), Hiatal hernia, High blood pressure, Hypertension, Impaired fasting glucose (6/18/2020), Lipoma of neck, Mediastinal lymphadenopathy, Osteoporosis, Pneumonia due to COVID-19 virus (1/25/2021), Right hand pain (7/16/2020), and Sleep disorder  ,  _______________________________________________________________________  Medical Problems:  does not have any pertinent problems on file ,  _______________________________________________________________________  Past Surgical History:   has a past surgical history that includes Lumbar fusion (07/11/2018); EGD (03/31/2017); Tonsillectomy (01/01/1980); Colonoscopy (10/07/2016); Elbow surgery (Left, 04/09/2018); Hernia repair (01/16/2019); Rotator cuff repair (Bilateral); Colonoscopy; Back surgery; pr bronchoscopy,diagnostic (N/A, 4/28/2020); and pr bronchoscopy needle bx trachea main stem&/bron (N/A, 4/28/2020)  ,  _______________________________________________________________________  Family History:  family history includes Hypertension in his father and mother ,  _______________________________________________________________________  Social History:   reports that he quit smoking about 37 years ago  His smoking use included cigarettes  He has a 3 00 pack-year smoking history  He has never used smokeless tobacco  He reports current alcohol use of about 7 0 standard drinks of alcohol per week  He reports that he does not use drugs  ,  _______________________________________________________________________  Allergies:  is allergic to nuts; peanut oil; codeine; latex; and dust mite extract     _______________________________________________________________________  Current Outpatient Medications   Medication Sig Dispense Refill    ascorbic acid (VITAMIN C) 1000 MG tablet Take 1 tablet (1,000 mg total) by mouth every 12 (twelve) hours for 5 days 10 tablet 0    aspirin 81 mg chewable tablet Chew 81 mg daily      azithromycin (ZITHROMAX) 500 MG tablet Take 1 tablet (500 mg total) by mouth daily for 5 days 5 tablet 0    clotrimazole (LOTRIMIN) 1 % cream Apply topically 2 (two) times a day as needed       diclofenac sodium (VOLTAREN) 1 % Apply 2 g topically 4 (four) times a day (Patient taking differently: Apply 2 g topically 4 (four) times a day as needed ) 1 Tube 2    fluticasone (FLONASE) 50 mcg/act nasal spray 2 sprays into each nostril daily 1 Bottle 5    ketoconazole (NIZORAL) 2 % shampoo Apply topically once      losartan (COZAAR) 50 mg tablet Take 1 tablet (50 mg total) by mouth daily  0    metoprolol succinate (Toprol XL) 25 mg 24 hr tablet Take 1 tablet (25 mg total) by mouth daily 30 tablet 1    zinc sulfate (ZINCATE) 220 mg capsule Take 1 capsule (220 mg total) by mouth daily for 4 days 5 capsule 0    hydrOXYzine HCL (ATARAX) 10 mg tablet Take 1 tablet (10 mg total) by mouth daily at bedtime 30 tablet 0     No current facility-administered medications for this visit       _______________________________________________________________________  Review of Systems   Constitutional: Negative for chills, fatigue and fever  HENT: Negative for congestion, sinus pain and sore throat  Eyes: Negative  Respiratory: Negative for cough and shortness of breath (intermittent shortness of breath )  Cardiovascular: Negative for chest pain and palpitations  Gastrointestinal: Negative for abdominal distention, abdominal pain and nausea  Endocrine: Negative  Genitourinary: Negative for difficulty urinating and flank pain  Musculoskeletal: Negative for arthralgias  Allergic/Immunologic: Positive for environmental allergies  Neurological: Negative for weakness and headaches  Psychiatric/Behavioral: Negative for sleep disturbance and suicidal ideas  The patient is not nervous/anxious  Objective:  Vitals:    02/22/21 1014 02/22/21 1018   BP: 132/78 146/80   BP Location: Right arm Left arm   Patient Position: Sitting    Cuff Size: Large    Pulse: 72    Resp: 16    Temp: 98 2 °F (36 8 °C)    TempSrc: Temporal    SpO2: 98%    Weight: 74 6 kg (164 lb 6 4 oz)    Height: 5' 7" (1 702 m)      Body mass index is 25 75 kg/m²  Physical Exam  Vitals signs and nursing note reviewed  Constitutional:       Appearance: Normal appearance  Comments: BMI 25 75   HENT:      Mouth/Throat:      Mouth: Mucous membranes are dry  Eyes:      Extraocular Movements: Extraocular movements intact  Neck:      Musculoskeletal: Normal range of motion  Cardiovascular:      Rate and Rhythm: Normal rate and regular rhythm  Pulses: Normal pulses  Heart sounds: Normal heart sounds  Pulmonary:      Effort: Pulmonary effort is normal       Breath sounds: Normal breath sounds  Abdominal:      Palpations: Abdomen is soft  Musculoskeletal: Normal range of motion  Skin:     General: Skin is warm  Capillary Refill: Capillary refill takes less than 2 seconds  Neurological:      Mental Status: He is alert and oriented to person, place, and time  Psychiatric:         Mood and Affect: Mood normal          Behavior: Behavior normal        Study Result    CTA - CHEST WITH IV CONTRAST - PULMONARY ANGIOGRAM     INDICATION:   I10: Essential (primary) hypertension  R06 02: Shortness of breath  R00 0: Tachycardia, unspecified  R00 2: Palpitations  R79 89: Other specified abnormal findings of blood chemistry  Patient had confirmed COVID-19  Tested positive on 1/11/2021    Paratracheal lymph node biopsy on 4/28/2020 negative for malignancy      COMPARISON: Chest CT from 2/17/2021 and 7/25/2017  PET CT from 4/9/2020      TECHNIQUE: CT angiogram tailored to evaluate for pulmonary embolism  Axial, sagittal, and coronal 2D reformatted images created from source data  Coronal 3D MIP postprocessing on the acquisition scanner        Radiation dose length product (DLP):  292 mGy-cm   Techniques to minimize radiation dose exposure include iterative reconstruction and automated exposure control      IV Contrast:  100 mL of iohexol (OMNIPAQUE)     FINDINGS:     PULMONARY ARTERIES:  No pulmonary embolus       LUNGS: Evaluation of the lung parenchyma compromised by respiratory motion      Redemonstration of mild patchy bilateral groundglass opacity in the right upper lobe, with slight increase in groundglass opacity in the dependent portion of both lower lobes      AIRWAYS: No significant filling defects in the airways      PLEURA:  Unremarkable      HEART/GREAT VESSELS:  Moderate coronary artery calcification indicating atherosclerotic heart disease      MEDIASTINUM AND KANDY:  Redemonstration of bilateral high paratracheal nodes, 2 2 x 2 1 cm on the right (401/45) and 2 1 x 2 0 cm on the left (401/54), present since 2017  Small hiatal hernia      CHEST WALL AND LOWER NECK:   Surgical clips in the right lower neck      UPPER ABDOMEN:  Unremarkable      OSSEOUS STRUCTURES:  Mild degenerative disease in the spine  Lumbar fusion      IMPRESSION:     No pulmonary embolus      Patchy groundglass opacity in the right upper lobe similar to yesterday with slight increase in bilateral lower lobe groundglass opacity  This could be due to atelectasis or pneumonia      Redemonstration of enlarged high paratracheal nodes         Study Result    CT CHEST WITHOUT IV CONTRAST     INDICATION:   I10: Essential (primary) hypertension  R06 02: Shortness of breath  R00 0: Tachycardia, unspecified  R00 2: Palpitations  Former smoker  Patient has confirmed COVID-19    History of mediastinal adenopathy with benign biopsy      COMPARISON:  CT chest 9/9/2020     TECHNIQUE: CT examination of the chest was performed without intravenous contrast   Axial, sagittal, and coronal 2D reformatted images were created from the source data and submitted for interpretation      Radiation dose length product (DLP) for this visit:  205 mGy-cm   This examination, like all CT scans performed in the [facility], was performed utilizing techniques to minimize radiation dose exposure, including the use of iterative   reconstruction and automated exposure control       FINDINGS:     LUNGS:  Very minimal scattered bilateral groundglass opacities are noted for example in the right upper lobe image 3/47 and in the left lower lobe image 3/87 in keeping with mild Covid 19 pneumonia  There is no tracheal or endobronchial lesion      PLEURA:  Unremarkable      HEART/GREAT VESSELS:  Unremarkable for patient's age      MEDIASTINUM AND KANDY:  Stable upper paraesophageal nodes measuring 2 1 x 1 8 cm on the right and 2 2 x 1 6 cm on the left      CHEST WALL AND LOWER NECK:   Unremarkable      VISUALIZED STRUCTURES IN THE UPPER ABDOMEN:  Unremarkable      OSSEOUS STRUCTURES:  No acute fracture or destructive osseous lesion      IMPRESSION:     Very minimal scattered bilateral groundglass opacities have developed for example in the right upper lobe image 3/47 and in the left lower lobe image 3/87 in keeping with mild Covid 19 pneumonia        Stable upper paraesophageal nodes measuring 2 1 x 1 8 cm on the right and 2 2 x 1 6 cm on the left         Workstation performed: [id number]        D-dimer, quantitative  Order: [id number]  Status:  Final result   Visible to patient:  Yes ([facility])   Next appt:  03/17/2021 at 11:00 AM in Hematology and Oncology So [place] MD PhD)   Dx:  Palpitations; Shortness of breath; Es    Ref Range & Units 2/17/21 5:24 PM   D-Dimer, Quant <0 50 ug/ml FEU 1  44High     Comment: Reference and upper limits to exclude DVT and PE are the same   Do not use to exclude if clinical symptoms are present  Specimen Collected: 02/17/21  5:24 PM   Last Resulted: 02/17/21  5:56 PM              D-dimer, quantitative  Order: [id number]  Status:  Final result   Visible to patient:  Yes ([facility])   Next appt:  03/17/2021 at 11:00 AM in Hematology and Oncology Cuca Almaguer MD PhD)   Dx:  Palpitations; Shortness of breath; Es    Ref Range & Units 2/17/21 5:24 PM   D-Dimer, Quant <0 50 ug/ml FEU 1  44High     Comment: Reference and upper limits to exclude DVT and PE are the same   Do not use to exclude if clinical symptoms are present  Specimen Collected: 02/17/21  5:24 PM   Last Resulted: 02/17/21  5:56 PM            Troponin I  Order: [id number]  Status:  Final result   Visible to patient:  Yes ([facility])   Next appt:  03/17/2021 at 11:00 AM in Hematology and Oncology Cuca Almaguer MD PhD)   Dx:  Palpitations; Shortness of breath; Es    Ref Range & Units 2/17/21 5:24 PM   Troponin I <=0 04 ng/mL <0 02    Comment: Siemens Chemistry analyzer 99% cutoff is > 0 04 ng/mL in network labs            Contains abnormal data CBC and differential  Order: [id number]  Status:  Final result   Visible to patient:  Yes ([facility])   Next appt:  03/17/2021 at 11:00 AM in Hematology and Oncology Cuca Almaguer MD PhD)   Dx:  Palpitations; Shortness of breath; Es       Ref Range & Units 2/17/21 5:24 PM   WBC 4 31 - 10 16 Thousand/uL 3 69Low     RBC 3 88 - 5 62 Million/uL 5 52    Hemoglobin 12 0 - 17 0 g/dL 14 8    Hematocrit 36 5 - 49 3 % 47 4    MCV 82 - 98 fL 86    MCH 26 8 - 34 3 pg 26 8    MCHC 31 4 - 37 4 g/dL 31 2Low     RDW 11 6 - 15 1 % 14 4    MPV 8 9 - 12 7 fL 10 1    Platelets 619 - 373 Thousands/uL 205    nRBC /100 WBCs 0    Neutrophils Relative 43 - 75 % 59    Immat GRANS % 0 - 2 % 0    Lymphocytes Relative 14 - 44 % 29    Monocytes Relative 4 - 12 % 9    Eosinophils Relative 0 - 6 % 2 Basophils Relative 0 - 1 % 1    Neutrophils Absolute 1 85 - 7 62 Thousands/µL 2 21    Immature Grans Absolute 0 00 - 0 20 Thousand/uL 0 01    Lymphocytes Absolute 0 60 - 4 47 Thousands/µL 1 05    Monocytes Absolute 0 17 - 1 22 Thousand/µL 0 33    Eosinophils Absolute 0 00 - 0 61 Thousand/µL 0 06    Basophils Absolute 0 00 - 0 10 Thousands/µL 0 03          Specimen Collected: 02/17/21  5:24 PM   Last Resulted: 02/17/21  5:45 PM           Comprehensive metabolic panel  Order: [id number]  Status:  Final result   Visible to patient:  Yes ([facility]   Next appt:  03/17/2021 at 11:00 AM in Hematology and Oncology Keena Schultz MD PhD)   Dx:  Palpitations; Shortness of breath; Es    Ref Range & Units 2/17/21 5:24 PM   Sodium 136 - 145 mmol/L 141    Potassium 3 5 - 5 3 mmol/L 4 0    Chloride 100 - 108 mmol/L 106    CO2 21 - 32 mmol/L 24    ANION GAP 4 - 13 mmol/L 11    BUN 5 - 25 mg/dL 8    Creatinine 0 60 - 1 30 mg/dL 1 09    Comment: Standardized to IDMS reference method   Glucose 65 - 140 mg/dL 96    Comment: If the patient is fasting, the ADA then defines impaired fasting glucose as > 100 mg/dL and diabetes as > or equal to 123 mg/dL  Specimen collection should occur prior to Sulfasalazine administration due to the potential for falsely depressed results  Specimen collection should occur prior to Sulfapyridine administration due to the potential for falsely elevated results  Calcium 8 3 - 10 1 mg/dL 9 0    AST 5 - 45 U/L 23    Comment: Specimen collection should occur prior to Sulfasalazine administration due to the potential for falsely depressed results  ALT 12 - 78 U/L 56    Comment: Specimen collection should occur prior to Sulfasalazine administration due to the potential for falsely depressed results      Alkaline Phosphatase 46 - 116 U/L 85    Total Protein 6 4 - 8 2 g/dL 7 9    Albumin 3 5 - 5 0 g/dL 3 6    Total Bilirubin 0 20 - 1 00 mg/dL 0 69    Comment: Use of this assay is not recommended for patients undergoing treatment with eltrombopag due to the potential for falsely elevated results  eGFR ml/min/1 73sq m 72          Lipase  Order: [id number]  Status:  Final result   Visible to patient:  Yes ([address])   Next appt:  03/17/2021 at 11:00 AM in Hematology and Oncology Yaya Corona MD PhD)   Dx:  Elevated liver enzymes   Ref Range & Units 2/9/21 11:46 AM   Lipase 73 - 393 u/L 210          Specimen Collected: 02/09/21 11:46 AM   Last Resulted: 02/09/21  1:14 PM              Amylase  Order: [id number]  Status:  Final result   Visible to patient:  Yes ([address])   Next appt:  03/17/2021 at 11:00 AM in Hematology and Oncology Yaya Corona MD PhD)   Dx:  Elevated liver enzymes   Ref Range & Units 2/9/21 11:46 AM   Amylase 25 - 115 IU/L 114          Specimen Collected: 02/09/21 11:46 AM   Last Resulted: 02/09/21  1:14 PM           Contains abnormal data Comprehensive metabolic panel  Order: [id number]  Status:  Final result   Visible to patient:  Yes ([address])   Next appt:  03/17/2021 at 11:00 AM in Hematology and Oncology Yaya Corona MD PhD)   Dx:  Elevated liver enzymes  (important suggestion)  Newer results are available  Click to view them now  Ref Range & Units 2/9/21 11:46 AM   Sodium 136 - 145 mmol/L 137    Potassium 3 5 - 5 3 mmol/L 4 5    Chloride 100 - 108 mmol/L 105    CO2 21 - 32 mmol/L 28    ANION GAP 4 - 13 mmol/L 4    BUN 5 - 25 mg/dL 8    Creatinine 0 60 - 1 30 mg/dL 1 09    Comment: Standardized to IDMS reference method   Glucose, Fasting 65 - 99 mg/dL 107High     Comment: Specimen collection should occur prior to Sulfasalazine administration due to the potential for falsely depressed results  Specimen collection should occur prior to Sulfapyridine administration due to the potential for falsely elevated results     Calcium 8 3 - 10 1 mg/dL 8 8    Corrected Calcium 8 3 - 10 1 mg/dL 9 3    AST 5 - 45 U/L 19    Comment: Specimen collection should occur prior to Sulfasalazine administration due to the potential for falsely depressed results  ALT 12 - 78 U/L 62    Comment: Specimen collection should occur prior to Sulfasalazine administration due to the potential for falsely depressed results  Alkaline Phosphatase 46 - 116 U/L 73    Total Protein 6 4 - 8 2 g/dL 7 4    Albumin 3 5 - 5 0 g/dL 3 4Low     Total Bilirubin 0 20 - 1 00 mg/dL 0 60    Comment: Use of this assay is not recommended for patients undergoing treatment with eltrombopag due to the potential for falsely elevated results     eGFR ml/min/1 73sq m 72

## 2021-02-23 DIAGNOSIS — I10 ESSENTIAL HYPERTENSION: ICD-10-CM

## 2021-02-23 RX ORDER — METOPROLOL SUCCINATE 25 MG/1
25 TABLET, EXTENDED RELEASE ORAL DAILY
Qty: 30 TABLET | Refills: 1 | Status: SHIPPED | OUTPATIENT
Start: 2021-02-23 | End: 2021-04-09 | Stop reason: ALTCHOICE

## 2021-03-02 ENCOUNTER — TELEPHONE (OUTPATIENT)
Dept: HEMATOLOGY ONCOLOGY | Facility: CLINIC | Age: 61
End: 2021-03-02

## 2021-03-02 ENCOUNTER — TELEPHONE (OUTPATIENT)
Dept: FAMILY MEDICINE CLINIC | Facility: CLINIC | Age: 61
End: 2021-03-02

## 2021-03-02 NOTE — TELEPHONE ENCOUNTER
Pt is seeing Dr Betsey Tapia next month - he is going to call them to try to get in sooner  In the mean time he is asking for you to call him regarding what to do with the post - covid sxs  Can you call him, or would you like a VV scheduled?

## 2021-03-02 NOTE — TELEPHONE ENCOUNTER
Pt called - he was recently in to see Jani Vogel and is still feeling short winded post Covid pneumonia - he wanted to ask Jani Vogel a few questions about what he could possibly do for it?  [phone]

## 2021-03-02 NOTE — TELEPHONE ENCOUNTER
Reschedule Appointment     Who is calling in Patient    Doctor Appointment Scheduled with Dr Hagan    Original date and time 3-17-21 @ 11:00am    New date and time 3-9-2021 @ 3:20pm   Location [facility]    Patient verbalized understanding

## 2021-03-03 NOTE — TELEPHONE ENCOUNTER
Please let pt know it's common to feel fatigue after covid, takes up to 6 months after infection  He was hospitalized and was very sick    I can see him for follow up if he wants

## 2021-03-03 NOTE — TELEPHONE ENCOUNTER
We move his appt with Dr Prabhu Mendez for next week   Discussed long ravindraulers findings with COVID   Discussed they can last for months     If needed he can come in again and get evaluated   Thankful for the phone call   Will continues to follow up     He is also going to follow up with Hem onc

## 2021-03-09 ENCOUNTER — HOSPITAL ENCOUNTER (EMERGENCY)
Facility: HOSPITAL | Age: 61
Discharge: HOME | End: 2021-03-09
Attending: EMERGENCY MEDICINE | Admitting: EMERGENCY MEDICINE
Payer: COMMERCIAL

## 2021-03-09 ENCOUNTER — OFFICE VISIT (OUTPATIENT)
Dept: PULMONOLOGY | Facility: CLINIC | Age: 61
End: 2021-03-09
Payer: COMMERCIAL

## 2021-03-09 ENCOUNTER — OFFICE VISIT (OUTPATIENT)
Dept: HEMATOLOGY ONCOLOGY | Facility: CLINIC | Age: 61
End: 2021-03-09
Payer: COMMERCIAL

## 2021-03-09 VITALS
WEIGHT: 168 LBS | TEMPERATURE: 97.2 F | SYSTOLIC BLOOD PRESSURE: 162 MMHG | HEIGHT: 67 IN | BODY MASS INDEX: 26.37 KG/M2 | DIASTOLIC BLOOD PRESSURE: 98 MMHG | RESPIRATION RATE: 18 BRPM

## 2021-03-09 VITALS
BODY MASS INDEX: 26.37 KG/M2 | TEMPERATURE: 98.2 F | DIASTOLIC BLOOD PRESSURE: 95 MMHG | HEIGHT: 67 IN | OXYGEN SATURATION: 96 % | HEART RATE: 79 BPM | WEIGHT: 168 LBS | SYSTOLIC BLOOD PRESSURE: 170 MMHG

## 2021-03-09 VITALS
DIASTOLIC BLOOD PRESSURE: 87 MMHG | OXYGEN SATURATION: 97 % | TEMPERATURE: 98.6 F | RESPIRATION RATE: 18 BRPM | WEIGHT: 166.23 LBS | SYSTOLIC BLOOD PRESSURE: 154 MMHG | HEART RATE: 70 BPM | BODY MASS INDEX: 26.03 KG/M2

## 2021-03-09 DIAGNOSIS — G47.09 OTHER INSOMNIA: ICD-10-CM

## 2021-03-09 DIAGNOSIS — Z99.89 OSA ON CPAP: Primary | ICD-10-CM

## 2021-03-09 DIAGNOSIS — J12.82 PNEUMONIA DUE TO COVID-19 VIRUS: ICD-10-CM

## 2021-03-09 DIAGNOSIS — I10 ESSENTIAL HYPERTENSION: Primary | ICD-10-CM

## 2021-03-09 DIAGNOSIS — G47.33 OSA ON CPAP: Primary | ICD-10-CM

## 2021-03-09 DIAGNOSIS — R59.0 MEDIASTINAL LYMPHADENOPATHY: Primary | ICD-10-CM

## 2021-03-09 DIAGNOSIS — U07.1 PNEUMONIA DUE TO COVID-19 VIRUS: ICD-10-CM

## 2021-03-09 DIAGNOSIS — R59.0 MEDIASTINAL LYMPHADENOPATHY: ICD-10-CM

## 2021-03-09 DIAGNOSIS — I10 ESSENTIAL HYPERTENSION: ICD-10-CM

## 2021-03-09 LAB
ANION GAP SERPL CALCULATED.3IONS-SCNC: 6 MMOL/L (ref 4–13)
BASOPHILS # BLD AUTO: 0.04 THOUSANDS/ΜL (ref 0–0.1)
BASOPHILS NFR BLD AUTO: 1 % (ref 0–1)
BUN SERPL-MCNC: 12 MG/DL (ref 5–25)
CALCIUM SERPL-MCNC: 9.3 MG/DL (ref 8.3–10.1)
CHLORIDE SERPL-SCNC: 106 MMOL/L (ref 100–108)
CO2 SERPL-SCNC: 27 MMOL/L (ref 21–32)
CREAT SERPL-MCNC: 1.14 MG/DL (ref 0.6–1.3)
EOSINOPHIL # BLD AUTO: 0.09 THOUSAND/ΜL (ref 0–0.61)
EOSINOPHIL NFR BLD AUTO: 2 % (ref 0–6)
ERYTHROCYTE [DISTWIDTH] IN BLOOD BY AUTOMATED COUNT: 15.6 % (ref 11.6–15.1)
GFR SERPL CREATININE-BSD FRML MDRD: 79 ML/MIN/1.73SQ M
GLUCOSE SERPL-MCNC: 143 MG/DL (ref 65–140)
HCT VFR BLD AUTO: 46 % (ref 36.5–49.3)
HGB BLD-MCNC: 14.5 G/DL (ref 12–17)
IMM GRANULOCYTES # BLD AUTO: 0.01 THOUSAND/UL (ref 0–0.2)
IMM GRANULOCYTES NFR BLD AUTO: 0 % (ref 0–2)
LYMPHOCYTES # BLD AUTO: 1.3 THOUSANDS/ΜL (ref 0.6–4.47)
LYMPHOCYTES NFR BLD AUTO: 33 % (ref 14–44)
MCH RBC QN AUTO: 27.2 PG (ref 26.8–34.3)
MCHC RBC AUTO-ENTMCNC: 31.5 G/DL (ref 31.4–37.4)
MCV RBC AUTO: 86 FL (ref 82–98)
MONOCYTES # BLD AUTO: 0.43 THOUSAND/ΜL (ref 0.17–1.22)
MONOCYTES NFR BLD AUTO: 11 % (ref 4–12)
NEUTROPHILS # BLD AUTO: 2.05 THOUSANDS/ΜL (ref 1.85–7.62)
NEUTS SEG NFR BLD AUTO: 53 % (ref 43–75)
NRBC BLD AUTO-RTO: 0 /100 WBCS
PLATELET # BLD AUTO: 173 THOUSANDS/UL (ref 149–390)
PMV BLD AUTO: 11.1 FL (ref 8.9–12.7)
POTASSIUM SERPL-SCNC: 3.8 MMOL/L (ref 3.5–5.3)
RBC # BLD AUTO: 5.33 MILLION/UL (ref 3.88–5.62)
SODIUM SERPL-SCNC: 139 MMOL/L (ref 136–145)
TROPONIN I SERPL-MCNC: <0.02 NG/ML
WBC # BLD AUTO: 3.92 THOUSAND/UL (ref 4.31–10.16)

## 2021-03-09 PROCEDURE — 96374 THER/PROPH/DIAG INJ IV PUSH: CPT

## 2021-03-09 PROCEDURE — 99214 OFFICE O/P EST MOD 30 MIN: CPT | Performed by: INTERNAL MEDICINE

## 2021-03-09 PROCEDURE — 99284 EMERGENCY DEPT VISIT MOD MDM: CPT | Performed by: EMERGENCY MEDICINE

## 2021-03-09 PROCEDURE — 80048 BASIC METABOLIC PNL TOTAL CA: CPT | Performed by: EMERGENCY MEDICINE

## 2021-03-09 PROCEDURE — 85025 COMPLETE CBC W/AUTO DIFF WBC: CPT | Performed by: EMERGENCY MEDICINE

## 2021-03-09 PROCEDURE — 36415 COLL VENOUS BLD VENIPUNCTURE: CPT | Performed by: EMERGENCY MEDICINE

## 2021-03-09 PROCEDURE — 99283 EMERGENCY DEPT VISIT LOW MDM: CPT

## 2021-03-09 PROCEDURE — 84484 ASSAY OF TROPONIN QUANT: CPT | Performed by: EMERGENCY MEDICINE

## 2021-03-09 PROCEDURE — 93005 ELECTROCARDIOGRAM TRACING: CPT

## 2021-03-09 RX ORDER — LABETALOL 20 MG/4 ML (5 MG/ML) INTRAVENOUS SYRINGE
10 ONCE
Status: COMPLETED | OUTPATIENT
Start: 2021-03-09 | End: 2021-03-09

## 2021-03-09 RX ADMIN — LABETALOL 20 MG/4 ML (5 MG/ML) INTRAVENOUS SYRINGE 10 MG: at 19:54

## 2021-03-09 NOTE — PROGRESS NOTES
HEMATOLOGY / [address] NOTE    Primary Care Provider: MEGHNA Garcia  Referring Provider:    MRN: [id number]  : 1960    Reason for Encounter:    Chief Complaint   Patient presents with    Follow-up         History of Hematology / Oncology Illness:     Mireille Duron is a 56 y o  male who came in  to establish care with oncology      1,   New cervical lymphadenopathy    -  2017 :  Patient developed nonspecific right cervical lymphadenopathy status post biopsy,  Atypical cellular changes  Predominant population of small lymphocytes with rare large lymphocytes, scattered  admixed histiocytes and small histiocyte clusters, some with tingible bodies  See Note Flow cytometry analysis- No evidence of a B-cell or T-cell lymphoma  Subsequently this  Enlarged lymph node resolved     - 2019 : Patient felt a posterior cervical lump, imaging study favor lipoma  size has been fairly stable  - 3/2020 : Repeat CT  Neck ( outside [facility], CT imaging to be uploaded )  Showed 1)  Right posterior lump corresponds to a lipoma by previous CT scan and ultrasound, unchanged  2)  3 additional abnormal mass are seen, 2 are in the paramidline soft tissues of superior mediastinum or infrahoyid region of the neck, the 3rd mass is in the right jugular chain  These are indetermined, malignancy is not ruled out  - 2020 : PET-CT showed  FDG avid lymphadenopathy, biopsy  By bronchoscopy to mediastinal lymphadenopathy is negative for malignancy    - 2021 :  Patient developed COVID, CT scan during hospitalization showed stable mediastinum lymphadenopathy, appears has been stable since 2017  No new lymphadenopathy    Assessment / Plan:      1  Mediastinal lymphadenopathy  - per CT scan, condition is fairly stable, will observe patient clinically, plan to follow patient 6 months, will exam patient then, no indication for workups    Has not patient stable, no need for any further imaging study for now          2  Lipoma of neck     - previously referred patient to surgeon, patient deferred         Made patient aware regarding side effects of chemotherapy, including but not limited to fatigue cytopenia, increased risk for infection, potential kidney, liver injuries neuropathies et al    Made patient aware to call MD or go to ED for any fever,  Chills, bleeding, easy bruise, unhealed wound, chest pain, abdominal pain et al           25        minutes were spent face to face with patient with greater than 50% of the time spent in counseling or coordination of care including discussions of treatment instructions  All of the patient's questions were answered to their satisfactory during this discussion  Advised pt to call if there is any further questions  Interval History:      4/1/2020 : Patient is a 78-year-old  male history of hypertension, lymphoma, former smoker, referred to Hematology for new cervical lymphadenopathy  Patient reported has been doing well, subjectively he is not feeling any new lumps bumps  He does reported a lipoma in the right posterior neck and mediastinum of right upper arm, the size has been stable  No constitutional symptoms  No bleeding no frequent infection  Otherwise patient remains very active, exercising every day, body weight stable  Patient was a former smoker stopped 30 years ago, father had history of leukemia diagnosed at 36         6/3/2020 : Patient reported doing well, no new lumps bumps body weight stable no other constitutional symptoms       3/9/2021 :  Patient came in for follow-up, doing well  Body weight stable, subjectively there is no new lumps bumps  Recently developed COVID has completely recovered          Problem list:       Patient Active Problem List   Diagnosis    Mediastinal lymphadenopathy    Spondylosis of cervical region without myelopathy or radiculopathy    Osteoarthritis of left elbow    Palpitations  JADYN on CPAP    History of elbow surgery    Foraminal stenosis of cervical region    DDD (degenerative disc disease), cervical    Impaired fasting glucose    Right hand pain    Gout of multiple sites    Gastroesophageal reflux disease without esophagitis    Other insomnia    Lipoma of neck    Cervical lymphadenopathy    Cellulitis of index finger, left    Encounter for screening for COVID-19    COVID-19 virus infection    YOAV (acute kidney injury) ([facility] [address] )    Elevated liver enzymes    Hypertension    Pneumonia due to COVID-19 virus         PHYSICIAL EXAMINATION:     Vital Signs:   /98 (BP Location: Left arm, Patient Position: Sitting, Cuff Size: Adult)   Temp (!) 97 2 °F (36 2 °C)   Resp 18   Ht 5' 7" (1 702 m)   Wt 76 2 kg (168 lb)   BMI 26 31 kg/m²   Body surface area is 1 88 meters squared  Ht Readings from Last 3 Encounters:   03/09/21 5' 7" (1 702 m)   03/09/21 5' 7" (1 702 m)   02/22/21 5' 7" (1 702 m)       Wt Readings from Last 3 Encounters:   03/09/21 76 2 kg (168 lb)   03/09/21 76 2 kg (168 lb)   02/22/21 74 6 kg (164 lb 6 4 oz)        Temp Readings from Last 3 Encounters:   03/09/21 (!) 97 2 °F (36 2 °C)   03/09/21 98 2 °F (36 8 °C) (Tympanic)   02/22/21 98 2 °F (36 8 °C) (Temporal)        BP Readings from Last 3 Encounters:   03/09/21 162/98   03/09/21 170/95   02/22/21 146/80         Pulse Readings from Last 3 Encounters:   03/09/21 79   02/22/21 72   02/17/21 96       Palpable 2 cm in diameter lump soft mobile any right posterior neck, no other new palpable lymph nodes, no other major finding on examination            GEN: Alert, awake oriented x3, in no acute distress  HEENT- No pallor, icterus, cyanosis, no oral mucosal lesions,   LAD - no palpable cervical, clavicle, axillary, inguinal LAD  Heart- normal S1 S2, regular rate and rhythm, No murmur, rubs     Lungs- decreased breathing sound bilateral    Abdomen- soft, Non tender, bowel sounds present  Extremities- No cyanosis, clubbing, edema  Neuro- No focal neurological deficit           PAST MEDICAL HISTORY:   has a past medical history of Arthritis, Asthma, Back pain, COVID-19 (1/21/2021), Gastroesophageal reflux disease without esophagitis (7/16/2020), GERD (gastroesophageal reflux disease), Gout of multiple sites (7/16/2020), Hiatal hernia, High blood pressure, Hypertension, Impaired fasting glucose (6/18/2020), Lipoma of neck, Mediastinal lymphadenopathy, Osteoporosis, Pneumonia due to COVID-19 virus (1/25/2021), Right hand pain (7/16/2020), and Sleep disorder  PAST SURGICAL HISTORY:   has a past surgical history that includes Lumbar fusion (07/11/2018); EGD (03/31/2017); Tonsillectomy (01/01/1980); Colonoscopy (10/07/2016); Elbow surgery (Left, 04/09/2018); Hernia repair (01/16/2019); Rotator cuff repair (Bilateral); Colonoscopy; Back surgery; pr bronchoscopy,diagnostic (N/A, 4/28/2020); and pr bronchoscopy needle bx trachea main stem&/bron (N/A, 4/28/2020)      CURRENT MEDICATIONS:     Current Outpatient Medications   Medication Sig Dispense Refill    ascorbic acid (VITAMIN C) 1000 MG tablet Take 1 tablet (1,000 mg total) by mouth every 12 (twelve) hours for 5 days 10 tablet 0    aspirin 81 mg chewable tablet Chew 81 mg daily      clotrimazole (LOTRIMIN) 1 % cream Apply topically 2 (two) times a day as needed       diclofenac sodium (VOLTAREN) 1 % Apply 2 g topically 4 (four) times a day (Patient taking differently: Apply 2 g topically 4 (four) times a day as needed ) 1 Tube 2    fluticasone (FLONASE) 50 mcg/act nasal spray 2 sprays into each nostril daily 1 Bottle 5    hydrOXYzine HCL (ATARAX) 10 mg tablet Take 1 tablet (10 mg total) by mouth daily at bedtime 30 tablet 0    ketoconazole (NIZORAL) 2 % shampoo Apply topically once      losartan (COZAAR) 50 mg tablet Take 1 tablet (50 mg total) by mouth daily  0    metoprolol succinate (Toprol XL) 25 mg 24 hr tablet Take 1 tablet (25 mg total) by mouth daily 30 tablet 1    zinc sulfate (ZINCATE) 220 mg capsule Take 1 capsule (220 mg total) by mouth daily for 4 days 5 capsule 0     No current facility-administered medications for this visit  [unfilled]    SOCIAL HISTORY:   reports that he quit smoking about 37 years ago  His smoking use included cigarettes  He has a 3 00 pack-year smoking history  He has never used smokeless tobacco  He reports current alcohol use of about 7 0 standard drinks of alcohol per week  He reports that he does not use drugs  FAMILY HISTORY:  family history includes Hypertension in his father and mother  ALLERGIES:  is allergic to nuts; peanut oil; codeine; latex; and dust mite extract  REVIEW OF SYSTEMS:  Please note that a 14-point review of systems was performed to include Constitutional, HEENT, Respiratory, CVS, GI, , Musculoskeletal, Integumentary, Neurologic, Rheumatologic, Endocrinologic, Psychiatric, Lymphatic, and Hematologic/Oncologic systems were reviewed and are negative unless otherwise stated in HPI  Positive and negative findings pertinent to this evaluation are incorporated into the history of present illness            Lab Re  Lab Results   Component Value Date    WBC 3 69 (L) 02/17/2021    HGB 14 8 02/17/2021    HCT 47 4 02/17/2021    MCV 86 02/17/2021     02/17/2021   sults   Component Value Date    WBC 3 06 (L) 04/24/2020    HGB 15 9 04/24/2020    HCT 49 7 (H) 04/24/2020    MCV 87 04/24/2020     04/24/2020      Component Value Date    SODIUM 141 02/17/2021    K 4 0 02/17/2021     02/17/2021    CO2 24 02/17/2021    AGAP 11 02/17/2021    BUN 8 02/17/2021    CREATININE 1 09 02/17/2021    GLUC 96 02/17/2021    GLUF 107 (H) 02/09/2021    CALCIUM 9 0 02/17/2021    AST 23 02/17/2021    ALT 56 02/17/2021    ALKPHOS 85 02/17/2021    TP 7 9 02/17/2021    TBILI 0 69 02/17/2021    EGFR 72 02/17/2021       CBC with diff:       Invalid input(s):  RBC, TOTALCELLSCOUNTED, SEGS%, GRANS%, LYMPHS%, EOS%, BASO%, ABNMONIKA, DEDE, KELSEY CANO ABEOS, ABBASO    CMP:      Invalid input(s): ALBUMIN    IMAGING:    No orders to display     No results found

## 2021-03-09 NOTE — ASSESSMENT & PLAN NOTE
He had mediastinal lymphadenopathy diagnosed in 2017 and had underwent bronchoscopy and biopsy according to him  He was put on prednisone for few months by his pulmonologist at that time  His CT scan of the chest had shown  multiple lymphadenopathy including mediastinal and supraclavicular  He was found to have right posterior neck lymphadenopathy and underwent biopsy which did not show any evidence of malignancy  In April 2020 he also underwent EBUS biopsy for mediastinal lymphadenopathy which was FDG avid  The results from this biopsy were also not suggestive of any malignancy  He follows with Dr Thuan Medina now  He denies any weight loss or anorexia or fever or chills

## 2021-03-09 NOTE — ASSESSMENT & PLAN NOTE
Mr Scot Rinne has obstructive sleep apnea    He had a repeat sleep study in view of his persistent insomnia on Feb 15 2019 and it showed moderate obstructive sleep apnea with oxygen desaturation and sleep fragmentation  During the same study it was determined that his sleep apnea can be treated with CPAP 7 cm of water  He was subsequently started on CPAP therapy and found it beneficial  he felt less tired and sleepy during the day  His sleep was also less interrupted  However he continues to have sleep maintenance insomnia  The snoring and reported apneic events had resolved  He has hypertension as comorbidity  CPAP compliance has been poor  He did not feel comfortable with the mask or pressure  He stated that he had nasal congestion that was interfering with the CPAP use  Currently he is not using CPAP  I have highlighted to him the need for using CPAP regularly and the problems with untreated sleep apnea  He also has significant insomnia  I have instructed regarding sleep hygiene   I have referred him for a mask fit with  DME  I counseled him regarding alcohol use    I had a long discussion with him and answered all his questions

## 2021-03-09 NOTE — PROGRESS NOTES
Assessment/Plan:    JADYN on CPAP  Mr Kimberly Fleischer has obstructive sleep apnea    He had a repeat sleep study in view of his persistent insomnia on Feb 15 2019 and it showed moderate obstructive sleep apnea with oxygen desaturation and sleep fragmentation  During the same study it was determined that his sleep apnea can be treated with CPAP 7 cm of water  He was subsequently started on CPAP therapy and found it beneficial  he felt less tired and sleepy during the day  His sleep was also less interrupted  However he continues to have sleep maintenance insomnia  The snoring and reported apneic events had resolved  He has hypertension as comorbidity  CPAP compliance has been poor  He did not feel comfortable with the mask or pressure  He stated that he had nasal congestion that was interfering with the CPAP use  Currently he is not using CPAP  I have highlighted to him the need for using CPAP regularly and the problems with untreated sleep apnea  He also has significant insomnia  I have instructed regarding sleep hygiene   I have referred him for a mask fit with  DME  I counseled him regarding alcohol use    I had a long discussion with him and answered all his questions        Mediastinal lymphadenopathy  He had mediastinal lymphadenopathy diagnosed in 2017 and had underwent bronchoscopy and biopsy according to him  He was put on prednisone for few months by his pulmonologist at that time  His CT scan of the chest had shown  multiple lymphadenopathy including mediastinal and supraclavicular  He was found to have right posterior neck lymphadenopathy and underwent biopsy which did not show any evidence of malignancy  In April 2020 he also underwent EBUS biopsy for mediastinal lymphadenopathy which was FDG avid  The results from this biopsy were also not suggestive of any malignancy  He follows with Dr Armandina Scheuermann now  He denies any weight loss or anorexia or fever or chills       Other insomnia  He has history of insomnia for a long time  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  Currently he sleeps for about 4 hours from 2 AM to 6 AM every night  His sleep is still interrupted  Thelda Fare He tried melatonin before  Thelda Fare He is not using oxycodone regularly  currently he has mainly sleep maintenance insomnia  I have advised him regarding sleep hygiene  I have counseled him regarding alcohol use which could also be interfering significantly with his sleep  Pneumonia due to COVID-19 virus  He previous had COVID infection with pneumonia  He was hospitalized for 2-3 days and was on oxygen  His last chest x-ray showed improvement except for some mild ground-glass infiltrates  Currently he denies any significant shortness of breath or cough or phlegm or wheeze or chest pain  Diagnoses and all orders for this visit:    JADYN on CPAP  -     Mask fitting only; Future    Mediastinal lymphadenopathy    Other insomnia    Pneumonia due to COVID-19 virus    Essential hypertension          Subjective:      Patient ID: Madalyn Gaitan is a 56 y o  male  Mr Camila Rodriguez has obstructive sleep apnea and has been on CPAP therapy  He is not using the CPAP regularly and still has problems with the mask  He is using a nasal interface and also has nasal congestion  He has insomnia  He states that he used to do night shifts for a long time  He states that he also consumes alcohol to get sleep  He previously tried melatonin which did not work  He has high blood pressure as comorbidity  Recently had COVID infection with pneumonia  He was hospitalized for 2-3 days and was on oxygen  His last chest x-ray showed improvement except for some mild ground-glass infiltrates  Currently he denies any significant shortness of breath or cough or phlegm or wheeze or chest pain  He has runny nose and nasal congestion    He has had mediastinal and hilar lymphadenopathy which has been present since 2017   This has been biopsied and found to be nonmalignant  Currently he denies any weight loss or anorexia  The following portions of the patient's history were reviewed and updated as appropriate: allergies, current medications, past family history, past medical history, past social history, past surgical history and problem list     Review of Systems   Constitutional: Negative for appetite change, chills, fatigue and fever  HENT: Positive for rhinorrhea and sneezing  Negative for hearing loss, trouble swallowing and voice change  Eyes: Negative for visual disturbance  Respiratory: Positive for shortness of breath  Negative for cough, chest tightness and wheezing  Cardiovascular: Negative for chest pain, palpitations and leg swelling  Gastrointestinal: Negative for abdominal pain, constipation, nausea and vomiting  Endocrine: Negative for polyuria  Genitourinary: Negative for dysuria, frequency and urgency  Musculoskeletal: Positive for arthralgias  Negative for joint swelling  Skin: Negative for rash  Allergic/Immunologic: Positive for environmental allergies  Neurological: Negative for dizziness, seizures and light-headedness  Psychiatric/Behavioral: Positive for sleep disturbance  The patient is nervous/anxious  Objective:      /95 (BP Location: Left arm, Patient Position: Sitting)   Pulse 79   Temp 98 2 °F (36 8 °C) (Tympanic)   Ht 5' 7" (1 702 m)   Wt 76 2 kg (168 lb)   SpO2 96%   BMI 26 31 kg/m²          Physical Exam  Vitals signs reviewed  Constitutional:       General: He is not in acute distress  Appearance: He is not ill-appearing or toxic-appearing  HENT:      Head: Normocephalic  Eyes:      General: No scleral icterus  Conjunctiva/sclera: Conjunctivae normal    Neck:      Musculoskeletal: No neck rigidity  Comments: No cervical or supraclavicular or axillary lymphadenopathy  Cardiovascular:      Rate and Rhythm: Normal rate and regular rhythm        Heart sounds: Normal heart sounds  No murmur  Pulmonary:      Effort: No respiratory distress  Breath sounds: Normal breath sounds  No stridor  No wheezing, rhonchi or rales  Chest:      Chest wall: No tenderness  Abdominal:      General: Bowel sounds are normal  There is distension  Palpations: Abdomen is soft  Tenderness: There is no abdominal tenderness  There is no guarding  Musculoskeletal:      Right lower leg: No edema  Left lower leg: No edema  Lymphadenopathy:      Cervical: No cervical adenopathy  Skin:     General: Skin is warm  Coloration: Skin is not jaundiced or pale  Neurological:      Mental Status: He is alert and oriented to person, place, and time  Gait: Gait normal    Psychiatric:         Mood and Affect: Mood normal          Behavior: Behavior normal          Thought Content:  Thought content normal          Judgment: Judgment normal

## 2021-03-10 ENCOUNTER — APPOINTMENT (EMERGENCY)
Dept: RADIOLOGY | Facility: HOSPITAL | Age: 61
End: 2021-03-10
Payer: COMMERCIAL

## 2021-03-10 ENCOUNTER — APPOINTMENT (EMERGENCY)
Dept: CT IMAGING | Facility: HOSPITAL | Age: 61
End: 2021-03-10
Payer: COMMERCIAL

## 2021-03-10 ENCOUNTER — HOSPITAL ENCOUNTER (OUTPATIENT)
Facility: HOSPITAL | Age: 61
Setting detail: OBSERVATION
Discharge: HOME | End: 2021-03-11
Attending: EMERGENCY MEDICINE | Admitting: INTERNAL MEDICINE
Payer: COMMERCIAL

## 2021-03-10 ENCOUNTER — DOCUMENTATION (OUTPATIENT)
Dept: FAMILY MEDICINE CLINIC | Facility: CLINIC | Age: 61
End: 2021-03-10

## 2021-03-10 ENCOUNTER — TELEPHONE (OUTPATIENT)
Dept: CT IMAGING | Facility: HOSPITAL | Age: 61
End: 2021-03-10

## 2021-03-10 ENCOUNTER — TELEPHONE (OUTPATIENT)
Dept: FAMILY MEDICINE CLINIC | Facility: CLINIC | Age: 61
End: 2021-03-10

## 2021-03-10 DIAGNOSIS — R59.0 MEDIASTINAL LYMPHADENOPATHY: ICD-10-CM

## 2021-03-10 DIAGNOSIS — I10 ESSENTIAL HYPERTENSION: Primary | ICD-10-CM

## 2021-03-10 DIAGNOSIS — R51.9 HEADACHE: ICD-10-CM

## 2021-03-10 DIAGNOSIS — I10 HYPERTENSION: Primary | ICD-10-CM

## 2021-03-10 DIAGNOSIS — I16.0 HYPERTENSIVE URGENCY: ICD-10-CM

## 2021-03-10 DIAGNOSIS — R06.00 DYSPNEA: ICD-10-CM

## 2021-03-10 LAB
ALBUMIN SERPL BCP-MCNC: 3.7 G/DL (ref 3.5–5)
ALP SERPL-CCNC: 78 U/L (ref 46–116)
ALT SERPL W P-5'-P-CCNC: 36 U/L (ref 12–78)
ANION GAP SERPL CALCULATED.3IONS-SCNC: 9 MMOL/L (ref 4–13)
APTT PPP: 33 SECONDS (ref 23–37)
AST SERPL W P-5'-P-CCNC: 17 U/L (ref 5–45)
ATRIAL RATE: 68 BPM
BASOPHILS # BLD AUTO: 0.04 THOUSANDS/ΜL (ref 0–0.1)
BASOPHILS NFR BLD AUTO: 1 % (ref 0–1)
BILIRUB SERPL-MCNC: 1.16 MG/DL (ref 0.2–1)
BUN SERPL-MCNC: 14 MG/DL (ref 5–25)
CALCIUM SERPL-MCNC: 9.5 MG/DL (ref 8.3–10.1)
CHLORIDE SERPL-SCNC: 105 MMOL/L (ref 100–108)
CO2 SERPL-SCNC: 26 MMOL/L (ref 21–32)
CREAT SERPL-MCNC: 1.1 MG/DL (ref 0.6–1.3)
D DIMER PPP FEU-MCNC: 0.88 UG/ML FEU
EOSINOPHIL # BLD AUTO: 0.11 THOUSAND/ΜL (ref 0–0.61)
EOSINOPHIL NFR BLD AUTO: 3 % (ref 0–6)
ERYTHROCYTE [DISTWIDTH] IN BLOOD BY AUTOMATED COUNT: 15.9 % (ref 11.6–15.1)
ERYTHROCYTE [SEDIMENTATION RATE] IN BLOOD: 15 MM/HOUR (ref 0–19)
GFR SERPL CREATININE-BSD FRML MDRD: 83 ML/MIN/1.73SQ M
GLUCOSE SERPL-MCNC: 106 MG/DL (ref 65–140)
HCT VFR BLD AUTO: 45.7 % (ref 36.5–49.3)
HGB BLD-MCNC: 14.1 G/DL (ref 12–17)
IMM GRANULOCYTES # BLD AUTO: 0.03 THOUSAND/UL (ref 0–0.2)
IMM GRANULOCYTES NFR BLD AUTO: 1 % (ref 0–2)
INR PPP: 1.08 (ref 0.84–1.19)
LACTATE SERPL-SCNC: 0.6 MMOL/L (ref 0.5–2)
LYMPHOCYTES # BLD AUTO: 1.35 THOUSANDS/ΜL (ref 0.6–4.47)
LYMPHOCYTES NFR BLD AUTO: 35 % (ref 14–44)
MCH RBC QN AUTO: 26.8 PG (ref 26.8–34.3)
MCHC RBC AUTO-ENTMCNC: 30.9 G/DL (ref 31.4–37.4)
MCV RBC AUTO: 87 FL (ref 82–98)
MONOCYTES # BLD AUTO: 0.51 THOUSAND/ΜL (ref 0.17–1.22)
MONOCYTES NFR BLD AUTO: 13 % (ref 4–12)
NEUTROPHILS # BLD AUTO: 1.77 THOUSANDS/ΜL (ref 1.85–7.62)
NEUTS SEG NFR BLD AUTO: 47 % (ref 43–75)
NRBC BLD AUTO-RTO: 0 /100 WBCS
NT-PROBNP SERPL-MCNC: 73 PG/ML
P AXIS: 43 DEGREES
PLATELET # BLD AUTO: 173 THOUSANDS/UL (ref 149–390)
PMV BLD AUTO: 11.2 FL (ref 8.9–12.7)
POTASSIUM SERPL-SCNC: 4 MMOL/L (ref 3.5–5.3)
PR INTERVAL: 154 MS
PROT SERPL-MCNC: 7.6 G/DL (ref 6.4–8.2)
PROTHROMBIN TIME: 14.1 SECONDS (ref 11.6–14.5)
QRS AXIS: -29 DEGREES
QRSD INTERVAL: 76 MS
QT INTERVAL: 384 MS
QTC INTERVAL: 408 MS
RBC # BLD AUTO: 5.27 MILLION/UL (ref 3.88–5.62)
SODIUM SERPL-SCNC: 140 MMOL/L (ref 136–145)
T WAVE AXIS: 11 DEGREES
TROPONIN I SERPL-MCNC: <0.02 NG/ML
VENTRICULAR RATE: 68 BPM
WBC # BLD AUTO: 3.81 THOUSAND/UL (ref 4.31–10.16)

## 2021-03-10 PROCEDURE — 85379 FIBRIN DEGRADATION QUANT: CPT | Performed by: EMERGENCY MEDICINE

## 2021-03-10 PROCEDURE — 85610 PROTHROMBIN TIME: CPT | Performed by: EMERGENCY MEDICINE

## 2021-03-10 PROCEDURE — 70450 CT HEAD/BRAIN W/O DYE: CPT

## 2021-03-10 PROCEDURE — 83880 ASSAY OF NATRIURETIC PEPTIDE: CPT | Performed by: EMERGENCY MEDICINE

## 2021-03-10 PROCEDURE — 85652 RBC SED RATE AUTOMATED: CPT | Performed by: EMERGENCY MEDICINE

## 2021-03-10 PROCEDURE — 85730 THROMBOPLASTIN TIME PARTIAL: CPT | Performed by: EMERGENCY MEDICINE

## 2021-03-10 PROCEDURE — 83605 ASSAY OF LACTIC ACID: CPT | Performed by: EMERGENCY MEDICINE

## 2021-03-10 PROCEDURE — 71045 X-RAY EXAM CHEST 1 VIEW: CPT

## 2021-03-10 PROCEDURE — 99285 EMERGENCY DEPT VISIT HI MDM: CPT

## 2021-03-10 PROCEDURE — 84484 ASSAY OF TROPONIN QUANT: CPT | Performed by: EMERGENCY MEDICINE

## 2021-03-10 PROCEDURE — 80053 COMPREHEN METABOLIC PANEL: CPT | Performed by: EMERGENCY MEDICINE

## 2021-03-10 PROCEDURE — 85025 COMPLETE CBC W/AUTO DIFF WBC: CPT | Performed by: EMERGENCY MEDICINE

## 2021-03-10 PROCEDURE — 99285 EMERGENCY DEPT VISIT HI MDM: CPT | Performed by: EMERGENCY MEDICINE

## 2021-03-10 PROCEDURE — 71275 CT ANGIOGRAPHY CHEST: CPT

## 2021-03-10 PROCEDURE — 87040 BLOOD CULTURE FOR BACTERIA: CPT | Performed by: EMERGENCY MEDICINE

## 2021-03-10 PROCEDURE — 36415 COLL VENOUS BLD VENIPUNCTURE: CPT | Performed by: EMERGENCY MEDICINE

## 2021-03-10 PROCEDURE — 93010 ELECTROCARDIOGRAM REPORT: CPT | Performed by: INTERNAL MEDICINE

## 2021-03-10 PROCEDURE — G1004 CDSM NDSC: HCPCS

## 2021-03-10 PROCEDURE — 93005 ELECTROCARDIOGRAM TRACING: CPT

## 2021-03-10 RX ORDER — ACETAMINOPHEN 325 MG/1
650 TABLET ORAL ONCE
Status: DISCONTINUED | OUTPATIENT
Start: 2021-03-10 | End: 2021-03-11 | Stop reason: HOSPADM

## 2021-03-10 NOTE — TELEPHONE ENCOUNTER
Spoke to patient   Discussed doubling dose up to 50 mg on Toprol  100 mg on Losaratan     - he took this morning 25 mg of Toprol and 50 mg on the Losartan   He was instructed to take one more of each tonight and call me in few hours     Starting tomorrow 50 mg of Toprol and 100 mg of the Losartan  And follow up with CARDIOLOGY   Referral and phone number provided again

## 2021-03-10 NOTE — ASSESSMENT & PLAN NOTE
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

## 2021-03-10 NOTE — TELEPHONE ENCOUNTER
Increase losartan 100 mg daily, metoprolol 50 mg daily   Need office visit in 1 week to f/u blood pressure

## 2021-03-10 NOTE — ED PROVIDER NOTES
History  Chief Complaint   Patient presents with    Hypertension     Patient reports high blood pressures today, told to come to the ER if pressure does not go down  Patient denies any symptoms, no CP/NVD/SOB     49-year-old gentleman comes in for evaluation of high blood pressure  Patient has a history of high blood pressure  He was at his oncologist today and was elevated was told to keep an eye on it if it did not come down to come to the emergency department  Patient became concerned when the top number went into the 200s  Patient states he was recently taken off metoprolol because it made his heart palpitations  Patient states that he became anxious with his blood pressure how in high and actually took some metoprolol just before coming  Patient denies any chest pain nausea vomiting diarrhea or shortness of breath  Patient states he does get intermittent headaches but does not have 1 right now  History provided by:  Patient   used: No    Hypertension  Severity:  Moderate  Onset quality:  Sudden  Duration:  16 hours  Timing:  Constant  Progression:  Unchanged  Chronicity:  New  Time since last dose of antihypertensive:  1 hour  Notable PTA blood pressures:  200/84  Context: medication change    Ineffective treatments:  Beta blockers and angiotensin blockers  Associated symptoms: shortness of breath (Patient states that he recently recovered from [facility] and has been short of breath since that diagnosis it is not new today with a blood pressure)    Associated symptoms: no abdominal pain, no blurred vision, no chest pain, no confusion, no dizziness, no fever, no headaches, no hematuria, no palpitations and no tinnitus    Risk factors: cardiac disease    Risk factors: no cocaine use        Prior to Admission Medications   Prescriptions Last Dose Informant Patient Reported?  Taking?   ascorbic acid (VITAMIN C) 1000 MG tablet   No No   Sig: Take 1 tablet (1,000 mg total) by mouth every 12 (twelve) hours for 5 days   aspirin 81 mg chewable tablet   Yes No   Sig: Chew 81 mg daily   clotrimazole (LOTRIMIN) 1 % cream   Yes No   Sig: Apply topically 2 (two) times a day as needed    diclofenac sodium (VOLTAREN) 1 %   No No   Sig: Apply 2 g topically 4 (four) times a day   Patient taking differently: Apply 2 g topically 4 (four) times a day as needed    fluticasone (FLONASE) 50 mcg/act nasal spray   No No   Si sprays into each nostril daily   hydrOXYzine HCL (ATARAX) 10 mg tablet   No No   Sig: Take 1 tablet (10 mg total) by mouth daily at bedtime   ketoconazole (NIZORAL) 2 % shampoo   Yes No   Sig: Apply topically once   losartan (COZAAR) 50 mg tablet   No No   Sig: Take 1 tablet (50 mg total) by mouth daily   metoprolol succinate (Toprol XL) 25 mg 24 hr tablet   No No   Sig: Take 1 tablet (25 mg total) by mouth daily   zinc sulfate (ZINCATE) 220 mg capsule   No No   Sig: Take 1 capsule (220 mg total) by mouth daily for 4 days      Facility-Administered Medications: None       Past Medical History:   Diagnosis Date    Arthritis     Asthma     Back pain     COVID-19 2021    Gastroesophageal reflux disease without esophagitis 2020    GERD (gastroesophageal reflux disease)     Gout of multiple sites 2020    Hiatal hernia     High blood pressure     Hypertension     Impaired fasting glucose 2020    Lipoma of neck     Mediastinal lymphadenopathy     Osteoporosis     Pneumonia due to COVID-19 virus 2021    Right hand pain 2020    Sleep disorder        Past Surgical History:   Procedure Laterality Date    BACK SURGERY      COLONOSCOPY  10/07/2016    5 years (per dominique)    COLONOSCOPY      EGD  2017    ELBOW SURGERY Left 2018    open arthrotomy, capsulectomy, loose body removal (per dominique)    HERNIA REPAIR      umbilical with mesh (per dominique)    LUMBAR FUSION  2018    SD BRONCHOSCOPY NEEDLE BX TRACHEA MAIN STEM&/BRON N/A 2020    Procedure: ENDOBRONCHIAL ULTRASOUND (EBUS) WITH BIOPSY;  Surgeon: Jhon Burrell MD;  [facility];  Service: Thoracic    OR Taya 44 N/A 2020    Procedure: Jeri Vázquez;  Surgeon: Jhon Burrell MD;  [facility];  Service: Thoracic    ROTATOR CUFF REPAIR Bilateral     TONSILLECTOMY  1980       Family History   Problem Relation Age of Onset    Hypertension Mother     Hypertension Father      I have reviewed and agree with the history as documented  E-Cigarette/Vaping    E-Cigarette Use Never User      E-Cigarette/Vaping Substances    Nicotine No     THC No     CBD No     Flavoring No     Other No     Unknown No      Social History     Tobacco Use    Smoking status: Former Smoker     Packs/day: 0 50     Years: 6 00     Pack years: 3 00     Types: Cigarettes     Quit date:      Years since quittin 2    Smokeless tobacco: Never Used   Substance Use Topics    Alcohol use: Yes     Alcohol/week: 7 0 standard drinks     Types: 7 Standard drinks or equivalent per week     Frequency: 4 or more times a week     Drinks per session: 1 or 2     Comment: one glass per night   Drug use: No       Review of Systems   Constitutional: Negative for activity change, appetite change and fever  HENT: Negative for congestion, facial swelling and tinnitus  Eyes: Negative for blurred vision, discharge and redness  Respiratory: Positive for shortness of breath (Patient states that he recently recovered from [facility] and has been short of breath since that diagnosis it is not new today with a blood pressure)  Negative for cough and wheezing  Cardiovascular: Negative for chest pain, palpitations and leg swelling  Gastrointestinal: Negative for abdominal distention, abdominal pain and blood in stool  Endocrine: Negative for cold intolerance and polydipsia     Genitourinary: Negative for difficulty urinating and hematuria  Musculoskeletal: Negative for arthralgias and gait problem  Skin: Negative for color change and rash  Allergic/Immunologic: Negative for food allergies and immunocompromised state  Neurological: Negative for dizziness, seizures and headaches  Hematological: Negative for adenopathy  Does not bruise/bleed easily  Psychiatric/Behavioral: Negative for agitation, confusion and decreased concentration  All other systems reviewed and are negative  Physical Exam  Physical Exam  Vitals signs and nursing note reviewed  Constitutional:       Appearance: He is well-developed  He is not toxic-appearing  HENT:      Head: Normocephalic and atraumatic  Right Ear: Tympanic membrane normal       Left Ear: Tympanic membrane normal       Nose: Nose normal    Eyes:      General: Lids are normal       Conjunctiva/sclera: Conjunctivae normal       Pupils: Pupils are equal, round, and reactive to light  Neck:      Musculoskeletal: Normal range of motion and neck supple  Vascular: No carotid bruit or JVD  Trachea: Trachea normal    Cardiovascular:      Rate and Rhythm: Normal rate and regular rhythm  No extrasystoles are present  Heart sounds: Normal heart sounds  Pulmonary:      Effort: Pulmonary effort is normal       Breath sounds: No decreased breath sounds, wheezing, rhonchi or rales  Chest:      Chest wall: No deformity or tenderness  Abdominal:      General: Bowel sounds are normal       Palpations: Abdomen is soft  Tenderness: There is no abdominal tenderness  There is no guarding or rebound  Musculoskeletal:      Right shoulder: He exhibits normal range of motion, no tenderness, no swelling and no deformity  Cervical back: Normal  He exhibits normal range of motion, no tenderness, no bony tenderness and no deformity  Lymphadenopathy:      Cervical: No cervical adenopathy  Skin:     General: Skin is warm and dry     Neurological:      Mental Status: He is alert and oriented to person, place, and time  Cranial Nerves: No cranial nerve deficit  Sensory: No sensory deficit  Deep Tendon Reflexes: Reflexes are normal and symmetric  Psychiatric:         Speech: Speech normal          Behavior: Behavior normal          Thought Content:  Thought content normal          Judgment: Judgment normal          Vital Signs  ED Triage Vitals [03/09/21 1939]   Temperature Pulse Respirations Blood Pressure SpO2   98 6 °F (37 °C) 71 18 (!) 197/91 96 %      Temp Source Heart Rate Source Patient Position - Orthostatic VS BP Location FiO2 (%)   Oral Monitor Lying Left arm --      Pain Score       --           Vitals:    03/09/21 1939 03/09/21 1949 03/09/21 2000 03/09/21 2015   BP: (!) 197/91 (!) 176/84 158/85 154/87   Pulse: 71  74 70   Patient Position - Orthostatic VS: Lying  Lying          Visual Acuity      ED Medications  Medications   Labetalol HCl (NORMODYNE) injection 10 mg (10 mg Intravenous Given 3/9/21 1954)       Diagnostic Studies  Results Reviewed     Procedure Component Value Units Date/Time    Troponin I [[id number]]  (Normal) Collected: 03/09/21 1951    Lab Status: Final result Specimen: Blood from Arm, Right Updated: 03/09/21 2021     Troponin I <0 02 ng/mL     Basic metabolic panel [[id number]]  (Abnormal) Collected: 03/09/21 1951    Lab Status: Final result Specimen: Blood from Arm, Right Updated: 03/09/21 2013     Sodium 139 mmol/L      Potassium 3 8 mmol/L      Chloride 106 mmol/L      CO2 27 mmol/L      ANION GAP 6 mmol/L      BUN 12 mg/dL      Creatinine 1 14 mg/dL      Glucose 143 mg/dL      Calcium 9 3 mg/dL      eGFR 79 ml/min/1 73sq m     Narrative:      Lizette guidelines for Chronic Kidney Disease (CKD):     Stage 1 with normal or high GFR (GFR > 90 mL/min/1 73 square meters)    Stage 2 Mild CKD (GFR = 60-89 mL/min/1 73 square meters)    Stage 3A Moderate CKD (GFR = 45-59 mL/min/1 73 square meters)    Stage 3B Moderate CKD (GFR = 30-44 mL/min/1 73 square meters)    Stage 4 Severe CKD (GFR = 15-29 mL/min/1 73 square meters)    Stage 5 End Stage CKD (GFR <15 mL/min/1 73 square meters)  Note: GFR calculation is accurate only with a steady state creatinine    CBC and differential [[id number]]  (Abnormal) Collected: 03/09/21 1951    Lab Status: Final result Specimen: Blood from Arm, Right Updated: 03/09/21 1957     WBC 3 92 Thousand/uL      RBC 5 33 Million/uL      Hemoglobin 14 5 g/dL      Hematocrit 46 0 %      MCV 86 fL      MCH 27 2 pg      MCHC 31 5 g/dL      RDW 15 6 %      MPV 11 1 fL      Platelets 499 Thousands/uL      nRBC 0 /100 WBCs      Neutrophils Relative 53 %      Immat GRANS % 0 %      Lymphocytes Relative 33 %      Monocytes Relative 11 %      Eosinophils Relative 2 %      Basophils Relative 1 %      Neutrophils Absolute 2 05 Thousands/µL      Immature Grans Absolute 0 01 Thousand/uL      Lymphocytes Absolute 1 30 Thousands/µL      Monocytes Absolute 0 43 Thousand/µL      Eosinophils Absolute 0 09 Thousand/µL      Basophils Absolute 0 04 Thousands/µL                  No orders to display              Procedures  ECG 12 Lead Documentation Only    Date/Time: 3/9/2021 7:54 PM  Performed by: Ronnald Holstein, DO  Authorized by: Ronnald Holstein, DO     Patient location:  ED  Rate:     ECG rate:  68  Rhythm:     Rhythm: sinus rhythm    Ectopy:     Ectopy: none               ED Course                                           MDM  Number of Diagnoses or Management Options  Essential hypertension: new and requires workup     Amount and/or Complexity of Data Reviewed  Clinical lab tests: ordered and reviewed  Tests in the medicine section of CPT®: ordered and reviewed    Patient Progress  Patient progress: stable      Disposition  Final diagnoses:   Essential hypertension     Time reflects when diagnosis was documented in both MDM as applicable and the Disposition within this note     Time User Action Codes Description Comment    3/9/2021  8:21 PM Ignacio Dykes Add [I10] Essential hypertension       ED Disposition     ED Disposition Condition Date/Time Comment    Discharge Stable Tue Mar 9, 2021  8:21 PM [address]orthy discharge to home/self care  Follow-up Information     Follow up With Specialties Details Why Contact Info    MEGHNA Bernal Nurse Practitioner, Family Medicine Schedule an appointment as soon as possible for a visit  Please call morning for follow-up appointment and blood pressure medication adjustment [address] [place] [address]   [phone]            Patient's Medications   Discharge Prescriptions    No medications on file     No discharge procedures on file      PDMP Review     None          ED Provider  Electronically Signed by           Franklin Bergman DO  03/09/21 2022

## 2021-03-10 NOTE — ASSESSMENT & PLAN NOTE
He previous had COVID infection with pneumonia  He was hospitalized for 2-3 days and was on oxygen  His last chest x-ray showed improvement except for some mild ground-glass infiltrates  Currently he denies any significant shortness of breath or cough or phlegm or wheeze or chest pain

## 2021-03-10 NOTE — TELEPHONE ENCOUNTER
Pt called and was in the ER last night for high BP  He says they brought it down,but it is still high today at 173/112      Currently taking:    metoprolol succinate (Toprol XL) 25 mg 24 hr tablet    losartan (COZAAR) 50 mg tablet    Please advise

## 2021-03-10 NOTE — TELEPHONE ENCOUNTER
Are fluctuating still   Needs to see Cardiology referral in place and follow up with us   Sent to [facility] to call patient

## 2021-03-11 ENCOUNTER — APPOINTMENT (OUTPATIENT)
Dept: NON INVASIVE DIAGNOSTICS | Facility: HOSPITAL | Age: 61
End: 2021-03-11
Payer: COMMERCIAL

## 2021-03-11 ENCOUNTER — APPOINTMENT (OUTPATIENT)
Dept: VASCULAR ULTRASOUND | Facility: HOSPITAL | Age: 61
End: 2021-03-11
Payer: COMMERCIAL

## 2021-03-11 ENCOUNTER — TRANSCRIBE ORDERS (OUTPATIENT)
Dept: SLEEP CENTER | Facility: CLINIC | Age: 61
End: 2021-03-11

## 2021-03-11 VITALS
OXYGEN SATURATION: 97 % | WEIGHT: 167.99 LBS | SYSTOLIC BLOOD PRESSURE: 125 MMHG | HEART RATE: 67 BPM | BODY MASS INDEX: 26.37 KG/M2 | RESPIRATION RATE: 18 BRPM | TEMPERATURE: 98.6 F | DIASTOLIC BLOOD PRESSURE: 80 MMHG | HEIGHT: 67 IN

## 2021-03-11 PROBLEM — I16.0 HYPERTENSIVE URGENCY: Status: RESOLVED | Noted: 2021-03-11 | Resolved: 2021-03-11

## 2021-03-11 PROBLEM — I16.0 HYPERTENSIVE URGENCY: Status: ACTIVE | Noted: 2021-03-11

## 2021-03-11 LAB
ANION GAP SERPL CALCULATED.3IONS-SCNC: 8 MMOL/L (ref 4–13)
ATRIAL RATE: 64 BPM
BUN SERPL-MCNC: 13 MG/DL (ref 5–25)
CALCIUM SERPL-MCNC: 9.2 MG/DL (ref 8.3–10.1)
CHLORIDE SERPL-SCNC: 105 MMOL/L (ref 100–108)
CO2 SERPL-SCNC: 26 MMOL/L (ref 21–32)
CREAT SERPL-MCNC: 1.06 MG/DL (ref 0.6–1.3)
ERYTHROCYTE [DISTWIDTH] IN BLOOD BY AUTOMATED COUNT: 15.9 % (ref 11.6–15.1)
GFR SERPL CREATININE-BSD FRML MDRD: 87 ML/MIN/1.73SQ M
GLUCOSE P FAST SERPL-MCNC: 112 MG/DL (ref 65–99)
GLUCOSE SERPL-MCNC: 112 MG/DL (ref 65–140)
HCT VFR BLD AUTO: 44.7 % (ref 36.5–49.3)
HGB BLD-MCNC: 13.9 G/DL (ref 12–17)
MCH RBC QN AUTO: 26.9 PG (ref 26.8–34.3)
MCHC RBC AUTO-ENTMCNC: 31.1 G/DL (ref 31.4–37.4)
MCV RBC AUTO: 87 FL (ref 82–98)
P AXIS: 37 DEGREES
PLATELET # BLD AUTO: 167 THOUSANDS/UL (ref 149–390)
PMV BLD AUTO: 11.1 FL (ref 8.9–12.7)
POTASSIUM SERPL-SCNC: 3.9 MMOL/L (ref 3.5–5.3)
PR INTERVAL: 152 MS
QRS AXIS: -8 DEGREES
QRSD INTERVAL: 74 MS
QT INTERVAL: 396 MS
QTC INTERVAL: 406 MS
RBC # BLD AUTO: 5.16 MILLION/UL (ref 3.88–5.62)
SODIUM SERPL-SCNC: 139 MMOL/L (ref 136–145)
T WAVE AXIS: 4 DEGREES
VENTRICULAR RATE: 63 BPM
WBC # BLD AUTO: 3.78 THOUSAND/UL (ref 4.31–10.16)

## 2021-03-11 PROCEDURE — 36415 COLL VENOUS BLD VENIPUNCTURE: CPT | Performed by: PHYSICIAN ASSISTANT

## 2021-03-11 PROCEDURE — 93306 TTE W/DOPPLER COMPLETE: CPT | Performed by: INTERNAL MEDICINE

## 2021-03-11 PROCEDURE — 93306 TTE W/DOPPLER COMPLETE: CPT

## 2021-03-11 PROCEDURE — 93975 VASCULAR STUDY: CPT | Performed by: RADIOLOGY

## 2021-03-11 PROCEDURE — 93975 VASCULAR STUDY: CPT

## 2021-03-11 PROCEDURE — 85027 COMPLETE CBC AUTOMATED: CPT | Performed by: PHYSICIAN ASSISTANT

## 2021-03-11 PROCEDURE — 93010 ELECTROCARDIOGRAM REPORT: CPT | Performed by: INTERNAL MEDICINE

## 2021-03-11 PROCEDURE — 99220 PR INITIAL OBSERVATION CARE/DAY 70 MINUTES: CPT | Performed by: FAMILY MEDICINE

## 2021-03-11 PROCEDURE — 80048 BASIC METABOLIC PNL TOTAL CA: CPT | Performed by: PHYSICIAN ASSISTANT

## 2021-03-11 RX ORDER — ASPIRIN 81 MG/1
81 TABLET, CHEWABLE ORAL DAILY
Status: DISCONTINUED | OUTPATIENT
Start: 2021-03-11 | End: 2021-03-11 | Stop reason: HOSPADM

## 2021-03-11 RX ORDER — CALCIUM CARBONATE 200(500)MG
1000 TABLET,CHEWABLE ORAL DAILY PRN
Status: DISCONTINUED | OUTPATIENT
Start: 2021-03-11 | End: 2021-03-11 | Stop reason: HOSPADM

## 2021-03-11 RX ORDER — AMLODIPINE BESYLATE 5 MG/1
5 TABLET ORAL DAILY
Qty: 30 TABLET | Refills: 0 | Status: CANCELLED | OUTPATIENT
Start: 2021-03-11

## 2021-03-11 RX ORDER — LOSARTAN POTASSIUM 50 MG/1
50 TABLET ORAL DAILY
Status: DISCONTINUED | OUTPATIENT
Start: 2021-03-11 | End: 2021-03-11 | Stop reason: HOSPADM

## 2021-03-11 RX ORDER — METOPROLOL SUCCINATE 25 MG/1
25 TABLET, EXTENDED RELEASE ORAL DAILY
Status: DISCONTINUED | OUTPATIENT
Start: 2021-03-11 | End: 2021-03-11 | Stop reason: HOSPADM

## 2021-03-11 RX ORDER — AMLODIPINE BESYLATE 5 MG/1
5 TABLET ORAL DAILY
Status: DISCONTINUED | OUTPATIENT
Start: 2021-03-11 | End: 2021-03-11 | Stop reason: HOSPADM

## 2021-03-11 RX ORDER — LABETALOL 20 MG/4 ML (5 MG/ML) INTRAVENOUS SYRINGE
10 EVERY 6 HOURS PRN
Status: DISCONTINUED | OUTPATIENT
Start: 2021-03-11 | End: 2021-03-11 | Stop reason: HOSPADM

## 2021-03-11 RX ORDER — AMLODIPINE BESYLATE 5 MG/1
5 TABLET ORAL DAILY
Qty: 30 TABLET | Refills: 0 | Status: SHIPPED | OUTPATIENT
Start: 2021-03-12 | End: 2021-04-09 | Stop reason: SDUPTHER

## 2021-03-11 RX ADMIN — AMLODIPINE BESYLATE 5 MG: 5 TABLET ORAL at 11:07

## 2021-03-11 RX ADMIN — METOPROLOL SUCCINATE 25 MG: 25 TABLET, EXTENDED RELEASE ORAL at 11:07

## 2021-03-11 RX ADMIN — ENOXAPARIN SODIUM 40 MG: 40 INJECTION SUBCUTANEOUS at 11:08

## 2021-03-11 RX ADMIN — ASPIRIN 81 MG: 81 TABLET, CHEWABLE ORAL at 11:06

## 2021-03-11 RX ADMIN — LOSARTAN POTASSIUM 50 MG: 50 TABLET, FILM COATED ORAL at 11:06

## 2021-03-11 RX ADMIN — IOHEXOL 85 ML: 350 INJECTION, SOLUTION INTRAVENOUS at 01:14

## 2021-03-11 NOTE — ED NOTES
Echo at the bedside and a new breakfast was ordered for pt due to it being cold when it got delivered        Kandis Martell RN  03/11/21 7513

## 2021-03-11 NOTE — ASSESSMENT & PLAN NOTE
· Noted on CT scan in the emergency department  · Patient follows with outpatient Hematology Oncology  · Has been stable since 2017    · Outpatient follow-up

## 2021-03-11 NOTE — ED NOTES
Attempted to complete FN protocol, pt states "I just had all this done yesterday, is that really necessary?" Pt requesting to wait to see provider prior to sending down lab work  Pt is alert and oriented, NAD noted  VSS, call bell within reach  Will continue to monitor        Ayaka Sanabria RN  03/10/21 7473

## 2021-03-11 NOTE — DISCHARGE SUMMARY
[facility]  Progress Note Kristin Lake 1960, 56 y o  male MRN: [id number]  Unit/Bed#: ED 06 Encounter: [id number]  Primary Care Provider: MEGHNA Morgan   Date and time admitted to hospital: 3/10/2021  9:31 PM     COVID-19 virus infection  Assessment & Plan  · Status post COVID infection in late January  · No precautions needed at this time     Cervical lymphadenopathy  Assessment & Plan  · Noted on CT scan in the emergency department  · Patient follows with outpatient Hematology Oncology  · Has been stable since 2017  · Outpatient follow-up     Gastroesophageal reflux disease without esophagitis  Assessment & Plan  · tums prn         * Hypertensive urgency  Assessment & Plan  · Pressure is initially elevated at greater than 639 systolic  Patient states that his pressures have been elevated for around 2 weeks  States that he doubled his dose of losartan, and metoprolol today  · No signs of hypertensive emergency  He denies any change in vision, headaches, chest pain, or shortness of breath  · Lab work unremarkable  · Renal artery ultrasound is unremarkable  Did add Norvasc the patient's regimen  Was thinking of increasing the beta-blocker however the patient's heart rate was in the 60s when I saw him  Continue Arb        Discharging Physician / Practitioner: Katelin Mckoy MD  PCP: Jemima Morgan [facility]  Admission Date:       Admission Orders (From admission, onward)              Ordered          03/11/21 0242   Place in Observation  Once                       Discharge Date: 03/11/21          Resolved Problems  Date Reviewed: 3/11/2021     None             Consultations During Hospital Stay:  · None     Procedures Performed:   · Renal artery ultrasound was otherwise unremarkable  · Echocardiogram:  Ejection fraction was 65%  CT scan:Right and left paraesophageal enlarged lymph nodes are seen measuring 2 1 cm on the right and 1 9 cm and the left    Right hilar adenopathy is noted  These are seen on the prior noncontrast CT chest from 2/17/2021  Dependent parenchymal changes are seen in the lung bases bilaterally  No pulmonary embolism  No effusion, airspace disease or pneumothorax  Recommend follow-up to resolution     Significant Findings / Test Results:   · None     Incidental Findings:   · See above      Test Results Pending at Discharge (will require follow up): · None     Outpatient Tests Requested:  · Repeat CT scan in to 12 months     Complications:  None     Reason for Admission:  high blood pressure     Hospital Course:      Zoraida Leventhal is a 56 y o  male patient who originally presented to the hospital on 3/10/2021 due to high blood pressure     History presenting illness:Rik Vega is a 56 y  o  male who presents with hypertensive urgency   Past medical history significant for COVID-19 infection in January, GERD, hypertension, mediastinal lymphadenopathy   Presents to the emergency department for elevated blood pressures for the past 2-3 weeks   Patient states that his blood pressures have been greater than 743 systolic   He states that he was having absolutely no symptoms including headache, visual changes, shortness of breath, chest pain   He does state that he was having a slight pain on the side of his head, however this has since gone away   CT scan of the head was negative   Patient states that he doubled up on his losartan, and metoprolol today with no affect as directed by his PCP within instructed him to come to the ED   [facility] course:  Patient was admitted for the above reasons  While he was here his pressures did remain high initially  I did give the patient Norvasc and will follow-up later and if it is okay the patient likely be discharged  The patient does have a little bit of shortness of breath which she states has been there since COVID    However given that he had COVID in hypertensive urgency I will check an echocardiogram  Patient had renal artery Dopplers which were otherwise unremarkable  Patient also had a CT scan of the chest which was stable as well  He has known lymphadenopathy which needs to be follow-up as an outpatient  Patient will likely be discharged later today depending on blood pressure control this afternoon  The echocardiogram is not done today this could be considered as an outpatient  I said if his blood pressure remains high at home taken extra losartan to make it 100 mg but continued taken the beta-blocker and the Norvasc  Patient was on Norvasc before but his pressures are better after I started them here  I told him his blood pressures will be ideal prior to being discharged but as long as they are stable this could be worked up as an outpatient  I told him to avoid doubling up on his beta-blocker since his heart rate was in the 60s here  Follow-up with his family doctor closely as an outpatient            Please see above list of diagnoses and related plan for additional information       Condition at Discharge: fair      Discharge Day Visit / Exam:      * Please refer to separate progress note for these details *           Discharge instructions/Information to patient and family:   See after visit summary for information provided to patient and family        Provisions for Follow-Up Care:  See after visit summary for information related to follow-up care and any pertinent home health orders        Disposition:      Home     For Discharges to [facility] SNF:   · Not Applicable to this Patient - Not Applicable to this Patient     Planned Readmission:  Not anticipated     Discharge Statement:  I spent 25 minutes discharging the patient  This time was spent on the day of discharge  I had direct contact with the patient on the day of discharge   Greater than 50% of the total time was spent examining patient, answering all patient questions, arranging and discussing plan of care with patient as well as directly providing post-discharge instructions    Additional time then spent on discharge activities      Discharge Medications:  See after visit summary for reconciled discharge medications provided to patient and family        ** Please Note: This note has been constructed using a voice recognition system **

## 2021-03-11 NOTE — TELEPHONE ENCOUNTER
Spoke to patient 2 hours later BP elevated in the [id number]-953V systolic  C/o headaches --> sent to the ER

## 2021-03-11 NOTE — ED PROVIDER NOTES
History  Chief Complaint   Patient presents with    Hypertension     Patient reports continuing issues with elevated BP - seen at this ED yesterday and had f/u with PCP today - doubled doses of BP meds as advised, but BP remains elevated  Patient reports left sided temporal pressure/tenderness and "feeling that something just isn't right "      Patient is a 56year old male with high blood pressure over past week and worsening tonight  (+) sob  No chest pain  No fever  No N/V  No trauma  No SAH in family  Was last seen in this ED on 3/9/21 for HTN  Patient wants to be admitted to the hospital since his BP meds at home are not helping  (+) left sided frontotemporal headache  No blurry vision at this time  Was diagnosed with COVID 19 on 21  History provided by:  Patient and spouse   used: No    Hypertension  Associated symptoms: headaches and shortness of breath    Associated symptoms: no chest pain, no fever, no nausea and not vomiting        Prior to Admission Medications   Prescriptions Last Dose Informant Patient Reported?  Taking?   aspirin 81 mg chewable tablet 3/10/2021 at Unknown time  Yes Yes   Sig: Chew 81 mg daily   clotrimazole (LOTRIMIN) 1 % cream Past Month at Unknown time  Yes Yes   Sig: Apply topically 2 (two) times a day as needed    diclofenac sodium (VOLTAREN) 1 % Past Month at Unknown time  No Yes   Sig: Apply 2 g topically 4 (four) times a day   Patient taking differently: Apply 2 g topically 4 (four) times a day as needed    fluticasone (FLONASE) 50 mcg/act nasal spray Past Month at Unknown time  No Yes   Si sprays into each nostril daily   hydrOXYzine HCL (ATARAX) 10 mg tablet   No Yes   Sig: Take 1 tablet (10 mg total) by mouth daily at bedtime   ketoconazole (NIZORAL) 2 % shampoo Past Month at Unknown time  Yes Yes   Sig: Apply topically once   losartan (COZAAR) 50 mg tablet 3/10/2021 at Unknown time  No Yes   Sig: Take 1 tablet (50 mg total) by mouth daily Patient taking differently: Take 100 mg by mouth daily    metoprolol succinate (Toprol XL) 25 mg 24 hr tablet 3/10/2021 at Unknown time  No Yes   Sig: Take 1 tablet (25 mg total) by mouth daily   Patient taking differently: Take 50 mg by mouth daily       Facility-Administered Medications: None       Past Medical History:   Diagnosis Date    Arthritis     Asthma     Back pain     COVID-19 1/21/2021 1/11/2021    Gastroesophageal reflux disease without esophagitis 7/16/2020    GERD (gastroesophageal reflux disease)     Gout of multiple sites 7/16/2020    Hiatal hernia     High blood pressure     Hypertension     Impaired fasting glucose 6/18/2020    Lipoma of neck     Mediastinal lymphadenopathy     Osteoporosis     Pneumonia due to COVID-19 virus 1/25/2021    Right hand pain 7/16/2020    Sleep disorder        Past Surgical History:   Procedure Laterality Date    BACK SURGERY      COLONOSCOPY  10/07/2016    5 years (per dominique)    COLONOSCOPY      EGD  03/31/2017    ELBOW SURGERY Left 04/09/2018    open arthrotomy, capsulectomy, loose body removal (per dominique)    HERNIA REPAIR  45/79/4315    umbilical with mesh (per dominique)    LUMBAR FUSION  07/11/2018    CO BRONCHOSCOPY NEEDLE BX TRACHEA MAIN STEM&/BRON N/A 4/28/2020    Procedure: ENDOBRONCHIAL ULTRASOUND (EBUS) WITH BIOPSY;  Surgeon: Maria De Jesus Ruby MD;  Location[facility];  Service: Thoracic    CO [address] N/A 4/28/2020    Procedure: Hussein Santos;  Surgeon: Maria De Jesus Ruby MD;  Location[facility];  Service: Thoracic    ROTATOR CUFF REPAIR Bilateral     TONSILLECTOMY  01/01/1980       Family History   Problem Relation Age of Onset    Hypertension Mother     Hypertension Father      I have reviewed and agree with the history as documented      E-Cigarette/Vaping    E-Cigarette Use Never User      E-Cigarette/Vaping Substances    Nicotine No     THC No     CBD No     Flavoring No     Other No  Unknown No      Social History     Tobacco Use    Smoking status: Former Smoker     Packs/day: 0 50     Years: 6 00     Pack years: 3 00     Types: Cigarettes     Quit date:      Years since quittin 2    Smokeless tobacco: Never Used   Substance Use Topics    Alcohol use: Yes     Alcohol/week: 7 0 standard drinks     Types: 7 Standard drinks or equivalent per week     Frequency: 4 or more times a week     Drinks per session: 1 or 2     Comment: one glass per night   Drug use: No       Review of Systems   Constitutional: Negative for fever  Eyes: Negative for visual disturbance  Respiratory: Positive for shortness of breath  Cardiovascular: Negative for chest pain  Gastrointestinal: Negative for nausea and vomiting  Neurological: Positive for headaches  All other systems reviewed and are negative  Physical Exam  Physical Exam  Vitals signs and nursing note reviewed  Constitutional:       General: He is in acute distress (moderate)  HENT:      Head: Normocephalic and atraumatic  Right Ear: Tympanic membrane, ear canal and external ear normal       Left Ear: Tympanic membrane, ear canal and external ear normal       Mouth/Throat:      Mouth: Mucous membranes are moist       Pharynx: No posterior oropharyngeal erythema  Eyes:      General: No scleral icterus  Extraocular Movements: Extraocular movements intact  Pupils: Pupils are equal, round, and reactive to light  Neck:      Musculoskeletal: Normal range of motion and neck supple  Cardiovascular:      Rate and Rhythm: Normal rate and regular rhythm  Heart sounds: Normal heart sounds  No murmur  Pulmonary:      Effort: Pulmonary effort is normal  No respiratory distress  Breath sounds: Normal breath sounds  No stridor  No wheezing, rhonchi or rales  Abdominal:      General: Bowel sounds are normal       Palpations: Abdomen is soft  Tenderness: There is no abdominal tenderness  Musculoskeletal:         General: No deformity  Right lower leg: No edema  Left lower leg: No edema  Skin:     General: Skin is warm and dry  Findings: No erythema or rash  Neurological:      General: No focal deficit present  Mental Status: He is alert and oriented to person, place, and time  Cranial Nerves: No cranial nerve deficit  Sensory: No sensory deficit  Motor: No weakness  Psychiatric:      Comments: Somewhat anxious            Vital Signs  ED Triage Vitals [03/10/21 2130]   Temperature Pulse Respirations Blood Pressure SpO2   99 1 °F (37 3 °C) 72 18 (!) 192/88 99 %      Temp Source Heart Rate Source Patient Position - Orthostatic VS BP Location FiO2 (%)   Oral Monitor Sitting Right arm --      Pain Score       3           Vitals:    03/10/21 2315 03/10/21 2345 03/11/21 0045 03/11/21 0214   BP: (!) 179/82 164/84 155/90 162/85   Pulse: 64 68 68 67   Patient Position - Orthostatic VS: Sitting Sitting Sitting Sitting         Visual Acuity  Visual Acuity      Most Recent Value   L Pupil Size (mm)  3   R Pupil Size (mm)  3          ED Medications  Medications   acetaminophen (TYLENOL) tablet 650 mg (0 mg Oral Hold 3/10/21 2250)   iohexol (OMNIPAQUE) 350 MG/ML injection (SINGLE-DOSE) 85 mL (85 mL Intravenous Given 3/11/21 0114)       Diagnostic Studies  Results Reviewed     Procedure Component Value Units Date/Time    Comprehensive metabolic panel [[id number]]  (Abnormal) Collected: 03/10/21 2152    Lab Status: Final result Specimen: Blood from Arm, Left Updated: 03/10/21 2330     Sodium 140 mmol/L      Potassium 4 0 mmol/L      Chloride 105 mmol/L      CO2 26 mmol/L      ANION GAP 9 mmol/L      BUN 14 mg/dL      Creatinine 1 10 mg/dL      Glucose 106 mg/dL      Calcium 9 5 mg/dL      AST 17 U/L      ALT 36 U/L      Alkaline Phosphatase 78 U/L      Total Protein 7 6 g/dL      Albumin 3 7 g/dL      Total Bilirubin 1 16 mg/dL      eGFR 83 ml/min/1 73sq m     Narrative: [facility] guidelines for Chronic Kidney Disease (CKD):     Stage 1 with normal or high GFR (GFR > 90 mL/min/1 73 square meters)    Stage 2 Mild CKD (GFR = 60-89 mL/min/1 73 square meters)    Stage 3A Moderate CKD (GFR = 45-59 mL/min/1 73 square meters)    Stage 3B Moderate CKD (GFR = 30-44 mL/min/1 73 square meters)    Stage 4 Severe CKD (GFR = 15-29 mL/min/1 73 square meters)    Stage 5 End Stage CKD (GFR <15 mL/min/1 73 square meters)  Note: GFR calculation is accurate only with a steady state creatinine    NT-BNP PRO [[id number]]  (Normal) Collected: 03/10/21 2152    Lab Status: Final result Specimen: Blood from Arm, Left Updated: 03/10/21 2330     NT-proBNP 73 pg/mL     Lactic acid [[id number]]  (Normal) Collected: 03/10/21 2248    Lab Status: Final result Specimen: Blood from Arm, Right Updated: 03/10/21 2327     LACTIC ACID 0 6 mmol/L     Narrative:      Result may be elevated if tourniquet was used during collection      D-Dimer [[id number]]  (Abnormal) Collected: 03/10/21 2152    Lab Status: Final result Specimen: Blood from Arm, Left Updated: 03/10/21 2325     D-Dimer, Quant 0 88 ug/ml FEU     Troponin I [[id number]]  (Normal) Collected: 03/10/21 2152    Lab Status: Final result Specimen: Blood from Arm, Left Updated: 03/10/21 2316     Troponin I <0 02 ng/mL     Sedimentation rate, automated [[id number]]  (Normal) Collected: 03/10/21 2152    Lab Status: Final result Specimen: Blood from Arm, Left Updated: 03/10/21 2310     Sed Rate 15 mm/hour     Protime-INR [[id number]]  (Normal) Collected: 03/10/21 2152    Lab Status: Final result Specimen: Blood from Arm, Left Updated: 03/10/21 2307     Protime 14 1 seconds      INR 1 08    APTT [[id number]]  (Normal) Collected: 03/10/21 2152    Lab Status: Final result Specimen: Blood from Arm, Left Updated: 03/10/21 2307     PTT 33 seconds     CBC and differential [[id number]]  (Abnormal) Collected: 03/10/21 3521    Lab Status: Final result Specimen: Blood from Arm, Left Updated: 03/10/21 2255     WBC 3 81 Thousand/uL      RBC 5 27 Million/uL      Hemoglobin 14 1 g/dL      Hematocrit 45 7 %      MCV 87 fL      MCH 26 8 pg      MCHC 30 9 g/dL      RDW 15 9 %      MPV 11 2 fL      Platelets 229 Thousands/uL      nRBC 0 /100 WBCs      Neutrophils Relative 47 %      Immat GRANS % 1 %      Lymphocytes Relative 35 %      Monocytes Relative 13 %      Eosinophils Relative 3 %      Basophils Relative 1 %      Neutrophils Absolute 1 77 Thousands/µL      Immature Grans Absolute 0 03 Thousand/uL      Lymphocytes Absolute 1 35 Thousands/µL      Monocytes Absolute 0 51 Thousand/µL      Eosinophils Absolute 0 11 Thousand/µL      Basophils Absolute 0 04 Thousands/µL     Blood culture #1 [[id number]] Collected: 03/10/21 2152    Lab Status: In process Specimen: Blood from Arm, Left Updated: 03/10/21 2254    Blood culture #2 [[id number]] Collected: 03/10/21 2248    Lab Status: In process Specimen: Blood from Arm, Right Updated: 03/10/21 2254                 CTA ED chest PE study   ED Interpretation by Mynor Sebastian MD (03/11 0204)   FINDINGS:  PULMONARY ARTERIAL TREE:  No pulmonary embolus is seen  LUNGS: Lingular linear atelectasis  Dependent parenchymal changes are seen in the lung bases bilaterally The lungs are otherwise clear  There is no tracheal or endobronchial lesion  PLEURA:  Unremarkable  HEART/GREAT VESSELS:  Unremarkable for patient's age  MEDIASTINUM AND KANDY:  Right and left paraesophageal enlarged lymph nodes are seen measuring 2 1 cm on the right and 1 9 cm and the left    Right hilar adenopathy is noted  CHEST WALL AND LOWER NECK:   Unremarkable  VISUALIZED STRUCTURES IN THE UPPER ABDOMEN:  Unremarkable  OSSEOUS STRUCTURES:  No acute fracture or destructive osseous lesion  IMPRESSION:  Right and left paraesophageal enlarged lymph nodes are seen measuring 2 1 cm on the right and 1 9 cm and the left    Right hilar adenopathy is noted   These are seen on the prior noncontrast CT chest from 2/17/2021  Dependent parenchymal changes are seen in the lung bases bilaterally  No pulmonary embolis   m  No effusion, airspace disease or pneumothorax  Recommend follow-up to resolution  Workstation performed: [id number]      Final Result by Cande Salcedo MD (03/11 0202)   Right and left paraesophageal enlarged lymph nodes are seen measuring 2 1 cm on the right and 1 9 cm and the left    Right hilar adenopathy is noted  These are seen on the prior noncontrast CT chest from 2/17/2021  Dependent parenchymal changes are seen in the lung bases bilaterally  No pulmonary embolism  No effusion, airspace disease or pneumothorax  Recommend follow-up to resolution  Workstation performed: [id number]      CT head without contrast   ED Interpretation by Oscar Long MD (03/10 2336)   FINDINGS:  PARENCHYMA:  Chronic lacunar infarct in the left caudate head  No evidence of acute intracranial hemorrhage or mass effect  VENTRICLES AND EXTRA-AXIAL SPACES:  No hydrocephalus or extra-axial collection  VISUALIZED ORBITS AND PARANASAL SINUSES:  Intact globes and orbits  Clear paranasal sinuses  CALVARIUM AND EXTRACRANIAL SOFT TISSUES:  No lytic or blastic lesion or fracture  IMPRESSION:  No acute intracranial abnormality  Workstation performed: [id number]      Final Result by Isabel Jennings MD (03/10 2335)   No acute intracranial abnormality  Workstation performed: [id number]      XR chest 1 view portable   ED Interpretation by Oscar Lnog MD (03/10 2308)   No acute disease read by me  Final Result by Cande Salcedo MD (03/11 0006)   No acute cardiopulmonary disease     Workstation performed: [id number]                 Procedures  ECG 12 Lead Documentation Only    Date/Time: 3/10/2021 10:45 PM  Performed by: Oscar Long MD  Authorized by: Oscar Long MD     Indications / Diagnosis:  Sob  ECG reviewed by me, the ED Provider: yes Patient location:  ED  Previous ECG:     Previous ECG:  Compared to current    Comparison ECG info:  3/9/21    Similarity:  No change  Rate:     ECG rate:  63    ECG rate assessment: normal    Rhythm:     Rhythm: sinus rhythm      Rhythm comment:  S  arrhythmia  Ectopy:     Ectopy: none    QRS:     QRS axis:  Normal    QRS intervals:  Normal  Conduction:     Conduction: normal    ST segments:     ST segments:  Normal  T waves:     T waves: normal    Comments:      Possible LAE             ED Course  ED Course as of Mar 11 0242   Wed Mar 10, 2021   2334 CT PE study ordered   D-Dimer, Quant(!): 0 88   Thu Mar 11, 2021   0003 Labs and CXR d/w patient and wife  0216 CT chest d/w patient including need for f/u for lymphadenopathy  HEART Risk Score      Most Recent Value   Heart Score Risk Calculator   History  1 Filed at: 03/10/2021 2331   ECG  0 Filed at: 03/10/2021 2331   Age  1 Filed at: 03/10/2021 2331   Risk Factors  1 Filed at: 03/10/2021 2331   Troponin  0 Filed at: 03/10/2021 2331   HEART Score  3 Filed at: 03/10/2021 2331              PERC Rule for PE      Most Recent Value   PERC Rule for PE   Age >=50  1 Filed at: 03/10/2021 2254   HR >=100  --   O2 Sat on room air < 95%  --   History of PE or DVT  --   Recent trauma or surgery  --   Hemoptysis  --   Exogenous estrogen  --   Unilateral leg swelling  --   PERC Rule for PE Results  1 Filed at: 03/10/2021 2254              SBIRT 22yo+      Most Recent Value   SBIRT (25 yo +)   In order to provide better care to our patients, we are screening all of our patients for alcohol and drug use  Would it be okay to ask you these screening questions?   Unable to answer at this time Filed at: 03/10/2021 2131          Germaine Mas' Criteria for PE      Most Recent Value   Julian' Criteria for PE   Clinical signs and symptoms of DVT  0 Filed at: 03/10/2021 2254   PE is primary diagnosis or equally likely  3 Filed at: 03/10/2021 2254   HR >100  0 Filed at: 03/10/2021 2254   Immobilization at least 3 days or Surgery in the previous 4 weeks  0 Filed at: 03/10/2021 2254   Previous, objectively diagnosed PE or DVT  0 Filed at: 03/10/2021 2254   Hemoptysis  0 Filed at: 03/10/2021 2254   Malignancy with treatment within 6 months or palliative  0 Filed at: 03/10/2021 2254   Wells' Criteria Total  3 Filed at: 03/10/2021 2254                MDM  Number of Diagnoses or Management Options  Diagnosis management comments: Differential diagnosis including but not limited to: hypertension, hypertensive urgency, hypertensive crisis, malignant hypertension, end organ damage, renal failure, metabolic abnormality, intracranial process, ACS, MI; doubt pheochromocytoma  DDX including but not limited to: pneumonia, pleural effusion, CHF, PE, PTX, asthma exacerbation, COPD exacerbation, anemia, metabolic abnormality, renal failure         Amount and/or Complexity of Data Reviewed  Clinical lab tests: ordered and reviewed  Tests in the radiology section of CPT®: ordered and reviewed  Decide to obtain previous medical records or to obtain history from someone other than the patient: yes  Obtain history from someone other than the patient: yes  Review and summarize past medical records: yes  Discuss the patient with other providers: yes  Independent visualization of images, tracings, or specimens: yes        Disposition  Final diagnoses:   Hypertension   Dyspnea   Headache   Mediastinal lymphadenopathy     Time reflects when diagnosis was documented in both MDM as applicable and the Disposition within this note     Time User Action Codes Description Comment    3/10/2021 11:26 PM Juan, [address] Hypertension     3/10/2021 11:26 PM Josias Short Add [R06 00] Dyspnea     3/10/2021 11:26 PM [place] Short Add [R51 9] Headache     3/11/2021  2:05 AM [place] Short Add [R59 0] Mediastinal lymphadenopathy       ED Disposition     ED Disposition Condition Date/Time Comment Admit Stable Thu Mar 11, 2021  2:42 AM Case was discussed with PAULA Farrell  and the patient's admission status was agreed to be Admission Status: observation status to the service of Dr Dalton Amos   Follow-up Information    None         Patient's Medications   Discharge Prescriptions    No medications on file     No discharge procedures on file      PDMP Review       Value Time User    PDMP Reviewed  Yes 3/10/2021 10:27 PM Shanel Kumari MD          ED Provider  Electronically Signed by           Shanel Kumari MD  03/11/21 4163

## 2021-03-11 NOTE — PLAN OF CARE
Problem: Potential for Falls  Goal: Patient will remain free of falls  Description: INTERVENTIONS:  - Assess patient frequently for physical needs  -  Identify cognitive and physical deficits and behaviors that affect risk of falls  -  [place] fall precautions as indicated by assessment   - Educate patient/family on patient safety including physical limitations  - Instruct patient to call for assistance with activity based on assessment  - Modify environment to reduce risk of injury  - Consider OT/PT consult to assist with strengthening/mobility  Outcome: Progressing     Problem: Nutrition/Hydration-ADULT  Goal: Nutrient/Hydration intake appropriate for improving, restoring or maintaining nutritional needs  Description: Monitor and assess patient's nutrition/hydration status for malnutrition  Collaborate with interdisciplinary team and initiate plan and interventions as ordered  Monitor patient's weight and dietary intake as ordered or per policy  Utilize nutrition screening tool and intervene as necessary  Determine patient's food preferences and provide high-protein, high-caloric foods as appropriate       INTERVENTIONS:  - Monitor oral intake, urinary output, labs, and treatment plans  - Assess nutrition and hydration status and recommend course of action  - Evaluate amount of meals eaten  - Assist patient with eating if necessary   - Allow adequate time for meals  - Recommend/ encourage appropriate diets, oral nutritional supplements, and vitamin/mineral supplements  - Order, calculate, and assess calorie counts as needed  - Recommend, monitor, and adjust tube feedings and TPN/PPN based on assessed needs  - Assess need for intravenous fluids  - Provide specific nutrition/hydration education as appropriate  - Include patient/family/caregiver in decisions related to nutrition  Outcome: Progressing

## 2021-03-11 NOTE — ASSESSMENT & PLAN NOTE
· Pressure is initially elevated at greater than 447 systolic  Patient states that his pressures have been elevated for around 2 weeks  States that he doubled his dose of losartan, and metoprolol today  · No signs of hypertensive emergency  He denies any change in vision, headaches, chest pain, or shortness of breath  · Lab work unremarkable  · Renal artery ultrasound is unremarkable  Did add Norvasc the patient's regimen  Was thinking of increasing the beta-blocker however the patient's heart rate was in the 60s when I saw him    Continue Arb

## 2021-03-11 NOTE — H&P
[address] [facility] 1960, 56 y o  male MRN: [id number]  Unit/Bed#: ED 06 Encounter: [id number]  Primary Care Provider: MEGHNA Clemons   Date and time admitted to hospital: 3/10/2021  9:31 PM    * Hypertensive urgency  Assessment & Plan  · Pressure is initially elevated at greater than 549 systolic  Patient states that his pressures have been elevated for around 2 weeks  States that he doubled his dose of losartan, and metoprolol today  · No signs of hypertensive emergency  He denies any change in vision, headaches, chest pain, or shortness of breath  · Lab work unremarkable  · Check renal artery u/s   · Prn labetolol for pressures greater then 355 systolic   · Monitor pressures   · Will likely need addition of new medication or up titration of existing medication    Cervical lymphadenopathy  Assessment & Plan  · Noted on CT scan in the emergency department  · Patient follows with outpatient Hematology Oncology  · Has been stable since 2017  · Outpatient follow-up    COVID-19 virus infection  Assessment & Plan  · Status post COVID infection in late January  · No precautions needed at this time    Gastroesophageal reflux disease without esophagitis  Assessment & Plan  · tums prn         VTE Prophylaxis: Enoxaparin (Lovenox)  / sequential compression device   Code Status: full  POLST: There is no POLST form on file for this patient (pre-hospital)  Discussion with family: patient     Anticipated Length of Stay:  Patient will be admitted on an Observation basis with an anticipated length of stay of  < 2 midnights  Justification for Hospital Stay: hypertensive urgency     Total Time for Visit, including Counseling / Coordination of Care: 1 hour  Greater than 50% of this total time spent on direct patient counseling and coordination of care      Chief Complaint:   High blood pressure    History of Present Illness:    Yessica Quiroz is a 56 y o  male who presents with hypertensive urgency  Past medical history significant for COVID-19 infection in January, GERD, hypertension, mediastinal lymphadenopathy  Presents to the emergency department for elevated blood pressures for the past 2-3 weeks  Patient states that his blood pressures have been greater than 862 systolic  He states that he was having absolutely no symptoms including headache, visual changes, shortness of breath, chest pain  He does state that he was having a slight pain on the side of his head, however this has since gone away  CT scan of the head was negative  Patient states that he doubled up on his losartan, and metoprolol today with no affect as directed by his PCP within instructed him to come to the ED  Review of Systems:    Review of Systems   Constitutional: Negative for chills, fatigue, fever and unexpected weight change  Eyes: Negative for visual disturbance  Respiratory: Negative for cough, chest tightness and shortness of breath  Cardiovascular: Negative for chest pain and palpitations  Gastrointestinal: Negative for abdominal pain, diarrhea, nausea and vomiting  Genitourinary: Negative for decreased urine volume, dysuria, frequency and urgency  Musculoskeletal: Negative for arthralgias  Neurological: Negative for dizziness, seizures, syncope, speech difficulty, weakness, light-headedness, numbness and headaches  All other systems reviewed and are negative        Past Medical and Surgical History:     Past Medical History:   Diagnosis Date    Arthritis     Asthma     Back pain     COVID-19 1/21/2021 1/11/2021    Gastroesophageal reflux disease without esophagitis 7/16/2020    GERD (gastroesophageal reflux disease)     Gout of multiple sites 7/16/2020    Hiatal hernia     High blood pressure     Hypertension     Impaired fasting glucose 6/18/2020    Lipoma of neck     Mediastinal lymphadenopathy     Osteoporosis     Pneumonia due to COVID-19 virus 1/25/2021  Right hand pain 7/16/2020    Sleep disorder        Past Surgical History:   Procedure Laterality Date    BACK SURGERY      COLONOSCOPY  10/07/2016    5 years (per dominique)    COLONOSCOPY      EGD  03/31/2017    ELBOW SURGERY Left 04/09/2018    open arthrotomy, capsulectomy, loose body removal (per dominique)    HERNIA REPAIR  58/05/6083    umbilical with mesh (per dominique)    LUMBAR FUSION  07/11/2018    NC BRONCHOSCOPY NEEDLE BX TRACHEA MAIN STEM&/BRON N/A 4/28/2020    Procedure: ENDOBRONCHIAL ULTRASOUND (EBUS) WITH BIOPSY;  Surgeon: Werner Ding MD;  [facility];  Service: Thoracic    NC [address] N/A 4/28/2020    Procedure: Armida Reveal;  Surgeon: Werner Ding MD;  [facility];  Service: Thoracic    ROTATOR CUFF REPAIR Bilateral     TONSILLECTOMY  01/01/1980       Meds/Allergies:    Prior to Admission medications    Medication Sig Start Date End Date Taking?  Authorizing Provider   aspirin 81 mg chewable tablet Chew 81 mg daily   Yes Historical Provider, MD   clotrimazole (LOTRIMIN) 1 % cream Apply topically 2 (two) times a day as needed    Yes Historical Provider, MD   diclofenac sodium (VOLTAREN) 1 % Apply 2 g topically 4 (four) times a day  Patient taking differently: Apply 2 g topically 4 (four) times a day as needed  7/21/20  Yes Lew Villalta PA-C   fluticasone [facility]) 50 mcg/act nasal spray 2 sprays into each nostril daily 2/22/21 3/24/21 Yes MEGHNA Go   hydrOXYzine HCL (ATARAX) 10 mg tablet Take 1 tablet (10 mg total) by mouth daily at bedtime 2/22/21 3/24/21 Yes MEGHNA Go   ketoconazole (NIZORAL) 2 % shampoo Apply topically once   Yes Historical Provider, MD   losartan (COZAAR) 50 mg tablet Take 1 tablet (50 mg total) by mouth daily  Patient taking differently: Take 100 mg by mouth daily  1/24/21  Yes Srinivas Corey MD   metoprolol succinate (Toprol XL) 25 mg 24 hr tablet Take 1 tablet (25 mg total) by mouth daily  Patient taking differently: Take 50 mg by mouth daily  2/23/21 3/25/21 Yes MEGHNA Montano   allopurinol (ZYLOPRIM) 100 mg tablet Take 1 tablet (100 mg total) by mouth daily 20  Ade Robins MD   amLODIPine (NORVASC) 10 mg tablet Take 1 tablet (10 mg total) by mouth daily 7/15/20 9/17/20  Ade Robins MD   losartan-hydrochlorothiazide (HYZAAR) 50-12 5 mg per tablet Take 1 tablet by mouth daily 20  Ade Robins MD   neomycin-polymyxin-hydrocortisone (CORTISPORIN) 0 35%-10,000 units/mL-1% otic suspension Administer 4 drops to the right ear 4 (four) times a day for 10 days 20  MEGHNA Montano   omeprazole (PriLOSEC) 40 MG capsule TAKE 1 CAPSULE(40 MG) BY MOUTH DAILY BEFORE BREAKFAST 20  Ade Robins MD     I have reviewed home medications with patient personally  Allergies: Allergies   Allergen Reactions    Nuts Shortness Of Breath    Peanut Oil Anaphylaxis     Anything using peanuts    Codeine Other (See Comments)     Dizzy, light headed  Body of balance      Latex Rash     Allergic to products, wears cotton underwear    Dust Mite Extract        Social History:     Marital Status: /Civil Union   Occupation: unknown   Patient Pre-hospital Living Situation: lives at home   Patient Pre-hospital Level of Mobility: ambulates   Patient Pre-hospital Diet Restrictions: cardiac   Substance Use History:   Social History     Substance and Sexual Activity   Alcohol Use Yes    Alcohol/week: 7 0 standard drinks    Types: 7 Standard drinks or equivalent per week    Frequency: 4 or more times a week    Drinks per session: 1 or 2    Comment: one glass per night       Social History     Tobacco Use   Smoking Status Former Smoker    Packs/day: 0 50    Years: 6 00    Pack years: 3 00    Types: Cigarettes    Quit date: 46    Years since quittin 2   Smokeless Tobacco Never Used     Social History     Substance and Sexual Activity Drug Use No       Family History:    Family History   Problem Relation Age of Onset    Hypertension Mother     Hypertension Father        Physical Exam:     Vitals:   Blood Pressure: 163/85 (03/11/21 0245)  Pulse: 76 (03/11/21 0245)  Temperature: 99 1 °F (37 3 °C) (03/10/21 2130)  Temp Source: Oral (03/10/21 2130)  Respirations: 16 (03/11/21 0245)  Height: 5' 7" (170 2 cm) (03/10/21 2130)  Weight - Scale: 76 2 kg (168 lb) (03/10/21 2130)  SpO2: 98 % (03/11/21 0245)    Physical Exam  Constitutional:       General: He is not in acute distress  HENT:      Head: Normocephalic and atraumatic  Mouth/Throat:      Mouth: Mucous membranes are moist       Pharynx: Oropharynx is clear  No oropharyngeal exudate  Eyes:      General:         Right eye: No discharge  Left eye: No discharge  Conjunctiva/sclera: Conjunctivae normal       Pupils: Pupils are equal, round, and reactive to light  Neck:      Musculoskeletal: Neck supple  No muscular tenderness  Cardiovascular:      Rate and Rhythm: Normal rate and regular rhythm  Pulses: Normal pulses  Heart sounds: Normal heart sounds  No murmur  Pulmonary:      Effort: Pulmonary effort is normal  No respiratory distress  Breath sounds: Normal breath sounds  No wheezing or rales  Abdominal:      General: Abdomen is flat  There is no distension  Palpations: Abdomen is soft  Tenderness: There is no abdominal tenderness  Musculoskeletal: Normal range of motion  Right lower leg: No edema  Left lower leg: No edema  Skin:     General: Skin is warm and dry  Capillary Refill: Capillary refill takes less than 2 seconds  Coloration: Skin is not jaundiced  Neurological:      General: No focal deficit present  Mental Status: He is alert and oriented to person, place, and time  Cranial Nerves: No cranial nerve deficit     Psychiatric:         Mood and Affect: Mood normal            Additional Data:     Lab Results: I have personally reviewed pertinent reports  Results from last 7 days   Lab Units 03/10/21  2152   WBC Thousand/uL 3 81*   HEMOGLOBIN g/dL 14 1   HEMATOCRIT % 45 7   PLATELETS Thousands/uL 173   NEUTROS PCT % 47   LYMPHS PCT % 35   MONOS PCT % 13*   EOS PCT % 3     Results from last 7 days   Lab Units 03/10/21  2152   SODIUM mmol/L 140   POTASSIUM mmol/L 4 0   CHLORIDE mmol/L 105   CO2 mmol/L 26   BUN mg/dL 14   CREATININE mg/dL 1 10   ANION GAP mmol/L 9   CALCIUM mg/dL 9 5   ALBUMIN g/dL 3 7   TOTAL BILIRUBIN mg/dL 1 16*   ALK PHOS U/L 78   ALT U/L 36   AST U/L 17   GLUCOSE RANDOM mg/dL 106     Results from last 7 days   Lab Units 03/10/21  2152   INR  1 08             Results from last 7 days   Lab Units 03/10/21  2248   LACTIC ACID mmol/L 0 6       Imaging: I have personally reviewed pertinent reports  CTA ED chest PE study   ED Interpretation by Qamar Rojsa MD (03/11 0204)   FINDINGS:  PULMONARY ARTERIAL TREE:  No pulmonary embolus is seen  LUNGS: Lingular linear atelectasis  Dependent parenchymal changes are seen in the lung bases bilaterally The lungs are otherwise clear  There is no tracheal or endobronchial lesion  PLEURA:  Unremarkable  HEART/GREAT VESSELS:  Unremarkable for patient's age  MEDIASTINUM AND KANDY:  Right and left paraesophageal enlarged lymph nodes are seen measuring 2 1 cm on the right and 1 9 cm and the left    Right hilar adenopathy is noted  CHEST WALL AND LOWER NECK:   Unremarkable  VISUALIZED STRUCTURES IN THE UPPER ABDOMEN:  Unremarkable  OSSEOUS STRUCTURES:  No acute fracture or destructive osseous lesion  IMPRESSION:  Right and left paraesophageal enlarged lymph nodes are seen measuring 2 1 cm on the right and 1 9 cm and the left    Right hilar adenopathy is noted  These are seen on the prior noncontrast CT chest from 2/17/2021  Dependent parenchymal changes are seen in the lung bases bilaterally  No pulmonary embolis   m   No effusion, airspace disease or pneumothorax  Recommend follow-up to resolution  Workstation performed: [id number]      Final Result by Saran Meek MD (03/11 0202)   Right and left paraesophageal enlarged lymph nodes are seen measuring 2 1 cm on the right and 1 9 cm and the left    Right hilar adenopathy is noted  These are seen on the prior noncontrast CT chest from 2/17/2021  Dependent parenchymal changes are seen in the lung bases bilaterally  No pulmonary embolism  No effusion, airspace disease or pneumothorax  Recommend follow-up to resolution  Workstation performed: [id number]      CT head without contrast   ED Interpretation by Jose Manuel Murphy MD (03/10 2336)   FINDINGS:  PARENCHYMA:  Chronic lacunar infarct in the left caudate head  No evidence of acute intracranial hemorrhage or mass effect  VENTRICLES AND EXTRA-AXIAL SPACES:  No hydrocephalus or extra-axial collection  VISUALIZED ORBITS AND PARANASAL SINUSES:  Intact globes and orbits  Clear paranasal sinuses  CALVARIUM AND EXTRACRANIAL SOFT TISSUES:  No lytic or blastic lesion or fracture  IMPRESSION:  No acute intracranial abnormality  Workstation performed: [id number]      Final Result by Bernabe Tillman MD (03/10 2335)   No acute intracranial abnormality  Workstation performed: [id number]      XR chest 1 view portable   ED Interpretation by Jose Manuel Murphy MD (03/10 2308)   No acute disease read by me  Final Result by Saran Meek MD (03/11 0006)   No acute cardiopulmonary disease  Workstation performed: [id number]          EKG, Pathology, and Other Studies Reviewed on Admission:   CXR: no acute disease on my read   Allscripts / Epic Records Reviewed: Yes     ** Please Note: This note has been constructed using a voice recognition system   **

## 2021-03-11 NOTE — UTILIZATION REVIEW
Initial Clinical Review    Admission: Date/Time/Statement:   Admission Orders (From admission, onward)     Ordered        03/11/21 0242  Place in Observation  Once                   Orders Placed This Encounter   Procedures    Place in Observation     Standing Status:   Standing     Number of Occurrences:   1     Order Specific Question:   Level of Care     Answer:   Med Surg [16]     ED Arrival Information     Expected Arrival Acuity Means of Arrival Escorted By Service Admission Type    - 3/10/2021 21:26 Urgent Walk-In Family Member General Medicine Urgent    Arrival Complaint    HYPERTENSION        Chief Complaint   Patient presents with    Hypertension     Patient reports continuing issues with elevated BP - seen at this ED yesterday and had f/u with PCP today - doubled doses of BP meds as advised, but BP remains elevated  Patient reports left sided temporal pressure/tenderness and "feeling that something just isn't right "      Assessment/Plan: 55 yo male with hx of COVID-19 infection in January, GERD, hypertension, mediastinal lymphadenopathy presents to EDc from home with elevated BP's x 2-3 KCCFH(>070 systolic), with slight pain on side of head that has gone away  Pt doubled up his Losartan and metoprolol 3/10 as directed by PCP with no effect and PC instructed pt to come to ED  On exam, heart and lung sounds normal, no visual changes  BP initially elevated SBP>190  CT head negative  Labs unremarkable  Pt admitted as OBS with hypertensive urgency  Plan includes renal artery US, monitor BP, prn Labetalol for SBP>180, add new med or uptitrate existing meds based on pressures    ED Triage Vitals [03/10/21 2130]   Temperature Pulse Respirations Blood Pressure SpO2   99 1 °F (37 3 °C) 72 18 (!) 192/88 99 %      Temp Source Heart Rate Source Patient Position - Orthostatic VS BP Location FiO2 (%)   Oral Monitor Sitting Right arm --      Pain Score       3          Wt Readings from Last 1 Encounters:   03/10/21 76 2 kg (168 lb)     Additional Vital Signs:   Date/Time  Temp  Pulse  Resp  BP  MAP (mmHg)  SpO2    03/11/21 0726  --  66  16  173/81Abnormal   --  99 %    03/11/21 0357  --  80  16  170/79  --  99 %    03/11/21 0245  --  76  16  163/85  118  98 %    03/11/21 0214  --  67  17  162/85  --  97 %    03/11/21 0045  --  68  16  155/90  117  98 %    03/10/21 2345  --  68  16  164/84  117  98 %    03/10/21 2315  --  64  16  179/82Abnormal   118  98 %    03/10/21 2245  --  64  --  176/84Abnormal   121  99 %    03/10/21 2200  --  62  16  158/71  --  96 %    03/10/21 2145  --  68  18  188/92Abnormal   132  99 %        Pertinent Labs/Diagnostic Test Results:   3/10 CXR  No acute cardiopulmonary disease  3/10 CT head  No acute intracranial abnormality  3/11 CTA PE study chest  Right and left paraesophageal enlarged lymph nodes are seen measuring 2 1 cm on the right and 1 9 cm and the left    Right hilar adenopathy is noted  These are seen on the prior noncontrast CT chest from 2/17/2021  Dependent parenchymal changes are seen in the lung bases bilaterally  No pulmonary embolism  No effusion, airspace disease or pneumothorax  Recommend follow-up to resolution    3/10 ECG  ECG rate:  63    ECG rate assessment: normal    Rhythm:     Rhythm: sinus rhythm      Rhythm comment:  S  arrhythmia  Ectopy:     Ectopy: none    QRS:     QRS axis:  Normal    QRS intervals:  Normal  Conduction:     Conduction: normal    ST segments:     ST segments:  Normal  T waves:     T waves: normal    Comments:      Possible LAE          Results from last 7 days   Lab Units 03/11/21  0406 03/10/21  2152 03/09/21  1951   WBC Thousand/uL 3 78* 3 81* 3 92*   HEMOGLOBIN g/dL 13 9 14 1 14 5   HEMATOCRIT % 44 7 45 7 46 0   PLATELETS Thousands/uL 167 173 173   NEUTROS ABS Thousands/µL  --  1 77* 2 05         Results from last 7 days   Lab Units 03/11/21  0406 03/10/21  2152 03/09/21  1951   SODIUM mmol/L 139 140 139   POTASSIUM mmol/L 3 9 4 0 3 8   CHLORIDE mmol/L 105 105 106   CO2 mmol/L 26 26 27   ANION GAP mmol/L 8 9 6   BUN mg/dL 13 14 12   CREATININE mg/dL 1 06 1 10 1 14   EGFR ml/min/1 73sq m 87 83 79   CALCIUM mg/dL 9 2 9 5 9 3     Results from last 7 days   Lab Units 03/10/21  2152   AST U/L 17   ALT U/L 36   ALK PHOS U/L 78   TOTAL PROTEIN g/dL 7 6   ALBUMIN g/dL 3 7   TOTAL BILIRUBIN mg/dL 1 16*         Results from last 7 days   Lab Units 03/11/21  0406 03/10/21  2152 03/09/21 1951   GLUCOSE RANDOM mg/dL 112 106 143*               Results from last 7 days   Lab Units 03/10/21  2152 03/09/21 1951   TROPONIN I ng/mL <0 02 <0 02     Results from last 7 days   Lab Units 03/10/21  2152   D-DIMER QUANTITATIVE ug/ml FEU 0 88*     Results from last 7 days   Lab Units 03/10/21  2152   PROTIME seconds 14 1   INR  1 08   PTT seconds 33             Results from last 7 days   Lab Units 03/10/21  2248   LACTIC ACID mmol/L 0 6             Results from last 7 days   Lab Units 03/10/21  2152   NT-PRO BNP pg/mL 73                 Results from last 7 days   Lab Units 03/10/21  2152   SED RATE mm/hour 15                   ED Treatment:   Medication Administration from 03/10/2021 2126 to 03/11/2021 0759       Date/Time Order Dose Route Action     03/11/2021 0114 iohexol (OMNIPAQUE) 350 MG/ML injection (SINGLE-DOSE) 85 mL 85 mL Intravenous Given        Past Medical History:   Diagnosis Date    Arthritis     Asthma     Back pain     COVID-19 1/21/2021 1/11/2021    Gastroesophageal reflux disease without esophagitis 7/16/2020    GERD (gastroesophageal reflux disease)     Gout of multiple sites 7/16/2020    Hiatal hernia     High blood pressure     Hypertension     Impaired fasting glucose 6/18/2020    Lipoma of neck     Mediastinal lymphadenopathy     Osteoporosis     Pneumonia due to COVID-19 virus 1/25/2021    Right hand pain 7/16/2020    Sleep disorder      Present on Admission:   COVID-19 virus infection   Cervical lymphadenopathy   Gastroesophageal reflux disease without esophagitis      Admitting Diagnosis: Hypertension [I10]  Age/Sex: 56 y o  male  Admission Orders:  Scheduled Medications:  acetaminophen, 650 mg, Oral, Once  amLODIPine, 5 mg, Oral, Daily  aspirin, 81 mg, Oral, Daily  enoxaparin, 40 mg, Subcutaneous, Daily  losartan, 50 mg, Oral, Daily  metoprolol succinate, 25 mg, Oral, Daily      Continuous IV Infusions:none     PRN Meds:  calcium carbonate, 1,000 mg, Oral, Daily PRN  Labetalol HCl, 10 mg, Intravenous, Q6H PRN    SCD's  Cardiac diet      Network Utilization Review Department  ATTENTION: Please call with any questions or concerns to [phone] and carefully listen to the prompts so that you are directed to the right person  All voicemails are confidential   Katelin Rangel all requests for admission clinical reviews, approved or denied determinations and any other requests to dedicated fax number below belonging to the campus where the patient is receiving treatment   List of dedicated fax numbers for the Facilities:  [address] DENIALS (Administrative/Medical Necessity) [phone]   [address] (Maternity/NICU/Pediatrics) [phone] [address] [place] [address] Dr Judith Herrera [phone] (Dann Salas "Elise" 103) [address] [place] [address] Kermit Ganesh [address] [place] [address] [facility] [phone]

## 2021-03-11 NOTE — ASSESSMENT & PLAN NOTE
· Pressure is initially elevated at greater than 111 systolic  Patient states that his pressures have been elevated for around 2 weeks  States that he doubled his dose of losartan, and metoprolol today  · No signs of hypertensive emergency    He denies any change in vision, headaches, chest pain, or shortness of breath  · Lab work unremarkable  · Check renal artery u/s   · Prn labetolol for pressures greater then 667 systolic   · Monitor pressures   · Will likely need addition of new medication or up titration of existing medication

## 2021-03-11 NOTE — PLAN OF CARE
Problem: Potential for Falls  Goal: Patient will remain free of falls  Description: INTERVENTIONS:  - Assess patient frequently for physical needs  -  Identify cognitive and physical deficits and behaviors that affect risk of falls  -  [place] fall precautions as indicated by assessment   - Educate patient/family on patient safety including physical limitations  - Instruct patient to call for assistance with activity based on assessment  - Modify environment to reduce risk of injury  - Consider OT/PT consult to assist with strengthening/mobility  Outcome: Adequate for Discharge     Problem: Nutrition/Hydration-ADULT  Goal: Nutrient/Hydration intake appropriate for improving, restoring or maintaining nutritional needs  Description: Monitor and assess patient's nutrition/hydration status for malnutrition  Collaborate with interdisciplinary team and initiate plan and interventions as ordered  Monitor patient's weight and dietary intake as ordered or per policy  Utilize nutrition screening tool and intervene as necessary  Determine patient's food preferences and provide high-protein, high-caloric foods as appropriate       INTERVENTIONS:  - Monitor oral intake, urinary output, labs, and treatment plans  - Assess nutrition and hydration status and recommend course of action  - Evaluate amount of meals eaten  - Assist patient with eating if necessary   - Allow adequate time for meals  - Recommend/ encourage appropriate diets, oral nutritional supplements, and vitamin/mineral supplements  - Order, calculate, and assess calorie counts as needed  - Recommend, monitor, and adjust tube feedings and TPN/PPN based on assessed needs  - Assess need for intravenous fluids  - Provide specific nutrition/hydration education as appropriate  - Include patient/family/caregiver in decisions related to nutrition  Outcome: Adequate for Discharge

## 2021-03-12 ENCOUNTER — TRANSITIONAL CARE MANAGEMENT (OUTPATIENT)
Dept: FAMILY MEDICINE CLINIC | Facility: CLINIC | Age: 61
End: 2021-03-12

## 2021-03-16 DIAGNOSIS — I10 ESSENTIAL HYPERTENSION: ICD-10-CM

## 2021-03-16 LAB
BACTERIA BLD CULT: NORMAL
BACTERIA BLD CULT: NORMAL

## 2021-03-18 DIAGNOSIS — I10 ESSENTIAL HYPERTENSION: ICD-10-CM

## 2021-03-19 RX ORDER — LOSARTAN POTASSIUM 50 MG/1
100 TABLET ORAL DAILY
Start: 2021-03-19

## 2021-03-19 RX ORDER — LOSARTAN POTASSIUM 100 MG/1
100 TABLET ORAL DAILY
Qty: 90 TABLET | Refills: 1 | Status: SHIPPED | OUTPATIENT
Start: 2021-03-19 | End: 2021-09-13

## 2021-03-19 RX ORDER — LOSARTAN POTASSIUM 50 MG/1
50 TABLET ORAL DAILY
Refills: 0
Start: 2021-03-19

## 2021-03-22 ENCOUNTER — TELEPHONE (OUTPATIENT)
Dept: FAMILY MEDICINE CLINIC | Facility: CLINIC | Age: 61
End: 2021-03-22

## 2021-03-22 NOTE — TELEPHONE ENCOUNTER
Spoke to him Friday and it was agreed to schedule follow up in 2 week please   Just move todays appt over

## 2021-03-30 ENCOUNTER — RA CDI HCC (OUTPATIENT)
Dept: OTHER | Facility: HOSPITAL | Age: 61
End: 2021-03-30

## 2021-03-31 ENCOUNTER — TELEPHONE (OUTPATIENT)
Dept: PULMONOLOGY | Facility: CLINIC | Age: 61
End: 2021-03-31

## 2021-03-31 NOTE — TELEPHONE ENCOUNTER
3/31/21 Pt said Dr Shirley Avendano [facility] so he can get a mask that isn't plastic (pt is allergic to latex)  [facility] has never called him to arrange for this fitting  [facility] had said they would send someone out there and would call first, but no one ever did  Please review and advise the pt    (Pt also said he will try calling them again )

## 2021-04-02 NOTE — TELEPHONE ENCOUNTER
Spoke to [facility] order was cancelled on there end because they reached out to the patient multiple times with no contact or return call  I asked if they can give him another call since he called us

## 2021-04-05 RX ORDER — IPRATROPIUM BROMIDE 42 UG/1
2 SPRAY, METERED NASAL 4 TIMES DAILY
COMMUNITY
Start: 2021-04-02 | End: 2022-03-11 | Stop reason: SDUPTHER

## 2021-04-07 ENCOUNTER — TELEPHONE (OUTPATIENT)
Dept: FAMILY MEDICINE CLINIC | Facility: CLINIC | Age: 61
End: 2021-04-07

## 2021-04-09 ENCOUNTER — OFFICE VISIT (OUTPATIENT)
Dept: FAMILY MEDICINE CLINIC | Facility: CLINIC | Age: 61
End: 2021-04-09
Payer: COMMERCIAL

## 2021-04-09 VITALS
DIASTOLIC BLOOD PRESSURE: 78 MMHG | HEIGHT: 67 IN | WEIGHT: 169.6 LBS | RESPIRATION RATE: 17 BRPM | SYSTOLIC BLOOD PRESSURE: 142 MMHG | TEMPERATURE: 97.3 F | OXYGEN SATURATION: 98 % | BODY MASS INDEX: 26.62 KG/M2 | HEART RATE: 94 BPM

## 2021-04-09 DIAGNOSIS — Z76.89 ENCOUNTER FOR SUPPORT AND COORDINATION OF TRANSITION OF CARE: ICD-10-CM

## 2021-04-09 DIAGNOSIS — I10 ESSENTIAL HYPERTENSION: Primary | ICD-10-CM

## 2021-04-09 DIAGNOSIS — I16.0 HYPERTENSIVE URGENCY: ICD-10-CM

## 2021-04-09 PROCEDURE — 99495 TRANSJ CARE MGMT MOD F2F 14D: CPT | Performed by: NURSE PRACTITIONER

## 2021-04-09 RX ORDER — AMLODIPINE BESYLATE 5 MG/1
5 TABLET ORAL DAILY
Qty: 90 TABLET | Refills: 0 | Status: SHIPPED | OUTPATIENT
Start: 2021-04-09 | End: 2021-04-09

## 2021-04-09 RX ORDER — AMLODIPINE BESYLATE 10 MG/1
10 TABLET ORAL DAILY
Qty: 90 TABLET | Refills: 0 | Status: SHIPPED | OUTPATIENT
Start: 2021-04-09 | End: 2021-08-02

## 2021-04-18 NOTE — PATIENT INSTRUCTIONS
Low-Sodium Diet   WHAT YOU NEED TO KNOW:   A low-sodium diet limits foods that are high in sodium (salt)  You will need to follow a low-sodium diet if you have high blood pressure, kidney disease, or heart failure  You may also need to follow this diet if you have a condition that is causing your body to retain (hold) extra fluid  You may need to limit the amount of sodium you eat in a day to 1,500 to 2,000 mg  Ask your healthcare provider how much sodium you can have each day  DISCHARGE INSTRUCTIONS:   How to use food labels to choose foods that are low in sodium:  Read food labels to find the amount of sodium they contain  The amount of sodium is listed in milligrams (mg)  The % Daily Value (DV) column tells you how much of your daily needs are met by 1 serving of the food for each nutrient listed  Choose foods that have less than 5% of the DV of sodium  These foods are considered low in sodium  Foods that have 20% or more of the DV of sodium are considered high in sodium  Some food labels may also list any of the following terms that tell you about the sodium content in the food:  · Sodium-free:  Less than 5 mg in each serving    · Very low sodium:  35 mg of sodium or less in each serving    · Low sodium:  140 mg of sodium or less in each serving    · Reduced sodium: At least 25% less sodium in each serving than the regular type    · Light in sodium:  50% less sodium in each serving    · Unsalted or no added salt:  No extra salt is added during processing (the food may still contain sodium)     Foods to avoid:  Salty foods are high in sodium  You should avoid the following:  · Processed foods:      ? Mixes for cornbread, biscuits, cake, and pudding     ? Instant foods, such as potatoes, cereals, noodles, and rice     ? Packaged foods, such as bread stuffing, rice and pasta mixes, snack dip mixes, and macaroni and cheese     ?  Canned foods, such as canned vegetables, soups, broths, sauces, and vegetable or tomato juice    ? Snack foods, such as salted chips, popcorn, pretzels, pork rinds, salted crackers, and salted nuts    ? Frozen foods, such as dinners, entrees, vegetables with sauces, and breaded meats    ? Sauerkraut, pickled vegetables, and other foods prepared in brine    · Meats and cheeses:      ? Smoked or cured meat, such as corned beef, jose, ham, hot dogs, and sausage    ? Canned meats or spreads, such as potted meats, sardines, anchovies, and imitation seafood    ? Deli or lunch meats, such as bologna, ham, turkey, and roast beef    ? Processed cheese, such as American cheese and cheese spreads    · Condiments, sauces, and seasonings:      ? Salt (¼ teaspoon of salt contains 575 mg of sodium)    ? Seasonings made with salt, such as garlic salt, celery salt, onion salt, and seasoned salt    ? Regular soy sauce, barbecue sauce, teriyaki sauce, steak sauce, Worcestershire sauce, and most flavored vinegars    ? Canned gravy and mixes     ? Regular condiments, such as mustard, ketchup, and salad dressings    ? Pickles and olives    ? Meat tenderizers and monosodium glutamate (MSG)    Foods to include:  Read the food label to find the amount of sodium in each serving  · Bread and cereal:  Try to choose breads with less than 80 mg of sodium per serving  ? Bread, roll, eva, tortilla, or unsalted crackers  ? Ready-to-eat cereals with less than 5% DV of sodium (examples include shredded wheat and puffed rice)    ? Pasta    · Vegetables and fruits:      ? Unsalted fresh, frozen, or canned vegetables    ? Fresh, frozen, or canned fruits    ? Fruit juice    · Dairy:  One serving has about 150 mg of sodium  ? Milk, all types    ? Yogurt    ? Hard cheese, such as cheddar, Swiss, [employer], or mozzarella    · Meat and other protein foods:  Some raw meats may have added sodium  ? Plain meats, fish, and poultry     ? Egg    · Other foods:      ? Homemade pudding    ?  Unsalted nuts, popcorn, or pretzels    ? Unsalted butter or margarine    Ways to decrease sodium:   · Add spices and herbs to foods instead of salt during cooking  Use salt-free seasonings to add flavor to foods  Examples include onion powder, garlic powder, basil, rosario powder, paprika, and parsley  Try lemon or lime juice or vinegar to give foods a tart flavor  Use hot peppers, pepper, or cayenne pepper to add a spicy flavor  · Do not keep a salt shaker at your kitchen table  This may help keep you from adding salt to food at the table  A teaspoon of salt has 2,300 mg of sodium  It may take time to get used to enjoying the natural flavor of food instead of adding salt  Talk to your healthcare provider before you use salt substitutes  Some salt substitutes have a high amount of potassium and need to be avoided if you have kidney disease  · Choose low-sodium foods at restaurants  Meals from restaurants are often high in sodium  Some restaurants have nutrition information on the menu that tells you the amount of sodium in their foods  If possible, ask for your food to be prepared with less, or no salt  · Shop for unsalted or low-sodium foods and snacks at the grocery store  Examples include unsalted or low-sodium broths, soups, and canned vegetables  Choose fresh or frozen vegetables instead  Choose unsalted nuts or seeds or fresh fruits or vegetables as snacks  Read food labels and choose salt-free, very low-sodium, or low-sodium foods  © Copyright [address] Information is for End User's use only and may not be sold, redistributed or otherwise used for commercial purposes  All illustrations and images included in CareNotes® are the copyrighted property of A D A M , Inc  or [facility] Marlon Sheehan   The above information is an  only  It is not intended as medical advice for individual conditions or treatments   Talk to your doctor, nurse or pharmacist before following any medical regimen to see if it is safe and effective for you

## 2021-04-19 ENCOUNTER — TELEPHONE (OUTPATIENT)
Dept: FAMILY MEDICINE CLINIC | Facility: CLINIC | Age: 61
End: 2021-04-19

## 2021-04-19 NOTE — TELEPHONE ENCOUNTER
----- Message from Beatriz Donovan, [address] sent at 4/17/2021 10:59 PM EDT -----  COLOGUARD IS NEGATIVE / NORMAL

## 2021-04-19 NOTE — TELEPHONE ENCOUNTER
Called and left message on patient voicemail to call office for Cologuard results     Dread Choudhury

## 2021-04-19 NOTE — TELEPHONE ENCOUNTER
Olimpia Ang   4/19/2021 11:04 AM EDT      Called and left message on patient voicemail to call office for cologuard results    Daisy Carmona

## 2021-05-18 ENCOUNTER — RA CDI HCC (OUTPATIENT)
Dept: OTHER | Facility: HOSPITAL | Age: 61
End: 2021-05-18

## 2021-05-18 NOTE — PROGRESS NOTES
[facility]  coding opportunities          Chart reviewed, no opportunity found: CHART REVIEWED, NO OPPORTUNITY FOUND              Patients insurance company: [employer] only)

## 2021-06-08 ENCOUNTER — RA CDI HCC (OUTPATIENT)
Dept: OTHER | Facility: HOSPITAL | Age: 61
End: 2021-06-08

## 2021-06-08 NOTE — PROGRESS NOTES
[facility] 75  coding opportunities          Chart reviewed, no opportunity found: CHART REVIEWED, NO OPPORTUNITY FOUND              Patients insurance company: [employer] only)

## 2021-07-07 ENCOUNTER — OFFICE VISIT (OUTPATIENT)
Dept: PULMONOLOGY | Facility: CLINIC | Age: 61
End: 2021-07-07
Payer: COMMERCIAL

## 2021-07-07 ENCOUNTER — TELEPHONE (OUTPATIENT)
Dept: PULMONOLOGY | Facility: CLINIC | Age: 61
End: 2021-07-07

## 2021-07-07 VITALS
OXYGEN SATURATION: 97 % | HEART RATE: 85 BPM | SYSTOLIC BLOOD PRESSURE: 158 MMHG | DIASTOLIC BLOOD PRESSURE: 100 MMHG | TEMPERATURE: 98.1 F | WEIGHT: 175.13 LBS | BODY MASS INDEX: 27.43 KG/M2

## 2021-07-07 DIAGNOSIS — Z99.89 OSA ON CPAP: Primary | ICD-10-CM

## 2021-07-07 DIAGNOSIS — G47.33 OSA ON CPAP: Primary | ICD-10-CM

## 2021-07-07 DIAGNOSIS — J12.82 PNEUMONIA DUE TO COVID-19 VIRUS: ICD-10-CM

## 2021-07-07 DIAGNOSIS — G47.09 OTHER INSOMNIA: ICD-10-CM

## 2021-07-07 DIAGNOSIS — R59.0 MEDIASTINAL LYMPHADENOPATHY: ICD-10-CM

## 2021-07-07 DIAGNOSIS — U07.1 PNEUMONIA DUE TO COVID-19 VIRUS: ICD-10-CM

## 2021-07-07 PROCEDURE — 99214 OFFICE O/P EST MOD 30 MIN: CPT | Performed by: INTERNAL MEDICINE

## 2021-07-07 NOTE — ASSESSMENT & PLAN NOTE
He previous had COVID infection with pneumonia  He was hospitalized for 2-3 days and was on oxygen  His last chest x-ray showed improvement except for some mild ground-glass infiltrates  Currently he denies any significant shortness of breath or cough or phlegm or wheeze or chest pain    He has not had COVID vaccination and I counseled him in this regard

## 2021-07-07 NOTE — PROGRESS NOTES
Assessment/Plan:    JADYN on CPAP  Mr Issa Calixto has obstructive sleep apnea    He had a repeat sleep study in view of his persistent insomnia on Feb 15 2019 and it showed moderate obstructive sleep apnea with oxygen desaturation and sleep fragmentation  During the same study it was determined that his sleep apnea can be treated with CPAP 7 cm of water  He was subsequently started on CPAP therapy and found it beneficial  he felt less tired and sleepy during the day  His sleep was also less interrupted  However he continues to have sleep maintenance insomnia  The snoring and reported apneic events had resolved  He has hypertension as comorbidity  CPAP compliance has been poor  I have highlighted to him the need for using CPAP regularly and the problems with untreated sleep apnea  He also has significant insomnia  I have instructed regarding sleep hygiene  I counseled him regarding alcohol use  I have asked him to contact his DME regarding regular supplies for his CPAP therapy  I had a long discussion with him and answered all his questions           Mediastinal lymphadenopathy  He had mediastinal lymphadenopathy diagnosed in 2017 and had underwent bronchoscopy and biopsy according to him  Claudean Backbone was put on prednisone for few months by his pulmonologist at that time  [facility] CT scan of the chest had shown  multiple lymphadenopathy including mediastinal and supraclavicular   He was found to have right posterior neck lymphadenopathy and underwent biopsy which did not show any evidence of malignancy   In April 2020 also he underwent EBUS biopsy for mediastinal lymphadenopathy which was FDG avid   The results from this biopsy were also not suggestive of any malignancy   He follows with Dr Jd Aquino now  Claudean Backbone denies any weight loss or anorexia or fever or chills         Other insomnia  He has history of insomnia for a long time  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   Currently he sleeps for about 4 hours from 2 AM to 6 AM every night  His sleep is still interrupted  Edinradha Muhammad He tried melatonin before  Edinradha Muhammad He is not using oxycodone regularly  currently he has mainly sleep maintenance insomnia  I have advised him regarding sleep hygiene  I have counseled him regarding alcohol use which could also be interfering significantly with his sleep  Pneumonia due to COVID-19 virus  He previous had COVID infection with pneumonia  He was hospitalized for 2-3 days and was on oxygen  His last chest x-ray showed improvement except for some mild ground-glass infiltrates  Currently he denies any significant shortness of breath or cough or phlegm or wheeze or chest pain  He has not had COVID vaccination and I counseled him in this regard        Hypertension  He has history of hypertension and has been on treatment with amlodipine and losartan  His blood pressure was significantly elevated on arrival to the office today  This subsequently improved later I have advised him to follow with his primary for adjustment of blood pressure medications  I also advised him regarding regular CPAP use and avoidance of alcohol  Diagnoses and all orders for this visit:    JADYN on CPAP    Mediastinal lymphadenopathy    Pneumonia due to COVID-19 virus    Other insomnia          Subjective:      Patient ID: Gabbi Gutierrez is a 56 y o  male  Mr Red Santos has obstructive sleep apnea and has been on CPAP therapy  He states that he is using the CPAP every night and is comfortable with the mask and pressure  However he has not been changing the mask regularly  He still has insomnia  He denies any headache or daytime sleepiness  I reviewed his CPAP compliance which is low  His residual AHI however is low  He understands the need for using the CPAP regularly and adequately  He has insomnia which she has been having for a long time and he attributes this to he is working night shifts  He also drinks alcohol    He does not want to take any sleeping pills  He has hypertension which has been difficult to control  He has had multiple ER visits  Currently he is on treatment with amlodipine and losartan  His blood pressure was found to be high on arrival to the office today  This subsequently improved  He previously had COVID pneumonia  He has not had vaccination  I counseled him  He had mediastinal lymphadenopathy since 2017 and had undergone investigations including a EBUS in April 2021  There was no evidence of malignancy  Currently he does not have any weight loss or anorexia or fever or chills  A total of 30 minutes of time was spent taking care of this patient in the office today  Majority of this time was spent discussing his various pulmonary problems as well as counseling him regarding CPAP use, insomnia and alcohol use  The following portions of the patient's history were reviewed and updated as appropriate: allergies, current medications, past family history, past medical history, past social history, past surgical history and problem list     Review of Systems   Constitutional: Negative for appetite change, chills, fatigue and fever  HENT: Negative for hearing loss, postnasal drip, rhinorrhea, sneezing, trouble swallowing and voice change  Eyes: Negative for visual disturbance  Respiratory: Negative for cough, chest tightness, shortness of breath and wheezing  Cardiovascular: Negative for chest pain, palpitations and leg swelling  Gastrointestinal: Negative for abdominal pain, constipation, diarrhea, nausea and vomiting  Endocrine: Negative for polyuria  Genitourinary: Negative for dysuria, frequency and urgency  Musculoskeletal: Negative for arthralgias, gait problem and neck pain  Skin: Negative for rash  Neurological: Negative for dizziness, seizures, syncope, light-headedness and headaches  Psychiatric/Behavioral: Negative for agitation and sleep disturbance  The patient is not nervous/anxious  Objective:      /100 (BP Location: Left arm, Patient Position: Sitting, Cuff Size: Adult)   Pulse 85   Temp 98 1 °F (36 7 °C) (Tympanic)   Wt 79 4 kg (175 lb 2 oz)   SpO2 97%   BMI 27 43 kg/m²          Physical Exam  Vitals reviewed  Constitutional:       General: He is not in acute distress  Appearance: He is not ill-appearing  HENT:      Head: Normocephalic  Mouth/Throat:      Mouth: Mucous membranes are moist    Eyes:      General: No scleral icterus  Conjunctiva/sclera: Conjunctivae normal    Cardiovascular:      Rate and Rhythm: Normal rate  Heart sounds: Normal heart sounds  No murmur heard  Pulmonary:      Effort: Pulmonary effort is normal  No respiratory distress  Breath sounds: Normal breath sounds  No stridor  No wheezing, rhonchi or rales  Chest:      Chest wall: No tenderness  Abdominal:      General: Bowel sounds are normal       Palpations: Abdomen is soft  Tenderness: There is no abdominal tenderness  There is no guarding  Musculoskeletal:      Cervical back: No rigidity  Right lower leg: No edema  Left lower leg: No edema  Lymphadenopathy:      Cervical: No cervical adenopathy  Skin:     General: Skin is warm  Coloration: Skin is not jaundiced or pale  Neurological:      Mental Status: He is alert and oriented to person, place, and time  Gait: Gait normal    Psychiatric:         Mood and Affect: Mood normal          Behavior: Behavior normal          Thought Content:  Thought content normal          Judgment: Judgment normal

## 2021-07-07 NOTE — ASSESSMENT & PLAN NOTE
Mr Aimee Chapman has obstructive sleep apnea    He had a repeat sleep study in view of his persistent insomnia on Feb 15 2019 and it showed moderate obstructive sleep apnea with oxygen desaturation and sleep fragmentation  During the same study it was determined that his sleep apnea can be treated with CPAP 7 cm of water  He was subsequently started on CPAP therapy and found it beneficial  he felt less tired and sleepy during the day  His sleep was also less interrupted  However he continues to have sleep maintenance insomnia  The snoring and reported apneic events had resolved  He has hypertension as comorbidity  CPAP compliance has been poor  I have highlighted to him the need for using CPAP regularly and the problems with untreated sleep apnea  He also has significant insomnia  I have instructed regarding sleep hygiene  I counseled him regarding alcohol use  I have asked him to contact his DME regarding regular supplies for his CPAP therapy   I had a long discussion with him and answered all his questions

## 2021-07-07 NOTE — ASSESSMENT & PLAN NOTE
He has history of hypertension and has been on treatment with amlodipine and losartan  His blood pressure was significantly elevated on arrival to the office today  This subsequently improved later I have advised him to follow with his primary for adjustment of blood pressure medications  I also advised him regarding regular CPAP use and avoidance of alcohol

## 2021-07-07 NOTE — TELEPHONE ENCOUNTER
Spoke to patient   States he has been taking the Losartan however feels that is not working   When seen by Cardiology was added  Amlodipine again as he was on it in the past but not taking it and was added Metoprolol --> however he is not taking them     He feels that the medications are not working for him and hates taking them but swears that he is taking the losartan  Patient wants naturalistic approach for BP management and seeking herbalist     I instructed him that I will reach out to his cardiologist to see if any treatment options would be an option    Did speak to Fidencio Archer 67 from his cardiologist at [facility] Dr Jamaal Prince - she will call patient and discuss further and let me know

## 2021-07-07 NOTE — TELEPHONE ENCOUNTER
Good afternoon Vahe Villalta was seen in the office today by Dr Cuca Mccullough and his BP was elevated @ 176/102, BP was taking again after Dr Cuca Mccullough seen him a came down a little to 158/100  Dr Cuca Mccullough wanted you to be aware  Pt  Has a follow up with you scheduled for 8/13  Thank you,   Reanna Francis

## 2021-07-07 NOTE — ASSESSMENT & PLAN NOTE
He had mediastinal lymphadenopathy diagnosed in 2017 and had underwent bronchoscopy and biopsy according to him  Jordin Narayan was put on prednisone for few months by his pulmonologist at that time  [facility] CT scan of the chest had shown  multiple lymphadenopathy including mediastinal and supraclavicular  Jordin Narayan was found to have right posterior neck lymphadenopathy and underwent biopsy which did not show any evidence of malignancy   In April 2020 also he underwent EBUS biopsy for mediastinal lymphadenopathy which was FDG avid   The results from this biopsy were also not suggestive of any malignancy   He follows with Dr Cristobal Lara now  Jordin Narayan denies any weight loss or anorexia or fever or chills

## 2021-07-07 NOTE — ASSESSMENT & PLAN NOTE
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

## 2021-08-01 ENCOUNTER — HOSPITAL ENCOUNTER (EMERGENCY)
Facility: HOSPITAL | Age: 61
Discharge: HOME | End: 2021-08-01
Attending: EMERGENCY MEDICINE | Admitting: EMERGENCY MEDICINE
Payer: COMMERCIAL

## 2021-08-01 ENCOUNTER — APPOINTMENT (EMERGENCY)
Dept: VASCULAR ULTRASOUND | Facility: HOSPITAL | Age: 61
End: 2021-08-01
Payer: COMMERCIAL

## 2021-08-01 VITALS
DIASTOLIC BLOOD PRESSURE: 89 MMHG | OXYGEN SATURATION: 98 % | RESPIRATION RATE: 16 BRPM | SYSTOLIC BLOOD PRESSURE: 173 MMHG | HEART RATE: 89 BPM | TEMPERATURE: 98.3 F

## 2021-08-01 DIAGNOSIS — L03.115 CELLULITIS OF RIGHT LOWER LEG: Primary | ICD-10-CM

## 2021-08-01 DIAGNOSIS — M62.82 RHABDOMYOLYSIS: ICD-10-CM

## 2021-08-01 LAB
ALBUMIN SERPL BCP-MCNC: 3.9 G/DL (ref 3.5–5)
ALP SERPL-CCNC: 94 U/L (ref 46–116)
ALT SERPL W P-5'-P-CCNC: 42 U/L (ref 12–78)
ANION GAP SERPL CALCULATED.3IONS-SCNC: 8 MMOL/L (ref 4–13)
APTT PPP: 31 SECONDS (ref 23–37)
AST SERPL W P-5'-P-CCNC: 27 U/L (ref 5–45)
BASOPHILS # BLD AUTO: 0.03 THOUSANDS/ΜL (ref 0–0.1)
BASOPHILS NFR BLD AUTO: 1 % (ref 0–1)
BILIRUB SERPL-MCNC: 1.22 MG/DL (ref 0.2–1)
BUN SERPL-MCNC: 19 MG/DL (ref 5–25)
CALCIUM SERPL-MCNC: 9.2 MG/DL (ref 8.3–10.1)
CHLORIDE SERPL-SCNC: 106 MMOL/L (ref 100–108)
CK MB SERPL-MCNC: 2.5 NG/ML (ref 0–5)
CK MB SERPL-MCNC: <1 % (ref 0–2.5)
CK SERPL-CCNC: 822 U/L (ref 39–308)
CO2 SERPL-SCNC: 25 MMOL/L (ref 21–32)
CREAT SERPL-MCNC: 1.23 MG/DL (ref 0.6–1.3)
EOSINOPHIL # BLD AUTO: 0.04 THOUSAND/ΜL (ref 0–0.61)
EOSINOPHIL NFR BLD AUTO: 1 % (ref 0–6)
ERYTHROCYTE [DISTWIDTH] IN BLOOD BY AUTOMATED COUNT: 14.4 % (ref 11.6–15.1)
GFR SERPL CREATININE-BSD FRML MDRD: 72 ML/MIN/1.73SQ M
GLUCOSE SERPL-MCNC: 96 MG/DL (ref 65–140)
HCT VFR BLD AUTO: 50.2 % (ref 36.5–49.3)
HGB BLD-MCNC: 16 G/DL (ref 12–17)
IMM GRANULOCYTES # BLD AUTO: 0.02 THOUSAND/UL (ref 0–0.2)
IMM GRANULOCYTES NFR BLD AUTO: 1 % (ref 0–2)
INR PPP: 1.09 (ref 0.84–1.19)
LYMPHOCYTES # BLD AUTO: 1.1 THOUSANDS/ΜL (ref 0.6–4.47)
LYMPHOCYTES NFR BLD AUTO: 34 % (ref 14–44)
MAGNESIUM SERPL-MCNC: 2.2 MG/DL (ref 1.6–2.6)
MCH RBC QN AUTO: 27.4 PG (ref 26.8–34.3)
MCHC RBC AUTO-ENTMCNC: 31.9 G/DL (ref 31.4–37.4)
MCV RBC AUTO: 86 FL (ref 82–98)
MONOCYTES # BLD AUTO: 0.44 THOUSAND/ΜL (ref 0.17–1.22)
MONOCYTES NFR BLD AUTO: 14 % (ref 4–12)
NEUTROPHILS # BLD AUTO: 1.59 THOUSANDS/ΜL (ref 1.85–7.62)
NEUTS SEG NFR BLD AUTO: 49 % (ref 43–75)
NRBC BLD AUTO-RTO: 0 /100 WBCS
PLATELET # BLD AUTO: 173 THOUSANDS/UL (ref 149–390)
PMV BLD AUTO: 10.9 FL (ref 8.9–12.7)
POTASSIUM SERPL-SCNC: 4 MMOL/L (ref 3.5–5.3)
PROT SERPL-MCNC: 8.3 G/DL (ref 6.4–8.2)
PROTHROMBIN TIME: 14.2 SECONDS (ref 11.6–14.5)
RBC # BLD AUTO: 5.84 MILLION/UL (ref 3.88–5.62)
SODIUM SERPL-SCNC: 139 MMOL/L (ref 136–145)
TROPONIN I SERPL-MCNC: <0.02 NG/ML
WBC # BLD AUTO: 3.22 THOUSAND/UL (ref 4.31–10.16)

## 2021-08-01 PROCEDURE — 36415 COLL VENOUS BLD VENIPUNCTURE: CPT | Performed by: EMERGENCY MEDICINE

## 2021-08-01 PROCEDURE — 85025 COMPLETE CBC W/AUTO DIFF WBC: CPT | Performed by: EMERGENCY MEDICINE

## 2021-08-01 PROCEDURE — 80053 COMPREHEN METABOLIC PANEL: CPT | Performed by: EMERGENCY MEDICINE

## 2021-08-01 PROCEDURE — 85730 THROMBOPLASTIN TIME PARTIAL: CPT | Performed by: EMERGENCY MEDICINE

## 2021-08-01 PROCEDURE — 84484 ASSAY OF TROPONIN QUANT: CPT | Performed by: EMERGENCY MEDICINE

## 2021-08-01 PROCEDURE — 99285 EMERGENCY DEPT VISIT HI MDM: CPT | Performed by: EMERGENCY MEDICINE

## 2021-08-01 PROCEDURE — 99284 EMERGENCY DEPT VISIT MOD MDM: CPT

## 2021-08-01 PROCEDURE — 82553 CREATINE MB FRACTION: CPT | Performed by: EMERGENCY MEDICINE

## 2021-08-01 PROCEDURE — 85610 PROTHROMBIN TIME: CPT | Performed by: EMERGENCY MEDICINE

## 2021-08-01 PROCEDURE — 82550 ASSAY OF CK (CPK): CPT | Performed by: EMERGENCY MEDICINE

## 2021-08-01 PROCEDURE — 83735 ASSAY OF MAGNESIUM: CPT | Performed by: EMERGENCY MEDICINE

## 2021-08-01 PROCEDURE — 93971 EXTREMITY STUDY: CPT

## 2021-08-01 PROCEDURE — 96360 HYDRATION IV INFUSION INIT: CPT

## 2021-08-01 PROCEDURE — 93005 ELECTROCARDIOGRAM TRACING: CPT

## 2021-08-01 RX ORDER — ACETAMINOPHEN 325 MG/1
650 TABLET ORAL ONCE
Status: COMPLETED | OUTPATIENT
Start: 2021-08-01 | End: 2021-08-01

## 2021-08-01 RX ORDER — CEPHALEXIN 500 MG/1
500 CAPSULE ORAL EVERY 6 HOURS SCHEDULED
Qty: 28 CAPSULE | Refills: 0 | Status: SHIPPED | OUTPATIENT
Start: 2021-08-01 | End: 2021-08-08

## 2021-08-01 RX ORDER — CEPHALEXIN 250 MG/1
500 CAPSULE ORAL ONCE
Status: COMPLETED | OUTPATIENT
Start: 2021-08-01 | End: 2021-08-01

## 2021-08-01 RX ADMIN — ACETAMINOPHEN 650 MG: 325 TABLET, FILM COATED ORAL at 16:10

## 2021-08-01 RX ADMIN — CEPHALEXIN 500 MG: 250 CAPSULE ORAL at 18:35

## 2021-08-01 RX ADMIN — SODIUM CHLORIDE 1000 ML: 0.9 INJECTION, SOLUTION INTRAVENOUS at 17:51

## 2021-08-01 NOTE — ED PROVIDER NOTES
History  Chief Complaint   Patient presents with    Leg Pain     Bilateral leg pain  Patient states he googled it, and think it's a blood clot  denies CP, SOB, N/V/D, or hx of blood clot  67-year-old male presents to the emergency department for evaluation with severe cramps in my calves    He additionally gestures to the right lower leg and relates that he has had redness above my ankle   He tells me that the area is swollen   Onset of cramps 3 to 4 days ago without identified provocation  He denies increased heat exposure, change in activity recalled injury  He reports staying hydrated while outdoors  He noticed the discoloration and swelling in the right calf this morning upon waking  He appreciates the area is warm to the touch  He has never anything similar  He also gestures to a small spot anterior lower leg and relates is red as well  He tells me that aside the leg discomfort he feels okay although he is quite nervous that his blood pressure is extremely elevated  He relates that this is usually well controlled with his medications  He shares that he did have COVID infection in January in as a result is concerned about possible blood clot in the legs  No known personal or family history of coagulopathy          Prior to Admission Medications   Prescriptions Last Dose Informant Patient Reported? Taking?    amLODIPine (NORVASC) 10 mg tablet   No No   Sig: Take 1 tablet (10 mg total) by mouth daily   aspirin 81 mg chewable tablet   Yes No   Sig: Chew 81 mg daily   clotrimazole (LOTRIMIN) 1 % cream   Yes No   Sig: Apply topically 2 (two) times a day as needed    fluticasone (FLONASE) 50 mcg/act nasal spray   No No   Si sprays into each nostril daily   ipratropium (ATROVENT) 0 06 % nasal spray   Yes No   Si sprays into each nostril 4 (four) times a day   losartan (COZAAR) 100 MG tablet   No No   Sig: Take 1 tablet (100 mg total) by mouth daily      Facility-Administered Medications: None Past Medical History:   Diagnosis Date    Arthritis     Asthma     Back pain     COVID-19 2021    Gastroesophageal reflux disease without esophagitis 2020    GERD (gastroesophageal reflux disease)     Gout of multiple sites 2020    Hiatal hernia     High blood pressure     Hypertension     Impaired fasting glucose 2020    Lipoma of neck     Mediastinal lymphadenopathy     Osteoporosis     Pneumonia due to COVID-19 virus 2021    Right hand pain 2020    Sleep disorder        Past Surgical History:   Procedure Laterality Date    BACK SURGERY      COLONOSCOPY  10/07/2016    5 years (per dominique)    COLONOSCOPY      EGD  2017    ELBOW SURGERY Left 2018    open arthrotomy, capsulectomy, loose body removal (per dominique)    HERNIA REPAIR      umbilical with mesh (per dominique)    LUMBAR FUSION  2018    [place] BRONCHOSCOPY NEEDLE BX TRACHEA MAIN STEM&/BRON N/A 2020    Procedure: ENDOBRONCHIAL ULTRASOUND (EBUS) WITH BIOPSY;  Surgeon: Kavon Long MD;  Location: [facility];  Service: Thoracic    [address] [address] N/A 2020    Procedure: Diego Recinos;  Surgeon: Kavon Long MD;  Location: [facility];  Service: Thoracic    ROTATOR CUFF REPAIR Bilateral     TONSILLECTOMY  1980       Family History   Problem Relation Age of Onset    Hypertension Mother     Hypertension Father      I have reviewed and agree with the history as documented      E-Cigarette/Vaping    E-Cigarette Use Never User      E-Cigarette/Vaping Substances    Nicotine No     THC No     CBD No     Flavoring No     Other No     Unknown No      Social History     Tobacco Use    Smoking status: Former Smoker     Packs/day: 0 50     Years: 6 00     Pack years: 3 00     Types: Cigarettes     Quit date:      Years since quittin 6    Smokeless tobacco: Never Used   Vaping Use    Vaping Use: Never used Substance Use Topics    Alcohol use: Yes     Alcohol/week: 7 0 standard drinks     Types: 7 Standard drinks or equivalent per week     Comment: one glass per night   Drug use: No       Review of Systems   All other systems reviewed and are negative  Physical Exam  Physical Exam  Vitals and nursing note reviewed  Constitutional:       Appearance: Normal appearance  HENT:      Head: Normocephalic  Mouth/Throat:      Mouth: Mucous membranes are moist    Eyes:      Extraocular Movements: Extraocular movements intact  Conjunctiva/sclera: Conjunctivae normal    Cardiovascular:      Rate and Rhythm: Normal rate and regular rhythm  Pulmonary:      Effort: Pulmonary effort is normal       Breath sounds: Normal breath sounds  Abdominal:      General: Bowel sounds are normal       Palpations: Abdomen is soft  Musculoskeletal:         General: Swelling (mild distal R calf) and tenderness (bilateral calves, R much greater than left) present  Cervical back: Normal range of motion  Comments: Erythema & warmth just above ankle/ sockline w/ tenderness anteriorly   Skin:     General: Skin is warm and dry  Findings: No rash  Neurological:      General: No focal deficit present  Mental Status: He is alert and oriented to person, place, and time     Psychiatric:         Mood and Affect: Mood normal          Vital Signs  ED Triage Vitals   Temperature Pulse Respirations Blood Pressure SpO2   08/01/21 1838 08/01/21 1552 08/01/21 1552 08/01/21 1552 08/01/21 1552   98 3 °F (36 8 °C) 89 16 (!) 173/89 98 %      Temp Source Heart Rate Source Patient Position - Orthostatic VS BP Location FiO2 (%)   08/01/21 1838 08/01/21 1552 08/01/21 1552 08/01/21 1552 --   Oral Monitor Sitting Right arm       Pain Score       --                  Vitals:    08/01/21 1552   BP: (!) 173/89   Pulse: 89   Patient Position - Orthostatic VS: Sitting         Visual Acuity  Visual Acuity      Most Recent Value L Pupil Size (mm)  2   R Pupil Size (mm)  2          ED Medications  Medications   acetaminophen (TYLENOL) tablet 650 mg (650 mg Oral Given 8/1/21 1610)   sodium chloride 0 9 % bolus 1,000 mL (0 mL Intravenous Stopped 8/1/21 1905)   cephalexin (KEFLEX) capsule 500 mg (500 mg Oral Given 8/1/21 1835)       Diagnostic Studies  Results Reviewed     Procedure Component Value Units Date/Time    CKMB [[id number]]  (Normal) Collected: 08/01/21 1607    Lab Status: Final result Specimen: Blood from Arm, Right Updated: 08/01/21 1821     CK-MB Index <1 0 %      CK-MB 2 5 ng/mL     Protime-INR [[id number]]  (Normal) Collected: 08/01/21 1607    Lab Status: Final result Specimen: Blood from Arm, Right Updated: 08/01/21 1755     Protime 14 2 seconds      INR 1 09    APTT [[id number]]  (Normal) Collected: 08/01/21 1607    Lab Status: Final result Specimen: Blood from Arm, Right Updated: 08/01/21 1755     PTT 31 seconds     Magnesium [[id number]]  (Normal) Collected: 08/01/21 1607    Lab Status: Final result Specimen: Blood from Arm, Right Updated: 08/01/21 1709     Magnesium 2 2 mg/dL     CK (with reflex to MB) [[id number]]  (Abnormal) Collected: 08/01/21 1607    Lab Status: Final result Specimen: Blood from Arm, Right Updated: 08/01/21 1709     Total  U/L     Troponin I [[id number]]  (Normal) Collected: 08/01/21 1607    Lab Status: Final result Specimen: Blood from Arm, Right Updated: 08/01/21 1652     Troponin I <0 02 ng/mL     Comprehensive metabolic panel [[id number]]  (Abnormal) Collected: 08/01/21 1607    Lab Status: Final result Specimen: Blood from Arm, Right Updated: 08/01/21 1650     Sodium 139 mmol/L      Potassium 4 0 mmol/L      Chloride 106 mmol/L      CO2 25 mmol/L      ANION GAP 8 mmol/L      BUN 19 mg/dL      Creatinine 1 23 mg/dL      Glucose 96 mg/dL      Calcium 9 2 mg/dL      AST 27 U/L      ALT 42 U/L      Alkaline Phosphatase 94 U/L      Total Protein 8 3 g/dL      Albumin 3 9 g/dL      Total Bilirubin 1 22 mg/dL      eGFR 72 ml/min/1 73sq m     Narrative:      National Kid[employer] Disease Foundation guidelines for Chronic Kidney Disease (CKD):     Stage 1 with normal or high GFR (GFR > 90 mL/min/1 73 square meters)    Stage 2 Mild CKD (GFR = 60-89 mL/min/1 73 square meters)    Stage 3A Moderate CKD (GFR = 45-59 mL/min/1 73 square meters)    Stage 3B Moderate CKD (GFR = 30-44 mL/min/1 73 square meters)    Stage 4 Severe CKD (GFR = 15-29 mL/min/1 73 square meters)    Stage 5 End Stage CKD (GFR <15 mL/min/1 73 square meters)  Note: GFR calculation is accurate only with a steady state creatinine    CBC and differential [[id number]]  (Abnormal) Collected: 08/01/21 1607    Lab Status: Final result Specimen: Blood from Arm, Right Updated: 08/01/21 1636     WBC 3 22 Thousand/uL      RBC 5 84 Million/uL      Hemoglobin 16 0 g/dL      Hematocrit 50 2 %      MCV 86 fL      MCH 27 4 pg      MCHC 31 9 g/dL      RDW 14 4 %      MPV 10 9 fL      Platelets 959 Thousands/uL      nRBC 0 /100 WBCs      Neutrophils Relative 49 %      Immat GRANS % 1 %      Lymphocytes Relative 34 %      Monocytes Relative 14 %      Eosinophils Relative 1 %      Basophils Relative 1 %      Neutrophils Absolute 1 59 Thousands/µL      Immature Grans Absolute 0 02 Thousand/uL      Lymphocytes Absolute 1 10 Thousands/µL      Monocytes Absolute 0 44 Thousand/µL      Eosinophils Absolute 0 04 Thousand/µL      Basophils Absolute 0 03 Thousands/µL                  VAS lower limb venous duplex study, unilateral/limited    (Results Pending)              Procedures  ECG 12 Lead Documentation Only    Date/Time: 8/1/2021 4:35 PM  Performed by: Nayely Lopez MD  Authorized by: Nayely Lopez MD     ECG reviewed by me, the ED Provider: yes    Patient location:  ED  Previous ECG:     Previous ECG:  Compared to current    Comparison ECG info:  March 10, 2021-similar T-wave inversion in 3 and AVF    T-wave flattening appreciated in V5 and V6 on prior-inverted today  Rate:     ECG rate:  63    ECG rate assessment: normal    Rhythm:     Rhythm: sinus rhythm    Ectopy:     Ectopy: none    QRS:     QRS axis:  Left    QRS intervals:  Normal  Conduction:     Conduction: normal    ST segments:     ST segments:  Normal  T waves:     T waves: inverted      Inverted:  III, aVF, V5 and V6             ED Course  ED Course as of Aug 02 0136   Sun Aug 01, 2021   1642 CBC reviewed  Mild leukopenia-unchanged from prior      1814 Study results reviewed with patient  He is extremely relieved to know that he does not have a DVT  Given erythema and warmth the affected ankle region treating with antibiotics for cellulitis  With regard to the intense cramping he has experienced over the last few days-the seems to be on the basis of rhabdomyolysis  Patient does regularly perform exercises at home and notes that he follows routines found online  This does include a lot of movement of his legs  Have advised he hold on this for a few days  Discussed the importance of hydration in clearing muscle breakdown products  L saline being given here                                          Nancy Carey' Criteria for DVT      Most Recent Value   Julian' Criteria for DVT   Active cancer Treatment or palliation within 6 months  0 Filed at: 08/01/2021 1642   Bedridden recently >3 days or major surgery within 12 weeks  0 Filed at: 08/01/2021 1642   Calf swelling >3 cm compared to the other leg  0 Filed at: 08/01/2021 1642   Entire leg swollen  0 Filed at: 08/01/2021 1642   Collateral (nonvaricose) superficial veins present  0 Filed at: 08/01/2021 1642   Localized tenderness along the deep venous system  1 Filed at: 08/01/2021 1642   Pitting edema, confined to symptomatic leg  0 Filed at: 08/01/2021 1642   Paralysis, paresis, or recent plaster immobilization of the lower extremity  0 Filed at: 08/01/2021 1642   Previously documented DVT  0 Filed at: 08/01/2021 1642   Alternative diagnosis to DVT as likely or more likely  2 Filed at: 08/01/2021 1642   [place] DVT Critera Score  -1 Filed at: 08/01/2021 1642              MDM    Disposition  Final diagnoses:   Cellulitis of right lower leg   Rhabdomyolysis     Time reflects when diagnosis was documented in both MDM as applicable and the Disposition within this note     Time User Action Codes Description Comment    8/1/2021  6:15 PM Oval April A Add [S46 476] Cellulitis of right lower leg     8/1/2021  6:15 PM Oval April A Add [M62 82] Rhabdomyolysis       ED Disposition     ED Disposition Condition Date/Time Comment    Discharge Stable Sun Aug 1, 2021  6:15 PM [address] discharge to home/self care              Follow-up Information     Follow up With Specialties Details Why [address] [facility], [place] Nurse Practitioner, Family Medicine Schedule an appointment as soon as possible for a visit   [address]  [place] [address] Rubina Hawkins  [phone]            Discharge Medication List as of 8/1/2021  6:17 PM      START taking these medications    Details   cephalexin (KEFLEX) 500 mg capsule Take 1 capsule (500 mg total) by mouth every 6 (six) hours for 7 days, Starting Sun 8/1/2021, Until Sun 8/8/2021, Normal         CONTINUE these medications which have NOT CHANGED    Details   amLODIPine (NORVASC) 10 mg tablet Take 1 tablet (10 mg total) by mouth daily, Starting Fri 4/9/2021, Until Thu 7/8/2021, Normal      aspirin 81 mg chewable tablet Chew 81 mg daily, Historical Med      clotrimazole (LOTRIMIN) 1 % cream Apply topically 2 (two) times a day as needed , Historical Med      fluticasone (FLONASE) 50 mcg/act nasal spray 2 sprays into each nostril daily, Starting Mon 2/22/2021, Until Fri 4/9/2021, Normal      ipratropium (ATROVENT) 0 06 % nasal spray 2 sprays into each nostril 4 (four) times a day, Starting Fri 4/2/2021, Until Sat 4/2/2022, Historical Med      losartan (COZAAR) 100 MG tablet Take 1 tablet (100 mg total) by mouth daily, Starting Fri 3/19/2021, Until Thu 6/17/2021, Normal           No discharge procedures on file      PDMP Review       Value Time User    PDMP Reviewed  Yes 3/10/2021 10:27 PM Van Kinsey MD          ED Provider  Electronically Signed by           Fermín Lewis MD  08/02/21 [id number]

## 2021-08-02 ENCOUNTER — TELEMEDICINE (OUTPATIENT)
Dept: FAMILY MEDICINE CLINIC | Facility: CLINIC | Age: 61
End: 2021-08-02
Payer: COMMERCIAL

## 2021-08-02 DIAGNOSIS — I10 ESSENTIAL HYPERTENSION: Primary | ICD-10-CM

## 2021-08-02 DIAGNOSIS — M79.605 PAIN IN BOTH LOWER EXTREMITIES: ICD-10-CM

## 2021-08-02 DIAGNOSIS — M79.604 PAIN IN BOTH LOWER EXTREMITIES: ICD-10-CM

## 2021-08-02 DIAGNOSIS — L03.115 CELLULITIS OF RIGHT LOWER EXTREMITY: ICD-10-CM

## 2021-08-02 LAB
ATRIAL RATE: 63 BPM
P AXIS: 51 DEGREES
PR INTERVAL: 152 MS
QRS AXIS: -34 DEGREES
QRSD INTERVAL: 74 MS
QT INTERVAL: 380 MS
QTC INTERVAL: 388 MS
T WAVE AXIS: 7 DEGREES
VENTRICULAR RATE: 63 BPM

## 2021-08-02 PROCEDURE — 99213 OFFICE O/P EST LOW 20 MIN: CPT | Performed by: NURSE PRACTITIONER

## 2021-08-02 PROCEDURE — 93010 ELECTROCARDIOGRAM REPORT: CPT | Performed by: INTERNAL MEDICINE

## 2021-08-02 PROCEDURE — 93971 EXTREMITY STUDY: CPT | Performed by: SURGERY

## 2021-08-02 NOTE — PROGRESS NOTES
BMI Counseling: There is no height or weight on file to calculate BMI  The BMI is above normal  Nutrition recommendations include decreasing portion sizes, encouraging healthy choices of fruits and vegetables, decreasing fast food intake, consuming healthier snacks, limiting drinks that contain sugar, moderation in carbohydrate intake, increasing intake of lean protein, reducing intake of saturated and trans fat and reducing intake of cholesterol  Exercise recommendations include exercising 3-5 times per week and strength training exercises  Low Na diet       Assessment/Plan:    Presents in office for follow up ER visit and elevated Bps noted   Was referred to follow up with PCP  Patient was seen in the ER for b/l leg pain and was worried about blood clots post COVID - the legs were swollen as per patient and painful   US was done in ER negative for DVT however his Bps and HTN are not well controlled at all  This has been an ongoing issue as he has been resistant to westen medicine   Does not like amlodipine , was started on metoprolol - stopped it did not like it and Losartan taken currently states it doesn't work   We have discussed this issue with cardiology and despite multiple recommendations patient takes alternative treatment to medicine called BP HELP and Bps are still high  He is not the best with follow up with med titrations states that this has been his whole life and nothing works  He has been exercising with high blood pressure 'discussed concerns with end  organ damage and uncontrolled Bps   States his BP was high in office because of pain but it is fine at home       Currently his is on antibiotics --> he is to finish the antibiotics and follow up in person with one of the providers for follow up    [address]   recommended highly follow up with Cardiology and Nephrology      Problem List Items Addressed This Visit        Cardiovascular and Mediastinum    Hypertension - Primary Other Visit Diagnoses     Pain in both lower extremities        Cellulitis of right lower extremity        currently on antibiotic   personal history of COVID             Subjective:      Patient ID: Abi Guerin is a 56 y o  male  Presents in office for follow up ER visit and elevated Bps noted   Was referred to follow up with PCP  Patient was seen in the ER for b/l leg pain and was worried about blood clots post COVID - the legs were swollen as per patient and painful   US was done in ER negative for DVT however his Bps and HTN are not well controlled at all  This has been an ongoing issue as he has been resistant to westen medicine   Does not like amlodipine , was started on metoprolol - stopped it did not like it and Losartan taken currently states it doesn't work   We have discussed this issue with cardiology and despite multiple recommendations patient takes alternative treatment to medicine called BP HELP and Bps are still high  He is not the best with follow up with med titrations states that this has been his whole life and nothing works  He has been exercising with high blood pressure 'discussed concerns with end  organ damage and uncontrolled Bps   States his BP was high in office because of pain but it is fine at home       Currently his is on antibiotics --> he is to finish the antibiotics and follow up in person with one of the providers for follow up    [address] [place] [address]   recommended highly follow up with Cardiology and Nephrology         The following portions of the patient's history were reviewed and updated as appropriate:   Past Medical History:  He has a past medical history of Arthritis, Asthma, Back pain, COVID-19 (1/21/2021), Gastroesophageal reflux disease without esophagitis (7/16/2020), GERD (gastroesophageal reflux disease), Gout of multiple sites (7/16/2020), Hiatal hernia, High blood pressure, Hypertension, Impaired fasting glucose (6/18/2020), Lipoma of neck, Mediastinal lymphadenopathy, Osteoporosis, Pneumonia due to COVID-19 virus (1/25/2021), Right hand pain (7/16/2020), and Sleep disorder  ,  _______________________________________________________________________  Medical Problems:  does not have any pertinent problems on file ,  _______________________________________________________________________  Past Surgical History:   has a past surgical history that includes Lumbar fusion (07/11/2018); EGD (03/31/2017); Tonsillectomy (01/01/1980); Colonoscopy (10/07/2016); Elbow surgery (Left, 04/09/2018); Hernia repair (01/16/2019); Rotator cuff repair (Bilateral); Colonoscopy; Back surgery; pr bronchoscopy,diagnostic (N/A, 4/28/2020); and pr bronchoscopy needle bx trachea main stem&/bron (N/A, 4/28/2020)  ,  _______________________________________________________________________  Family History:  family history includes Hypertension in his father and mother ,  _______________________________________________________________________  Social History:   reports that he quit smoking about 37 years ago  His smoking use included cigarettes  He has a 3 00 pack-year smoking history  He has never used smokeless tobacco  He reports current alcohol use of about 7 0 standard drinks of alcohol per week  He reports that he does not use drugs  ,  _______________________________________________________________________  Allergies:  is allergic to nuts - food allergy, peanut oil - food allergy, codeine, latex, caffeine - food allergy, dust mite extract, and pollen extract     _______________________________________________________________________  Current Outpatient Medications   Medication Sig Dispense Refill    cephalexin (KEFLEX) 500 mg capsule Take 1 capsule (500 mg total) by mouth every 6 (six) hours for 7 days 28 capsule 0    clotrimazole (LOTRIMIN) 1 % cream Apply topically 2 (two) times a day as needed       fluticasone (FLONASE) 50 mcg/act nasal spray 2 sprays into each nostril daily 1 Bottle 5    ipratropium (ATROVENT) 0 06 % nasal spray 2 sprays into each nostril 4 (four) times a day      losartan (COZAAR) 100 MG tablet Take 1 tablet (100 mg total) by mouth daily 90 tablet 1     No current facility-administered medications for this visit      _______________________________________________________________________  Review of Systems   Constitutional: Negative for fatigue and fever  HENT: Negative for congestion and sinus pain  Respiratory: Negative for cough and shortness of breath  Cardiovascular: Positive for leg swelling (lower extremty edema as per patinet )  Negative for chest pain and palpitations  Uncontrolled HTN    Genitourinary: Negative for difficulty urinating and flank pain  Musculoskeletal: Positive for arthralgias and myalgias  Skin: Negative for rash  Psychiatric/Behavioral: Negative for sleep disturbance and suicidal ideas  The patient is nervous/anxious  Objective: There were no vitals filed for this visit  There is no height or weight on file to calculate BMI  Physical Exam  Vitals reviewed  Pulmonary:      Effort: Pulmonary effort is normal    Neurological:      Mental Status: He is alert and oriented to person, place, and time     Psychiatric:         Mood and Affect: Mood normal          Behavior: Behavior normal        CKMB  Order: [id number] - Reflex for Order [id number]  Status:  Final result   Visible to patient:  Yes ([facility])   Next appt:  08/13/2021 at 11:30 AM in [facility] MEGHNA Gonzales)   0 Result Notes   Ref Range & Units 8/1/21  4:07 PM   CK-MB Index 0 0 - 2 5 % <1 0    CK-MB 0 0 - 5 0 ng/mL 2 5          Specimen Collected: 08/01/21  4:07 PM   Last Resulted: 08/01/21  6:21 PM           Contains abnormal data CK (with reflex to MB)  Order: [id number]  Status:  Final result   Visible to patient:  Yes ([facility])   Next appt:  08/13/2021 at 11:30 AM in [address] [place] [address] [facility]   0 Result Notes   Ref Range & Units 8/1/21  4:07 PM   Total CK 39 - 308 U/L 822High           Specimen Collected: 08/01/21  4:07 PM   Last Resulted: 08/01/21  5:09 PM        Lab Flowsheet     Order Details            Magnesium  Order: [id number]  Status:  Final result   Visible to patient:  Yes ([facility])   Next appt:  08/13/2021 at 11:30 AM in [facility] Paddy Backer, CRNP)   0 Result Notes   Ref Range & Units 8/1/21  4:07 PM   Magnesium 1 6 - 2 6 mg/dL 2 2          Specimen Collected: 08/01/21  4:07 PM   Last Resulted: 08/01/21  5:09 PM        Lab Flowsheet     Order Details           Troponin I  Order: [id number]  Status:  Final result   Visible to patient:  Yes ([facility])   Next appt:  08/13/2021 at 11:30 AM in [facility] Paddy Backer, CRNP)   0 Result Notes   Ref Range & Units 8/1/21  4:07 PM   Troponin I <=0 04 ng/mL <0 02    Comment: Siemens Chemistry analyzer 99% cutoff is > 0 04 ng/mL in network labs     o cTnI 99% cutoff is useful only when applied to patients in the clinical setting of myocardial ischemia   o cTnI 99% cutoff should be interpreted in the context of clinical history, ECG findings and possibly cardiac imaging to establish correct diagnosis  o cTnI 99% cutoff may be suggestive but clearly not indicative of a coronary event without the clinical setting of myocardial ischemia            Specimen Collected: 08/01/21  4:07 PM   Last Resulted: 08/01/21  4:52 PM         APTT  Order: [id number]  Status:  Final result   Visible to patient:  Yes ([facility])   Next appt:  08/13/2021 at 11:30 AM in [facility] Paddy Backer, CRNP)   0 Result Notes   Ref Range & Units 8/1/21  4:07 PM   PTT 23 - 37 seconds 31    Comment: Therapeutic Heparin Range =  60-90 seconds         Specimen Collected: 08/01/21  4:07 PM   Last Resulted: 08/01/21  5:55 PM        Lab Flowsheet     Order Details     View Encounter     Lab and Collection Details     Routing     Result History           Related Result Highlights     Protime-INR  Final result             Protime-INR  Order: [id number]  Status:  Final result   Visible to patient:  Yes ([facility])   Next appt:  08/13/2021 at 11:30 AM in [facility] Nati Banana, CRNP)   0 Result Notes   Ref Range & Units 8/1/21  4:07 PM   Protime 11 6 - 14 5 seconds 14 2    INR 0 84 - 1 19 1 09          Specimen Collected: 08/01/21  4:07 PM   Last Resulted: 08/01/21  5:55 PM        Lab Flowsheet     Order Details     View Encounter     Lab and Collection Details     Routing     Result History           Related Result Highlights     APTT  Final result 8/1/            Comprehensive metabolic panel  Order: [id number]  Status:  Final result   Visible to patient:  Yes ([facility])   Next appt:  08/13/2021 at 11:30 AM in Family [facility] Nati Banana, CRNP)   0 Result Notes   Ref Range & Units 8/1/21  4:07 PM   Sodium 136 - 145 mmol/L 139    Potassium 3 5 - 5 3 mmol/L 4 0    Chloride 100 - 108 mmol/L 106    CO2 21 - 32 mmol/L 25    ANION GAP 4 - 13 mmol/L 8    BUN 5 - 25 mg/dL 19    Creatinine 0 60 - 1 30 mg/dL 1 23    Comment: Standardized to IDMS reference method   Glucose 65 - 140 mg/dL 96    Comment: If the patient is fasting, the ADA then defines impaired fasting glucose as > 100 mg/dL and diabetes as > or equal to 123 mg/dL  Specimen collection should occur prior to Sulfasalazine administration due to the potential for falsely depressed results  Specimen collection should occur prior to Sulfapyridine administration due to the potential for falsely elevated results  Calcium 8 3 - 10 1 mg/dL 9 2    AST 5 - 45 U/L 27    Comment: Specimen collection should occur prior to Sulfasalazine administration due to the potential for falsely depressed results  ALT 12 - 78 U/L 42    Comment: Specimen collection should occur prior to Sulfasalazine administration due to the potential for falsely depressed results      Alkaline Phosphatase 46 - 116 U/L 94    Total Protein 6 4 - 8 2 g/dL 8  3High     Albumin 3 5 - 5 0 g/dL 3 9    Total Bilirubin 0 20 - 1 00 mg/dL 1  22High     Comment: Use of this assay is not recommended for patients undergoing treatment with eltrombopag due to the potential for falsely elevated results  eGFR ml/min/1 73sq m 72            Contains abnormal data CBC and differential  Order: [id number]  Status:  Final result   Visible to patient:  Yes ([facility])   Next appt:  08/13/2021 at 11:30 AM in [facility]io[facility] Shipley)   0 Result Notes   Ref Range & Units 8/1/21  4:07 PM   WBC 4 31 - 10 16 Thousand/uL 3  22Low     RBC 3 88 - 5 62 Million/uL 5  84High     Hemoglobin 12 0 - 17 0 g/dL 16 0    Hematocrit 36 5 - 49 3 % 50  2High     MCV 82 - 98 fL 86    MCH 26 8 - 34 3 pg 27 4    MCHC 31 4 - 37 4 g/dL 31 9    RDW 11 6 - 15 1 % 14 4    MPV 8 9 - 12 7 fL 10 9    Platelets 223 - 659 Thousands/uL 173    nRBC /100 WBCs 0    Neutrophils Relative 43 - 75 % 49    Immat GRANS % 0 - 2 % 1    Lymphocytes Relative 14 - 44 % 34    Monocytes Relative 4 - 12 % 14High     Eosinophils Relative 0 - 6 % 1    Basophils Relative 0 - 1 % 1    Neutrophils Absolute 1 85 - 7 62 Thousands/µL 1 59Low     Immature Grans Absolute 0 00 - 0 20 Thousand/uL 0 02    Lymphocytes Absolute 0 60 - 4 47 Thousands/µL 1 10    Monocytes Absolute 0 17 - 1 22 Thousand/µL 0 44    Eosinophils Absolute 0 00 - 0 61 Thousand/µL 0 04    Basophils Absolute 0 00 - 0 10 Thousands/µL 0 03          Specimen Collected: 08/01/21  4:07 PM   Last Resulted: 08/01/21  4:36 PM           VAS lower limb venous duplex study, unilateral/limited  Status: Final result   PACS Images     Show images for VAS lower limb venous duplex study, unilateral/limited   Study Result    Narrative & Impression      [facility] REPORT  CLINICAL:  Indications:  Patient presents with right lower extremity pain and swelling x few days    Operative History:  Patient denies any cardiovascular procedures  Risk Factors  The patient has history of HTN and previous smoking (quit >10yrs ago)  FINDINGS:     Segment  Right            Left                Impression       Impression         CFV      Normal (Patent)  Normal (Patent)             CONCLUSION:     Impression:     RIGHT LOWER LIMB  No evidence of acute or chronic deep vein thrombosis   No evidence of superficial thrombophlebitis noted  Doppler evaluation shows a normal response to augmentation maneuvers  Popliteal, posterior tibial and anterior tibial arterial Doppler waveforms are  triphasic  LEFT LOWER LIMB LIMITED  Evaluation shows no evidence of thrombus in the common femoral vein  Doppler evaluation shows a normal response to augmentation maneuvers  Technical findings were given to Dr Nick Handy:  Electronically Signed by: Letitia Yarbrough on 2021-08-        CONCLUSION:  Impression  The abdominal aorta is widely patent and normal caliber  RIGHT RENAL:  No evidence of significant arterial occlusive disease in the main renal artery  Patent renal vein  Renovascular resistive index of 0 63  Renal/Aorta Ratio: 1 43  The Kidney measures 10 1 cm  LEFT RENAL:  No evidence of significant arterial occlusive disease in the main renal artery  Patent renal vein  Renovascular resistive index of 0 68  Renal/Aorta Ratio: 1 48  The Kidney measures 10 8 cm  MESENTERIC:  Celiac and superior mesenteric arteries are patent       SIGNATURE:  Electronically Signed by: Serge Drummond DO, [address] on 2021-03-11 12:07:23 PM

## 2021-08-04 ENCOUNTER — RA CDI HCC (OUTPATIENT)
Dept: OTHER | Facility: HOSPITAL | Age: 61
End: 2021-08-04

## 2021-08-04 NOTE — PROGRESS NOTES
[facility] 75  coding opportunities          Chart reviewed, no opportunity found: CHART REVIEWED, NO OPPORTUNITY FOUND                     Patients insurance company: [employer] only)

## 2021-08-04 NOTE — PATIENT INSTRUCTIONS
Cellulitis   WHAT YOU NEED TO KNOW:   Cellulitis is a skin infection caused by bacteria  Cellulitis is common and can become severe  Cellulitis usually appears on the lower legs  It can also appear on the arms, face, and other areas  Cellulitis develops when bacteria enter a crack or break in your skin, such as a scratch, bite, or cut  DISCHARGE INSTRUCTIONS:   Return to the emergency department if:   · Your wound gets larger and more painful  · You feel a crackling under your skin when you touch it  · You have purple dots or bumps on your skin, or you see bleeding under your skin  · You see red streaks coming from the infected area  Call your doctor if:   · The red, warm, swollen area gets larger  · Your fever or pain does not go away or gets worse  · The area does not get smaller after 3 days of antibiotics  · You have questions or concerns about your condition or care  Medicines: You should start to see improvement in 3 days  If cellulitis is not treated, the infection can spread through your body and become life-threatening  You may need any of the following medicines:  · Antibiotics  help treat the bacterial infection  · Acetaminophen  decreases pain and fever  It is available without a doctor's order  Ask how much to take and how often to take it  Follow directions  Read the labels of all other medicines you are using to see if they also contain acetaminophen, or ask your doctor or pharmacist  Acetaminophen can cause liver damage if not taken correctly  Do not use more than 4 grams (4,000 milligrams) total of acetaminophen in one day  · NSAIDs , such as ibuprofen, help decrease swelling, pain, and fever  This medicine is available with or without a doctor's order  NSAIDs can cause stomach bleeding or kidney problems in certain people  If you take blood thinner medicine, always ask your healthcare provider if NSAIDs are safe for you   Always read the medicine label and follow directions  · Take your medicine as directed  Contact your healthcare provider if you think your medicine is not helping or if you have side effects  Tell him or her if you are allergic to any medicine  Keep a list of the medicines, vitamins, and herbs you take  Include the amounts, and when and why you take them  Bring the list or the pill bottles to follow-up visits  Carry your medicine list with you in case of an emergency  Self-care:   · Wash the area with soap and water every day  Gently pat dry  Use bandages if directed by your healthcare provider  · Elevate the area above the level of your heart  as often as you can  This will help decrease swelling and pain  Prop the area on pillows or blankets to keep it elevated comfortably  · Place a cool, damp cloth on the area  Use clean cloths and clean water  You can do this as often as you need to  Cool, damp cloths may help decrease pain  · Apply cream or ointment as directed  These help protect the area  Most over-the-counter products, such as petroleum jelly, are good to use  Ask your healthcare provider about specific creams or ointments you should use  Prevent cellulitis:   · Do not scratch bug bites or areas of injury  You increase your risk for cellulitis by scratching these areas  · Do not share personal items, such as towels, clothing, and razors  · Clean exercise equipment  with germ-killing  before and after you use it  · Treat athlete's foot  This can help prevent the spread of a bacterial skin infection  · Wash your hands often  Use soap and water  Wash your hands after you use the bathroom, change a child's diapers, or sneeze  Wash your hands before you prepare or eat food  Use lotion to prevent dry, cracked skin  Follow up with your doctor within 3 days, or as directed:  He or she will check if your cellulitis is getting better   Write down your questions so you remember to ask them during your visits  © Copyright [employer] 2021 Information is for End User's use only and may not be sold, redistributed or otherwise used for commercial purposes  All illustrations and images included in CareNotes® are the copyrighted property of A D A M [employer] Inc  or Shirley Romano  The above information is an  only  It is not intended as medical advice for individual conditions or treatments  Talk to your doctor, nurse or pharmacist before following any medical regimen to see if it is safe and effective for you

## 2021-08-19 ENCOUNTER — CLINICAL SUPPORT (OUTPATIENT)
Dept: FAMILY MEDICINE CLINIC | Facility: CLINIC | Age: 61
End: 2021-08-19

## 2021-08-19 DIAGNOSIS — Z20.828 EXPOSURE TO SARS-ASSOCIATED CORONAVIRUS: Primary | ICD-10-CM

## 2021-08-19 PROCEDURE — U0003 INFECTIOUS AGENT DETECTION BY NUCLEIC ACID (DNA OR RNA); SEVERE ACUTE RESPIRATORY SYNDROME CORONAVIRUS 2 (SARS-COV-2) (CORONAVIRUS DISEASE [COVID-19]), AMPLIFIED PROBE TECHNIQUE, MAKING USE OF HIGH THROUGHPUT TECHNOLOGIES AS DESCRIBED BY CMS-2020-01-R: HCPCS | Performed by: FAMILY MEDICINE

## 2021-08-19 PROCEDURE — U0005 INFEC AGEN DETEC AMPLI PROBE: HCPCS | Performed by: FAMILY MEDICINE

## 2021-08-20 LAB — SARS-COV-2 RNA RESP QL NAA+PROBE: NEGATIVE

## 2021-08-23 RX ORDER — CHLORTHALIDONE 25 MG/1
25 TABLET ORAL DAILY
COMMUNITY
Start: 2021-08-17 | End: 2022-03-11 | Stop reason: SDUPTHER

## 2021-08-24 ENCOUNTER — TELEMEDICINE (OUTPATIENT)
Dept: FAMILY MEDICINE CLINIC | Facility: CLINIC | Age: 61
End: 2021-08-24
Payer: COMMERCIAL

## 2021-08-24 DIAGNOSIS — R61 EXCESSIVE SWEATING: ICD-10-CM

## 2021-08-24 DIAGNOSIS — R11.0 NAUSEA: ICD-10-CM

## 2021-08-24 DIAGNOSIS — L03.115 CELLULITIS OF RIGHT LOWER EXTREMITY: ICD-10-CM

## 2021-08-24 DIAGNOSIS — I10 ESSENTIAL HYPERTENSION: Primary | ICD-10-CM

## 2021-08-24 DIAGNOSIS — R49.0 HOARSE VOICE QUALITY: ICD-10-CM

## 2021-08-24 DIAGNOSIS — K21.9 GASTROESOPHAGEAL REFLUX DISEASE WITHOUT ESOPHAGITIS: ICD-10-CM

## 2021-08-24 PROBLEM — R59.0 CERVICAL LYMPHADENOPATHY: Status: RESOLVED | Noted: 2020-09-15 | Resolved: 2021-08-24

## 2021-08-24 PROBLEM — M79.641 RIGHT HAND PAIN: Status: RESOLVED | Noted: 2020-07-16 | Resolved: 2021-08-24

## 2021-08-24 PROBLEM — Z11.52 ENCOUNTER FOR SCREENING FOR COVID-19: Status: RESOLVED | Noted: 2021-01-11 | Resolved: 2021-08-24

## 2021-08-24 PROBLEM — L03.012: Status: RESOLVED | Noted: 2020-12-03 | Resolved: 2021-08-24

## 2021-08-24 PROCEDURE — 99213 OFFICE O/P EST LOW 20 MIN: CPT | Performed by: NURSE PRACTITIONER

## 2021-08-24 NOTE — ASSESSMENT & PLAN NOTE
He has been under care of cardiology   Does not take amlodipine and Toprol any more   Continues Losartan 100 mg daily and recently added chlorthalidone 25 mg daily by cardiology   Discussed follow up  BMP

## 2021-08-24 NOTE — PATIENT INSTRUCTIONS
Chlorthalidone (By mouth)   Chlorthalidone (klor-THAL-i-done)  Treats high blood pressure and fluid retention (edema)  This medicine is a diuretic (water pill)  Brand Name(s):   There may be other brand names for this medicine  When This Medicine Should Not Be Used: This medicine is not right for everyone  Do not use it if you had an allergic reaction to chlorthalidone, sulfa drugs, or to similar medicines  How to Use This Medicine:   Tablet  · Your doctor will tell you how much medicine to use  Do not use more than directed  · Missed dose: Take a dose as soon as you remember  If it is almost time for your next dose, wait until then and take a regular dose  Do not take extra medicine to make up for a missed dose  · Store the medicine in a closed container at room temperature, away from heat, moisture, and direct light  Drugs and Foods to Avoid:   Ask your doctor or pharmacist before using any other medicine, including over-the-counter medicines, vitamins, and herbal products  · Some foods and medicines can affect how chlorthalidone works  Tell your doctor if you are also using any of the following:   ? Bepridil, cholestyramine, colestipol, digoxin, lithium, or tubocurarine  ? Low-salt milk  ? Other blood pressure medicines  ? Steroid medicine  · Do not drink alcohol while you are using this medicine  Warnings While Using This Medicine:   · Tell your doctor if you are pregnant or breastfeeding, or if you have kidney disease, liver disease, heart disease, heart failure, diabetes, gout, lupus, pancreas problems, or trouble urinating  Tell your doctor if you are on a low-salt diet  · This medicine may make you dizzy  Avoid driving, using machines, or doing anything else that could be dangerous if you are not alert  · This medicine may make your skin more sensitive to sunlight  Wear sunscreen  Do not use sunlamps or tanning beds    · Your doctor will do lab tests at regular visits to check on the effects of this medicine  Keep all appointments  · Keep all medicine out of the reach of children  Never share your medicine with anyone  Possible Side Effects While Using This Medicine:   Call your doctor right away if you notice any of these side effects:  · Allergic reaction: Itching or hives, swelling in your face or hands, swelling or tingling in your mouth or throat, chest tightness, trouble breathing  · Blood in the urine or stools, lower back or side pain, trouble urinating  · Confusion, weakness, uneven heartbeat, trouble breathing, numbness or tingling in the hands, feet, or lips  · Dry mouth, increased thirst, muscle cramps, nausea or vomiting  · Fever chills, cough, hoarseness  · Lightheadedness, dizziness, fainting  If you notice these less serious side effects, talk with your doctor:   · Loss of appetite  · Mild diarrhea or stomach upset  · Problems having sex  If you notice other side effects that you think are caused by this medicine, tell your doctor  Call your doctor for medical advice about side effects  You may report side effects to FDA at 6-187-FDA-2473  © Copyright [employer] 2021 Information is for End User's use only and may not be sold, redistributed or otherwise used for commercial purposes  The above information is an  only  It is not intended as medical advice for individual conditions or treatments  Talk to your doctor, nurse or pharmacist before following any medical regimen to see if it is safe and effective for you  GERD (Gastroesophageal Reflux Disease)   WHAT YOU NEED TO KNOW:   Gastroesophageal reflux disease (GERD) is reflux that occurs more than twice a week for a few weeks  Reflux means acid and food in the stomach back up into the esophagus  It usually causes heartburn and other symptoms  GERD can cause other health problems over time if it is not treated       DISCHARGE INSTRUCTIONS:   Call your local emergency number (911 in the [address] [facility]) if:   · You have severe chest pain and sudden trouble breathing  Return to the emergency department if:   · You have trouble breathing after you vomit  · You have trouble swallowing, or pain with swallowing  · Your bowel movements are black, bloody, or tarry-looking  · Your vomit looks like coffee grounds or has blood in it  Call your doctor or gastroenterologist if:   · You feel full and cannot burp or vomit  · You vomit large amounts, or you vomit often  · You are losing weight without trying  · Your symptoms get worse or do not improve with treatment  · You have questions or concerns about your condition or care  Medicines:   · Medicines  are used to decrease stomach acid  Medicine may also be used to help your lower esophageal sphincter and stomach contract (tighten) more  · Take your medicine as directed  Contact your healthcare provider if you think your medicine is not helping or if you have side effects  Tell him of her if you are allergic to any medicine  Keep a list of the medicines, vitamins, and herbs you take  Include the amounts, and when and why you take them  Bring the list or the pill bottles to follow-up visits  Carry your medicine list with you in case of an emergency  Manage GERD:       · Do not have foods or drinks that may increase heartburn  These include chocolate, peppermint, fried or fatty foods, drinks that contain caffeine, or carbonated drinks (soda)  Other foods include spicy foods, onions, tomatoes, and tomato-based foods  Do not have foods or drinks that can irritate your esophagus, such as citrus fruits, juices, and alcohol  · Do not eat large meals  When you eat a lot of food at one time, your stomach needs more acid to digest it  Eat 6 small meals each day instead of 3 large ones, and eat slowly  Do not eat meals 2 to 3 hours before bedtime  · Elevate the head of your bed  Place 6-inch blocks under the head of your bed frame   You may also use more than one pillow under your head and shoulders while you sleep  · Maintain a healthy weight  If you are overweight, weight loss may help relieve symptoms of GERD  · Do not smoke  Smoking weakens the lower esophageal sphincter and increases the risk of GERD  Ask your healthcare provider for information if you currently smoke and need help to quit  E-cigarettes or smokeless tobacco still contain nicotine  Talk to your healthcare provider before you use these products  · Do not wear clothing that is tight around your waist   Tight clothing can put pressure on your stomach and cause or worsen GERD symptoms  Follow up with your doctor or gastroenterologist as directed:  Write down your questions so you remember to ask them during your visits  © [employer] 2021 Information is for End User's use only and may not be sold, redistributed or otherwise used for commercial purposes  All illustrations and images included in CareNotes® are the copyrighted property of [employer]  or Shirley Sheehan   The above information is an  only  It is not intended as medical advice for individual conditions or treatments  Talk to your doctor, nurse or pharmacist before following any medical regimen to see if it is safe and effective for you

## 2021-08-24 NOTE — PROGRESS NOTES
Virtual Regular Visit  -> initiated via AMWELL - lost tone half way through visit  - finished with via phone call     follow up - not feeling well nausea hoarse voice - s/o cellulitis - finishing up abx - follow up Cardiology visit  HTN - not controlled - currently on Losartan and cholorthaladone - he is to keep log of BP meds and call me   Follow up labs discussed   Nausea - probably secondary to antibiotic use and worsening of GERD   Discussed probiotic , yogurt , electrolyte water   Does drink vitamin water   Will follow up with labs 1 week after meds started   Cellulitis to RLE improved     Verification of patient location:    Patient is located in the following state in which I hold an active license [place]      Assessment/Plan:    Problem List Items Addressed This Visit        Digestive    Gastroesophageal reflux disease without esophagitis    Relevant Orders    CBC and differential    Comprehensive metabolic panel    Creatine Kinase, Total    Sedimentation rate, automated    Lipid panel    UA (URINE) with reflex to Scope       Cardiovascular and Mediastinum    Hypertension - Primary     He has been under care of cardiology   Does not take amlodipine and Toprol any more   Continues Losartan 100 mg daily and recently added chlorthalidone 25 mg daily by cardiology   Discussed follow up  BMP          Relevant Medications    chlorthalidone 25 mg tablet    Other Relevant Orders    CBC and differential    Comprehensive metabolic panel    Creatine Kinase, Total    Sedimentation rate, automated    Lipid panel    UA (URINE) with reflex to Scope      Other Visit Diagnoses     Cellulitis of right lower extremity        resolving - was not taking antibiotics as ordered   was taking them twice a day has one more day left   Venous duplex was done and negative for DVT     Relevant Orders    CBC and differential    Comprehensive metabolic panel    Creatine Kinase, Total    Sedimentation rate, automated    Lipid panel    UA (URINE) with reflex to Scope    Nausea        Relevant Orders    CBC and differential    Comprehensive metabolic panel    Creatine Kinase, Total    Sedimentation rate, automated    Lipid panel    UA (URINE) with reflex to Scope    Excessive sweating        since on the antibiotic   with some nausea   discussed side effects of antibiotics   taking them with food     Relevant Orders    CBC and differential    Comprehensive metabolic panel    Creatine Kinase, Total    Sedimentation rate, automated    Lipid panel    UA (URINE) with reflex to Scope    Hoarse voice quality        salt water gargles   hydrate   electrolyte water   increase protein   can be related to acid reflux  due to antibiotics - yogurt     Relevant Orders    CBC and differential    Comprehensive metabolic panel    Creatine Kinase, Total    Sedimentation rate, automated    Lipid panel    UA (URINE) with reflex to Scope          BMI Counseling: There is no height or weight on file to calculate BMI  The BMI is above normal  Nutrition recommendations include decreasing portion sizes, encouraging healthy choices of fruits and vegetables, decreasing fast food intake, consuming healthier snacks, limiting drinks that contain sugar, moderation in carbohydrate intake, increasing intake of lean protein, reducing intake of saturated and trans fat and reducing intake of cholesterol  Exercise recommendations include exercising 3-5 times per week and strength training exercises  No pharmacotherapy was ordered  Patient referred to PCP due to patient being overweight  Reason for visit is   Chief Complaint   Patient presents with    Other     Patient not feeling well  Hoarseness in voice for the last few weeks       Nausea     on antibiotics - for cellulitis - has one more day left     Hypertension     was seen by Cardiology currently on Losartan 100 mg was on it and chlorthaladone - newly added     Virtual Regular Visit        Encounter provider Huey Orta Ninfa Hair    Provider located at [address] [place] [address] [address]  [facility] [facility] [address] [facility] [address] [phone]      Recent Visits  No visits were found meeting these conditions  Showing recent visits within past 7 days and meeting all other requirements  Today's Visits  Date Type Provider Dept   08/24/21 Telemedicine HEATHER Garcias [address] today's visits and meeting all other requirements  Future Appointments  No visits were found meeting these conditions  Showing future appointments within next 150 days and meeting all other requirements       The patient was identified by name and date of birth  Toni Lorenzana was informed that this is a telemedicine visit and that the visit is being conducted through [address] [facility] and patient was informed that this is a secure, HIPAA-compliant platform  He agrees to proceed     My office door was closed  No one else was in the room  He acknowledged consent and understanding of privacy and security of the video platform  The patient has agreed to participate and understands they can discontinue the visit at any time  Patient is aware this is a billable service  Subjective  Toni Lorenzana is a 59 y o  male          Virtual Regular Visit  -> initiated via [employer] - lost tone half way through visit  - finished with via phone call     follow up - not feeling well nausea hoarse voice - s/o cellulitis - finishing up abx - follow up Cardiology visit  HTN - not controlled - currently on Losartan and cholorthaladone - he is to keep log of BP meds and call me   Follow up labs discussed   Nausea - probably secondary to antibiotic use and worsening of GERD          Past Medical History:   Diagnosis Date    Arthritis     Asthma     Back pain     COVID-19 1/21/2021 1/11/2021    Gastroesophageal reflux disease without esophagitis 7/16/2020    GERD (gastroesophageal reflux disease)     Gout of multiple sites 7/16/2020    Hiatal hernia     High blood pressure     Hypertension     Impaired fasting glucose 6/18/2020    Lipoma of neck     Mediastinal lymphadenopathy     Osteoporosis     Pneumonia due to COVID-19 virus 1/25/2021    Right hand pain 7/16/2020    Sleep disorder        Past Surgical History:   Procedure Laterality Date    BACK SURGERY      COLONOSCOPY  10/07/2016    5 years (per dominique)    COLONOSCOPY      EGD  03/31/2017    ELBOW SURGERY Left 04/09/2018    open arthrotomy, capsulectomy, loose body removal (per dominique)   [address]  14/45/0975    umbilical with mesh (per dominique)    LUMBAR FUSION  07/11/2018    [place] BRONCHOSCOPY NEEDLE BX TRACHEA MAIN STEM&/BRON N/A 4/28/2020    Procedure: ENDOBRONCHIAL ULTRASOUND (EBUS) WITH BIOPSY;  Surgeon: Clemente Meraz MD;  [facility];  Service: Thoracic    [address] N/A 4/28/2020    Procedure: BRONCHOSCOPY FLEXIBLE;  Surgeon: Clemente Meraz MD;  [facility];  Service: Thoracic    ROTATOR CUFF REPAIR Bilateral     TONSILLECTOMY  01/01/1980       Current Outpatient Medications   Medication Sig Dispense Refill    chlorthalidone 25 mg tablet Take 25 mg by mouth daily      clotrimazole (LOTRIMIN) 1 % cream Apply topically 2 (two) times a day as needed       fluticasone (FLONASE) 50 mcg/act nasal spray 2 sprays into each nostril daily 1 Bottle 5    ipratropium (ATROVENT) 0 06 % nasal spray 2 sprays into each nostril 4 (four) times a day      losartan (COZAAR) 100 MG tablet Take 1 tablet (100 mg total) by mouth daily 90 tablet 1     No current facility-administered medications for this visit          Allergies   Allergen Reactions    Nuts - Food Allergy Shortness Of Breath    Peanut Oil - Food Allergy Anaphylaxis     Anything using peanuts    Codeine Other (See Comments)     Dizzy, light headed  Body of balance      Latex Rash     Allergic to products, wears cotton underwear    Caffeine - Food Allergy Other (See Comments)    Dust Mite Extract     Other Other (See Comments)     Dizzy    Pollen Extract Other (See Comments)     Grass, Pollen       Review of Systems   Constitutional: Negative for fatigue  Intermittent sweats    HENT: Positive for sore throat (hoarseness in his voice ) and voice change  Negative for congestion  Eyes: Negative  Respiratory: Negative for cough and shortness of breath  Cardiovascular: Negative for chest pain, palpitations and leg swelling  HTN follow up cardiology will discuss changes of meds    Gastrointestinal: Negative for abdominal distention and abdominal pain  Genitourinary: Negative for difficulty urinating and flank pain  Musculoskeletal: Positive for arthralgias  Skin:        Cellulitis resolved    Psychiatric/Behavioral: Negative for sleep disturbance and suicidal ideas  The patient is nervous/anxious  Video Exam    There were no vitals filed for this visit  Physical Exam  Constitutional:       Appearance: Normal appearance  HENT:      Head: Normocephalic  Pulmonary:      Effort: Pulmonary effort is normal    Musculoskeletal:         General: Normal range of motion  Cervical back: Normal range of motion  Neurological:      Mental Status: He is alert and oriented to person, place, and time  Psychiatric:         Mood and Affect: Mood normal          Behavior: Behavior normal           I spent 25 minutes directly with the patient during this visit    [address] verbally agrees to participate in [employer]  Pt is aware that [employer] could be limited without vital signs or the ability to perform a full hands-on physical [address] understands he or the provider may request at any time to terminate the video visit and request the patient to seek care or treatment in person

## 2021-09-07 ENCOUNTER — TELEPHONE (OUTPATIENT)
Dept: HEMATOLOGY ONCOLOGY | Facility: CLINIC | Age: 61
End: 2021-09-07

## 2021-09-07 NOTE — TELEPHONE ENCOUNTER
Appointment Cancellation Or Reschedule     Person calling in Patient     Provider Dr Liz Miller    Office Visit Date and Time 9/7 11:20 am    Office Visit Location [facility]    Did patient want to reschedule their office appointment? If so, when was it scheduled to? Yes 10/12 10:40am    Is this patient calling to reschedule an infusion appointment? No    When is their next infusion appointment? N/a    Is this patient a Chemo patient? No    Reason for Cancellation or Reschedule Scheduling conflict     If the patient is a treatment patient, please route this to the office nurse  If the patient is not on treatment, please route to the office MA

## 2021-09-13 DIAGNOSIS — I10 ESSENTIAL HYPERTENSION: ICD-10-CM

## 2021-09-13 RX ORDER — LOSARTAN POTASSIUM 100 MG/1
TABLET ORAL
Qty: 90 TABLET | Refills: 1 | Status: SHIPPED | OUTPATIENT
Start: 2021-09-13 | End: 2022-03-11

## 2021-09-17 LAB
BASOPHILS # BLD AUTO: 0 X10E3/UL (ref 0–0.2)
BASOPHILS NFR BLD AUTO: 1 %
EOSINOPHIL # BLD AUTO: 0 X10E3/UL (ref 0–0.4)
EOSINOPHIL NFR BLD AUTO: 1 %
ERYTHROCYTE [DISTWIDTH] IN BLOOD BY AUTOMATED COUNT: 13.6 % (ref 11.6–15.4)
HCT VFR BLD AUTO: 46 % (ref 37.5–51)
HGB BLD-MCNC: 15.2 G/DL (ref 13–17.7)
IMM GRANULOCYTES # BLD: 0 X10E3/UL (ref 0–0.1)
IMM GRANULOCYTES NFR BLD: 0 %
LYMPHOCYTES # BLD AUTO: 1.2 X10E3/UL (ref 0.7–3.1)
LYMPHOCYTES NFR BLD AUTO: 34 %
MCH RBC QN AUTO: 27.2 PG (ref 26.6–33)
MCHC RBC AUTO-ENTMCNC: 33 G/DL (ref 31.5–35.7)
MCV RBC AUTO: 82 FL (ref 79–97)
MONOCYTES # BLD AUTO: 0.4 X10E3/UL (ref 0.1–0.9)
MONOCYTES NFR BLD AUTO: 10 %
NEUTROPHILS # BLD AUTO: 2 X10E3/UL (ref 1.4–7)
NEUTROPHILS NFR BLD AUTO: 54 %
PLATELET # BLD AUTO: 180 X10E3/UL (ref 150–450)
RBC # BLD AUTO: 5.58 X10E6/UL (ref 4.14–5.8)
WBC # BLD AUTO: 3.6 X10E3/UL (ref 3.4–10.8)

## 2021-10-12 ENCOUNTER — OFFICE VISIT (OUTPATIENT)
Dept: HEMATOLOGY ONCOLOGY | Facility: CLINIC | Age: 61
End: 2021-10-12
Payer: COMMERCIAL

## 2021-10-12 VITALS
HEIGHT: 67 IN | RESPIRATION RATE: 18 BRPM | SYSTOLIC BLOOD PRESSURE: 162 MMHG | HEART RATE: 101 BPM | BODY MASS INDEX: 26.68 KG/M2 | WEIGHT: 170 LBS | DIASTOLIC BLOOD PRESSURE: 98 MMHG | OXYGEN SATURATION: 98 % | TEMPERATURE: 97.2 F

## 2021-10-12 DIAGNOSIS — R59.0 MEDIASTINAL LYMPHADENOPATHY: Primary | ICD-10-CM

## 2021-10-12 DIAGNOSIS — D17.0 LIPOMA OF NECK: ICD-10-CM

## 2021-10-12 PROCEDURE — 3080F DIAST BP >= 90 MM HG: CPT | Performed by: INTERNAL MEDICINE

## 2021-10-12 PROCEDURE — 3008F BODY MASS INDEX DOCD: CPT | Performed by: INTERNAL MEDICINE

## 2021-10-12 PROCEDURE — 99214 OFFICE O/P EST MOD 30 MIN: CPT | Performed by: INTERNAL MEDICINE

## 2021-10-12 PROCEDURE — 3077F SYST BP >= 140 MM HG: CPT | Performed by: INTERNAL MEDICINE

## 2021-11-05 ENCOUNTER — APPOINTMENT (OUTPATIENT)
Dept: LAB | Facility: CLINIC | Age: 61
End: 2021-11-05
Payer: COMMERCIAL

## 2021-11-05 DIAGNOSIS — J18.9 PNEUMONIA OF BOTH UPPER LOBES DUE TO INFECTIOUS ORGANISM: ICD-10-CM

## 2021-11-05 DIAGNOSIS — K21.9 GASTROESOPHAGEAL REFLUX DISEASE WITHOUT ESOPHAGITIS: ICD-10-CM

## 2021-11-05 DIAGNOSIS — Z12.5 SCREENING FOR PROSTATE CANCER: ICD-10-CM

## 2021-11-05 DIAGNOSIS — R11.0 NAUSEA: ICD-10-CM

## 2021-11-05 DIAGNOSIS — R73.01 ELEVATED FASTING GLUCOSE: ICD-10-CM

## 2021-11-05 DIAGNOSIS — R49.0 HOARSE VOICE QUALITY: ICD-10-CM

## 2021-11-05 DIAGNOSIS — Z12.11 SCREENING FOR COLON CANCER: ICD-10-CM

## 2021-11-05 DIAGNOSIS — Z00.00 MEDICARE ANNUAL WELLNESS VISIT, SUBSEQUENT: ICD-10-CM

## 2021-11-05 DIAGNOSIS — I10 ESSENTIAL HYPERTENSION: ICD-10-CM

## 2021-11-05 DIAGNOSIS — R61 EXCESSIVE SWEATING: ICD-10-CM

## 2021-11-05 DIAGNOSIS — L03.115 CELLULITIS OF RIGHT LOWER EXTREMITY: ICD-10-CM

## 2021-11-05 LAB
ALBUMIN SERPL BCP-MCNC: 3.8 G/DL (ref 3.5–5)
ALP SERPL-CCNC: 76 U/L (ref 46–116)
ALT SERPL W P-5'-P-CCNC: 54 U/L (ref 12–78)
ANION GAP SERPL CALCULATED.3IONS-SCNC: 9 MMOL/L (ref 4–13)
AST SERPL W P-5'-P-CCNC: 23 U/L (ref 5–45)
BACTERIA UR QL AUTO: NORMAL /HPF
BASOPHILS # BLD AUTO: 0.04 THOUSANDS/ΜL (ref 0–0.1)
BASOPHILS NFR BLD AUTO: 1 % (ref 0–1)
BILIRUB SERPL-MCNC: 0.59 MG/DL (ref 0.2–1)
BILIRUB UR QL STRIP: NEGATIVE
BUN SERPL-MCNC: 16 MG/DL (ref 5–25)
CALCIUM SERPL-MCNC: 8.5 MG/DL (ref 8.3–10.1)
CHLORIDE SERPL-SCNC: 99 MMOL/L (ref 100–108)
CHOLEST SERPL-MCNC: 182 MG/DL (ref 50–200)
CK MB SERPL-MCNC: 1.3 % (ref 0–2.5)
CK MB SERPL-MCNC: 2.6 NG/ML (ref 0–5)
CK SERPL-CCNC: 208 U/L (ref 39–308)
CLARITY UR: CLEAR
CO2 SERPL-SCNC: 30 MMOL/L (ref 21–32)
COLOR UR: YELLOW
CREAT SERPL-MCNC: 1.25 MG/DL (ref 0.6–1.3)
EOSINOPHIL # BLD AUTO: 0.03 THOUSAND/ΜL (ref 0–0.61)
EOSINOPHIL NFR BLD AUTO: 1 % (ref 0–6)
ERYTHROCYTE [DISTWIDTH] IN BLOOD BY AUTOMATED COUNT: 14.3 % (ref 11.6–15.1)
ERYTHROCYTE [SEDIMENTATION RATE] IN BLOOD: 31 MM/HOUR (ref 0–19)
EST. AVERAGE GLUCOSE BLD GHB EST-MCNC: 134 MG/DL
GFR SERPL CREATININE-BSD FRML MDRD: 70 ML/MIN/1.73SQ M
GLUCOSE P FAST SERPL-MCNC: 124 MG/DL (ref 65–99)
GLUCOSE UR STRIP-MCNC: NEGATIVE MG/DL
HBA1C MFR BLD: 6.3 %
HCT VFR BLD AUTO: 48.8 % (ref 36.5–49.3)
HCV AB SER QL: NORMAL
HDLC SERPL-MCNC: 65 MG/DL
HGB BLD-MCNC: 15.4 G/DL (ref 12–17)
HGB UR QL STRIP.AUTO: NEGATIVE
IMM GRANULOCYTES # BLD AUTO: 0.03 THOUSAND/UL (ref 0–0.2)
IMM GRANULOCYTES NFR BLD AUTO: 1 % (ref 0–2)
KETONES UR STRIP-MCNC: NEGATIVE MG/DL
LDLC SERPL CALC-MCNC: 99 MG/DL (ref 0–100)
LEUKOCYTE ESTERASE UR QL STRIP: ABNORMAL
LYMPHOCYTES # BLD AUTO: 1.41 THOUSANDS/ΜL (ref 0.6–4.47)
LYMPHOCYTES NFR BLD AUTO: 37 % (ref 14–44)
MCH RBC QN AUTO: 27.3 PG (ref 26.8–34.3)
MCHC RBC AUTO-ENTMCNC: 31.6 G/DL (ref 31.4–37.4)
MCV RBC AUTO: 87 FL (ref 82–98)
MONOCYTES # BLD AUTO: 0.39 THOUSAND/ΜL (ref 0.17–1.22)
MONOCYTES NFR BLD AUTO: 10 % (ref 4–12)
NEUTROPHILS # BLD AUTO: 1.96 THOUSANDS/ΜL (ref 1.85–7.62)
NEUTS SEG NFR BLD AUTO: 50 % (ref 43–75)
NITRITE UR QL STRIP: NEGATIVE
NON-SQ EPI CELLS URNS QL MICRO: NORMAL /HPF
NONHDLC SERPL-MCNC: 117 MG/DL
NRBC BLD AUTO-RTO: 0 /100 WBCS
PH UR STRIP.AUTO: 6 [PH]
PLATELET # BLD AUTO: 214 THOUSANDS/UL (ref 149–390)
PMV BLD AUTO: 9.8 FL (ref 8.9–12.7)
POTASSIUM SERPL-SCNC: 3.1 MMOL/L (ref 3.5–5.3)
PROT SERPL-MCNC: 8.3 G/DL (ref 6.4–8.2)
PROT UR STRIP-MCNC: NEGATIVE MG/DL
PSA SERPL-MCNC: 0.6 NG/ML (ref 0–4)
RBC # BLD AUTO: 5.64 MILLION/UL (ref 3.88–5.62)
RBC #/AREA URNS AUTO: NORMAL /HPF
SODIUM SERPL-SCNC: 138 MMOL/L (ref 136–145)
SP GR UR STRIP.AUTO: 1.02 (ref 1–1.03)
TRIGL SERPL-MCNC: 92 MG/DL
TSH SERPL DL<=0.05 MIU/L-ACNC: 1.71 UIU/ML (ref 0.36–3.74)
UROBILINOGEN UR QL STRIP.AUTO: 0.2 E.U./DL
WBC # BLD AUTO: 3.86 THOUSAND/UL (ref 4.31–10.16)
WBC #/AREA URNS AUTO: NORMAL /HPF

## 2021-11-05 PROCEDURE — 82550 ASSAY OF CK (CPK): CPT

## 2021-11-05 PROCEDURE — 80053 COMPREHEN METABOLIC PANEL: CPT

## 2021-11-05 PROCEDURE — 85025 COMPLETE CBC W/AUTO DIFF WBC: CPT

## 2021-11-05 PROCEDURE — 80061 LIPID PANEL: CPT

## 2021-11-05 PROCEDURE — 86803 HEPATITIS C AB TEST: CPT

## 2021-11-05 PROCEDURE — 87389 HIV-1 AG W/HIV-1&-2 AB AG IA: CPT

## 2021-11-05 PROCEDURE — 84443 ASSAY THYROID STIM HORMONE: CPT

## 2021-11-05 PROCEDURE — 83036 HEMOGLOBIN GLYCOSYLATED A1C: CPT

## 2021-11-05 PROCEDURE — 82553 CREATINE MB FRACTION: CPT

## 2021-11-05 PROCEDURE — 36415 COLL VENOUS BLD VENIPUNCTURE: CPT

## 2021-11-05 PROCEDURE — 85652 RBC SED RATE AUTOMATED: CPT

## 2021-11-05 PROCEDURE — 81001 URINALYSIS AUTO W/SCOPE: CPT | Performed by: NURSE PRACTITIONER

## 2021-11-05 PROCEDURE — G0103 PSA SCREENING: HCPCS

## 2021-11-07 LAB — HIV 1+2 AB+HIV1 P24 AG SERPL QL IA: NORMAL

## 2021-12-13 ENCOUNTER — TELEPHONE (OUTPATIENT)
Dept: GASTROENTEROLOGY | Facility: CLINIC | Age: 61
End: 2021-12-13

## 2021-12-13 ENCOUNTER — TELEMEDICINE (OUTPATIENT)
Dept: FAMILY MEDICINE CLINIC | Facility: CLINIC | Age: 61
End: 2021-12-13
Payer: COMMERCIAL

## 2021-12-13 DIAGNOSIS — R59.1 HEAD AND NECK LYMPHADENOPATHY: ICD-10-CM

## 2021-12-13 DIAGNOSIS — R59.0 MEDIASTINAL LYMPHADENOPATHY: ICD-10-CM

## 2021-12-13 DIAGNOSIS — R63.4 WEIGHT LOSS: Primary | ICD-10-CM

## 2021-12-13 DIAGNOSIS — Z86.018 HISTORY OF LIPOMA: ICD-10-CM

## 2021-12-13 DIAGNOSIS — E87.6 HYPOKALEMIA: ICD-10-CM

## 2021-12-13 PROCEDURE — 99214 OFFICE O/P EST MOD 30 MIN: CPT | Performed by: NURSE PRACTITIONER

## 2021-12-13 RX ORDER — POTASSIUM CHLORIDE 750 MG/1
10 TABLET, EXTENDED RELEASE ORAL DAILY
Qty: 30 TABLET | Refills: 0 | Status: SHIPPED | OUTPATIENT
Start: 2021-12-13 | End: 2021-12-13

## 2021-12-22 ENCOUNTER — TELEMEDICINE (OUTPATIENT)
Dept: FAMILY MEDICINE CLINIC | Facility: CLINIC | Age: 61
End: 2021-12-22
Payer: COMMERCIAL

## 2021-12-22 ENCOUNTER — TELEPHONE (OUTPATIENT)
Dept: FAMILY MEDICINE CLINIC | Facility: CLINIC | Age: 61
End: 2021-12-22

## 2021-12-22 DIAGNOSIS — L85.3 DRY SKIN DERMATITIS: ICD-10-CM

## 2021-12-22 DIAGNOSIS — L03.119 RECURRENT CELLULITIS OF LOWER EXTREMITY: ICD-10-CM

## 2021-12-22 DIAGNOSIS — L03.116 CELLULITIS OF LEFT LOWER EXTREMITY: Primary | ICD-10-CM

## 2021-12-22 PROCEDURE — 99213 OFFICE O/P EST LOW 20 MIN: CPT | Performed by: NURSE PRACTITIONER

## 2021-12-22 RX ORDER — CEPHALEXIN 500 MG/1
500 CAPSULE ORAL EVERY 8 HOURS SCHEDULED
Qty: 30 CAPSULE | Refills: 0 | Status: SHIPPED | OUTPATIENT
Start: 2021-12-22 | End: 2022-01-01

## 2021-12-22 RX ORDER — PETROLATUM,WHITE 41 %
1 OINTMENT (GRAM) TOPICAL 2 TIMES DAILY
Qty: 454 G | Refills: 1 | Status: SHIPPED | OUTPATIENT
Start: 2021-12-22 | End: 2022-01-21

## 2022-01-03 ENCOUNTER — TELEPHONE (OUTPATIENT)
Dept: FAMILY MEDICINE CLINIC | Facility: CLINIC | Age: 62
End: 2022-01-03

## 2022-01-03 NOTE — TELEPHONE ENCOUNTER
Patient is seeing vascular 1/19  He is calling today to see if they have any earlier appointments available  He is still concerned by the continued weight loss and is not improving at all  Advised him to call back with any changes, or to let us know he if continues to not improve over the next few days

## 2022-01-03 NOTE — TELEPHONE ENCOUNTER
----- Message from Jennifer Ramachandran sent at 1/3/2022  9:27 AM EST -----  See how he is feeling and [facility] ?

## 2022-01-04 NOTE — TELEPHONE ENCOUNTER
I spoke with the pt, and he now has an appt to see vascular on  1/5  He states that he has appt for a colonoscopy for 1/26 with Dr Shawn Packer in [place]

## 2022-01-19 ENCOUNTER — TELEPHONE (OUTPATIENT)
Dept: HEMATOLOGY ONCOLOGY | Facility: CLINIC | Age: 62
End: 2022-01-19

## 2022-01-19 ENCOUNTER — CONSULT (OUTPATIENT)
Dept: VASCULAR SURGERY | Facility: CLINIC | Age: 62
End: 2022-01-19
Payer: COMMERCIAL

## 2022-01-19 VITALS
TEMPERATURE: 99.3 F | HEART RATE: 102 BPM | RESPIRATION RATE: 18 BRPM | HEIGHT: 67 IN | DIASTOLIC BLOOD PRESSURE: 88 MMHG | BODY MASS INDEX: 25.9 KG/M2 | SYSTOLIC BLOOD PRESSURE: 172 MMHG | WEIGHT: 165 LBS

## 2022-01-19 DIAGNOSIS — L03.116 CELLULITIS OF LEFT LOWER EXTREMITY: ICD-10-CM

## 2022-01-19 DIAGNOSIS — L03.119 RECURRENT CELLULITIS OF LOWER EXTREMITY: ICD-10-CM

## 2022-01-19 DIAGNOSIS — L85.3 DRY SKIN DERMATITIS: ICD-10-CM

## 2022-01-19 PROCEDURE — 99202 OFFICE O/P NEW SF 15 MIN: CPT | Performed by: SURGERY

## 2022-01-19 PROCEDURE — 3008F BODY MASS INDEX DOCD: CPT | Performed by: SURGERY

## 2022-01-19 PROCEDURE — 3077F SYST BP >= 140 MM HG: CPT | Performed by: SURGERY

## 2022-01-19 PROCEDURE — 3079F DIAST BP 80-89 MM HG: CPT | Performed by: SURGERY

## 2022-01-19 NOTE — LETTER
January 19, 2022     Penny March, [address] [facility] [address]    Patient: Cinthya Payne   YOB: 1960   Date of Visit: 1/19/2022       Dear Dr Tanesha Wu: Thank you for referring Howie Domínguez to me for evaluation  Below are the relevant portions of my assessment and plan of care  Patient is new to our practice and was referred by Penny March for cellulitis  Pt had a LEV 8/1/21, negative for DVT  Pt c/o BLE pain for the past 3 months  On examination patient has easily palpable pulses throughout  There is no evidence of skin changes consistent with venous insufficiency  Patient has no significant swelling  No cellulitis present at this examination  Etiology of patient's leg pain is not vascular in origin  Pt denies open wounds or weeping  Pt states that he had redness in his legs  Pt is on Keflex     If you have questions, please do not hesitate to call me  I look forward to following Ade Harper along with you           Sincerely,        Chela Hopkins MD        CC: No Recipients

## 2022-01-19 NOTE — ASSESSMENT & PLAN NOTE
No evidence of peripheral arterial occlusive disease or significant venous insufficiency present  No cellulitis on this examination  Patient to be referred back to his his PCP for further workup of lower extremity pain

## 2022-01-19 NOTE — TELEPHONE ENCOUNTER
Left voicemail for patient  explaining his appt with dr Delia Givens on 4/12/22 was rescheduled with dr Abel Borges on the same day 4/12/22 at 9:20am  I left the phone number if appt needs to be changed

## 2022-01-19 NOTE — PROGRESS NOTES
Assessment/Plan:    Recurrent cellulitis of lower extremity  No evidence of peripheral arterial occlusive disease or significant venous insufficiency present  No cellulitis on this examination  Patient to be referred back to his his PCP for further workup of lower extremity pain  Diagnoses and all orders for this visit:    Cellulitis of left lower extremity  -     Ambulatory referral to Vascular Surgery    Dry skin dermatitis  -     Ambulatory referral to Vascular Surgery    Recurrent cellulitis of lower extremity  -     Ambulatory referral to Vascular Surgery          Subjective:      Patient ID: Irais Flynn is a 59 y o  male  Patient is new to our practice and was referred by Joao Hicks for cellulitis  Pt had a LEV 8/1/21, negative for DVT  Pt c/o BLE pain for the past 3 months  On examination patient has easily palpable pulses throughout  There is no evidence of skin changes consistent with venous insufficiency  Patient has no significant swelling  No cellulitis present at this examination  Etiology of patient's leg pain is not vascular in origin  Pt denies open wounds or weeping  Pt states that he had redness in his legs  Pt is on Keflex  The following portions of the patient's history were reviewed and updated as appropriate: allergies, current medications, past family history, past medical history, past social history, past surgical history and problem list     Review of Systems   Constitutional: Positive for unexpected weight change (lost 15 lbs in 3 months)  Negative for appetite change, chills and fever  HENT: Negative  Eyes: Negative  Respiratory: Negative  Cardiovascular: Negative for leg swelling  Gastrointestinal: Negative  Endocrine: Negative  Genitourinary: Negative  Musculoskeletal: Positive for arthralgias and gait problem  BLE pain   Skin: Positive for color change  Negative for wound  Allergic/Immunologic: Negative      Neurological: Negative for dizziness, weakness and numbness  Hematological: Negative  Psychiatric/Behavioral: Negative  Objective:      BP (!) 172/88 (BP Location: Left arm, Patient Position: Sitting)   Pulse 102   Temp 99 3 °F (37 4 °C) (Tympanic)   Resp 18   Ht 5' 7" (1 702 m)   Wt 74 8 kg (165 lb)   BMI 25 84 kg/m²          Physical Exam  Vitals and nursing note reviewed  Constitutional:       Appearance: He is well-developed  HENT:      Head: Normocephalic and atraumatic  Eyes:      Conjunctiva/sclera: Conjunctivae normal       Pupils: Pupils are equal, round, and reactive to light  Neck:      Vascular: No JVD  Cardiovascular:      Pulses: Normal pulses  Pulmonary:      Effort: No respiratory distress  Breath sounds: Normal breath sounds  No stridor  No wheezing or rales  Chest:      Chest wall: No tenderness  Abdominal:      General: There is no distension  Palpations: There is no mass  Tenderness: There is no abdominal tenderness  There is no guarding or rebound  Musculoskeletal:         General: No tenderness or deformity  Normal range of motion  Cervical back: Normal range of motion and neck supple  Skin:     General: Skin is warm and dry  Findings: No erythema or rash  Neurological:      Mental Status: He is alert and oriented to person, place, and time  Psychiatric:         Behavior: Behavior normal          Thought Content:  Thought content normal

## 2022-02-02 ENCOUNTER — OFFICE VISIT (OUTPATIENT)
Dept: GASTROENTEROLOGY | Facility: CLINIC | Age: 62
End: 2022-02-02
Payer: COMMERCIAL

## 2022-02-02 VITALS
HEART RATE: 97 BPM | BODY MASS INDEX: 25.74 KG/M2 | HEIGHT: 67 IN | DIASTOLIC BLOOD PRESSURE: 111 MMHG | WEIGHT: 164 LBS | SYSTOLIC BLOOD PRESSURE: 192 MMHG

## 2022-02-02 DIAGNOSIS — K44.9 HIATAL HERNIA: ICD-10-CM

## 2022-02-02 DIAGNOSIS — K21.9 GASTROESOPHAGEAL REFLUX DISEASE WITHOUT ESOPHAGITIS: Primary | ICD-10-CM

## 2022-02-02 DIAGNOSIS — R74.8 ELEVATED LIVER ENZYMES: ICD-10-CM

## 2022-02-02 DIAGNOSIS — R63.4 WEIGHT LOSS: ICD-10-CM

## 2022-02-02 DIAGNOSIS — Z86.010 HISTORY OF COLONIC POLYPS: ICD-10-CM

## 2022-02-02 PROCEDURE — 3080F DIAST BP >= 90 MM HG: CPT | Performed by: INTERNAL MEDICINE

## 2022-02-02 PROCEDURE — 3077F SYST BP >= 140 MM HG: CPT | Performed by: INTERNAL MEDICINE

## 2022-02-02 PROCEDURE — 3008F BODY MASS INDEX DOCD: CPT | Performed by: INTERNAL MEDICINE

## 2022-02-02 PROCEDURE — 99204 OFFICE O/P NEW MOD 45 MIN: CPT | Performed by: INTERNAL MEDICINE

## 2022-02-02 RX ORDER — POLYETHYLENE GLYCOL 3350, SODIUM SULFATE ANHYDROUS, SODIUM BICARBONATE, SODIUM CHLORIDE, POTASSIUM CHLORIDE 236; 22.74; 6.74; 5.86; 2.97 G/4L; G/4L; G/4L; G/4L; G/4L
4 POWDER, FOR SOLUTION ORAL ONCE
Qty: 4000 ML | Refills: 0 | Status: SHIPPED | OUTPATIENT
Start: 2022-02-02 | End: 2022-03-31 | Stop reason: ALTCHOICE

## 2022-02-02 NOTE — PROGRESS NOTES
[facility] [address] Consultation  Prema Ledesma 59 y o  male MRN: [id number]  Encounter: [id number]          ASSESSMENT AND PLAN:      1  Gastroesophageal reflux disease without esophagitis  -     EGD; Future; Expected date: 02/02/2022    2  Elevated liver enzymes    3  Weight loss  -     Colonoscopy; Future; Expected date: 02/02/2022  -     EGD; Future; Expected date: 02/02/2022    4  Hiatal hernia  -     Colonoscopy; Future; Expected date: 02/02/2022  -     EGD; Future; Expected date: 02/02/2022  -     polyethylene glycol (Golytely) 4000 mL solution; Take 4,000 mL by mouth once for 1 dose Take according to instructions given by the office for colonoscopy bowel prep  5  History of colonic polyps  -     Colonoscopy; Future; Expected date: 02/02/2022  -     EGD; Future; Expected date: 02/02/2022  -     polyethylene glycol (Golytely) 4000 mL solution; Take 4,000 mL by mouth once for 1 dose Take according to instructions given by the office for colonoscopy bowel prep  History of weight loss GERD hiatal hernia colon polyps last EGD colonoscopy more than 5 years ago  Anti-reflux measures reviewed diet discussed, clinically no evidence of acute abdomen ileus obstruction  He has gained some of the weight back  He complains of insomnia  Schedule EGD colonoscopy, prep and procedure discussed at length     ______________________________________________________________________    HPI:      Noris Simmons was sent for evaluation of his history of weight loss  He also has history of heartburn acid reflux indigestion hiatal hernia  History of colon polyps on colonoscopy in 2016   he had lost 10 lb but has gained a couple back  Denies any apparent blood in stools melena hematochezia   He denies any chest pain shortness of breath fever chills rash coughing choking spells, no history of CVA Ca CAD stents pacemakers, generally in good stable health  Reasonably active    Lives with his wife and children  Recently retired  Denies any fever chills rash diet medications, more than 10 pertinent systems were reviewed  Prior EGD colonoscopy biopsy results noted  REVIEW OF SYSTEMS:    CONSTITUTIONAL: Radha Mcginnis came in for  Denies any cough, hemoptysis, shortness of breath or dyspnea on exertion  GASTROINTESTINAL: As noted in the History of Present Illness  GENITOURINARY: Denies any hematuria or dysuria  NEUROLOGIC: No dizziness or vertigo  MUSCULOSKELETAL: Denies any joint swellings  SKIN: Denies skin rashes or itching  ENDOCRINE: Denies excessive thirst  Denies intolerance to heat or cold  PSYCHOSOCIAL: Denies depression or anxiety  Denies any recent memory loss         Historical Information   Past Medical History:   Diagnosis Date    Arthritis     Asthma     Back pain     Colon polyp     COVID-19 1/21/2021 1/11/2021    Gastroesophageal reflux disease without esophagitis 7/16/2020    GERD (gastroesophageal reflux disease)     Gout of multiple sites 7/16/2020    Hiatal hernia     High blood pressure     Hypertension     Impaired fasting glucose 6/18/2020    Lipoma of neck     Mediastinal lymphadenopathy     Osteoporosis     Pneumonia due to COVID-19 virus 1/25/2021    Right hand pain 7/16/2020    Sleep disorder      Past Surgical History:   Procedure Laterality Date    BACK SURGERY      COLONOSCOPY  10/07/2016    5 years (per dominique)    COLONOSCOPY      EGD  03/31/2017    ELBOW SURGERY Left 04/09/2018    open arthrotomy, capsulectomy, loose body removal (per dominique)    HERNIA REPAIR  37/19/5820    umbilical with mesh (per dominique)    LUMBAR FUSION  07/11/2018    MA BRONCHOSCOPY NEEDLE BX TRACHEA MAIN STEM&/BRON N/A 4/28/2020    Procedure: ENDOBRONCHIAL ULTRASOUND (EBUS) WITH BIOPSY;  Surgeon: Clarice Tabares MD;  [facility];  Service: Thoracic    [address] N/A 4/28/2020    Procedure: BRONCHOSCOPY FLEXIBLE;  Surgeon: aYdy Hahn MD L[place];  [facility];  Service: Thoracic    ROTATOR CUFF REPAIR Bilateral     TONSILLECTOMY  1980    UPPER GASTROINTESTINAL ENDOSCOPY       Social History   Social History     Substance and Sexual Activity   Alcohol Use Yes    Alcohol/week: 7 0 standard drinks    Types: 7 Standard drinks or equivalent per week    Comment: one glass per night  Social History     Substance and Sexual Activity   Drug Use No     Social History     Tobacco Use   Smoking Status Former Smoker    Packs/day: 0 50    Years: 6 00    Pack years: 3 00    Types: Cigarettes    Quit date: 44    Years since quittin 1   Smokeless Tobacco Never Used     Family History   Problem Relation Age of Onset    Hypertension Mother     Hypertension Father        Meds/Allergies       Current Outpatient Medications:     chlorthalidone 25 mg tablet    clotrimazole (LOTRIMIN) 1 % cream    fluticasone (FLONASE) 50 mcg/act nasal spray    ipratropium (ATROVENT) 0 06 % nasal spray    mupirocin (BACTROBAN) 2 % ointment    potassium chloride (K-DUR,KLOR-CON) 10 mEq tablet    losartan (COZAAR) 100 MG tablet    polyethylene glycol (Golytely) 4000 mL solution    Allergies   Allergen Reactions    Nuts - Food Allergy Shortness Of Breath    Peanut Oil - Food Allergy Anaphylaxis     Anything using peanuts    Codeine Other (See Comments)     Dizzy, light headed  Body of balance      Latex Rash     Allergic to products, wears cotton underwear    Caffeine - Food Allergy Other (See Comments)    Dust Mite Extract     Other Other (See Comments)     Dizzy    Pollen Extract Other (See Comments)     Grass, Pollen           Objective     Blood pressure (!) 192/111, pulse 97, height 5' 7" (1 702 m), weight 74 4 kg (164 lb)  Body mass index is 25 69 kg/m²  PHYSICAL EXAM:      General Appearance:   Alert, cooperative, no distress   HEENT:   Normocephalic, atraumatic, anicteric       Neck:  Supple, symmetrical, trachea midline   Lungs:   Clear to auscultation bilaterally; no rales, rhonchi or wheezing; respirations unlabored    Heart[de-identified]   Regular rate and rhythm; no murmur  Abdomen:   Soft, non-tender, non-distended; normal bowel sounds; no masses, no organomegaly    Genitalia:   Deferred    Rectal:   Deferred    Extremities:  No cyanosis, clubbing or edema    Skin:  No jaundice, rashes, or lesions    Lymph nodes:  No palpable cervical lymphadenopathy        Lab Results:   No visits with results within 1 Day(s) from this visit     Latest known visit with results is:   Appointment on 11/05/2021   Component Date Value    WBC 11/05/2021 3 86*    RBC 11/05/2021 5 64*    Hemoglobin 11/05/2021 15 4     Hematocrit 11/05/2021 48 8     MCV 11/05/2021 87     MCH 11/05/2021 27 3     MCHC 11/05/2021 31 6     RDW 11/05/2021 14 3     MPV 11/05/2021 9 8     Platelets 64/07/9203 214     nRBC 11/05/2021 0     Neutrophils Relative 11/05/2021 50     Immat GRANS % 11/05/2021 1     Lymphocytes Relative 11/05/2021 37     Monocytes Relative 11/05/2021 10     Eosinophils Relative 11/05/2021 1     Basophils Relative 11/05/2021 1     Neutrophils Absolute 11/05/2021 1 96     Immature Grans Absolute 11/05/2021 0 03     Lymphocytes Absolute 11/05/2021 1 41     Monocytes Absolute 11/05/2021 0 39     Eosinophils Absolute 11/05/2021 0 03     Basophils Absolute 11/05/2021 0 04     Sodium 11/05/2021 138     Potassium 11/05/2021 3 1*    Chloride 11/05/2021 99*    CO2 11/05/2021 30     ANION GAP 11/05/2021 9     BUN 11/05/2021 16     Creatinine 11/05/2021 1 25     Glucose, Fasting 11/05/2021 124*    Calcium 11/05/2021 8 5     AST 11/05/2021 23     ALT 11/05/2021 54     Alkaline Phosphatase 11/05/2021 76     Total Protein 11/05/2021 8 3*    Albumin 11/05/2021 3 8     Total Bilirubin 11/05/2021 0 59     eGFR 11/05/2021 70     Sed Rate 11/05/2021 31*    Cholesterol 11/05/2021 182     Triglycerides 11/05/2021 92     HDL, Direct 11/05/2021 65     LDL Calculated 11/05/2021 99     Non-HDL-Chol (CHOL-HDL) 11/05/2021 117     Hemoglobin A1C 11/05/2021 6 3*    EAG 11/05/2021 134     TSH 3RD GENERATON 11/05/2021 1 709     HIV-1/HIV-2 Ab 11/05/2021 Non-Reactive     Hepatitis C Ab 11/05/2021 Non-reactive     PSA 11/05/2021 0 6     Total CK 11/05/2021 208     CK-MB Index 11/05/2021 1 3     CK-MB 11/05/2021 2 6          Radiology Results:   No results found

## 2022-02-02 NOTE — PATIENT INSTRUCTIONS
Scheduled date of EGD/colonoscopy (as of today): 2/18/22  Physician performing EGD/colonoscopy: DR Romulo Zarate  [facility] of [facility]oscopy[facility]  Desired bowel prep reviewed with patient: Golytely  Instructions reviewed with patient by:   Clearances:  N/A

## 2022-02-03 ENCOUNTER — TELEPHONE (OUTPATIENT)
Dept: GASTROENTEROLOGY | Facility: AMBULATORY SURGERY CENTER | Age: 62
End: 2022-02-03

## 2022-02-03 NOTE — TELEPHONE ENCOUNTER
Called patient and LMOM explaining why we had to cxl his procedure and that someone would call him to schedule at [facility]

## 2022-02-09 ENCOUNTER — TELEPHONE (OUTPATIENT)
Dept: GASTROENTEROLOGY | Facility: CLINIC | Age: 62
End: 2022-02-09

## 2022-02-09 NOTE — TELEPHONE ENCOUNTER
Patients GI provider:  Dr Sara Manzanares    Number to return call: (  [phone]    Reason for call: Pt calling to reschedule procedure    Scheduled procedure/appointment date if applicable: procedure   2-18-22

## 2022-03-09 ENCOUNTER — TELEPHONE (OUTPATIENT)
Dept: FAMILY MEDICINE CLINIC | Facility: CLINIC | Age: 62
End: 2022-03-09

## 2022-03-11 ENCOUNTER — OFFICE VISIT (OUTPATIENT)
Dept: FAMILY MEDICINE CLINIC | Facility: CLINIC | Age: 62
End: 2022-03-11
Payer: COMMERCIAL

## 2022-03-11 VITALS
SYSTOLIC BLOOD PRESSURE: 168 MMHG | OXYGEN SATURATION: 98 % | BODY MASS INDEX: 25.58 KG/M2 | TEMPERATURE: 97.9 F | DIASTOLIC BLOOD PRESSURE: 100 MMHG | WEIGHT: 163 LBS | RESPIRATION RATE: 17 BRPM | HEIGHT: 67 IN | HEART RATE: 102 BPM

## 2022-03-11 DIAGNOSIS — Z00.00 MEDICARE ANNUAL WELLNESS VISIT, SUBSEQUENT: ICD-10-CM

## 2022-03-11 DIAGNOSIS — E87.6 HYPOKALEMIA: ICD-10-CM

## 2022-03-11 DIAGNOSIS — R09.81 SINUS CONGESTION: ICD-10-CM

## 2022-03-11 DIAGNOSIS — G62.9 NEUROPATHY: ICD-10-CM

## 2022-03-11 DIAGNOSIS — I10 PRIMARY HYPERTENSION: Primary | ICD-10-CM

## 2022-03-11 PROBLEM — M10.9 ACUTE GOUT OF RIGHT ELBOW: Status: ACTIVE | Noted: 2022-01-23

## 2022-03-11 PROBLEM — M25.421 EFFUSION, RIGHT ELBOW: Status: ACTIVE | Noted: 2022-01-21

## 2022-03-11 PROCEDURE — 3725F SCREEN DEPRESSION PERFORMED: CPT | Performed by: NURSE PRACTITIONER

## 2022-03-11 PROCEDURE — 3077F SYST BP >= 140 MM HG: CPT | Performed by: NURSE PRACTITIONER

## 2022-03-11 PROCEDURE — 3080F DIAST BP >= 90 MM HG: CPT | Performed by: NURSE PRACTITIONER

## 2022-03-11 PROCEDURE — G0439 PPPS, SUBSEQ VISIT: HCPCS | Performed by: NURSE PRACTITIONER

## 2022-03-11 PROCEDURE — 99214 OFFICE O/P EST MOD 30 MIN: CPT | Performed by: NURSE PRACTITIONER

## 2022-03-11 PROCEDURE — 3008F BODY MASS INDEX DOCD: CPT | Performed by: NURSE PRACTITIONER

## 2022-03-11 RX ORDER — INDOMETHACIN 25 MG/1
CAPSULE ORAL
COMMUNITY
Start: 2022-01-23

## 2022-03-11 RX ORDER — IPRATROPIUM BROMIDE 42 UG/1
2 SPRAY, METERED NASAL 4 TIMES DAILY
Qty: 262 ML | Refills: 0 | Status: SHIPPED | OUTPATIENT
Start: 2022-03-11 | End: 2023-03-11

## 2022-03-11 RX ORDER — CHLORTHALIDONE 25 MG/1
25 TABLET ORAL DAILY
Qty: 90 TABLET | Refills: 1 | Status: SHIPPED | OUTPATIENT
Start: 2022-03-11 | End: 2022-06-09

## 2022-03-11 RX ORDER — GABAPENTIN 100 MG/1
100 CAPSULE ORAL
Qty: 90 CAPSULE | Refills: 1 | Status: SHIPPED | OUTPATIENT
Start: 2022-03-11 | End: 2022-07-28

## 2022-03-11 RX ORDER — GABAPENTIN 100 MG/1
100 CAPSULE ORAL
COMMUNITY
End: 2022-03-11 | Stop reason: SDUPTHER

## 2022-03-11 RX ORDER — POTASSIUM CHLORIDE 20 MEQ/1
20 TABLET, EXTENDED RELEASE ORAL DAILY
Qty: 90 TABLET | Refills: 1 | Status: SHIPPED | OUTPATIENT
Start: 2022-03-11 | End: 2022-08-10

## 2022-03-11 RX ORDER — HYDRALAZINE HYDROCHLORIDE 10 MG/1
10 TABLET, FILM COATED ORAL 2 TIMES DAILY
Qty: 180 TABLET | Refills: 1 | Status: SHIPPED | OUTPATIENT
Start: 2022-03-11 | End: 2022-06-09

## 2022-03-11 NOTE — PROGRESS NOTES
Assessment and Plan:     Problem List Items Addressed This Visit        Cardiovascular and Mediastinum    Hypertension - Primary    Relevant Medications    hydrALAZINE (APRESOLINE) 10 mg tablet    chlorthalidone 25 mg tablet       Other    Hypokalemia    Relevant Medications    potassium chloride (K-DUR,KLOR-CON) 20 mEq tablet      Other Visit Diagnoses     Neuropathy        Relevant Medications    gabapentin (NEURONTIN) 100 mg capsule    Sinus congestion        Relevant Medications    ipratropium (ATROVENT) 0 06 % nasal spray    Medicare annual wellness visit, subsequent            BMI Counseling: Body mass index is 25 53 kg/m²  The BMI is above normal  Nutrition recommendations include decreasing portion sizes, encouraging healthy choices of fruits and vegetables, decreasing fast food intake, consuming healthier snacks, limiting drinks that contain sugar, moderation in carbohydrate intake, increasing intake of lean protein, reducing intake of saturated and trans fat and reducing intake of cholesterol  Exercise recommendations include vigorous physical activity 75 minutes/week, exercising 3-5 times per week and strength training exercises  No pharmacotherapy was ordered  Patient referred to PCP  Rationale for BMI follow-up plan is due to patient being overweight or obese  Depression Screening and Follow-up Plan: Patient was screened for depression during today's encounter  They screened negative with a PHQ-2 score of 0  Preventive health issues were discussed with patient, and age appropriate screening tests were ordered as noted in patient's After Visit Summary  Personalized health advice and appropriate referrals for health education or preventive services given if needed, as noted in patient's After Visit Summary       History of Present Illness:     Patient presents for Medicare Annual Wellness visit    Patient Care Team:  Martine Josue as PCP - General (Nurse Practitioner)  Michelle Vásquez MD as Surgeon (Thoracic Surgery)  Kristin Guerrero MD (Vascular Surgery)     Problem List:     Patient Active Problem List   Diagnosis    Mediastinal lymphadenopathy    Spondylosis of cervical region without myelopathy or radiculopathy    Osteoarthritis of left elbow    Palpitations    JADYN on CPAP    History of elbow surgery    Foraminal stenosis of cervical region    DDD (degenerative disc disease), cervical    Impaired fasting glucose    Gout of multiple sites    Gastroesophageal reflux disease without esophagitis    Other insomnia    Lipoma of neck    COVID-19 virus infection    YOAV (acute kidney injury) ([address] )    Elevated liver enzymes    Hypertension    Pneumonia due to COVID-19 virus    Recurrent cellulitis of lower extremity    Hypokalemia    Effusion, right elbow    Acute gout of right elbow      Past Medical and Surgical History:     Past Medical History:   Diagnosis Date    Arthritis     Asthma     Back pain     Colon polyp     COVID-19 1/21/2021 1/11/2021    Gastroesophageal reflux disease without esophagitis 7/16/2020    GERD (gastroesophageal reflux disease)     Gout of multiple sites 7/16/2020    Hiatal hernia     High blood pressure     Hypertension     Impaired fasting glucose 6/18/2020    Lipoma of neck     Mediastinal lymphadenopathy     Osteoporosis     Pneumonia due to COVID-19 virus 1/25/2021    Right hand pain 7/16/2020    Sleep disorder      Past Surgical History:   Procedure Laterality Date    BACK SURGERY      COLONOSCOPY  10/07/2016    5 years (per dominique)    COLONOSCOPY      EGD  03/31/2017    ELBOW SURGERY Left 04/09/2018    open arthrotomy, capsulectomy, loose body removal (per dominique)    HERNIA REPAIR  76/71/4223    umbilical with mesh (per dominique)    LUMBAR FUSION  07/11/2018    AK BRONCHOSCOPY NEEDLE BX TRACHEA MAIN STEM&/BRON N/A 4/28/2020    Procedure: ENDOBRONCHIAL ULTRASOUND (EBUS) WITH BIOPSY;  Surgeon: Di Bill, MD;  [facility];  Service: Thoracic    [address] N/A 2020    Procedure: Taiwo Hooper;  Surgeon: Linda Longoria MD;  [facility];  Service: Thoracic    ROTATOR CUFF REPAIR Bilateral     TONSILLECTOMY  1980    UPPER GASTROINTESTINAL ENDOSCOPY        Family History:     Family History   Problem Relation Age of Onset    Hypertension Mother     Hypertension Father       Social History:     Social History     Socioeconomic History    Marital status: /Civil Union     Spouse name: None    Number of children: None    Years of education: None    Highest education level: None   Occupational History    Occupation: unemployed   Tobacco Use    Smoking status: Former Smoker     Packs/day: 0 50     Years: 6 00     Pack years: 3 00     Types: Cigarettes     Quit date:      Years since quittin 2    Smokeless tobacco: Never Used   Vaping Use    Vaping Use: Never used   Substance and Sexual Activity    Alcohol use: Yes     Alcohol/week: 7 0 standard drinks     Types: 7 Standard drinks or equivalent per week     Comment: one glass per night   Drug use: No    Sexual activity: Yes   Other Topics Concern    None   Social History Narrative    Most recent tobacco use screenin2020      Do you currently or have you served in the [employer]:  No      Were you activated, into active duty, as a member of the [employer] or as a Reservist:  No      Sexual orientation:  Heterosexual      Exercise level:   Moderate      Diet:  Regular      General stress level:  Medium      Caffeine intake:  Occasional      Guns present in home:  No      Seat belts used routinely:  Yes      Smoke alarm in home:  Yes      Advance directive:  No      Passive smoke exposure:  No      Live alone or with others:  with others      Are you currently employed:  No      Asbestos exposure:  No      TB exposure:  No      Environmental exposure:  No      Animal exposure:  Yes 1 dog     (per dominique)     Social Determinants of Health     Financial Resource Strain: Not on file   Food Insecurity: Not on file   Transportation Needs: Not on file   Physical Activity: Not on file   Stress: Not on file   Social Connections: Not on file   Intimate Partner Violence: Not on file   Housing Stability: Not on file      Medications and Allergies:     Current Outpatient Medications   Medication Sig Dispense Refill    gabapentin (NEURONTIN) 100 mg capsule Take 1 capsule (100 mg total) by mouth daily at bedtime 90 capsule 1    chlorthalidone 25 mg tablet Take 1 tablet (25 mg total) by mouth daily 90 tablet 1    clotrimazole (LOTRIMIN) 1 % cream Apply topically 2 (two) times a day as needed       fluticasone (FLONASE) 50 mcg/act nasal spray 2 sprays into each nostril daily 1 Bottle 5    hydrALAZINE (APRESOLINE) 10 mg tablet Take 1 tablet (10 mg total) by mouth 2 (two) times a day 180 tablet 1    indomethacin (INDOCIN) 25 mg capsule       ipratropium (ATROVENT) 0 06 % nasal spray 2 sprays into each nostril 4 (four) times a day 262 mL 0    mupirocin (BACTROBAN) 2 % ointment Apply topically 3 (three) times a day for 10 days 60 g 1    polyethylene glycol (Golytely) 4000 mL solution Take 4,000 mL by mouth once for 1 dose Take according to instructions given by the office for colonoscopy bowel prep  4000 mL 0    potassium chloride (K-DUR,KLOR-CON) 20 mEq tablet Take 1 tablet (20 mEq total) by mouth daily 90 tablet 1     No current facility-administered medications for this visit       Allergies   Allergen Reactions    Nuts - Food Allergy Shortness Of Breath    Peanut Oil - Food Allergy Anaphylaxis     Anything using peanuts    Codeine Other (See Comments)     Dizzy, light headed  Body of balance      Latex Rash     Allergic to products, wears cotton underwear    Caffeine - Food Allergy Other (See Comments)    Dust Mite Extract     Other Other (See Comments)     Dizzy    Pollen Extract Other (See Comments)     Grass, Pollen      Immunizations:     Immunization History   Administered Date(s) Administered    COVID-19 PFIZER VACCINE 0 3 ML IM 10/29/2021, 11/19/2021    Influenza Quadrivalent Preservative Free 3 years and older IM 01/05/2022    Tdap 07/16/2020    Zoster Vaccine Recombinant 07/16/2020, 09/17/2020      Health Maintenance:         Topic Date Due    Colorectal Cancer Screening  10/07/2021    HIV Screening  Completed    Hepatitis C Screening  Completed     There are no preventive care reminders to display for this patient  Medicare Health Risk Assessment:     /100 (BP Location: Left arm, Patient Position: Sitting, Cuff Size: Standard)   Pulse 102   Temp 97 9 °F (36 6 °C) (Temporal)   Resp 17   Ht 5' 7" (1 702 m)   Wt 73 9 kg (163 lb)   SpO2 98%   BMI 25 53 kg/m²      Vivien Mckenna is here for his Subsequent Wellness visit  Health Risk Assessment:   Patient rates overall health as good  Patient feels that their physical health rating is slightly better  Patient is satisfied with their life  Eyesight was rated as slightly worse  Hearing was rated as same  Patient feels that their emotional and mental health rating is same  Patients states they are never, rarely angry  Patient states they are sometimes unusually tired/fatigued  Pain experienced in the last 7 days has been some  Patient's pain rating has been 7/10  Patient states that he has experienced weight loss or gain in last 6 months  Depression Screening:   PHQ-2 Score: 0      Fall Risk Screening: In the past year, patient has experienced: no history of falling in past year      Home Safety:  Patient does not have trouble with stairs inside or outside of their home  Patient has working smoke alarms and has working carbon monoxide detector  Home safety hazards include: none  Nutrition:   Current diet is Low Carb  Medications:   Patient is not currently taking any over-the-counter supplements  Patient is able to manage medications  Activities of Daily Living (ADLs)/Instrumental Activities of Daily Living (IADLs):   Walk and transfer into and out of bed and chair?: Yes  Dress and groom yourself?: Yes    Bathe or shower yourself?: Yes    Feed yourself? Yes  Do your laundry/housekeeping?: Yes  Manage your money, pay your bills and track your expenses?: Yes  Make your own meals?: Yes    Do your own shopping?: Yes    Previous Hospitalizations:   Any hospitalizations or ED visits within the last 12 months?: Yes    How many hospitalizations have you had in the last year?: 1-2    Advance Care Planning:   Living will: No    Advanced directive counseling given: Yes    Five wishes given: Yes      Cognitive Screening:   Provider or family/friend/caregiver concerned regarding cognition?: No    PREVENTIVE SCREENINGS      Cardiovascular Screening:    General: Screening Current      Diabetes Screening:     General: Screening Current      Colorectal Cancer Screening:     General: Screening Current      Prostate Cancer Screening:    General: Screening Current      Abdominal Aortic Aneurysm (AAA) Screening:    Risk factors include: tobacco use        Lung Cancer Screening:     General: Screening Not Indicated      Hepatitis C Screening:    General: Screening Current    Screening, Brief Intervention, and Referral to Treatment (SBIRT)    Screening  Typical number of drinks in a day: 1  Typical number of drinks in a week: 3  Interpretation: Low risk drinking behavior  Single Item Drug Screening:  How often have you used an illegal drug (including marijuana) or a prescription medication for non-medical reasons in the past year? never    Single Item Drug Screen Score: 0  Interpretation: Negative screen for possible drug use disorder    Other Counseling Topics:   Car/seat belt/driving safety, skin self-exam, sunscreen and calcium and vitamin D intake and regular weightbearing exercise         MEGHNA Lackey

## 2022-03-11 NOTE — PATIENT INSTRUCTIONS

## 2022-03-11 NOTE — TELEPHONE ENCOUNTER
He will need to be seen   Used to see Dr Denis Goff   We need to figure when it was started and by who

## 2022-03-12 NOTE — PROGRESS NOTES
BMI Counseling: Body mass index is 25 53 kg/m²  The BMI is above normal  Nutrition recommendations include decreasing portion sizes, encouraging healthy choices of fruits and vegetables, decreasing fast food intake, consuming healthier snacks, limiting drinks that contain sugar, moderation in carbohydrate intake, increasing intake of lean protein, reducing intake of saturated and trans fat and reducing intake of cholesterol  Exercise recommendations include vigorous physical activity 75 minutes/week, exercising 3-5 times per week and strength training exercises  No pharmacotherapy was ordered  Patient referred to PCP  Rationale for BMI follow-up plan is due to patient being overweight or obese  Depression Screening and Follow-up Plan: Patient was screened for depression during today's encounter  They screened negative with a PHQ-2 score of 0  Assessment/Plan:    Patient is a 49-year-old male presents in office for follow-up multiple issues  Initially called to have gabapentin refilled which was initiated in a hospital setting when he was admitted about a couple months ago he was post coming for follow-up but failed to do so  Lower extremity erythema and skin issues are doing better continues to have neuropathy and gabapentin has been working well  Hypertension is still not well controlled only currently taking the chlorthalidone 25 mg takes it once a day noted to have low potassium levels when he was in the hospital strongly encouraged to make sure he is taking his potassium daily  He could understand with a big deal is about taking the potassium and explained to him the pathophysiology and excretion of the as chlorthalidone  Discussed concerns with uncontrolled hypertension he is no longer taking the losartan feels like it is not working for him no longer takes the metoprolol that was started by Cardiology    Has been trying  tea recommendations and herbals  and staff to help him decrease it      Discussed that the blood pressure is still not  at optimal  I have added hydralazine 10 mg twice a day right now discussed that it is a very low dose that we will taper it up so he is to send me his blood pressure logs weekly  He also does not like when his blood pressure is running low his he is feeling them much sooner so we will go from here  Discussed importance of adequate hydration avoid excessive sodium intake and follow-up in 4-6 weeks  Problem List Items Addressed This Visit        Cardiovascular and Mediastinum    Hypertension - Primary    Relevant Medications    hydrALAZINE (APRESOLINE) 10 mg tablet    chlorthalidone 25 mg tablet       Other    Hypokalemia    Relevant Medications    potassium chloride (K-DUR,KLOR-CON) 20 mEq tablet      Other Visit Diagnoses     Neuropathy        Relevant Medications    gabapentin (NEURONTIN) 100 mg capsule    Sinus congestion        Relevant Medications    ipratropium (ATROVENT) 0 06 % nasal spray    Medicare annual wellness visit, subsequent                Subjective:      Patient ID: Irais Flynn is a 59 y o  male  HPI    The following portions of the patient's history were reviewed and updated as appropriate:   Past Medical History:  He has a past medical history of Arthritis, Asthma, Back pain, Colon polyp, COVID-19 (1/21/2021), Gastroesophageal reflux disease without esophagitis (7/16/2020), GERD (gastroesophageal reflux disease), Gout of multiple sites (7/16/2020), Hiatal hernia, High blood pressure, Hypertension, Impaired fasting glucose (6/18/2020), Lipoma of neck, Mediastinal lymphadenopathy, Osteoporosis, Pneumonia due to COVID-19 virus (1/25/2021), Right hand pain (7/16/2020), and Sleep disorder  ,  _______________________________________________________________________  Medical Problems:  does not have any pertinent problems on file ,  _______________________________________________________________________  Past Surgical History:   has a past surgical history that includes Lumbar fusion (07/11/2018); EGD (03/31/2017); Tonsillectomy (01/01/1980); Colonoscopy (10/07/2016); Elbow surgery (Left, 04/09/2018); Hernia repair (01/16/2019); Rotator cuff repair (Bilateral); Colonoscopy; Back surgery; pr bronchoscopy,diagnostic (N/A, 4/28/2020); pr bronchoscopy needle bx trachea main stem&/bron (N/A, 4/28/2020); and Upper gastrointestinal endoscopy  ,  _______________________________________________________________________  Family History:  family history includes Hypertension in his father and mother ,  _______________________________________________________________________  Social History:   reports that he quit smoking about 38 years ago  His smoking use included cigarettes  He has a 3 00 pack-year smoking history  He has never used smokeless tobacco  He reports current alcohol use of about 7 0 standard drinks of alcohol per week  He reports that he does not use drugs  ,  _______________________________________________________________________  Allergies:  is allergic to nuts - food allergy, peanut oil - food allergy, codeine, latex, caffeine - food allergy, dust mite extract, other, and pollen extract     _______________________________________________________________________  Current Outpatient Medications   Medication Sig Dispense Refill    gabapentin (NEURONTIN) 100 mg capsule Take 1 capsule (100 mg total) by mouth daily at bedtime 90 capsule 1    chlorthalidone 25 mg tablet Take 1 tablet (25 mg total) by mouth daily 90 tablet 1    clotrimazole (LOTRIMIN) 1 % cream Apply topically 2 (two) times a day as needed       fluticasone (FLONASE) 50 mcg/act nasal spray 2 sprays into each nostril daily 1 Bottle 5    hydrALAZINE (APRESOLINE) 10 mg tablet Take 1 tablet (10 mg total) by mouth 2 (two) times a day 180 tablet 1    indomethacin (INDOCIN) 25 mg capsule       ipratropium (ATROVENT) 0 06 % nasal spray 2 sprays into each nostril 4 (four) times a day 262 mL 0    mupirocin (BACTROBAN) 2 % ointment Apply topically 3 (three) times a day for 10 days 60 g 1    polyethylene glycol (Golytely) 4000 mL solution Take 4,000 mL by mouth once for 1 dose Take according to instructions given by the office for colonoscopy bowel prep  4000 mL 0    potassium chloride (K-DUR,KLOR-CON) 20 mEq tablet Take 1 tablet (20 mEq total) by mouth daily 90 tablet 1     No current facility-administered medications for this visit      _______________________________________________________________________  Review of Systems   Constitutional: Positive for unexpected weight change (weight loss )  Negative for fatigue  Intermittent sweats    HENT: Negative for congestion, sore throat (hoarseness in his voice ) and voice change  Eyes: Negative  Respiratory: Negative for cough and shortness of breath  Cardiovascular: Negative for chest pain, palpitations and leg swelling  Uncontrolled HTN    Gastrointestinal: Negative for abdominal distention, abdominal pain and nausea  Genitourinary: Negative for difficulty urinating and flank pain  Musculoskeletal: Negative for arthralgias  Skin:        Cellulitis resolved    Psychiatric/Behavioral: Negative for sleep disturbance and suicidal ideas  The patient is nervous/anxious  Objective:  Vitals:    03/11/22 1333   BP: 168/100   BP Location: Left arm   Patient Position: Sitting   Cuff Size: Standard   Pulse: 102   Resp: 17   Temp: 97 9 °F (36 6 °C)   TempSrc: Temporal   SpO2: 98%   Weight: 73 9 kg (163 lb)   Height: 5' 7" (1 702 m)     Body mass index is 25 53 kg/m²  Physical Exam  Vitals and nursing note reviewed  Constitutional:       Appearance: Normal appearance  HENT:      Head: Atraumatic  Eyes:      Extraocular Movements: Extraocular movements intact  Cardiovascular:      Rate and Rhythm: Normal rate and regular rhythm  Pulses: Normal pulses  Heart sounds: Normal heart sounds        Comments: Elevated Bps   Pulmonary:      Effort: Pulmonary effort is normal       Breath sounds: Normal breath sounds  Abdominal:      Palpations: Abdomen is soft  Musculoskeletal:         General: Normal range of motion  Cervical back: Normal range of motion  Skin:     General: Skin is warm  Neurological:      Mental Status: He is oriented to person, place, and time     Psychiatric:         Mood and Affect: Mood normal          Behavior: Behavior normal

## 2022-03-30 ENCOUNTER — TELEPHONE (OUTPATIENT)
Dept: OTHER | Facility: OTHER | Age: 62
End: 2022-03-30

## 2022-03-30 NOTE — TELEPHONE ENCOUNTER
Patient called in to inquire when to start drinking for his prep for colonoscopy tomorrow  Found he is to take Golytely but patient reported not having Dulcolax tablets to also take  Please follow up with patient to make sure he has everything required for his prep to be successful

## 2022-03-30 NOTE — TELEPHONE ENCOUNTER
Spoke with patient regarding prep questions  Patient states he was unaware that he could not have breakfast and not given instructions although I see these were provided at Dr Frye Seen office last month  Patient informed me that he had breakfast (tater tots, jose, and an egg)  I told the patient that he would need to be rescheduled, however, patient insisted on still coming tomorrow regardless  Patient understands that he will need to repeat the colonoscopy if he is not properly cleaned out

## 2022-03-31 ENCOUNTER — HOSPITAL ENCOUNTER (OUTPATIENT)
Dept: GASTROENTEROLOGY | Facility: AMBULARY SURGERY CENTER | Age: 62
Setting detail: OUTPATIENT SURGERY
Discharge: HOME | End: 2022-03-31
Attending: INTERNAL MEDICINE | Admitting: INTERNAL MEDICINE
Payer: COMMERCIAL

## 2022-03-31 ENCOUNTER — ANESTHESIA (OUTPATIENT)
Dept: GASTROENTEROLOGY | Facility: AMBULARY SURGERY CENTER | Age: 62
End: 2022-03-31

## 2022-03-31 ENCOUNTER — ANESTHESIA EVENT (OUTPATIENT)
Dept: GASTROENTEROLOGY | Facility: AMBULARY SURGERY CENTER | Age: 62
End: 2022-03-31

## 2022-03-31 VITALS
TEMPERATURE: 97.8 F | HEART RATE: 82 BPM | SYSTOLIC BLOOD PRESSURE: 130 MMHG | RESPIRATION RATE: 18 BRPM | OXYGEN SATURATION: 100 % | HEIGHT: 67 IN | WEIGHT: 165.8 LBS | DIASTOLIC BLOOD PRESSURE: 81 MMHG | BODY MASS INDEX: 26.02 KG/M2

## 2022-03-31 DIAGNOSIS — Z86.010 HISTORY OF COLONIC POLYPS: ICD-10-CM

## 2022-03-31 DIAGNOSIS — K21.9 GASTROESOPHAGEAL REFLUX DISEASE WITHOUT ESOPHAGITIS: ICD-10-CM

## 2022-03-31 DIAGNOSIS — R63.4 WEIGHT LOSS: ICD-10-CM

## 2022-03-31 DIAGNOSIS — K44.9 HIATAL HERNIA: ICD-10-CM

## 2022-03-31 PROCEDURE — 45380 COLONOSCOPY AND BIOPSY: CPT | Performed by: INTERNAL MEDICINE

## 2022-03-31 PROCEDURE — 43239 EGD BIOPSY SINGLE/MULTIPLE: CPT | Performed by: INTERNAL MEDICINE

## 2022-03-31 PROCEDURE — 45385 COLONOSCOPY W/LESION REMOVAL: CPT | Performed by: INTERNAL MEDICINE

## 2022-03-31 PROCEDURE — 88305 TISSUE EXAM BY PATHOLOGIST: CPT | Performed by: PATHOLOGY

## 2022-03-31 PROCEDURE — C1726 CATH, BAL DIL, NON-VASCULAR: HCPCS

## 2022-03-31 PROCEDURE — 43249 ESOPH EGD DILATION <30 MM: CPT | Performed by: INTERNAL MEDICINE

## 2022-03-31 RX ORDER — LIDOCAINE HYDROCHLORIDE 10 MG/ML
INJECTION, SOLUTION EPIDURAL; INFILTRATION; INTRACAUDAL; PERINEURAL AS NEEDED
Status: DISCONTINUED | OUTPATIENT
Start: 2022-03-31 | End: 2022-03-31

## 2022-03-31 RX ORDER — SODIUM CHLORIDE, SODIUM LACTATE, POTASSIUM CHLORIDE, CALCIUM CHLORIDE 600; 310; 30; 20 MG/100ML; MG/100ML; MG/100ML; MG/100ML
INJECTION, SOLUTION INTRAVENOUS CONTINUOUS PRN
Status: DISCONTINUED | OUTPATIENT
Start: 2022-03-31 | End: 2022-03-31

## 2022-03-31 RX ORDER — GLYCOPYRROLATE 0.2 MG/ML
INJECTION INTRAMUSCULAR; INTRAVENOUS AS NEEDED
Status: DISCONTINUED | OUTPATIENT
Start: 2022-03-31 | End: 2022-03-31

## 2022-03-31 RX ORDER — OMEPRAZOLE 10 MG/1
10 CAPSULE, DELAYED RELEASE ORAL DAILY
COMMUNITY
End: 2022-03-31 | Stop reason: SDUPTHER

## 2022-03-31 RX ORDER — OMEPRAZOLE 10 MG/1
40 CAPSULE, DELAYED RELEASE ORAL DAILY
Qty: 30 CAPSULE | Refills: 3 | Status: SHIPPED | OUTPATIENT
Start: 2022-03-31 | End: 2022-07-27

## 2022-03-31 RX ORDER — PROPOFOL 10 MG/ML
INJECTION, EMULSION INTRAVENOUS AS NEEDED
Status: DISCONTINUED | OUTPATIENT
Start: 2022-03-31 | End: 2022-03-31

## 2022-03-31 RX ADMIN — PROPOFOL 30 MG: 10 INJECTION, EMULSION INTRAVENOUS at 11:56

## 2022-03-31 RX ADMIN — PROPOFOL 50 MG: 10 INJECTION, EMULSION INTRAVENOUS at 11:43

## 2022-03-31 RX ADMIN — PROPOFOL 30 MG: 10 INJECTION, EMULSION INTRAVENOUS at 11:34

## 2022-03-31 RX ADMIN — LIDOCAINE HYDROCHLORIDE 50 MG: 10 INJECTION, SOLUTION EPIDURAL; INFILTRATION; INTRACAUDAL at 11:28

## 2022-03-31 RX ADMIN — PROPOFOL 130 MG: 10 INJECTION, EMULSION INTRAVENOUS at 11:31

## 2022-03-31 RX ADMIN — PROPOFOL 30 MG: 10 INJECTION, EMULSION INTRAVENOUS at 11:50

## 2022-03-31 RX ADMIN — PROPOFOL 100 MG: 10 INJECTION, EMULSION INTRAVENOUS at 11:40

## 2022-03-31 RX ADMIN — PROPOFOL 30 MG: 10 INJECTION, EMULSION INTRAVENOUS at 11:45

## 2022-03-31 RX ADMIN — PROPOFOL 30 MG: 10 INJECTION, EMULSION INTRAVENOUS at 11:47

## 2022-03-31 RX ADMIN — GLYCOPYRROLATE 0.2 MG: 0.2 INJECTION, SOLUTION INTRAMUSCULAR; INTRAVENOUS at 11:37

## 2022-03-31 RX ADMIN — Medication 40 MG: at 11:58

## 2022-03-31 RX ADMIN — SODIUM CHLORIDE, SODIUM LACTATE, POTASSIUM CHLORIDE, AND CALCIUM CHLORIDE: .6; .31; .03; .02 INJECTION, SOLUTION INTRAVENOUS at 11:19

## 2022-03-31 RX ADMIN — PROPOFOL 30 MG: 10 INJECTION, EMULSION INTRAVENOUS at 11:53

## 2022-03-31 RX ADMIN — PROPOFOL 30 MG: 10 INJECTION, EMULSION INTRAVENOUS at 11:59

## 2022-03-31 NOTE — ANESTHESIA POSTPROCEDURE EVALUATION
Post-Op Assessment Note    CV Status:  Stable  Pain Score: 0    Pain management: adequate     Mental Status:  Alert and awake   Hydration Status:  Euvolemic   PONV Controlled:  Controlled   Airway Patency:  Patent      Post Op Vitals Reviewed: Yes      Staff: CRNA         No complications documented      BP  126/72   Temp   97   Pulse 94   Resp 16   SpO2 97

## 2022-03-31 NOTE — ANESTHESIA PREPROCEDURE EVALUATION
Procedure:  COLONOSCOPY  EGD    Relevant Problems   CARDIO   (+) Hypertension      GI/HEPATIC   (+) Gastroesophageal reflux disease without esophagitis      /RENAL   (+) YOAV (acute kidney injury) ([facility] [address] )      MUSCULOSKELETAL   (+) Acute gout of right elbow   (+) DDD (degenerative disc disease), cervical   (+) Gout of multiple sites   (+) Osteoarthritis of left elbow   (+) Spondylosis of cervical region without myelopathy or radiculopathy      PULMONARY   (+) JADYN on CPAP   (+) Pneumonia due to COVID-19 virus      Other   (+) COVID-19 virus infection   (+) Effusion, right elbow   (+) Elevated liver enzymes   (+) Foraminal stenosis of cervical region   (+) History of elbow surgery   (+) Hypokalemia   (+) Impaired fasting glucose   (+) Lipoma of neck   (+) Mediastinal lymphadenopathy   (+) Other insomnia   (+) Palpitations   (+) Recurrent cellulitis of lower extremity      Lab Results   Component Value Date    SODIUM 138 11/05/2021    K 3 1 (L) 11/05/2021    CL 99 (L) 11/05/2021    CO2 30 11/05/2021    AGAP 9 11/05/2021    BUN 16 11/05/2021    CREATININE 1 25 11/05/2021    GLUC 96 08/01/2021    GLUF 124 (H) 11/05/2021    CALCIUM 8 5 11/05/2021    AST 23 11/05/2021    ALT 54 11/05/2021    ALKPHOS 76 11/05/2021    TP 8 3 (H) 11/05/2021    TBILI 0 59 11/05/2021    EGFR 70 11/05/2021     Lab Results   Component Value Date    WBC 3 86 (L) 11/05/2021    HGB 15 4 11/05/2021    HCT 48 8 11/05/2021    MCV 87 11/05/2021     11/05/2021       Physical Exam    Airway    Mallampati score: I  TM Distance: >3 FB  Neck ROM: full     Dental   No notable dental hx     Cardiovascular      Pulmonary      Other Findings        Anesthesia Plan  ASA Score- 2     Anesthesia Type- IV sedation with anesthesia with ASA Monitors  Additional Monitors:   Airway Plan:           Plan Factors-Exercise tolerance (METS): >4 METS  Chart reviewed  EKG reviewed  Imaging results reviewed  Existing labs reviewed   Patient summary reviewed  Induction- intravenous  Postoperative Plan-     Informed Consent- Anesthetic plan and risks discussed with patient  I personally reviewed this patient with the CRNA  Discussed and agreed on the Anesthesia Plan with the CRNA  Mason Haley

## 2022-03-31 NOTE — H&P
History and Physical - [facility]  Imer Sanodval  59 y o  male MRN: [id number]    HPI: Imer Sandoval  is a 59y o  year old male who presents with GERD, hx of polyps, weight loss  Review of Systems    Historical Information   Past Medical History:   Diagnosis Date    Arthritis     Asthma     Back pain     Colon polyp     COVID-19 1/21/2021 1/11/2021    Gastroesophageal reflux disease without esophagitis 7/16/2020    GERD (gastroesophageal reflux disease)     Gout of multiple sites 7/16/2020    Hiatal hernia     High blood pressure     Hypertension     Impaired fasting glucose 6/18/2020    Lipoma of neck     Mediastinal lymphadenopathy     Osteoporosis     Pneumonia due to COVID-19 virus 1/25/2021    Right hand pain 7/16/2020    Sleep disorder      Past Surgical History:   Procedure Laterality Date    BACK SURGERY      COLONOSCOPY  10/07/2016    5 years (per dominique)    COLONOSCOPY      EGD  03/31/2017    ELBOW SURGERY Left 04/09/2018    open arthrotomy, capsulectomy, loose body removal (per dominique)    HERNIA REPAIR  55/83/9724    umbilical with mesh (per dominique)    LUMBAR FUSION  07/11/2018    DE BRONCHOSCOPY NEEDLE BX TRACHEA MAIN STEM&/BRON N/A 4/28/2020    Procedure: ENDOBRONCHIAL ULTRASOUND (EBUS) WITH BIOPSY;  Surgeon: Werner Ding MD;  [facility];  Service: Thoracic    DE [address] N/A 4/28/2020    Procedure: BRONCHOSCOPY FLEXIBLE;  Surgeon: Werner Ding MD;  [facility];  Service: Thoracic    ROTATOR CUFF REPAIR Bilateral     TONSILLECTOMY  01/01/1980    UPPER GASTROINTESTINAL ENDOSCOPY       Social History   Social History     Substance and Sexual Activity   Alcohol Use Yes    Alcohol/week: 7 0 standard drinks    Types: 7 Standard drinks or equivalent per week    Comment: one glass per night       Social History     Substance and Sexual Activity   Drug Use No     Social History     Tobacco Use Smoking Status Former Smoker    Packs/day: 0 50    Years: 6 00    Pack years: 3 00    Types: Cigarettes    Quit date: 44    Years since quittin 2   Smokeless Tobacco Never Used     Family History   Problem Relation Age of Onset    Hypertension Mother     Hypertension Father        Meds/Allergies     (Not in a hospital admission)      Allergies   Allergen Reactions    Nuts - Food Allergy Shortness Of Breath    Peanut Oil - Food Allergy Anaphylaxis     Anything using peanuts    Codeine Other (See Comments)     Dizzy, light headed  Body of balance      Latex Rash     Allergic to products, wears cotton underwear    Caffeine - Food Allergy Other (See Comments)    Dust Mite Extract     Other Other (See Comments)     Dizzy    Pollen Extract Other (See Comments)     Grass, Pollen       Objective     There were no vitals taken for this visit  PHYSICAL EXAM    Gen: NAD  CV: RRR  CHEST: Clear  ABD: soft, NT/ND  EXT: no edema  Neuro: AAO      ASSESSMENT/PLAN:  This is a 59y o  year old male here for GERD, hx of polyps, weight loss       PLAN:   Procedure: egd/colonoscopy

## 2022-04-12 ENCOUNTER — TELEPHONE (OUTPATIENT)
Dept: HEMATOLOGY ONCOLOGY | Facility: CLINIC | Age: 62
End: 2022-04-12

## 2022-04-12 NOTE — TELEPHONE ENCOUNTER
Reschedule Appointment     Who is calling in Patient    Doctor Appointment Scheduled with Dr Jaquez Fails date and time 04/12 at 9:20am   New date and time 04/19 at 1:40pm   Location [facility]   Patient verbalized understanding    yes

## 2022-04-19 ENCOUNTER — RA CDI HCC (OUTPATIENT)
Dept: OTHER | Facility: HOSPITAL | Age: 62
End: 2022-04-19

## 2022-04-19 ENCOUNTER — OFFICE VISIT (OUTPATIENT)
Dept: HEMATOLOGY ONCOLOGY | Facility: CLINIC | Age: 62
End: 2022-04-19
Payer: COMMERCIAL

## 2022-04-19 VITALS
RESPIRATION RATE: 18 BRPM | SYSTOLIC BLOOD PRESSURE: 190 MMHG | BODY MASS INDEX: 26.06 KG/M2 | HEART RATE: 91 BPM | OXYGEN SATURATION: 98 % | TEMPERATURE: 97.7 F | HEIGHT: 67 IN | DIASTOLIC BLOOD PRESSURE: 110 MMHG | WEIGHT: 166 LBS

## 2022-04-19 DIAGNOSIS — U07.1 COVID-19 VIRUS INFECTION: ICD-10-CM

## 2022-04-19 DIAGNOSIS — J12.82 PNEUMONIA DUE TO COVID-19 VIRUS: Primary | ICD-10-CM

## 2022-04-19 DIAGNOSIS — R59.0 MEDIASTINAL LYMPHADENOPATHY: ICD-10-CM

## 2022-04-19 DIAGNOSIS — D17.0 LIPOMA OF NECK: ICD-10-CM

## 2022-04-19 DIAGNOSIS — U07.1 PNEUMONIA DUE TO COVID-19 VIRUS: Primary | ICD-10-CM

## 2022-04-19 PROCEDURE — 3008F BODY MASS INDEX DOCD: CPT | Performed by: INTERNAL MEDICINE

## 2022-04-19 PROCEDURE — 3080F DIAST BP >= 90 MM HG: CPT | Performed by: INTERNAL MEDICINE

## 2022-04-19 PROCEDURE — 99214 OFFICE O/P EST MOD 30 MIN: CPT | Performed by: INTERNAL MEDICINE

## 2022-04-19 PROCEDURE — 3077F SYST BP >= 140 MM HG: CPT | Performed by: INTERNAL MEDICINE

## 2022-04-19 NOTE — PROGRESS NOTES
[facility]  coding opportunities       Chart reviewed, no opportunity found:   [address]        Patients Insurance     Medicare Insurance: [facility] Advantage

## 2022-04-19 NOTE — PROGRESS NOTES
[address] [facility]  1960  [address] [facility]  [address]  [facility] [address]    DISCUSSION/SUMMARY:    75-year-old male with a somewhat complicated history of adenopathy in the neck and the mediastinum  Patient also has a right occipital (presumed) lipoma  Presently Mr Ignacio Gandara feels okay, clinically there are no signs of progressive adenopathy  Blood work in January 2022 was good/WNL  The plan is to continue with surveillance  We rediscussed what to monitor for as far as fevers, chills, night sweats, enlarging lymph nodes, worsening respiratory issues, persistent/continuous weight loss etc     Patient is to return in 6 months with blood work before  Mr Ignacio Gandara knows to call the hematology/oncology office if there are any other questions or concerns  Patient also knows that he needs to follow up with his PCP for the elevated blood pressure  Carefully review your medication list and verify that the list is accurate and up-to-date  Please call the hematology/oncology office if there are medications missing from the list, medications on the list that you are not currently taking or if there is a dosage or instruction that is different from how you're taking that medication  Patient goals and areas of care:  Cervical lymph node surveillance  Barriers to care: none  Patient is able to self-care   _____________________________________________________________________________________    Chief Complaint   Patient presents with    Follow-up     Adenopathy     History of Present Illness:  75-year-old male with history of adenopathy followed by Dr Leni Witt  In 2017 patient developed nonspecific right cervical adenopathy  Biopsy demonstrated atypical cellular changes  Flow cytometry did not demonstrate any evidence of a B or T-cell lymphoma  The lymph nodes subsequently resolved      In 2019 patient felt a posterior cervical lump - clinically lesion was consistent with a lipoma  The plan has been surveillance  The lesion has not enlarged  April 2020 PET-CT demonstrated FDG avid adenopathy in the mediastinum  Bronchoscopy +mediastinal biopsies did not demonstrate any evidence for malignancy  The plan has been surveillance  In February 2021 patient developed COVID  CT scan during hospitalization showed mediastinal adenopathy, stable from before  Patient had a relatively uncomplicated course and was discharged  Since that time, the plan has been surveillance  Presently Mr Vega states feeling okay, baseline  No fevers, chills or sweats  Patient has had elevated blood pressure in the past - not new  No headaches, blurred vision or dizziness  No new cervical adenopathy or adenopathy elsewhere  Patient has lost approximately 15 lb without trying  No respiratory issues  No other GI or  issues  Activities are baseline  Patient has subsequently received his COVID vaccination  Patient states that he has sleep apnea and is on CPAP - patient finds it difficult to tolerate the machine at night  Review of Systems   Constitutional: Negative  HENT: Negative  Eyes: Negative  Respiratory: Negative  Cardiovascular: Negative  Gastrointestinal: Negative  Endocrine: Negative  Genitourinary: Negative  Musculoskeletal: Negative  Skin: Negative  Allergic/Immunologic: Negative  Neurological: Negative  Hematological: Negative  Psychiatric/Behavioral: Negative  All other systems reviewed and are negative      Patient Active Problem List   Diagnosis    Mediastinal lymphadenopathy    Spondylosis of cervical region without myelopathy or radiculopathy    Osteoarthritis of left elbow    Palpitations    JADYN on CPAP    History of elbow surgery    Foraminal stenosis of cervical region    DDD (degenerative disc disease), cervical    Impaired fasting glucose    Gout of multiple sites    Gastroesophageal reflux disease without esophagitis    Other insomnia    Lipoma of neck    COVID-19 virus infection    YOAV [facility]acute kidney injury) ([facility] [address] )    Elevated liver enzymes    Hypertension    Pneumonia due to COVID-19 virus    Recurrent cellulitis of lower extremity    Hypokalemia    Effusion, right elbow    Acute gout of right elbow     Past Medical History:   Diagnosis Date    Arthritis     Asthma     Back pain     Colon polyp     COVID-19 1/21/2021 1/11/2021    CPAP (continuous positive airway pressure) dependence     Gastroesophageal reflux disease without esophagitis 7/16/2020    GERD (gastroesophageal reflux disease)     Gout of multiple sites 7/16/2020    Hiatal hernia     High blood pressure     Hypertension     Impaired fasting glucose 6/18/2020    Lipoma of neck     Mediastinal lymphadenopathy     Osteoporosis     Pneumonia due to COVID-19 virus 1/25/2021    Right hand pain 7/16/2020    Sleep apnea     Sleep disorder      Past Surgical History:   Procedure Laterality Date    BACK SURGERY      COLONOSCOPY  10/07/2016    5 years (per dominique)    COLONOSCOPY      EGD  03/31/2017    ELBOW SURGERY Left 04/09/2018    open arthrotomy, capsulectomy, loose body removal (per dominique)    HERNIA REPAIR  02/23/4850    umbilical with mesh (per dominique)    LUMBAR FUSION  07/11/2018    ID BRONCHOSCOPY NEEDLE BX TRACHEA MAIN STEM&/BRON N/A 4/28/2020    Procedure: ENDOBRONCHIAL ULTRASOUND (EBUS) WITH BIOPSY;  Surgeon: Maricruz Weiner MD;  [facility];  Service: Thoracic    ID [address] N/A 4/28/2020    Procedure: Luis Enrique Mock;  Surgeon: Maricruz Weiner MD;  [facility];  Service: Thoracic    ROTATOR CUFF REPAIR Bilateral     TONSILLECTOMY  01/01/1980    UPPER GASTROINTESTINAL ENDOSCOPY       Family History   Problem Relation Age of Onset    Hypertension Mother     Hypertension Father      Social History     Socioeconomic History    Marital status: /Civil Union     Spouse name: Not on file    Number of children: Not on file    Years of education: Not on file    Highest education level: Not on file   Occupational History    Occupation: unemployed   Tobacco Use    Smoking status: Former Smoker     Packs/day: 0 50     Years: 6 00     Pack years: 3 00     Types: Cigarettes     Quit date:      Years since quittin 3    Smokeless tobacco: Never Used   Vaping Use    Vaping Use: Never used   Substance and Sexual Activity    Alcohol use: Yes     Alcohol/week: 7 0 standard drinks     Types: 7 Shots of liquor per week     Comment: one glass per night   Drug use: No    Sexual activity: Yes   Other Topics Concern    Not on file   Social History Narrative    Most recent tobacco use screenin2020      Do you currently or have you served in the [employer]:  No      Were you activated, into active duty, as a member of the [employer] or as a Reservist:  No      Sexual orientation:  Heterosexual      Exercise level:   Moderate      Diet:  Regular      General stress level:  Medium      Caffeine intake:  Occasional      Guns present in home:  No      Seat belts used routinely:  Yes      Smoke alarm in home:  Yes      Advance directive:  No      Passive smoke exposure:  No      Live alone or with others:  with others      Are you currently employed:  No      Asbestos exposure:  No      TB exposure:  No      Environmental exposure:  No      Animal exposure:  Yes 1 dog     (per dominique)     Social Determinants of Health     Financial Resource Strain: Not on file   Food Insecurity: Not on file   Transportation Needs: Not on file   Physical Activity: Not on file   Stress: Not on file   Social Connections: Not on file   Intimate Partner Violence: Not on file   Housing Stability: Not on file       Current Outpatient Medications:     chlorthalidone 25 mg tablet, Take 1 tablet (25 mg total) by mouth daily, Disp: 90 tablet, Rfl: 1   clotrimazole (LOTRIMIN) 1 % cream, Apply topically 2 (two) times a day as needed , Disp: , Rfl:     gabapentin (NEURONTIN) 100 mg capsule, Take 1 capsule (100 mg total) by mouth daily at bedtime, Disp: 90 capsule, Rfl: 1    hydrALAZINE (APRESOLINE) 10 mg tablet, Take 1 tablet (10 mg total) by mouth 2 (two) times a day, Disp: 180 tablet, Rfl: 1    indomethacin (INDOCIN) 25 mg capsule, , Disp: , Rfl:     ipratropium (ATROVENT) 0 06 % nasal spray, 2 sprays into each nostril 4 (four) times a day, Disp: 262 mL, Rfl: 0    omeprazole (PriLOSEC) 10 mg delayed release capsule, Take 4 capsules (40 mg total) by mouth daily, Disp: 30 capsule, Rfl: 3    potassium chloride (K-DUR,KLOR-CON) 20 mEq tablet, Take 1 tablet (20 mEq total) by mouth daily, Disp: 90 tablet, Rfl: 1    fluticasone (FLONASE) 50 mcg/act nasal spray, 2 sprays into each nostril daily, Disp: 1 Bottle, Rfl: 5    Allergies   Allergen Reactions    Nuts - Food Allergy Shortness Of Breath    Peanut Oil - Food Allergy Anaphylaxis     Anything using peanuts    Codeine Other (See Comments)     Dizzy, light headed  Body of balance      Latex Rash     Allergic to products, wears cotton underwear    Caffeine - Food Allergy Other (See Comments)    Dust Mite Extract     Other Other (See Comments)     Dizzy    Pollen Extract Other (See Comments)     Grass, Pollen       Vitals:    04/19/22 1300   BP: (!) 190/110   Pulse: 91   Resp: 18   Temp: 97 7 °F (36 5 °C)   SpO2: 98%     Physical Exam  Constitutional:       Appearance: He is well-developed  Comments: Well-nourished middle-aged male, no respiratory distress, no signs of pain   HENT:      Head: Normocephalic and atraumatic  Right Ear: External ear normal       Left Ear: External ear normal    Eyes:      Conjunctiva/sclera: Conjunctivae normal       Pupils: Pupils are equal, round, and reactive to light     Neck:      Comments: Supple, patient has a soft tissue tumor in the right occipital area, consistent with lipoma (see lymph nodes below)  Cardiovascular:      Rate and Rhythm: Normal rate and regular rhythm  Heart sounds: Normal heart sounds  Pulmonary:      Effort: Pulmonary effort is normal       Breath sounds: Normal breath sounds  Comments: Clear bilaterally  Abdominal:      General: Bowel sounds are normal       Palpations: Abdomen is soft  Comments: +bowel sounds, nontender, soft, no hepatosplenomegaly, no guarding   Musculoskeletal:         General: Normal range of motion  Cervical back: Normal range of motion and neck supple  Skin:     General: Skin is warm  Comments: Warm, moist, good color, no petechiae or ecchymoses   Neurological:      Mental Status: He is alert and oriented to person, place, and time  Deep Tendon Reflexes: Reflexes are normal and symmetric  Psychiatric:         Behavior: Behavior normal          Thought Content: Thought content normal          Judgment: Judgment normal      Extremities:  No lower extremity edema, no cords, pulses are 1+  Lymphatics:  Few shotty, nontender, movable, cervical lymph nodes bilaterally, no axillary, supraclavicular, infraclavicular, pre/postauricular or occipital adenopathy bilaterally    Labs    01/23/2022 BUN = 13 creatinine = 1 04 calcium = 9 5 alkaline phosphatase = 53 total bilirubin = 0 7 AST = 24 ALT = 24    01/21/2022 WBC = 5 3 hemoglobin = 14 2 hematocrit = 42 5 platelet count = 735 neutrophil = 64% lymphocytes = 23% monocyte = 11%    Imaging    03/11/2021 CTA chest PE study    IMPRESSION:  Right and left paraesophageal enlarged lymph nodes are seen measuring 2 1 cm on the right and 1 9 cm and the left    Right hilar adenopathy is noted  These are seen on the prior noncontrast CT chest from 2/17/2021  Dependent parenchymal changes are seen in the lung bases bilaterally  No pulmonary embolism  No effusion, airspace disease or pneumothorax  Recommend follow-up to resolution      04/09/2020 PET-CT    CHEST:     There are 2 FDG avid lymph nodes in the superior mediastinum adjacent to the esophagus        The lymph node to the right of the upper esophagus demonstrates SUV max of 3 5  This measures 2 1 x 1 4 cm image 70 series 3        The lymph node on the left demonstrates SUV max of 5 4  This measures 2 4 x 1 9 cm image 75 series 3      There is focal FDG uptake at the level of the upper esophagus at this level, SUV max of 3 8  See image 64 series 1200 of the PET/CT fusion images  Questionable wall thickening here on CT      No FDG avid hilar lymph nodes  CT images: Scattered coronary artery calcifications  Scattered linear atelectasis bilaterally  IMPRESSION:  1   FDG avid lymph nodes in the neck and upper mediastinum suspicious for hypermetabolic malignancy  2  Focal FDG uptake at the upper esophagus adjacent to the FDG avid lymphadenopathy, underlying esophageal lesion should be excluded  3   No findings for hypermetabolic malignancy in the abdomen or pelvis      Pathology    Case Report   Non-gynecologic Cytology                          Case: [id number]                                   Authorizing Provider: Haleigh Philippe MD      Collected:           04/28/2020 1306               Ordering Location:     [address]      Received:            04/28/2020 [facility]                                      Hospital Operating Room                                                       Pathologist:           Ericka Kruger DO                                                      Specimens:   A) - Mass, paratracheal                                                                              B) - Mass, paratracheal                                                                              C) - Mass, paratracheal, cell block                                                        Final Diagnosis   A , B , and C  Paratracheal Mass, FNAB (ThinPrep, Smear, and Cell Block Preparations):  - Negative for malignancy  - Benign bronchial epithelial cells, mixed lymphocytes, and macrophages present  See Note  Electronically signed by Carol Thornton DO on 5/5/2020 at 12:26 PM   Note    Concurrent flow cytometry ([facility] Specimen [id number]) is negative for findings of B- or T-cell lymphoma, and favors reactive hyperplasia  See scanned full report in the Media tab in Epic      Clinical and radiologic correlation recommended to ensure that the targeted lesional area was adequately sampled

## 2022-05-06 ENCOUNTER — TELEPHONE (OUTPATIENT)
Dept: FAMILY MEDICINE CLINIC | Facility: CLINIC | Age: 62
End: 2022-05-06

## 2022-05-06 DIAGNOSIS — Z12.5 SCREENING FOR PROSTATE CANCER: Primary | ICD-10-CM

## 2022-05-06 DIAGNOSIS — Z12.83 SKIN EXAM, SCREENING FOR CANCER: ICD-10-CM

## 2022-05-06 NOTE — TELEPHONE ENCOUNTER
BP noted 190/110 at hematology   Noted he ins not taking his Bps as ordered has not started the hydralazine  Instructed to please take as he is at high risk for strokes or even cardiac events that can cause disability even death     This has been an ongoing issue with him and noncompliance with meds as he feels that they don't work      He needs Urology referral for prostate screening and dermatology for skin eval

## 2022-05-13 ENCOUNTER — OFFICE VISIT (OUTPATIENT)
Dept: UROLOGY | Facility: CLINIC | Age: 62
End: 2022-05-13
Payer: COMMERCIAL

## 2022-05-13 VITALS
DIASTOLIC BLOOD PRESSURE: 118 MMHG | BODY MASS INDEX: 26.4 KG/M2 | RESPIRATION RATE: 16 BRPM | WEIGHT: 168.2 LBS | SYSTOLIC BLOOD PRESSURE: 170 MMHG | HEIGHT: 67 IN | HEART RATE: 105 BPM | OXYGEN SATURATION: 97 %

## 2022-05-13 DIAGNOSIS — Z12.5 SCREENING FOR PROSTATE CANCER: ICD-10-CM

## 2022-05-13 PROCEDURE — 3077F SYST BP >= 140 MM HG: CPT | Performed by: PHYSICIAN ASSISTANT

## 2022-05-13 PROCEDURE — 3080F DIAST BP >= 90 MM HG: CPT | Performed by: PHYSICIAN ASSISTANT

## 2022-05-13 PROCEDURE — 99203 OFFICE O/P NEW LOW 30 MIN: CPT | Performed by: PHYSICIAN ASSISTANT

## 2022-05-13 PROCEDURE — 3008F BODY MASS INDEX DOCD: CPT | Performed by: PHYSICIAN ASSISTANT

## 2022-05-13 NOTE — PROGRESS NOTES
UROLOGY CONSULTATION NOTE     Patient Identifiers: Joaquin Moss (MRN: [id number])  Service Requesting Consultation: MEGHNA Richardson  Service Providing Consultation:  Urology, Millicent Duffy PA-C  Consults  Date of Service: 5/13/2022    Reason for Consultation:  Prostate cancer screening    History of Present Illness:     Joaquin Moss is a 59 y o  Male referred for prostate cancer screening  He has no family history of prostate cancer  He has never seen a urologist in the past   PSA 0 6  He has occasional slow flow  Otherwise no significant outlet complaints  Hemoglobin A1c is 6 2  There is no upper tract imaging      Past Medical, Past Surgical History:     Past Medical History:   Diagnosis Date    Arthritis     Asthma     Back pain     Colon polyp     COVID-19 1/21/2021 1/11/2021    CPAP (continuous positive airway pressure) dependence     Gastroesophageal reflux disease without esophagitis 7/16/2020    GERD (gastroesophageal reflux disease)     Gout of multiple sites 7/16/2020    Hiatal hernia     High blood pressure     Hypertension     Impaired fasting glucose 6/18/2020    Lipoma of neck     Mediastinal lymphadenopathy     Osteoporosis     Pneumonia due to COVID-19 virus 1/25/2021    Right hand pain 7/16/2020    Sleep apnea     Sleep disorder    :    Past Surgical History:   Procedure Laterality Date    BACK SURGERY      COLONOSCOPY  10/07/2016    5 years (per dominique)    COLONOSCOPY      EGD  03/31/2017    ELBOW SURGERY Left 04/09/2018    open arthrotomy, capsulectomy, loose body removal (per dominique)    HERNIA REPAIR  12/64/3967    umbilical with mesh (per dominique)    LUMBAR FUSION  07/11/2018    MA BRONCHOSCOPY NEEDLE BX TRACHEA MAIN STEM&/BRON N/A 4/28/2020    Procedure: ENDOBRONCHIAL ULTRASOUND (EBUS) WITH BIOPSY;  Surgeon: Charles Reveles MD;  [facility];  Service: Thoracic    MA [address] N/A 4/28/2020    Procedure: BRONCHOSCOPY FLEXIBLE;  Surgeon: Tiffani Vital MD;  [facility];  Service: Thoracic    ROTATOR CUFF REPAIR Bilateral     TONSILLECTOMY  01/01/1980    UPPER GASTROINTESTINAL ENDOSCOPY     :    Medications, Allergies:     Current Outpatient Medications:     chlorthalidone 25 mg tablet, Take 1 tablet (25 mg total) by mouth daily, Disp: 90 tablet, Rfl: 1    clotrimazole (LOTRIMIN) 1 % cream, Apply topically 2 (two) times a day as needed , Disp: , Rfl:     gabapentin (NEURONTIN) 100 mg capsule, Take 1 capsule (100 mg total) by mouth daily at bedtime, Disp: 90 capsule, Rfl: 1    hydrALAZINE (APRESOLINE) 10 mg tablet, Take 1 tablet (10 mg total) by mouth 2 (two) times a day, Disp: 180 tablet, Rfl: 1    indomethacin (INDOCIN) 25 mg capsule, , Disp: , Rfl:     ipratropium (ATROVENT) 0 06 % nasal spray, 2 sprays into each nostril 4 (four) times a day, Disp: 262 mL, Rfl: 0    omeprazole (PriLOSEC) 10 mg delayed release capsule, Take 4 capsules (40 mg total) by mouth daily, Disp: 30 capsule, Rfl: 3    potassium chloride (K-DUR,KLOR-CON) 20 mEq tablet, Take 1 tablet (20 mEq total) by mouth daily, Disp: 90 tablet, Rfl: 1    fluticasone (FLONASE) 50 mcg/act nasal spray, 2 sprays into each nostril daily, Disp: 1 Bottle, Rfl: 5    Allergies:   Allergies   Allergen Reactions    Nuts - Food Allergy Shortness Of Breath    Peanut Oil - Food Allergy Anaphylaxis     Anything using peanuts    Codeine Other (See Comments)     Dizzy, light headed  Body of balance      Latex Rash     Allergic to products, wears cotton underwear    Caffeine - Food Allergy Other (See Comments)    Dust Mite Extract     Other Other (See Comments)     Dizzy    Pollen Extract Other (See Comments)     Grass, Pollen   :    Social and Family History:   Social History:   Social History     Tobacco Use    Smoking status: Former Smoker     Packs/day: 0 50     Years: 6 00     Pack years: 3 00     Types: Cigarettes     Quit date: 1984     Years since quittin 3    Smokeless tobacco: Never Used   Vaping Use    Vaping Use: Never used   Substance Use Topics    Alcohol use: Yes     Alcohol/week: 7 0 standard drinks     Types: 7 Shots of liquor per week     Comment: one glass per night   Drug use: No        Social History     Tobacco Use   Smoking Status Former Smoker    Packs/day: 0 50    Years: 6 00    Pack years: 3 00    Types: Cigarettes    Quit date: 44    Years since quittin 3   Smokeless Tobacco Never Used       Family History:  Family History   Problem Relation Age of Onset    Hypertension Mother     Hypertension Father    :     Review of Systems:     General: Fever, chills, or night sweats: negative  Cardiac: Negative for chest pain  Pulmonary: Negative for shortness of breath  Gastrointestinal: Abdominal pain negative  Nausea, vomiting, or diarrhea negative,  Genitourinary: See HPI above  Patient does not have hematuria  All other systems queried were negative  Physical Exam:   General: Patient is pleasant and in NAD  Awake and alert  BP (!) 170/118 (BP Location: Left arm, Patient Position: Sitting, Cuff Size: Large)   Pulse 105   Resp 16   Ht 5' 7" (1 702 m)   Wt 76 3 kg (168 lb 3 2 oz)   SpO2 97%   BMI 26 34 kg/m²   HEENT:  Conjunctiva clear  Constitutional:  pleasant and cooperative     no apparent distress  Cardiac: Peripheral edema: negative  Pulmonary: Non-labored breathing  Abdomen: Soft, non-tender, non-distended  No surgical scars  No masses, tenderness, hernias noted  Genitourinary: Negative CVA tenderness, negative suprapubic tenderness  Extremities: moves all extremities  Neurological:CNII-XII intact  No numbness or tingling  Essentially non focal neurologic exam  Psychiatric:mood affect and behavior normal    (Male): Penis circumcised, phallus normal, meatus patent  Testicles descended into scrotum bilaterally without masses nor tenderness  No inguinal hernias bilaterally    RADHA: Prostate is enlarged at 35 grams  The prostate is not boggy  The prostate is not tender  No nodules noted  Labs:     Lab Results   Component Value Date    HGB 15 4 11/05/2021    HCT 48 8 11/05/2021    WBC 3 86 (L) 11/05/2021     11/05/2021   ]    Lab Results   Component Value Date    K 3 1 (L) 11/05/2021    CL 99 (L) 11/05/2021    CO2 30 11/05/2021    BUN 16 11/05/2021    CREATININE 1 25 11/05/2021    CALCIUM 8 5 11/05/2021   ]    Imaging:   I personally reviewed the images and report of the following studies, and reviewed them with the patient:   none    ASSESSMENT:    1  BPH/prostate cancer screening       PLAN:   - we discussed the importance of good blood sugar control  - follow-up in 1 year with PSA prior to visit      Thank you for allowing me to participate in this patients care  Please do not hesitate to call with any additional questions    Taiwo Reyes PA-C

## 2022-06-03 ENCOUNTER — CONSULT (OUTPATIENT)
Dept: DERMATOLOGY | Facility: CLINIC | Age: 62
End: 2022-06-03
Payer: COMMERCIAL

## 2022-06-03 VITALS — BODY MASS INDEX: 26.37 KG/M2 | HEIGHT: 67 IN | TEMPERATURE: 98.4 F | WEIGHT: 168 LBS

## 2022-06-03 DIAGNOSIS — Z12.83 SKIN EXAM, SCREENING FOR CANCER: ICD-10-CM

## 2022-06-03 DIAGNOSIS — L81.9 HYPERPIGMENTATION: Primary | ICD-10-CM

## 2022-06-03 PROCEDURE — 3008F BODY MASS INDEX DOCD: CPT | Performed by: NURSE PRACTITIONER

## 2022-06-03 PROCEDURE — 99203 OFFICE O/P NEW LOW 30 MIN: CPT | Performed by: DERMATOLOGY

## 2022-06-03 RX ORDER — KETOCONAZOLE 20 MG/ML
SHAMPOO TOPICAL
Qty: 120 ML | Refills: 0 | Status: SHIPPED | OUTPATIENT
Start: 2022-06-03

## 2022-06-03 RX ORDER — CLOTRIMAZOLE 1 %
CREAM (GRAM) TOPICAL 2 TIMES DAILY PRN
Qty: 30 G | Refills: 0 | Status: SHIPPED | OUTPATIENT
Start: 2022-06-03

## 2022-06-03 NOTE — PROGRESS NOTES
[facility] Note     Patient Name: Leena Mars  Encounter Date: 06/03/2022     Have you been cared for by a [facility] in the last 3 years and, if so, which one? No    · Have you traveled outside of the [address] [place] [address] in the past 3 months or outside of the [facility] area in the last 2 weeks? No     May we call your Preferred Phone number to discuss your specific medical information? Yes     May we leave a detailed message that includes your specific medical information? Yes      Today's Chief Concerns:   Concern #1:  Spots of concern on back and scalp       Past Medical History:  Have you personally ever had or currently have any of the following? · Skin cancer (such as Melanoma, Basal Cell Carcinoma, Squamous Cell Carcinoma? (If Yes, please provide more detail)- No  · Eczema: No  · Psoriasis: No  · HIV/AIDS: No  · Hepatitis B or C: No  · Tuberculosis: No  · Systemic Immunosuppression such as Diabetes, Biologic or Immunotherapy, Chemotherapy, Organ Transplantation, Bone Marrow Transplantation (If YES, please provide more detail): No  · Radiation Treatment (If YES, please provide more detail): No  · Any other major medical conditions/concerns? (If Yes, which types)- No    Social History:     What is/was your primary occupation? Retired      What are your hobbies/past-times? Music     Family History:  Have any of your "first degree relatives" (parent, brother, sister, or child) had any of the following       · Skin cancer such as Melanoma or Merkel Cell Carcinoma or Pancreatic Cancer? No  · Eczema, Asthma, Hay Fever or Seasonal Allergies: No  · Psoriasis or Psoriatic Arthritis: No  · Do any other medical conditions seem to run in your family? If Yes, what condition and which relatives?   No    Current Medications:       Current Outpatient Medications:     chlorthalidone 25 mg tablet, Take 1 tablet (25 mg total) by mouth daily, Disp: 90 tablet, Rfl: 1    clotrimazole (LOTRIMIN) 1 % cream, Apply topically 2 (two) times a day as needed , Disp: , Rfl:     fluticasone (FLONASE) 50 mcg/act nasal spray, 2 sprays into each nostril daily, Disp: 1 Bottle, Rfl: 5    gabapentin (NEURONTIN) 100 mg capsule, Take 1 capsule (100 mg total) by mouth daily at bedtime, Disp: 90 capsule, Rfl: 1    hydrALAZINE (APRESOLINE) 10 mg tablet, Take 1 tablet (10 mg total) by mouth 2 (two) times a day, Disp: 180 tablet, Rfl: 1    indomethacin (INDOCIN) 25 mg capsule, , Disp: , Rfl:     ipratropium (ATROVENT) 0 06 % nasal spray, 2 sprays into each nostril 4 (four) times a day, Disp: 262 mL, Rfl: 0    omeprazole (PriLOSEC) 10 mg delayed release capsule, Take 4 capsules (40 mg total) by mouth daily, Disp: 30 capsule, Rfl: 3    potassium chloride (K-DUR,KLOR-CON) 20 mEq tablet, Take 1 tablet (20 mEq total) by mouth daily, Disp: 90 tablet, Rfl: 1      Review of Systems:  Have you recently had or currently have any of the following? If YES, what are you doing for the problem? · Fever, chills or unintended weight loss: No  · Sudden loss or change in your vision: No  · Nausea, vomiting or blood in your stool: No  · Painful or swollen joints: No  · Wheezing or cough: No  · Changing mole or non-healing wound: No  · Nosebleeds: No  · Excessive sweating: No  · Easy or prolonged bleeding? No  · Over the last 2 weeks, how often have you been bothered by the following problems? · Taking little interest or pleasure in doing things: 1 - Not at All  · Feeling down, depressed, or hopeless: 1 - Not at All  · Rapid heartbeat with epinephrine:  No    · FEMALES ONLY:    · Are you pregnant or planning to become pregnant? N/A  · Are you currently or planning to be nursing or breast feeding? N/A    · Any known allergies?       Allergies   Allergen Reactions    Nuts - Food Allergy Shortness Of Breath    Peanut Oil - Food Allergy Anaphylaxis     Anything using peanuts    Codeine Other (See Comments)     Dizzy, light headed  Body of balance      Latex Rash     Allergic to products, wears cotton underwear    Caffeine - Food Allergy Other (See Comments)    Dust Mite Extract     Other Other (See Comments)     Dizzy    Pollen Extract Other (See Comments)     Grass, Pollen         Physical Exam:     Was a chaperone (Derm Clinical Assistant) present throughout the entire Physical Exam? Yes     Did the Dermatology Team specifically  the patient on the importance of a Full Skin Exam to be sure that nothing is missed clinically? Yes}  o Did the patient ultimately request or accept a Full Skin Exam?  NO  o Did the patient specifically refuse to have the areas "under-the-bra" examined by the Dermatologist? No  o Did the patient specifically refuse to have the areas "under-the-underwear" examined by the Dermatologist? No    CONSTITUTIONAL:   Vitals:    06/03/22 1546   Temp: 98 4 °F (36 9 °C)   TempSrc: Temporal   Weight: 76 2 kg (168 lb)   Height: 5' 7" (1 702 m)         PSYCH: Normal mood and affect  EYES: Normal conjunctiva  ENT: Normal lips and oral mucosa  CARDIOVASCULAR: No edema  RESPIRATORY: Normal respirations  HEME/LYMPH/IMMUNO:  No regional lymphadenopathy except as noted below in "ASSESSMENT AND PLAN BY DIAGNOSIS"    SKIN:  FULL ORGAN SYSTEM EXAM   Hair, Scalp, Ears, Face Normal except as noted below in Assessment   Neck, Cervical Chain Nodes Normal except as noted below in Assessment   Right Arm/Hand/Fingers Normal except as noted below in Assessment   Left Arm/Hand/Fingers Normal except as noted below in Assessment   Chest/Breasts/Axillae Viewed areas Normal except as noted below in Assessment   Abdomen, Umbilicus Normal except as noted below in Assessment   Back/Spine Normal except as noted below in Assessment   Groin/Genitalia/Buttocks    Right Leg, Foot, Toes    Left Leg, Foot, Toes         Assessment and Plan by Diagnosis:      1   HYPERPIGMENTATION     Physical Exam:   Anatomic Location Affected:  Back    Morphological Description:  See photographs    Pertinent Positives:   Pertinent Negatives:           Additional History of Present Condition:  N/A     Assessment and Plan:  Based on a thorough discussion of this condition and the management approach to it (including a comprehensive discussion of the known risks, side effects and potential benefits of treatment), the patient (family) agrees to implement the following specific plan:   Ketoconazole shampoo- twice weekly for 8 weeks and than as needed    Can continue to use the Lotrimin Cream 1% as needed                 Scribe Attestation    I,:  Chung Diaz am acting as a scribe while in the presence of the attending physician :       I,:  John Lagunas MD personally performed the services described in this documentation    as scribed in my presence :

## 2022-06-03 NOTE — PATIENT INSTRUCTIONS
1   HYPERPIGMENTATION       Assessment and Plan:  Based on a thorough discussion of this condition and the management approach to it (including a comprehensive discussion of the known risks, side effects and potential benefits of treatment), the patient (family) agrees to implement the following specific plan:  Ketoconazole shampoo- twice weekly for 8 weeks and than as needed   Can continue to use the Lotrimin Cream 1% as needed

## 2022-06-20 ENCOUNTER — TELEPHONE (OUTPATIENT)
Dept: FAMILY MEDICINE CLINIC | Facility: CLINIC | Age: 62
End: 2022-06-20

## 2022-06-20 NOTE — TELEPHONE ENCOUNTER
I have not seen him for a while   I spoke to him on the weekend and sen him to the ER   Needs to see Noble for follow up and can review meds and he may have to establish   I am a conference this week but he needs to be seen sooner please

## 2022-06-21 ENCOUNTER — TELEMEDICINE (OUTPATIENT)
Dept: FAMILY MEDICINE CLINIC | Facility: CLINIC | Age: 62
End: 2022-06-21
Payer: COMMERCIAL

## 2022-06-21 DIAGNOSIS — M10.9 GOUT OF MULTIPLE SITES, UNSPECIFIED CAUSE, UNSPECIFIED CHRONICITY: Primary | ICD-10-CM

## 2022-06-21 DIAGNOSIS — E87.6 HYPOKALEMIA: ICD-10-CM

## 2022-06-21 PROCEDURE — 99214 OFFICE O/P EST MOD 30 MIN: CPT | Performed by: NURSE PRACTITIONER

## 2022-06-21 RX ORDER — ALLOPURINOL 100 MG/1
100 TABLET ORAL DAILY
Qty: 90 TABLET | Refills: 3 | Status: SHIPPED | OUTPATIENT
Start: 2022-06-21

## 2022-06-21 NOTE — PATIENT INSTRUCTIONS
Low Purine Diet   WHAT YOU NEED TO KNOW:   What is a low-purine diet? A low-purine diet is a meal plan based on foods that are low in purine content  Purine is a substance that is found in foods and is produced naturally by the body  Purines are broken down by the body and changed to uric acid  The kidneys normally filter the uric acid, and it leaves the body through the urine  However, people with gout sometimes have a buildup of uric acid in the blood  This buildup of uric acid can cause swelling and pain (a gout attack)  A low-purine diet may help to treat and prevent gout attacks  What foods can I include? The following foods are low in purine  Eggs, nuts, and peanut butter    Low-fat and fat free cheese and ice cream    Skim or 1% milk    Soup made without meat extract or broth    Vegetables that are not on the medium-purine list below    All fruit and fruit juices    Bread, pasta, rice, cake, cornbread, and popcorn    Water, soda, tea, coffee, and cocoa    Sugar, sweets, and gelatin    Fat and oil    What foods should I limit? Medium-purine foods:      Meats:  Limit the following to 4 to 6 ounces each day  Meat and poultry     Crab, lobster, oysters, and shrimp    Vegetables:  Limit the following vegetables to ½ cup each day  Asparagus    Cauliflower    Spinach    Mushrooms    Green peas    Beans, peas, and lentils (limit to 1 cup each day)    Oats and oatmeal (limit to ? cup uncooked each day)    Wheat germ and bran (limit to ¼ cup each day)    High-purine foods:  Limit or avoid foods high in purine  Anchovies, sardines, scallops, and mussels    [place], codfish, herring, and [employer], like goose and duck    Organ meats, such as brains, heart, kidney, liver, and sweetbreads    Gravies and sauces made with meat    Yeast extracts taken in the form of a supplement    What other guidelines should I follow? Increase liquid intake    Drink 8 to 16 (eight-ounce) cups of liquid each day  At least half of the liquid you drink should be water  Liquid can help your body get rid of extra uric acid  Limit or avoid alcohol  Alcohol (especially beer) increases your risk of a gout attack  Beer contains a high amount of purine  Maintain a healthy weight  If you are overweight, you should lose weight slowly  Weight loss can help decrease the amount of stress on your joints  Regular exercise can help you lose weight if you are overweight, or maintain your weight if you are at a normal weight  Talk to your healthcare provider before you begin an exercise program     CARE AGREEMENT:   You have the right to help plan your care  Discuss treatment options with your healthcare provider to decide what care you want to receive  You always have the right to refuse treatment  The above information is an  only  It is not intended as medical advice for individual conditions or treatments  Talk to your doctor, nurse or pharmacist before following any medical regimen to see if it is safe and effective for you  © Copyright [employer] 2022 Information is for End User's use only and may not be sold, redistributed or otherwise used for commercial purposes  All illustrations and images included in CareNotes® are the copyrighted property of [employer]  or [facility] Marlon Sheehan   Hypokalemia   WHAT YOU NEED TO KNOW:   What is hypokalemia? Hypokalemia is a low level of potassium in your blood  Potassium helps control how your muscles, heart, and digestive system work  Hypokalemia occurs when your body loses too much potassium or does not absorb enough from food  What causes hypokalemia?    Diarrhea or vomiting    Medicines, such as diuretics, blood pressure medicines, or antibiotics    Excessive use of laxatives    Anorexia or bulimia nervosa    Medical conditions, such as Cushing syndrome or kidney disease    Not eating enough foods that contain potassium    What are the signs and symptoms of hypokalemia? You may not have any signs or symptoms if you have mild hypokalemia  You may have any of the following if it is more severe:  Fatigue    Constipation    Frequent urination or urinating large amounts    Muscle cramps or skin tingling    Muscle weakness    Fast or irregular heartbeat    How is hypokalemia diagnosed? An EKG  test records your heart rhythm and how fast your heart beats  It is used to check for an irregular heartbeat  Blood tests  are done to check your potassium level  How is hypokalemia treated? You will receive potassium to bring your levels back to normal  This may be given as a pill or IV  The amount of potassium you will be given depends on your potassium level  What foods are high in potassium? Foods that are high in potassium include bananas, oranges, tomatoes, potatoes, and avocado  [place] beans, turkey, salmon, lean beef, yogurt, and milk are also high in potassium  Ask your healthcare provider or dietitian for more information about foods that are high in potassium  When should I seek immediate care? You cannot move your arm or leg  You have a fast or irregular heartbeat  You are too tired or weak to stand up  When should I contact my healthcare provider? You are vomiting, or you have diarrhea  You have numbness or tingling in your arms or legs  Your symptoms do not go away or they get worse  You have questions or concerns about your condition or care  CARE AGREEMENT:   You have the right to help plan your care  Learn about your health condition and how it may be treated  Discuss treatment options with your healthcare providers to decide what care you want to receive  You always have the right to refuse treatment  The above information is an  only  It is not intended as medical advice for individual conditions or treatments   Talk to your doctor, nurse or pharmacist before following any medical regimen to see if it is safe and effective for you  © Copyright [employer] 2022 Information is for End User's use only and may not be sold, redistributed or otherwise used for commercial purposes  All illustrations and images included in CareNotes® are the copyrighted property of A D A M , Inc  or Shirley Romano  Gout   WHAT YOU NEED TO KNOW:   What is gout? Gout is a form of arthritis that causes severe joint pain and stiffness  Acute gout pain starts suddenly, gets worse quickly, and stops on its own  Acute gout can become chronic and cause permanent damage to your joints  What causes gout? Gout develops when uric acid builds up in your joints  Uric acid is made when your body breaks down purines  Purines are found in some medicines and foods  Your body gets rid of most uric acid through your urine  When your body cannot get rid of enough uric acid, it can build up and form crystals in your joints  The crystals cause your joints to become swollen and painful  This is called a gout attack  What increases my risk for gout? You may have been born with a decreased ability to break down and get rid of purines  Your body's ability to break down purines may be very slow  Gout is more common in men than in women  Any of the following can also increase your risk:  A family history of gout    Kidney disease or problems with how your kidneys work    Eating foods that are high in purines, such as red meat    Alcohol or tobacco use    Diuretic medicine (water pills), or aspirin    A medical condition such as diabetes, high blood pressure, or high cholesterol    A condition such as an irregular heartbeat or a blood clot in your lungs    What are the stages of gout? Hyperuricemia  is a high level of uric acid  Hyperuricemia is not gout, but it increases your risk for gout  You may have no symptoms at this stage, and it usually does not need treatment  Acute gouty arthritis  starts with a sudden attack of pain and swelling, usually in 1 joint  This may be in your big toe  The attack may last from a few days to 2 weeks  Intercritical gout  is the time between attacks  You may go months or years without another attack  You will not have joint pain or stiffness, but this does not mean your gout is cured  You will still need treatment to prevent chronic gout  Chronic tophaceous gout  develops if gout is not treated  Large amounts of uric acid crystals, called tophi, collect around your joints  The crystals can destroy or deform the joints  Gout attacks occur more often, and last hours to weeks  More than 1 joint may be painful and swollen  At this stage, gout symptoms do not go away on their own  How is gout diagnosed? Your healthcare provider will ask about your medicines, health problems, and allergies  Tell him or her when your joint pain and swelling started  He or she will need to know if the swelling and pain were worst within 1 day or if got worse over time  He or she will check the skin over your joints for redness  You may also need any of the following:  Blood tests  are used to check the level of uric acid  You may need to have blood tested more than 1 time  A synovial fluid test  is used to collect a sample of fluid from around your painful joint  The fluid is sent to a lab to check for uric acid crystals  Synovial fluid surrounds and protects your joints  An x-ray, ultrasound, CT, or MRI  may show the gout or damage to bones caused by gout  You may be given contrast liquid to help your joints show up better in the pictures  Tell the healthcare provider if you have ever had an allergic reaction to contrast liquid  Do not enter the MRI room with anything metal  Metal can cause serious injury  Tell the healthcare provider if you have any metal in or on your body  How is gout treated?   The following can make your symptoms stop sooner, prevent attacks, and decrease your risk for joint damage:  Medicines:      NSAIDs , such as ibuprofen, help decrease swelling, pain, and fever  This medicine is available with or without a doctor's order  NSAIDs can cause stomach bleeding or kidney problems in certain people  If you take blood thinner medicine, always ask your healthcare provider if NSAIDs are safe for you  Always read the medicine label and follow directions  Gout medicine  decreases joint pain and swelling  It may also be given to prevent new gout attacks  Steroids  reduce inflammation and can help your joint stiffness and pain during gout attacks  Uric acid medicine  may be given to reduce the amount of uric acid your body makes  Some medicines may help you pass more uric acid when you urinate  Surgery  called a bone graft may be needed for tophi that are painful or infected  Bone in the joint may be replaced with bone taken from another place in your body  Ask your healthcare provider for more information about bone graft surgery  How can I manage my symptoms? Rest your painful joint so it can heal   Your healthcare provider may recommend crutches or a walker if the affected joint is in a leg  Apply ice to your joint  Ice decreases pain and swelling  Use an ice pack, or put crushed ice in a plastic bag  Cover the ice pack or bag with a towel before you apply it to your painful joint  Apply ice for 15 to 20 minutes every hour, or as directed  Elevate your joint  Elevation helps reduce swelling and pain  Raise your joint above the level of your heart as often as you can  Prop your painful joint on pillows to keep it above your heart comfortably  Go to physical therapy if directed  A physical therapist can teach you exercises to improve flexibility and range of motion  How can I help prevent gout attacks? Do not eat high-purine foods  These foods include meats, seafood, asparagus, spinach, cauliflower, and some types of beans   Healthcare providers may tell you to eat more low-fat milk products, such as yogurt  Milk products may decrease your risk for gout attacks  Vitamin C and coffee may also help  Your healthcare provider or dietitian can help you create a meal plan  Drink liquids as directed  Liquids such as water help remove uric acid from your body  Ask how much liquid to drink each day and which liquids are best for you  Maintain a healthy weight  Weight loss may decrease the amount of uric acid in your body  Ask your healthcare provider what a healthy weight is for you  Ask him or her to help you create a weight loss plan if you are overweight  Control your blood sugar level if you have diabetes  Keep your blood sugar level in a normal range  This can help prevent gout attacks  Limit or do not drink alcohol as directed  Alcohol can trigger a gout attack  Alcohol also increases your risk for dehydration  Ask your healthcare provider if alcohol is safe for you  When should I seek immediate care? You have severe joint pain that you cannot tolerate  You have a fever or redness that spreads beyond the joint area  When should I call my doctor? You have a fever, chills, or body aches  You are confused or more tired than usual     You have new symptoms, such as a rash, after you start gout treatment  Your joint pain and swelling do not go away, even after treatment  You are not urinating as much or as often as you usually do  You have trouble taking your gout medicines  CARE AGREEMENT:   You have the right to help plan your care  Learn about your health condition and how it may be treated  Discuss treatment options with your healthcare providers to decide what care you want to receive  You always have the right to refuse treatment  The above information is an  only  It is not intended as medical advice for individual conditions or treatments   Talk to your doctor, nurse or pharmacist before following any medical regimen to see if it is safe and effective for you  © Copyright [employer] 2022 Information is for End User's use only and may not be sold, redistributed or otherwise used for commercial purposes   All illustrations and images included in CareNotes® are the copyrighted property of A D A M , Inc  or Shirley Romano

## 2022-06-21 NOTE — ASSESSMENT & PLAN NOTE
Currently just requires ibuprofen as needed  He indicates that he will maintain a gout diet  Patient provided at this time  Uric acid ordered for re-evaluation  Patient to come into the office if he has increased elbow swelling

## 2022-06-21 NOTE — ASSESSMENT & PLAN NOTE
Patient noted to have been stopped on chlorthalidone 25 mg p o  Daily and changed to valsartan 40 mg p o  Daily  Currently has not been taking his pressure medication  He has been instructed to continue to monitor his BP routinely and come into the office for follow-up evaluation  To maintain low-sodium low-cholesterol diet

## 2022-06-21 NOTE — ASSESSMENT & PLAN NOTE
Patient currently on potassium supplementation of K-Dur 20 mg p o  Daily  Instructed to eat bananas  BMP ordered for repeat evaluation of hypokalemia

## 2022-06-21 NOTE — PROGRESS NOTES
Virtual Regular Visit    Verification of patient location:    Patient is located in the following state in which I hold an active license [place]      Assessment/Plan:    Problem List Items Addressed This Visit        Other    Gout of multiple sites - Primary     Patient reports decrease in hill swelling at this time  Currently order for allopurinol 100 mg p o  Daily maintenance  Uric acid level ordered at this time patient to maintain a gout diet  Patient instructed may take ibuprofen as needed for pain  Relevant Medications    allopurinol (ZYLOPRIM) 100 mg tablet    Other Relevant Orders    Uric acid    Hypokalemia     Patient currently on potassium supplementation of K-Dur 20 mg p o  Daily  Instructed to eat bananas  BMP ordered for repeat evaluation of hypokalemia  Relevant Orders    Basic metabolic panel               Reason for visit is   Chief Complaint   Patient presents with    Virtual Regular Visit        Encounter provider Jemima James    Provider located at [address] [address] [phone]      Recent Visits  Date Type Provider Dept   06/20/22 Telephone Jenny Jordan, [address] [place] [address] [facility]   Showing recent visits within past 7 days and meeting all other requirements  Today's Visits  Date Type Provider Dept   06/21/22 Telemedicine HEATHER James 112 today's visits and meeting all other requirements  Future Appointments  No visits were found meeting these conditions  Showing future appointments within next 150 days and meeting all other requirements       The patient was identified by name and date of birth  Chica Bharathis  was informed that this is a telemedicine visit and that the visit is being conducted through Telephone  My office door was closed  No one else was in the room    He acknowledged consent and understanding of privacy and security of the video platform  The patient has agreed to participate and understands they can discontinue the visit at any time  Patient is aware this is a billable service  Subjective  Meena Garcia  is a 59 y o  male virtual phone visit for follow-up evaluation of gout, hypertension and hypokalemia         Patient is a 59year old male that reports gout in his elbow and that he was treated with antibiotics for fluid in the elbow and on indomethacin  Went to the ER 6/18/2022 and was given treatment of colchicine for gout that was not covered by insurance and was switched to ibuprofen as needed for gout  He also was taken off his chlorthalidone and switched to valsartan 40 mg p o  daily  Currently requests phone visit to discuss medication treatment an management of gout and hypertension as well as hypokalemia         Past Medical History:   Diagnosis Date    Arthritis     Asthma     Back pain     Colon polyp     COVID-19 1/21/2021 1/11/2021    CPAP (continuous positive airway pressure) dependence     Gastroesophageal reflux disease without esophagitis 7/16/2020    GERD (gastroesophageal reflux disease)     Gout of multiple sites 7/16/2020    Hiatal hernia     High blood pressure     Hypertension     Impaired fasting glucose 6/18/2020    Lipoma of neck     Mediastinal lymphadenopathy     Osteoporosis     Pneumonia due to COVID-19 virus 1/25/2021    Right hand pain 7/16/2020    Sleep apnea     Sleep disorder        Past Surgical History:   Procedure Laterality Date    BACK SURGERY      COLONOSCOPY  10/07/2016    5 years (per dominique)    COLONOSCOPY      EGD  03/31/2017    ELBOW SURGERY Left 04/09/2018    open arthrotomy, capsulectomy, loose body removal (per dominique)    HERNIA REPAIR  46/43/2531    umbilical with mesh (per dominique)    LUMBAR FUSION  07/11/2018    [place] BRONCHOSCOPY NEEDLE BX TRACHEA MAIN STEM&/BRON N/A 4/28/2020    Procedure: ENDOBRONCHIAL ULTRASOUND (EBUS) WITH BIOPSY;  Surgeon: Aniket Mccray MD;  [facility];  Service: Thoracic    [place] [address] N/A 4/28/2020    Procedure: Ferdinand Montana;  Surgeon: Aniket Mccray MD;  [facility];  Service: Thoracic    ROTATOR CUFF REPAIR Bilateral     TONSILLECTOMY  01/01/1980    UPPER GASTROINTESTINAL ENDOSCOPY         Current Outpatient Medications   Medication Sig Dispense Refill    allopurinol (ZYLOPRIM) 100 mg tablet Take 1 tablet (100 mg total) by mouth daily 90 tablet 3    chlorthalidone 25 mg tablet Take 1 tablet (25 mg total) by mouth daily 90 tablet 1    clotrimazole (LOTRIMIN) 1 % cream Apply topically 2 (two) times a day as needed (as needed) 30 g 0    fluticasone (FLONASE) 50 mcg/act nasal spray 2 sprays into each nostril daily 1 Bottle 5    gabapentin (NEURONTIN) 100 mg capsule Take 1 capsule (100 mg total) by mouth daily at bedtime 90 capsule 1    hydrALAZINE (APRESOLINE) 10 mg tablet Take 1 tablet (10 mg total) by mouth 2 (two) times a day 180 tablet 1    indomethacin (INDOCIN) 25 mg capsule       ipratropium (ATROVENT) 0 06 % nasal spray 2 sprays into each nostril 4 (four) times a day 262 mL 0    ketoconazole (NIZORAL) 2 % shampoo Apply topically twice weekly for 8 week and then as needed 120 mL 0    omeprazole (PriLOSEC) 10 mg delayed release capsule Take 4 capsules (40 mg total) by mouth daily 30 capsule 3    potassium chloride (K-DUR,KLOR-CON) 20 mEq tablet Take 1 tablet (20 mEq total) by mouth daily 90 tablet 1     No current facility-administered medications for this visit          Allergies   Allergen Reactions    Nuts - Food Allergy Shortness Of Breath    Peanut Oil - Food Allergy Anaphylaxis     Anything using peanuts    Codeine Other (See Comments)     Dizzy, light headed  Body of balance      Latex Rash     Allergic to products, wears cotton underwear    Caffeine - Food Allergy Other (See Comments)    Dust Mite Extract     Other Other (See Comments)     Dizzy    Pollen Extract Other (See Comments)     Grass, Pollen       Review of Systems   Constitutional: Negative for activity change, appetite change, chills, fatigue, fever and unexpected weight change  HENT: Negative for congestion, ear discharge, ear pain, nosebleeds, postnasal drip, rhinorrhea, sinus pressure, sinus pain, sneezing, sore throat and voice change  Eyes: Negative for pain, redness and visual disturbance  Respiratory: Negative for cough, chest tightness, shortness of breath and wheezing  Cardiovascular: Negative for chest pain and palpitations  Gastrointestinal: Negative for abdominal distention, abdominal pain, constipation, diarrhea, nausea and vomiting  Endocrine: Negative  Genitourinary: Negative for difficulty urinating, dysuria, flank pain, frequency, hematuria and urgency  Musculoskeletal: Positive for arthralgias  Negative for myalgias  Pain in his elbow and swelling has gone down Saturday 6/18/2022  Gout episodes that currently affecting his right elbow  Skin: Negative  Allergic/Immunologic: Negative  Neurological: Negative  Hematological: Negative  Psychiatric/Behavioral: Negative  Video Exam    There were no vitals filed for this visit  Physical Exam  Constitutional:       Appearance: Normal appearance  Comments: Overweight  Neurological:      Mental Status: He is alert and oriented to person, place, and time  Psychiatric:         Mood and Affect: Mood normal          Behavior: Behavior normal           I spent 25 minutes directly with the patient during this visit    VIRTUAL VISIT Claudio  verbally agrees to participate in [employer]   Pt is aware that [employer] could be limited without vital signs or the ability to perform a full hands-on physical Nataliia Baltazar  understands he or the provider may request at any time to terminate the video visit and request the patient to seek care or treatment in person

## 2022-06-21 NOTE — ASSESSMENT & PLAN NOTE
Patient reports decrease in hill swelling at this time  Currently order for allopurinol 100 mg p o  Daily maintenance  Uric acid level ordered at this time patient to maintain a gout diet  Patient instructed may take ibuprofen as needed for pain

## 2022-07-26 ENCOUNTER — TELEPHONE (OUTPATIENT)
Dept: FAMILY MEDICINE CLINIC | Facility: CLINIC | Age: 62
End: 2022-07-26

## 2022-07-26 NOTE — TELEPHONE ENCOUNTER
Pt called and stated that he had been in the emergency room and was given Valsartan 40 mg and was told to call his PCP to get more ,, he stated that he is waiting for them to be sent in to the pharmacy

## 2022-07-27 ENCOUNTER — OFFICE VISIT (OUTPATIENT)
Dept: GASTROENTEROLOGY | Facility: AMBULARY SURGERY CENTER | Age: 62
End: 2022-07-27
Payer: COMMERCIAL

## 2022-07-27 VITALS
BODY MASS INDEX: 26.21 KG/M2 | DIASTOLIC BLOOD PRESSURE: 104 MMHG | WEIGHT: 167 LBS | SYSTOLIC BLOOD PRESSURE: 180 MMHG | HEIGHT: 67 IN | OXYGEN SATURATION: 96 % | HEART RATE: 82 BPM

## 2022-07-27 DIAGNOSIS — K21.9 GASTROESOPHAGEAL REFLUX DISEASE WITHOUT ESOPHAGITIS: ICD-10-CM

## 2022-07-27 DIAGNOSIS — R13.10 DYSPHAGIA, UNSPECIFIED TYPE: ICD-10-CM

## 2022-07-27 DIAGNOSIS — K22.2 ESOPHAGEAL RING: Primary | ICD-10-CM

## 2022-07-27 PROBLEM — E66.3 OVERWEIGHT WITH BODY MASS INDEX (BMI) OF 26 TO 26.9 IN ADULT: Status: ACTIVE | Noted: 2022-07-27

## 2022-07-27 PROCEDURE — 99214 OFFICE O/P EST MOD 30 MIN: CPT | Performed by: PHYSICIAN ASSISTANT

## 2022-07-27 RX ORDER — OMEPRAZOLE 20 MG/1
20 CAPSULE, DELAYED RELEASE ORAL
Qty: 30 CAPSULE | Refills: 4 | Status: SHIPPED | OUTPATIENT
Start: 2022-07-27 | End: 2022-09-03

## 2022-07-27 RX ORDER — VALSARTAN 40 MG/1
TABLET ORAL
COMMUNITY
Start: 2022-06-19 | End: 2022-09-21

## 2022-07-27 RX ORDER — IBUPROFEN 600 MG/1
TABLET ORAL
COMMUNITY
Start: 2022-06-19 | End: 2022-08-16

## 2022-07-27 NOTE — PROGRESS NOTES
[facility] - Outpatient Follow-up Note  Paloma Valle  59 y o  male MRN: [id number]  Encounter: [id number]          ASSESSMENT AND PLAN:      1  Dysphagia  2  Schatzki's ring  3  GERD  EGD 03/2021 with Schatzki's ring  Dilation attempted with Joby Solomon but unable to pass through oropharynx therefore balloon dilator used  He had relief of his symptoms but have slowly reoccurred  Patient was unintentionally not taking his PPI as he did not know with the medication was 4 has noted some worsening reflux symptoms  -will reschedule repeat EGD with dilation  This was recommended to be done with wire guided bougie dilation next time   -discussed risks of procedure including bleeding, infection perforation       -start omeprazole 20 mg daily     -continue to follow anti-reflux measures including avoiding spicy food, fatty food, tomato based products, citrus, carbonation   -continue to avoid alcohol      Follow-up after procedure  ______________________________________________________________________    SUBJECTIVE:      Paloma Valle  is a 78-year-old male with past medical history of GERD, JADYN, hypertension who presents for follow-up  Patient was last in the office 02/2022 for unintentional weight loss  He underwent both EGD and colonoscopy 03/2021  Colonoscopy with removal of a few polyps and repeat recommended in 5 years  EGD with hiatal hernia, Schatzki's ring status post balloon dilation  Patient reports he has been doing well since last visit  His weight has now returned to normal   Reports his dysphagia improved significantly after dilation however has seem to reoccur since then  Symptoms often have been with foods such as breads and occasionally cold liquids  He reports his bowel movements are normal without any blood in the stool  He has no nausea or vomiting    Patient initially believed he was taking medicine for acid reflux however upon further discussion he actually seem to think that allopurinol was for acid reflux as this was confused with managing the uric acid for his gout  It was then found that he has not been on any PPI including his omeprazole which is listed  He has noticed some worsening reflux symptoms and has made some diet modifications for this  Most recent lab work with normal hemoglobin, platelets  CRP and sed rate were elevated at 40 and 72 during diagnosis of gout  CMP with LFTs normal     EGD 03/2021 with Schatzki's ring  Dilation was attempted with Rishabh Fast however unable to pass the oropharynx on multiple times  Dilation was then performed with 18-20 mm through scope balloon dilator with small controlled tear at the ring noted  It was recommended to have repeat EGD if symptoms persist with the use of wire guided bougie dilation  REVIEW OF SYSTEMS IS OTHERWISE NEGATIVE        Historical Information   Past Medical History:   Diagnosis Date    Arthritis     Asthma     Back pain     Colon polyp     COVID-19 1/21/2021 1/11/2021    CPAP (continuous positive airway pressure) dependence     Gastroesophageal reflux disease without esophagitis 7/16/2020    GERD (gastroesophageal reflux disease)     Gout of multiple sites 7/16/2020    Hiatal hernia     High blood pressure     Hypertension     Impaired fasting glucose 6/18/2020    Lipoma of neck     Mediastinal lymphadenopathy     Osteoporosis     Pneumonia due to COVID-19 virus 1/25/2021    Right hand pain 7/16/2020    Sleep apnea     Sleep disorder      Past Surgical History:   Procedure Laterality Date    BACK SURGERY      COLONOSCOPY  10/07/2016    5 years (per dominique)    COLONOSCOPY      EGD  03/31/2017    ELBOW SURGERY Left 04/09/2018    open arthrotomy, capsulectomy, loose body removal (per dominique)    HERNIA REPAIR  37/01/8805    umbilical with mesh (per dominique)    LUMBAR FUSION  07/11/2018    [place] BRONCHOSCOPY NEEDLE BX TRACHEA MAIN STEM&/BRON N/A 4/28/2020 Procedure: ENDOBRONCHIAL ULTRASOUND (EBUS) WITH BIOPSY;  Surgeon: Mala Alford MD;  [facility];  Service: Thoracic    [place] [address] N/A 2020    Procedure: Gayla Blandon;  Surgeon: Mala Alford MD;  [facility];  Service: Thoracic    ROTATOR CUFF REPAIR Bilateral     TONSILLECTOMY  1980    UPPER GASTROINTESTINAL ENDOSCOPY       Social History   Social History     Substance and Sexual Activity   Alcohol Use Yes    Alcohol/week: 7 0 standard drinks    Types: 7 Shots of liquor per week    Comment: one glass per night       Social History     Substance and Sexual Activity   Drug Use No     Social History     Tobacco Use   Smoking Status Former Smoker    Packs/day: 0 50    Years: 6 00    Pack years: 3 00    Types: Cigarettes    Quit date: 44    Years since quittin 5   Smokeless Tobacco Never Used     Family History   Problem Relation Age of Onset    Hypertension Mother     Hypertension Father        Meds/Allergies       Current Outpatient Medications:     allopurinol (ZYLOPRIM) 100 mg tablet    chlorthalidone 25 mg tablet    clotrimazole (LOTRIMIN) 1 % cream    fluticasone (FLONASE) 50 mcg/act nasal spray    gabapentin (NEURONTIN) 100 mg capsule    hydrALAZINE (APRESOLINE) 10 mg tablet    indomethacin (INDOCIN) 25 mg capsule    ipratropium (ATROVENT) 0 06 % nasal spray    ketoconazole (NIZORAL) 2 % shampoo    omeprazole (PriLOSEC) 10 mg delayed release capsule    potassium chloride (K-DUR,KLOR-CON) 20 mEq tablet    Allergies   Allergen Reactions    Nuts - Food Allergy Shortness Of Breath    Peanut Oil - Food Allergy Anaphylaxis     Anything using peanuts    Codeine Other (See Comments)     Dizzy, light headed  Body of balance      Latex Rash     Allergic to products, wears cotton underwear    Caffeine - Food Allergy Other (See Comments)    Dust Mite Extract     Other Other (See Comments)     Dizzy    Pollen Extract Other (See Comments)     Grass, Pollen           Objective     Vitals:    07/27/22 1339   BP: (!) 180/104   Pulse: 82   SpO2: 96%     Body mass index is 26 16 kg/m²  Weight (last 2 days)     Date/Time Weight    07/27/22 1339 75 8 (167)            PHYSICAL EXAM:      General Appearance:   Alert, cooperative, no distress   HEENT:   Normocephalic, atraumatic, anicteric      Neck:  Supple, symmetrical, trachea midline   Lungs:   Clear to auscultation bilaterally; no rales, rhonchi or wheezing; respirations unlabored    Heart[de-identified]   Regular rate and rhythm; no murmur, rub, or gallop  Abdomen:   Soft, non-tender, non-distended; normal bowel sounds; no masses, no organomegaly    Genitalia:   Deferred    Rectal:   Deferred    Extremities:  No cyanosis, clubbing or edema    Pulses:  2+ and symmetric    Skin:  No jaundice, rashes, or lesions    Lymph nodes:  No palpable cervical lymphadenopathy        Lab Results:   No visits with results within 1 Day(s) from this visit     Latest known visit with results is:   Hospital Outpatient Visit on 03/31/2022   Component Date Value    Case Report 03/31/2022                      Value:Surgical Pathology Report                         Case: [id number]                                   Authorizing Provider:  Jeannette Resendez MD            Collected:           03/31/2022 1142              Ordering Location:     [address] [place] [address]        Received:            03/31/2022 [facility]                                                    Pathologist:           Ryder Archer MD                                                         Specimens:   A) - Stomach, RO Hpylori                                                                            B) - Polyp, Colorectal, Ascending 3, cold snare                                                     C) - Polyp, Colorectal, cecum, cold snare                                                  Final Diagnosis 03/31/2022                      Value: This result contains rich text formatting which cannot be displayed here   Additional Information 03/31/2022                      Value: This result contains rich text formatting which cannot be displayed here   Synoptic Checklist 03/31/2022                      Value:                            COLON/RECTUM POLYP FORM - GI - B, C                                                                                     :    Adenoma(s)      Gross Description 03/31/2022                      Value: This result contains rich text formatting which cannot be displayed here  Radiology Results:   No results found

## 2022-07-27 NOTE — TELEPHONE ENCOUNTER
Called pt left msg on machine stating to call back and schedule an appt for the medication he is requesting

## 2022-07-28 ENCOUNTER — OFFICE VISIT (OUTPATIENT)
Dept: FAMILY MEDICINE CLINIC | Facility: CLINIC | Age: 62
End: 2022-07-28
Payer: COMMERCIAL

## 2022-07-28 VITALS
WEIGHT: 167 LBS | OXYGEN SATURATION: 97 % | HEIGHT: 67 IN | SYSTOLIC BLOOD PRESSURE: 160 MMHG | BODY MASS INDEX: 26.21 KG/M2 | HEART RATE: 78 BPM | TEMPERATURE: 98.2 F | DIASTOLIC BLOOD PRESSURE: 100 MMHG

## 2022-07-28 DIAGNOSIS — I10 PRIMARY HYPERTENSION: ICD-10-CM

## 2022-07-28 DIAGNOSIS — R79.82 ELEVATED C-REACTIVE PROTEIN: ICD-10-CM

## 2022-07-28 DIAGNOSIS — R70.0 ELEVATED SED RATE: ICD-10-CM

## 2022-07-28 DIAGNOSIS — R73.03 PREDIABETES: ICD-10-CM

## 2022-07-28 DIAGNOSIS — Z76.89 ENCOUNTER TO ESTABLISH CARE: ICD-10-CM

## 2022-07-28 DIAGNOSIS — E66.3 OVERWEIGHT WITH BODY MASS INDEX (BMI) OF 26 TO 26.9 IN ADULT: ICD-10-CM

## 2022-07-28 DIAGNOSIS — E87.6 HYPOKALEMIA: ICD-10-CM

## 2022-07-28 DIAGNOSIS — G47.09 OTHER INSOMNIA: Primary | ICD-10-CM

## 2022-07-28 DIAGNOSIS — Z13.89 SCREENING FOR BLOOD OR PROTEIN IN URINE: ICD-10-CM

## 2022-07-28 DIAGNOSIS — Z13.89 SCREENING FOR RHEUMATIC DISORDER: ICD-10-CM

## 2022-07-28 PROCEDURE — 99215 OFFICE O/P EST HI 40 MIN: CPT | Performed by: NURSE PRACTITIONER

## 2022-07-28 RX ORDER — VALSARTAN 40 MG/1
40 TABLET ORAL DAILY
Qty: 90 TABLET | Refills: 3 | Status: SHIPPED | OUTPATIENT
Start: 2022-07-28

## 2022-07-28 RX ORDER — POTASSIUM CHLORIDE 750 MG/1
10 CAPSULE, EXTENDED RELEASE ORAL 2 TIMES DAILY
Qty: 10 CAPSULE | Refills: 0 | Status: CANCELLED | OUTPATIENT
Start: 2022-07-28

## 2022-07-28 RX ORDER — POTASSIUM CHLORIDE 20 MEQ/1
20 TABLET, EXTENDED RELEASE ORAL DAILY
Qty: 90 TABLET | Refills: 1 | Status: CANCELLED | OUTPATIENT
Start: 2022-07-28 | End: 2022-10-26

## 2022-07-28 NOTE — PATIENT INSTRUCTIONS
Hypertension   WHAT YOU NEED TO KNOW:   What is hypertension? Hypertension is high blood pressure  Your blood pressure is the force of your blood moving against the walls of your arteries  Hypertension causes your blood pressure to get so high that your heart has to work much harder than normal  This can damage your heart  The cause of hypertension may not be known  This is called essential or primary hypertension  Hypertension caused by another medical condition, such as kidney disease, is called secondary hypertension  What do I need to know about the stages of hypertension? Normal blood pressure is 119/79 or lower   Your healthcare provider may only check your blood pressure each year if it stays at a normal level  Elevated blood pressure is 120/79 to 129/79   This is sometimes called prehypertension  Your healthcare provider may suggest lifestyle changes to help lower your blood pressure to a normal level  He or she may then check it again in 3 to 6 months  Stage 1 hypertension is 130/80  to 139/89   Your provider may recommend lifestyle changes, medication, and checks every 3 to 6 months until your blood pressure is controlled  Stage 2 hypertension is 140/90 or higher   Your provider will recommend lifestyle changes and have you take 2 kinds of hypertension medicines  You will also need to have your blood pressure checked monthly until it is controlled  What increases my risk for hypertension? Age older than 54 years (men) or 72 (women)    Stress, or a family history of hypertension or heart disease    Obesity, lack of exercise, or too many high-sodium foods    Use of tobacco, alcohol, or illegal drugs    A medical condition, such as diabetes, kidney disease, thyroid disease, or adrenal gland disorder    Certain medicines, such as steroids or birth control pills    What are the signs and symptoms of hypertension?   You may have no signs or symptoms, or you may have any of the following:  Headache    Blurred vision    Chest pain    Dizziness or weakness    Trouble breathing    Nosebleeds    How is hypertension diagnosed? Your healthcare provider will take your blood pressure at several visits  You may also need to check your blood pressure at home  The provider will examine you and ask what medicines you take  He or she will also ask if you have a family history of high blood pressure and about any health conditions you have  He or she will also check your blood pressure and weight and examine your heart, lungs, and eyes  You may need any of the following tests: An ambulatory blood pressure monitor (ABPM)  is a device that you wear  ABPM measures your blood pressure while you do your regular daily activities  It records your blood pressure every 15 to 30 minutes during the day  It also records your blood pressure every 15 minutes to 1 hour at night  The recorded blood pressures help your healthcare provider know if you have hypertension not seen at your appointment  Blood tests  may help healthcare providers find the cause of your hypertension  Blood tests can also help find other health problems caused by hypertension  Urine tests  will be done to check your kidney function  Kidney problems can increase your risk for hypertension  Which medicines are used to treat hypertension? Antihypertensives  may be used to help lower your blood pressure  Several kinds of medicines are available  Your healthcare provider will choose medicines based on the kind of hypertension you have  You may need more than one type of medicine  Take the medicine exactly as directed  Diuretics  help decrease extra fluid that collects in your body  This will help lower your blood pressure  You may urinate more often while you take this medicine  Cholesterol medicine  helps lower your cholesterol level   A low cholesterol level helps prevent heart disease and makes it easier to control your blood pressure  What can I do to manage hypertension? Check your blood pressure at home  Avoid smoking, caffeine, and exercise at least 30 minutes before checking your blood pressure  Sit and rest for 5 minutes before you take your blood pressure  Extend your arm and support it on a flat surface  Your arm should be at the same level as your heart  Follow the directions that came with your blood pressure monitor  Check your blood pressure 2 times, 1 minute apart, before you take your medicine in the morning  Also check your blood pressure before your evening meal  Keep a record of your readings and bring it to your follow-up visits  Ask your healthcare provider what your blood pressure should be  Manage any other health conditions you have  Health conditions such as diabetes can increase your risk for hypertension  Follow your healthcare provider's instructions and take all your medicines as directed  Ask about all medicines  Certain medicines can increase your blood pressure  Examples include oral birth control pills, decongestants, herbal supplements, and NSAIDs, such as ibuprofen  Your healthcare provider can tell you which medicines are safe for you to take  This includes prescription and over-the-counter medicines  What lifestyle changes can I make to manage hypertension? Your healthcare provider may recommend you work with a team to manage hypertension  The team may include medical experts such as a dietitian, an exercise or physical therapist, and a behavior therapist  Your family members may be included in helping you create lifestyle changes  Limit sodium (salt) as directed  Too much sodium can affect your fluid balance  Check labels to find low-sodium or no-salt-added foods  Some low-sodium foods use potassium salts for flavor  Too much potassium can also cause health problems  Your healthcare provider will tell you how much sodium and potassium are safe for you to have in a day   He or she may recommend that you limit sodium to 2,300 mg a day  Follow the meal plan recommended by your healthcare provider  A dietitian or your provider can give you more information on low-sodium plans or the DASH (Dietary Approaches to Stop Hypertension) eating plan  The DASH plan is low in sodium, processed sugar, unhealthy fats, and total fat  It is high in potassium, calcium, and fiber  These can be found in vegetables, fruit, and whole-grain foods  Be physically active throughout the day  Physical activity, such as exercise, can help control your blood pressure and your weight  Be physically active for at least 30 minutes per day, on most days of the week  Include aerobic activity, such as walking or riding a bicycle  Also include strength training at least 2 times each week  Your healthcare providers can help you create a physical activity plan  Decrease stress  This may help lower your blood pressure  Learn ways to relax, such as deep breathing or listening to music  Limit alcohol as directed  Alcohol can increase your blood pressure  A drink of alcohol is 12 ounces of beer, 5 ounces of wine, or 1½ ounces of liquor  Do not smoke  Nicotine and other chemicals in cigarettes and cigars can increase your blood pressure and also cause lung damage  Ask your healthcare provider for information if you currently smoke and need help to quit  E-cigarettes or smokeless tobacco still contain nicotine  Talk to your healthcare provider before you use these products  Call your local emergency number [phone] in the [address] [facility]) or have someone call if:   You have chest pain      You have any of the following signs of a heart attack:      Squeezing, pressure, or pain in your chest    You may  also have any of the following:     Discomfort or pain in your back, neck, jaw, stomach, or arm    Shortness of breath    Nausea or vomiting    Lightheadedness or a sudden cold sweat    You become confused or have trouble speaking  You suddenly feel lightheaded or have trouble breathing  When should I seek immediate care? You have a severe headache or vision loss  You have weakness in an arm or leg  When should I call my doctor? You feel faint, dizzy, confused, or drowsy  You have been taking your blood pressure medicine but your pressure is higher than your provider says it should be  You have questions or concerns about your condition or care  CARE AGREEMENT:   You have the right to help plan your care  Learn about your health condition and how it may be treated  Discuss treatment options with your healthcare providers to decide what care you want to receive  You always have the right to refuse treatment  The above information is an  only  It is not intended as medical advice for individual conditions or treatments  Talk to your doctor, nurse or pharmacist before following any medical regimen to see if it is safe and effective for you  © Copyright [employer] 2022 Information is for End User's use only and may not be sold, redistributed or otherwise used for commercial purposes  All illustrations and images included in CareNotes® are the copyrighted property of A D A M , Inc  or [address] [place] [address]  Valsartan (By mouth)   Valsartan ([id number])  Treats high blood pressure and heart failure  May lower the risk of death after a heart attack  This medicine is an angiotensin receptor blocker (ARB)  Brand Name(s): Kristy Scalessartlloyd Mullen   There may be other brand names for this medicine  When This Medicine Should Not Be Used: This medicine is not right for everyone  Do not use it if you had an allergic reaction to valsartan, or if you are pregnant  How to Use This Medicine:   Capsule, Tablet  Take your medicine as directed  Your dose may need to be changed several times to find what works best for you    Oral liquid: Shake the bottle well for at least 10 seconds before you measure the dose  Measure the oral liquid medicine with a marked measuring spoon, oral syringe, or medicine cup  Read and follow the patient instructions that come with this medicine  Talk to your doctor or pharmacist if you have any questions  Missed dose: Take a dose as soon as you remember  If it is almost time for your next dose, wait until then and take a regular dose  Do not take extra medicine to make up for a missed dose  Store the medicine in a closed container at room temperature, away from heat, moisture, and direct light  Store the oral liquid at room temperature for up to 30 days or in the refrigerator for up to 75 days  Drugs and Foods to Avoid:   Ask your doctor or pharmacist before using any other medicine, including over-the-counter medicines, vitamins, and herbal products  Do not use this medicine together with aliskiren, especially if you have diabetes or kidney disease  Some medicines can affect how valsartan works  Tell your doctor if you also use any of the following:  Cyclosporine, lithium, rifampin, ritonavir  ACE inhibitor blood pressure medicine  Diuretic (water pill)  Heparin  NSAID pain or arthritis medicine, including aspirin, diclofenac, ibuprofen, naproxen  Ask your doctor before you use any medicine, supplement, or salt substitute that contains potassium  Warnings While Using This Medicine: It is not safe to take this medicine during pregnancy  It could harm an unborn baby  Tell your doctor right away if you become pregnant  Tell your doctor if you are breastfeeding, or if you have kidney problems, liver disease, or heart or blood vessel problems  This medicine could lower your blood pressure too much, especially when you first use it or if you are dehydrated  Stand or sit up slowly if you feel lightheaded or dizzy  Your doctor will do lab tests at regular visits to check on the effects of this medicine  Keep all appointments    Keep all medicine out of the reach of children  Never share your medicine with anyone  Possible Side Effects While Using This Medicine:   Call your doctor right away if you notice any of these side effects: Allergic reaction: Itching or hives, swelling in your face or hands, swelling or tingling in your mouth or throat, chest tightness, trouble breathing  Change in how much or how often you urinate, bloody or cloudy urine  Confusion, weakness, uneven heartbeat, trouble breathing, numbness in your hands, feet, or lips  Lightheadedness, dizziness, fainting  Rapid weight gain, swelling in your hands, ankles, or feet  If you notice other side effects that you think are caused by this medicine, tell your doctor  Call your doctor for medical advice about side effects  You may report side effects to FDA at 9-375-IOW-8464    © Copyright [employer] 2022 Information is for End User's use only and may not be sold, redistributed or otherwise used for commercial purposes  The above information is an  only  It is not intended as medical advice for individual conditions or treatments  Talk to your doctor, nurse or pharmacist before following any medical regimen to see if it is safe and effective for you  Hypertension   WHAT YOU NEED TO KNOW:   What is hypertension? Hypertension is high blood pressure  Your blood pressure is the force of your blood moving against the walls of your arteries  Hypertension causes your blood pressure to get so high that your heart has to work much harder than normal  This can damage your heart  The cause of hypertension may not be known  This is called essential or primary hypertension  Hypertension caused by another medical condition, such as kidney disease, is called secondary hypertension  What do I need to know about the stages of hypertension? Normal blood pressure is 119/79 or lower   Your healthcare provider may only check your blood pressure each year if it stays at a normal level      Elevated blood pressure is 120/79 to 129/79   This is sometimes called prehypertension  Your healthcare provider may suggest lifestyle changes to help lower your blood pressure to a normal level  He or she may then check it again in 3 to 6 months  Stage 1 hypertension is 130/80  to 139/89   Your provider may recommend lifestyle changes, medication, and checks every 3 to 6 months until your blood pressure is controlled  Stage 2 hypertension is 140/90 or higher   Your provider will recommend lifestyle changes and have you take 2 kinds of hypertension medicines  You will also need to have your blood pressure checked monthly until it is controlled  What increases my risk for hypertension? Age older than 54 years (men) or 72 (women)    Stress, or a family history of hypertension or heart disease    Obesity, lack of exercise, or too many high-sodium foods    Use of tobacco, alcohol, or illegal drugs    A medical condition, such as diabetes, kidney disease, thyroid disease, or adrenal gland disorder    Certain medicines, such as steroids or birth control pills    What are the signs and symptoms of hypertension? You may have no signs or symptoms, or you may have any of the following:  Headache    Blurred vision    Chest pain    Dizziness or weakness    Trouble breathing    Nosebleeds    How is hypertension diagnosed? Your healthcare provider will take your blood pressure at several visits  You may also need to check your blood pressure at home  The provider will examine you and ask what medicines you take  He or she will also ask if you have a family history of high blood pressure and about any health conditions you have  He or she will also check your blood pressure and weight and examine your heart, lungs, and eyes  You may need any of the following tests: An ambulatory blood pressure monitor (ABPM)  is a device that you wear  ABPM measures your blood pressure while you do your regular daily activities   It records your blood pressure every 15 to 30 minutes during the day  It also records your blood pressure every 15 minutes to 1 hour at night  The recorded blood pressures help your healthcare provider know if you have hypertension not seen at your appointment  Blood tests  may help healthcare providers find the cause of your hypertension  Blood tests can also help find other health problems caused by hypertension  Urine tests  will be done to check your kidney function  Kidney problems can increase your risk for hypertension  Which medicines are used to treat hypertension? Antihypertensives  may be used to help lower your blood pressure  Several kinds of medicines are available  Your healthcare provider will choose medicines based on the kind of hypertension you have  You may need more than one type of medicine  Take the medicine exactly as directed  Diuretics  help decrease extra fluid that collects in your body  This will help lower your blood pressure  You may urinate more often while you take this medicine  Cholesterol medicine  helps lower your cholesterol level  A low cholesterol level helps prevent heart disease and makes it easier to control your blood pressure  What can I do to manage hypertension? Check your blood pressure at home  Avoid smoking, caffeine, and exercise at least 30 minutes before checking your blood pressure  Sit and rest for 5 minutes before you take your blood pressure  Extend your arm and support it on a flat surface  Your arm should be at the same level as your heart  Follow the directions that came with your blood pressure monitor  Check your blood pressure 2 times, 1 minute apart, before you take your medicine in the morning  Also check your blood pressure before your evening meal  Keep a record of your readings and bring it to your follow-up visits  Ask your healthcare provider what your blood pressure should be  Manage any other health conditions you have  Health conditions such as diabetes can increase your risk for hypertension  Follow your healthcare provider's instructions and take all your medicines as directed  Ask about all medicines  Certain medicines can increase your blood pressure  Examples include oral birth control pills, decongestants, herbal supplements, and NSAIDs, such as ibuprofen  Your healthcare provider can tell you which medicines are safe for you to take  This includes prescription and over-the-counter medicines  What lifestyle changes can I make to manage hypertension? Your healthcare provider may recommend you work with a team to manage hypertension  The team may include medical experts such as a dietitian, an exercise or physical therapist, and a behavior therapist  Your family members may be included in helping you create lifestyle changes  Limit sodium (salt) as directed  Too much sodium can affect your fluid balance  Check labels to find low-sodium or no-salt-added foods  Some low-sodium foods use potassium salts for flavor  Too much potassium can also cause health problems  Your healthcare provider will tell you how much sodium and potassium are safe for you to have in a day  He or she may recommend that you limit sodium to 2,300 mg a day  Follow the meal plan recommended by your healthcare provider  A dietitian or your provider can give you more information on low-sodium plans or the DASH (Dietary Approaches to Stop Hypertension) eating plan  The DASH plan is low in sodium, processed sugar, unhealthy fats, and total fat  It is high in potassium, calcium, and fiber  These can be found in vegetables, fruit, and whole-grain foods  Be physically active throughout the day  Physical activity, such as exercise, can help control your blood pressure and your weight  Be physically active for at least 30 minutes per day, on most days of the week  Include aerobic activity, such as walking or riding a bicycle   Also include strength training at least 2 times each week  Your healthcare providers can help you create a physical activity plan  Decrease stress  This may help lower your blood pressure  Learn ways to relax, such as deep breathing or listening to music  Limit alcohol as directed  Alcohol can increase your blood pressure  A drink of alcohol is 12 ounces of beer, 5 ounces of wine, or 1½ ounces of liquor  Do not smoke  Nicotine and other chemicals in cigarettes and cigars can increase your blood pressure and also cause lung damage  Ask your healthcare provider for information if you currently smoke and need help to quit  E-cigarettes or smokeless tobacco still contain nicotine  Talk to your healthcare provider before you use these products  Call your local emergency number [phone] in the [address] [facility]) or have someone call if:   You have chest pain  You have any of the following signs of a heart attack:      Squeezing, pressure, or pain in your chest    You may  also have any of the following:     Discomfort or pain in your back, neck, jaw, stomach, or arm    Shortness of breath    Nausea or vomiting    Lightheadedness or a sudden cold sweat    You become confused or have trouble speaking  You suddenly feel lightheaded or have trouble breathing  When should I seek immediate care? You have a severe headache or vision loss  You have weakness in an arm or leg  When should I call my doctor? You feel faint, dizzy, confused, or drowsy  You have been taking your blood pressure medicine but your pressure is higher than your provider says it should be  You have questions or concerns about your condition or care  CARE AGREEMENT:   You have the right to help plan your care  Learn about your health condition and how it may be treated  Discuss treatment options with your healthcare providers to decide what care you want to receive  You always have the right to refuse treatment   The above information is an  only  It is not intended as medical advice for individual conditions or treatments  Talk to your doctor, nurse or pharmacist before following any medical regimen to see if it is safe and effective for you  © Copyright [employer] 2022 Information is for End User's use only and may not be sold, redistributed or otherwise used for commercial purposes  All illustrations and images included in CareNotes® are the copyrighted property of [employer]  or Shirley Sheehan   Prediabetes   WHAT YOU NEED TO KNOW:   What is prediabetes? Prediabetes is a blood glucose (sugar) level that is higher than normal  It is not high enough to be considered diabetes  Prediabetes increases your risk for type 2 diabetes and heart disease, especially if you have high blood pressure or high cholesterol  What increases my risk for prediabetes? Being overweight or obese, with a body mass index (BMI) of 25 or higher (23 or higher if you are  American)    Lack of physical activity    Older age    Family history of diabetes (parent or sibling)    A history of heart disease, gestational diabetes, or polycystic ovary syndrome (PCOS)    High blood pressure or cholesterol levels    Being [place] American, , , [place] American, or     In children, having a mother with diabetes or gestational diabetes mellitus (GDM) during the pregnancy    What are the signs and symptoms of prediabetes? Prediabetes may not cause any symptoms  How is prediabetes diagnosed? Blood tests can help diagnose prediabetes even if no signs or symptoms have started  Adults are tested starting at age 39, or at 28 with a high risk for diabetes  Children who are overweight or obese are tested at the start of puberty, or as early as 10 years  How do I prevent or delay type 2 diabetes? Healthy choices work best to delay or prevent type 2 diabetes   You may be given the following guidelines from your healthcare provider:  Get regular physical activity  Adults should get at least 150 minutes (2 5 hours) of moderate physical activity every week  Spread the amount of activity over at least 3 days a week  Do not skip more than 2 days in a row  Children should get at least 60 minutes of moderate physical activity on most days of the week  Examples of moderate physical activity include brisk walking, running, and swimming  Do not sit for longer than 30 minutes at a time  Work with your healthcare provider to create a plan for physical activity  Lose weight if you are overweight  A weight loss of 7% of your body weight can help  Your healthcare provider can tell you what weight is healthy for you  He or she can help you create a weight loss plan  Eat healthy foods  Eat a variety of fruits and vegetables  Eat whole-grain foods more often  Choose dairy foods, meat, and other protein foods that are low in fat  Eat fewer sweets, such as candy, cookies, regular soda, and sweetened drinks  You can also decrease calories by eating smaller portion sizes  Work with your healthcare provider or dietitian to develop a meal plan that is right for you  Take medicine as directed  Your healthcare provider may give diabetes medicine if you are at high risk for diabetes  You may also need medicines for high blood pressure and high cholesterol  Follow up with your healthcare provider as directed  You will need to return every year to get tested for diabetes  Do not smoke  Do not use e-cigarettes or smokeless tobacco in place of cigarettes or to help you quit  They still contain nicotine  Ask your healthcare provider for information if you currently smoke and need help quitting  Start with small goals and work your way up  You may need to start with 3 days of physical activity  You can add a day after 3 weeks or so of activity   Make a goal of losing 5 pounds at first  Swap a piece of fruit for dessert or sweets  Start with 2 fruits in a week and increase it weekly  These are suggestions  Talk with healthcare providers about making a plan that is right for you  When should I call my doctor? You have more hunger or thirst than usual     You are urinating more often than usual     You are always exhausted  You have blurred vision  You have questions or concerns about your condition or care  CARE AGREEMENT:   You have the right to help plan your care  Learn about your health condition and how it may be treated  Discuss treatment options with your healthcare providers to decide what care you want to receive  You always have the right to refuse treatment  The above information is an  only  It is not intended as medical advice for individual conditions or treatments  Talk to your doctor, nurse or pharmacist before following any medical regimen to see if it is safe and effective for you  © Copyright [employer] 2022 Information is for End User's use only and may not be sold, redistributed or otherwise used for commercial purposes  All illustrations and images included in CareNotes® are the copyrighted property of [employer]  or [facility] Marlon Sheehan   Hypokalemia   WHAT YOU NEED TO KNOW:   What is hypokalemia? Hypokalemia is a low level of potassium in your blood  Potassium helps control how your muscles, heart, and digestive system work  Hypokalemia occurs when your body loses too much potassium or does not absorb enough from food  What causes hypokalemia? Diarrhea or vomiting    Medicines, such as diuretics, blood pressure medicines, or antibiotics    Excessive use of laxatives    Anorexia or bulimia nervosa    Medical conditions, such as Cushing syndrome or kidney disease    Not eating enough foods that contain potassium    What are the signs and symptoms of hypokalemia? You may not have any signs or symptoms if you have mild hypokalemia   You may have any of the following if it is more severe:  Fatigue    Constipation    Frequent urination or urinating large amounts    Muscle cramps or skin tingling    Muscle weakness    Fast or irregular heartbeat    How is hypokalemia diagnosed? An EKG  test records your heart rhythm and how fast your heart beats  It is used to check for an irregular heartbeat  Blood tests  are done to check your potassium level  How is hypokalemia treated? You will receive potassium to bring your levels back to normal  This may be given as a pill or IV  The amount of potassium you will be given depends on your potassium level  What foods are high in potassium? Foods that are high in potassium include bananas, oranges, tomatoes, potatoes, and avocado  [place] beans, turkey, salmon, lean beef, yogurt, and milk are also high in potassium  Ask your healthcare provider or dietitian for more information about foods that are high in potassium  When should I seek immediate care? You cannot move your arm or leg  You have a fast or irregular heartbeat  You are too tired or weak to stand up  When should I contact my healthcare provider? You are vomiting, or you have diarrhea  You have numbness or tingling in your arms or legs  Your symptoms do not go away or they get worse  You have questions or concerns about your condition or care  CARE AGREEMENT:   You have the right to help plan your care  Learn about your health condition and how it may be treated  Discuss treatment options with your healthcare providers to decide what care you want to receive  You always have the right to refuse treatment  The above information is an  only  It is not intended as medical advice for individual conditions or treatments  Talk to your doctor, nurse or pharmacist before following any medical regimen to see if it is safe and effective for you    © Copyright [employer] 2022 Information is for End User's use only and may not be sold, redistributed or otherwise used for commercial purposes  All illustrations and images included in CareNotes® are the copyrighted property of [employer]  or [employer] [facility]  Rheumatoid Arthritis   WHAT YOU NEED TO KNOW:   What is rheumatoid arthritis? Rheumatoid arthritis (RA) is a long-term autoimmune disease that causes inflammation and damage to your joints  RA causes your body's immune system to attack the synovial membrane (lining) in your joints  RA can also affect other organs, such as your eyes, heart, or lungs  It may also increase your risk for osteoporosis (weakened bones)  What increases my risk for RA? Being a woman    A family history of RA    Age between 27and 61years old    Smoking cigarettes    What are the signs and symptoms of RA? Joint pain and stiffness that lasts longer than 1 hour    Swollen joints in the same joint on both sides of your body    Loss of joint movement    Firm, round nodules (growths) on your joints    Fatigue or muscle weakness    Loss of appetite or weight loss    How is RA diagnosed? Blood tests  may be used to check for signs of infection or inflammation  X-ray or MRI  pictures may be taken of the bones and tissues in your joints  You may be given contrast liquid as a shot into the joint to help your joint show up better  Tell the healthcare provider if you have ever had an allergic reaction to contrast liquid  Do not enter the MRI room with anything metal  Metal can cause serious injury  Tell the healthcare provider if you have any metal in or on your body  Arthrocentesis  is a procedure used to drain fluid out of a joint  The fluid is tested for infection or other problems that can cause arthritis  Synovial biopsy  may be used if your joint fluid cannot be drained or if you have signs of an infection  A piece of tissue is removed from the lining of a joint  The tissue is tested for possible causes of your arthritis  How is RA treated?   The goal of treatment within the first year is remission (no pain or inflammation)  If full remission cannot be reached, the goal is as few arthritis flares as possible  Early treatment can also help prevent or slow joint damage  Treatment may change after the first year, depending on how your body responds  Medicines: Your healthcare provider may start with 1 medicine and add medicines if you continue to have symptoms  He or she may instead start with a combination of medicines  The medicines may be stopped one at a time as you reach and maintain remission  Antirheumatics  help slow the progress of RA, and reduce pain, stiffness, and inflammation  NSAIDs , such as ibuprofen, help decrease swelling, pain, and fever  NSAIDs can cause stomach bleeding or kidney problems in certain people  If you take blood thinner medicine, always ask your healthcare provider if NSAIDs are safe for you  Always read the medicine label and follow directions  Steroid medicine  helps reduce swelling and pain  Biologic therapy  helps decrease joint swelling, pain, and stiffness  These medicines increase the risk of serious infection  Your healthcare provider will need to monitor you closely while you are taking these medicines  Surgery  may be done to take out all or part of the joint and put in an artificial joint  This may help reduce pain and repair the joint  Surgery may also be done if you have a joint infection or if the bones in your spine are pressing on nerves  What can I do to manage my symptoms? Rest when needed  Rest is important if your joints are painful  Limit your activities until your symptoms improve  Gradually start your normal activities when you can do them without pain  Avoid motions and activities that cause strain on your joints, such as heavy exercise and lifting  Use ice or heat  Both can help decrease swelling and pain  Ice may also help prevent tissue damage   Use an ice pack, or put crushed ice in a plastic bag  Cover it with a towel and place it on your joint for 15 to 20 minutes every hour or as directed  You can apply heat for 20 minutes every 2 hours  Heat treatment includes hot packs, heat lamps, warm baths, or showers  Elevate your joint  Elevation helps reduce swelling and pain  Raise your joint above the level of your heart as often as you can  Prop your painful joint on pillows to keep it above your heart comfortably  What can I do to manage RA? Talk to your healthcare providers about your arthritis medicines  Some medicines may only be needed when you have arthritis pain  You may need to take other medicines every day to prevent arthritis from getting worse  Your healthcare providers will help you understand all your medicines and when to take them  It is important to take the medicines as directed, even if you start to feel better  You can continue to have joint damage and inflammation even if you do not feel it  Eat a variety of healthy foods  Healthy foods include fruits, vegetables, whole-grain breads, low-fat dairy products, beans, lean meats, and fish  Ask if you need to be on a special diet  A diet rich in calcium and vitamin D may decrease your risk of osteoporosis  Foods high in calcium include milk, cheese, broccoli, and tofu  Vitamin D may be found in meat, fish, fortified milk, cereal and bread  Ask if you need calcium or vitamin D supplements  Maintain a healthy weight  This may help decrease strain on joints in your back, knees, ankles, and feet  Ask your healthcare provider what a healthy weight is for you  Ask him or her to help you create a weight loss plan if you are overweight  Exercise can help you maintain a healthy weight  Go to physical or occupational therapy as directed  Physical activity can help relieve pain and stiffness  A physical therapist can teach you exercises to improve flexibility and range of motion   You may also be shown non-weight-bearing exercises that are safe for your joints, such as swimming  An occupational therapist can help you learn to do your daily activities when your joints are stiff or sore  Do not smoke  Nicotine and other chemicals in cigarettes and cigars can damage your bones and joints  Ask your healthcare provider for information if you currently smoke and need help to quit  E-cigarettes or smokeless tobacco still contain nicotine  Talk to your healthcare provider before you use these products  What do I need to know about RA medicines and pregnancy? Men:  Talk to your doctor about your RA and the medicines you take  Some medicines may keep your partner from getting pregnant  Talk to your doctor if you and your partner are discussing pregnancy  Women:  Talk to your gynecologist about contraceptives  Tell him or her about your RA and what medicines you take  He or she will tell you what the best contraceptive for will be  Not all contraceptives are effective if you have RA and are taking certain medicines  You may not be able to breastfeed if you are taking certain RA medicines  What support devices can help manage RA? Orthotic shoes or insoles  help support your feet when you walk  Crutches, a cane, or a walker  may help decrease your risk for falling  They also decrease stress on affected joints  Devices to prevent falls  include raised toilet seats and bathtub bars to help you get up from sitting  Handrails can be placed in areas where you need balance and support  Devices to help with support and rest  include splints to wear on your hands and a firm pillow while you sleep  Use a pillow that is firm enough to support your neck and head  When should I call my doctor? You have a fever  You have increased joint swelling, pain, or redness  Your skin is itchy, swollen, or has a rash  Your symptoms are getting worse, even with treatment      You have questions or concerns about your condition or care  CARE AGREEMENT:   You have the right to help plan your care  Learn about your health condition and how it may be treated  Discuss treatment options with your healthcare providers to decide what care you want to receive  You always have the right to refuse treatment  The above information is an  only  It is not intended as medical advice for individual conditions or treatments  Talk to your doctor, nurse or pharmacist before following any medical regimen to see if it is safe and effective for you  © Copyright [employer] 2022 Information is for End User's use only and may not be sold, redistributed or otherwise used for commercial purposes   All illustrations and images included in CareNotes® are the copyrighted property of [employer] M , Inc  or [address] [place], [place] [address]

## 2022-07-28 NOTE — PROGRESS NOTES
BMI Counseling: Body mass index is 26 16 kg/m²  The BMI is above normal  Nutrition recommendations include decreasing portion sizes, encouraging healthy choices of fruits and vegetables, decreasing fast food intake, consuming healthier snacks, limiting drinks that contain sugar, moderation in carbohydrate intake, increasing intake of lean protein, reducing intake of saturated and trans fat and reducing intake of cholesterol  Exercise recommendations include vigorous physical activity 75 minutes/week, exercising 3-5 times per week, obtaining a gym membership and strength training exercises  No pharmacotherapy was ordered  Rationale for BMI follow-up plan is due to patient being overweight or obese  Depression Screening and Follow-up Plan: Clincally patient does not have depression  No treatment is required  Assessment/Plan:         Problem List Items Addressed This Visit        Cardiovascular and Mediastinum    Hypertension - Primary     /100  Patient takes Apresoline 10 mg p o  B i d  and valsartan 40 mg p o  Daily  Continue to maintain low sodium diet and to exercise 3-5 times a day for 75 minutes per week  BP check 1 week  Repeat BMP  Relevant Medications    valsartan (DIOVAN) 40 mg tablet    Other Relevant Orders    CBC and differential    Comprehensive metabolic panel       Other    Screening for blood or protein in urine     Screening for protein, glucose and blood in urine  Relevant Orders    UA w Reflex to Microscopic w Reflex to Culture    Hypokalemia     Check BMP patient currently on potassium supplementation  Prior K+ level 3 0  Overweight with body mass index (BMI) of 26 to 26 9 in adult     BMI currently 26 16 kg/M2  Goal BMI 25 kg/M2  Patient counseled on proper diet exercise nutrition  Recheck BMI next office visit  Elevated C-reactive protein     Elevated C reactive protein in the past   Repeat C reactive protein level at this time  Relevant Orders    C-reactive protein    Elevated sed rate     Elevated sed rate, repeat sed rate ordered  Relevant Orders    Sedimentation rate, automated    Prediabetes     Recheck hemoglobin A1c and fasting glucose  Relevant Orders    Hemoglobin A1C            Subjective:      Patient ID: Miguel Ángel George  is a 59 y o  male  Patient is a 78-year-old male with history of hypertension, present for follow-up evaluation of his blood pressure as well as medication refill of valsartan  Currently on Apresoline 10 mg b i d , however patient was taking once a day  Patient currently out of valsartan 40 mg  Restart medications this time recheck BP in 1-2 weeks  Patient also order for routine lab work at this time  Patient noted to have elevated sed rate and C reactive protein in the past   Repeat lab work ordered for evaluation  Concerns for rheumatic arthritis  The following portions of the patient's history were reviewed and updated as appropriate:   Past Medical History:  He has a past medical history of Arthritis, Asthma, Back pain, Colon polyp, COVID-19 (1/21/2021), CPAP (continuous positive airway pressure) dependence, Gastroesophageal reflux disease without esophagitis (7/16/2020), GERD (gastroesophageal reflux disease), Gout of multiple sites (7/16/2020), Hiatal hernia, High blood pressure, Hypertension, Impaired fasting glucose (6/18/2020), Lipoma of neck, Mediastinal lymphadenopathy, Osteoporosis, Pneumonia due to COVID-19 virus (1/25/2021), Right hand pain (7/16/2020), Sleep apnea, and Sleep disorder  ,  _______________________________________________________________________  Medical Problems:  does not have any pertinent problems on file ,  _______________________________________________________________________  Past Surgical History:   has a past surgical history that includes Lumbar fusion (07/11/2018); EGD (03/31/2017); Tonsillectomy (01/01/1980); Colonoscopy (10/07/2016);  Elbow surgery (Left, 04/09/2018); Hernia repair (01/16/2019); Rotator cuff repair (Bilateral); Colonoscopy; Back surgery; pr bronchoscopy,diagnostic (N/A, 4/28/2020); pr bronchoscopy needle bx trachea main stem&/bron (N/A, 4/28/2020); and Upper gastrointestinal endoscopy  ,  _______________________________________________________________________  Family History:  family history includes Hypertension in his father and mother ,  _______________________________________________________________________  Social History:   reports that he quit smoking about 38 years ago  His smoking use included cigarettes  He has a 3 00 pack-year smoking history  He has never used smokeless tobacco  He reports current alcohol use of about 7 0 standard drinks of alcohol per week  He reports that he does not use drugs  ,  _______________________________________________________________________  Allergies:  is allergic to nuts - food allergy, peanut oil - food allergy, codeine, latex, caffeine - food allergy, dust mite extract, other, and pollen extract     _______________________________________________________________________  Current Outpatient Medications   Medication Sig Dispense Refill    allopurinol (ZYLOPRIM) 100 mg tablet Take 1 tablet (100 mg total) by mouth daily 90 tablet 3    clotrimazole (LOTRIMIN) 1 % cream Apply topically 2 (two) times a day as needed (as needed) 30 g 0    fluticasone (FLONASE) 50 mcg/act nasal spray 2 sprays into each nostril daily 1 Bottle 5    gabapentin (NEURONTIN) 100 mg capsule Take 1 capsule (100 mg total) by mouth daily at bedtime 90 capsule 1    ipratropium (ATROVENT) 0 06 % nasal spray 2 sprays into each nostril 4 (four) times a day 262 mL 0    ketoconazole (NIZORAL) 2 % shampoo Apply topically twice weekly for 8 week and then as needed 120 mL 0    omeprazole (PriLOSEC) 20 mg delayed release capsule Take 1 capsule (20 mg total) by mouth daily before breakfast 30 capsule 4    potassium chloride (K-DUR,KLOR-CON) 20 mEq tablet Take 1 tablet (20 mEq total) by mouth daily 90 tablet 1    valsartan (DIOVAN) 40 mg tablet       valsartan (DIOVAN) 40 mg tablet Take 1 tablet (40 mg total) by mouth daily 90 tablet 3    chlorthalidone 25 mg tablet Take 1 tablet (25 mg total) by mouth daily (Patient not taking: Reported on 7/28/2022) 90 tablet 1    hydrALAZINE (APRESOLINE) 10 mg tablet Take 1 tablet (10 mg total) by mouth 2 (two) times a day 180 tablet 1    ibuprofen (MOTRIN) 600 mg tablet  (Patient not taking: Reported on 7/28/2022)      indomethacin (INDOCIN) 25 mg capsule  (Patient not taking: No sig reported)       No current facility-administered medications for this visit      _______________________________________________________________________  Review of Systems   Constitutional: Negative for activity change, appetite change, chills, fatigue, fever and unexpected weight change  HENT: Negative for congestion, ear discharge, ear pain, nosebleeds, postnasal drip, rhinorrhea, sinus pressure, sinus pain, sneezing, sore throat and voice change  Eyes: Negative for pain, redness and visual disturbance  Respiratory: Negative for cough, chest tightness, shortness of breath and wheezing  Cardiovascular: Negative for chest pain and palpitations  Gastrointestinal: Negative for abdominal distention, abdominal pain, constipation, diarrhea, nausea and vomiting  Endocrine: Negative  Genitourinary: Negative for difficulty urinating, dysuria, flank pain, frequency, hematuria and urgency  Musculoskeletal: Negative for arthralgias and myalgias  Skin: Negative  Allergic/Immunologic: Negative  Neurological: Negative  Hematological: Negative  Psychiatric/Behavioral: Negative            Objective:  Vitals:    07/28/22 1307   BP: 160/100   BP Location: Left arm   Patient Position: Sitting   Cuff Size: Standard   Pulse: 78   Temp: 98 2 °F (36 8 °C)   TempSrc: Tympanic   SpO2: 97%   Weight: 75 8 kg (167 lb)   Height: 5' 7" (1 702 m)     Body mass index is 26 16 kg/m²  Physical Exam  Vitals and nursing note reviewed  Constitutional:       Appearance: Normal appearance  He is well-developed and normal weight  HENT:      Head: Normocephalic and atraumatic  Right Ear: Tympanic membrane, ear canal and external ear normal       Left Ear: Tympanic membrane, ear canal and external ear normal       Nose: Nose normal  No congestion or rhinorrhea  Mouth/Throat:      Mouth: Mucous membranes are moist       Pharynx: No oropharyngeal exudate or posterior oropharyngeal erythema  Eyes:      Extraocular Movements: Extraocular movements intact  Conjunctiva/sclera: Conjunctivae normal       Pupils: Pupils are equal, round, and reactive to light  Cardiovascular:      Rate and Rhythm: Normal rate and regular rhythm  Pulses: Normal pulses  Heart sounds: Normal heart sounds  No murmur heard  Pulmonary:      Effort: Pulmonary effort is normal       Breath sounds: Normal breath sounds  Abdominal:      General: Bowel sounds are normal       Palpations: Abdomen is soft  Musculoskeletal:         General: Normal range of motion  Cervical back: Normal range of motion  Skin:     General: Skin is warm  Capillary Refill: Capillary refill takes 2 to 3 seconds  Neurological:      General: No focal deficit present  Mental Status: He is alert and oriented to person, place, and time     Psychiatric:         Mood and Affect: Mood normal          Behavior: Behavior normal

## 2022-07-28 NOTE — ASSESSMENT & PLAN NOTE
/100  Patient takes Apresoline 10 mg p o  B i d  and valsartan 40 mg p o  Daily  Continue to maintain low sodium diet and to exercise 3-5 times a day for 75 minutes per week  BP check 1 week  Repeat BMP

## 2022-07-28 NOTE — PATIENT INSTRUCTIONS
Scheduled date of EGD(as of today):9/26/2022  Physician performing EGD:DR BRANDON  Location of EGD:[facility]  Instructions reviewed with patient by:MUKUL GREENBERG  Clearances:

## 2022-07-28 NOTE — ASSESSMENT & PLAN NOTE
BMI currently 26 16 kg/M2  Goal BMI 25 kg/M2  Patient counseled on proper diet exercise nutrition  Recheck BMI next office visit

## 2022-08-07 DIAGNOSIS — E87.6 HYPOKALEMIA: ICD-10-CM

## 2022-08-10 RX ORDER — POTASSIUM CHLORIDE 20 MEQ/1
TABLET, EXTENDED RELEASE ORAL
Qty: 90 TABLET | Refills: 1 | Status: SHIPPED | OUTPATIENT
Start: 2022-08-10

## 2022-08-14 PROBLEM — Z09 FOLLOW-UP EXAM AFTER TREATMENT: Status: ACTIVE | Noted: 2022-08-14

## 2022-08-14 NOTE — PROGRESS NOTES
BMI Counseling: There is no height or weight on file to calculate BMI  The BMI is above normal  Nutrition recommendations include decreasing portion sizes, encouraging healthy choices of fruits and vegetables, decreasing fast food intake, consuming healthier snacks, limiting drinks that contain sugar, moderation in carbohydrate intake, increasing intake of lean protein, reducing intake of saturated and trans fat and reducing intake of cholesterol  Exercise recommendations include vigorous physical activity 75 minutes/week, exercising 3-5 times per week, obtaining a gym membership and strength training exercises  No pharmacotherapy was ordered  Rationale for BMI follow-up plan is due to patient being overweight or obese  Lung Cancer Screening Shared Decision Making: I discussed with him that he is a candidate for lung cancer CT screening  The following Shared Decision-Making points were covered:  1  Benefits of screening were discussed, including the rates of reduction in death from lung cancer and other causes  Harms of screening were reviewed, including false positive tests, radiation exposure levels, risks of invasive procedures, risks of complications of screening, and risk of overdiagnosis  2  I counseled on the importance of adherence to annual lung cancer LDCT screening, impact of co-morbidities, and ability or willingness to undergo diagnosis and treatment  3  I counseled on the importance of maintaining abstinence as a former smoker or was counseled on the importance of smoking cessation if a current smoker    Review of Eligibility Criteria: He meets all of the criteria for Lung Cancer Screening    - He is 59 y o    - He has 20 pack year tobacco history and is a current smoker or has quit within the past 15 years  - He presents no signs or symptoms of lung cancer    After discussion, the patient decided to elect lung cancer screening         Assessment/Plan:         Problem List Items Addressed This Visit        Endocrine    Impaired fasting glucose     Recent hemoglobin A1c 6 0 within normal limits  Patient maintain a low carb, low starch low sugar diet  Will continue to monitor at this time  Cardiovascular and Mediastinum    Hypertension     BP currently evaluated  BP currently 140/70  The maintained on Apresoline 10 mg p o  B i d  and Diovan 40 mg p o  Daily  Patient presents for BP recheck this visit  Patient maintain low-sodium diet  Patient also encouraged exercise 3 times per week for 75 minutes of moderate cardiovascular activity  Musculoskeletal and Integument    Toenail fungus     Patient referred to podiatry for evaluation of toenail fungus on the right great toe  May use Lamisil or Tinactin antifungal spray or cream routinely  Relevant Orders    Ambulatory Referral to Podiatry       Other    Gout of multiple sites     Patient noted have history of gout  Patient has allopurinol 100 mg p o  Daily at home for routine maintenance  Also given colchicine in the emergency room  Indicates that is not helped his pain  Patient also has taken ibuprofen 600 mg as needed, however is concerned about his kidney function so would like to obtain from use  Manjit Morris Recent uric acid with normal limits  Uric acid  Order: [id number]   Status: Final result     Visible to patient: Yes (seen)     Next appt: 09/06/2022 at 10:00 AM in Sleep Medicine (Candy Lopez PA-C)     Dx: Gout of multiple sites, unspecified c      2 Result Notes    Component Ref Range & Units 8/15/22  3:34 PM 6/17/20  9:46 AM   Uric Acid 3 5 - 8 5 mg/dL 6 2  7 3 R, CM    Comment: Specimen collection should occur prior to Metamizole administration due to the potential for falsely depressed results  Specimen Collected: 08/15/22  3:34 PM Last Resulted: 08/15/22  4:22 PM                    Hypokalemia     Recent potassium level 4 9  Patient currently on potassium chloride 20 mEq p o  Daily  Patient courage to eat foods and wrist with potassium  Will continue to monitor routinely  Overweight with body mass index (BMI) of 26 to 26 9 in adult     BMI currently is 25 78 kg/M2  Goal BMI 25 kg/M2  Patient counseled proper diet, nutrition and exercise  Recheck BMI next office visit  Elevated C-reactive protein     C-reactive protein Slightly elevated 11 2  Patient noted of COVID-19 in the past 2/2021  Possibly secondary to allergies  Elevated sed rate     Sed rate still elevated at 51  Patient noted to have history of COVID-19 2/2021  Possibly secondary to allergies  Prediabetes     Hemoglobin A1c 6 0  Patient in prediabetic range  Consult currently on proper diet, nutrition and exercise  Follow-up exam after treatment     Patient presents for follow-up evaluation of blood pressure after treatment with antihypertensive medications  Cellulitis of great toe of right foot     Patient order for amoxicillin 875 mg p o  B i d  For 14 days  Instructed on use of warm compresses to toe  We will order x-ray for further evaluation  Relevant Medications    amoxicillin (AMOXIL) 875 mg tablet    Other Relevant Orders    XR foot 3+ vw right      Other Visit Diagnoses     Pain of great toe, right    -  Primary    Relevant Medications    ibuprofen (MOTRIN) 800 mg tablet            Subjective:      Patient ID: Abhijit Seek  is a 59 y o  male  Patient is a 59year old male present for follow-up office visit for review of lab work  Recently seen in the emergency room 08/13/2022 for right great foot pain  Patient case he was evaluated for gout  Recent uric acid level within normal limits  Upon evaluation of right great toe it appears that there is swelling redness and warmth  Patient has cellulitis of the right great toe    Patient currently started amoxicillin 875 mg p o  B i d   X-ray ordered for further evaluation of right foot great toe       The following portions of the patient's history were reviewed and updated as appropriate:   Past Medical History:  He has a past medical history of Arthritis, Asthma, Back pain, Colon polyp, COVID-19 (1/21/2021), CPAP (continuous positive airway pressure) dependence, Gastroesophageal reflux disease without esophagitis (7/16/2020), GERD (gastroesophageal reflux disease), Gout of multiple sites (7/16/2020), Hiatal hernia, High blood pressure, Hypertension, Impaired fasting glucose (6/18/2020), Lipoma of neck, Mediastinal lymphadenopathy, Osteoporosis, Pneumonia due to COVID-19 virus (1/25/2021), Right hand pain (7/16/2020), Sleep apnea, and Sleep disorder  ,  _______________________________________________________________________  Medical Problems:  does not have any pertinent problems on file ,  _______________________________________________________________________  Past Surgical History:   has a past surgical history that includes Lumbar fusion (07/11/2018); EGD (03/31/2017); Tonsillectomy (01/01/1980); Colonoscopy (10/07/2016); Elbow surgery (Left, 04/09/2018); Hernia repair (01/16/2019); Rotator cuff repair (Bilateral); Colonoscopy; Back surgery; pr bronchoscopy,diagnostic (N/A, 4/28/2020); pr bronchoscopy needle bx trachea main stem&/bron (N/A, 4/28/2020); and Upper gastrointestinal endoscopy  ,  _______________________________________________________________________  Family History:  family history includes Hypertension in his father and mother ,  _______________________________________________________________________  Social History:   reports that he quit smoking about 38 years ago  His smoking use included cigarettes  He has a 3 00 pack-year smoking history  He has never used smokeless tobacco  He reports current alcohol use of about 7 0 standard drinks of alcohol per week  He reports that he does not use drugs  ,  _______________________________________________________________________  Allergies:  is allergic to nuts - food allergy, peanut oil - food allergy, codeine, latex, caffeine - food allergy, dust mite extract, other, and pollen extract     _______________________________________________________________________  Current Outpatient Medications   Medication Sig Dispense Refill    allopurinol (ZYLOPRIM) 100 mg tablet Take 1 tablet (100 mg total) by mouth daily 90 tablet 3    amoxicillin (AMOXIL) 875 mg tablet Take 1 tablet (875 mg total) by mouth 2 (two) times a day for 14 days 28 tablet 0    clotrimazole (LOTRIMIN) 1 % cream Apply topically 2 (two) times a day as needed (as needed) 30 g 0    fluticasone (FLONASE) 50 mcg/act nasal spray 2 sprays into each nostril daily 1 Bottle 5    gabapentin (NEURONTIN) 100 mg capsule Take 1 capsule (100 mg total) by mouth daily at bedtime 90 capsule 1    hydrALAZINE (APRESOLINE) 10 mg tablet Take 1 tablet (10 mg total) by mouth 2 (two) times a day 180 tablet 1    ibuprofen (MOTRIN) 800 mg tablet Take 1 tablet (800 mg total) by mouth every 8 (eight) hours as needed for mild pain or moderate pain (Right foot great toe pain) 30 tablet 0    ipratropium (ATROVENT) 0 06 % nasal spray 2 sprays into each nostril 4 (four) times a day 262 mL 0    ketoconazole (NIZORAL) 2 % shampoo Apply topically twice weekly for 8 week and then as needed 120 mL 0    omeprazole (PriLOSEC) 20 mg delayed release capsule Take 1 capsule (20 mg total) by mouth daily before breakfast 30 capsule 4    potassium chloride (K-DUR,KLOR-CON) 20 mEq tablet TAKE 1 TABLET EVERY DAY 90 tablet 1    valsartan (DIOVAN) 40 mg tablet       chlorthalidone 25 mg tablet Take 1 tablet (25 mg total) by mouth daily (Patient not taking: Reported on 7/28/2022) 90 tablet 1    valsartan (DIOVAN) 40 mg tablet Take 1 tablet (40 mg total) by mouth daily (Patient not taking: Reported on 8/16/2022) 90 tablet 3     No current facility-administered medications for this visit  _______________________________________________________________________  Review of Systems   Constitutional: Negative for activity change, appetite change, chills, fatigue, fever and unexpected weight change  HENT: Negative for congestion, ear discharge, ear pain, nosebleeds, postnasal drip, rhinorrhea, sinus pressure, sinus pain, sneezing, sore throat and voice change  Eyes: Negative for pain, redness and visual disturbance  Respiratory: Negative for cough, chest tightness, shortness of breath and wheezing  Cardiovascular: Negative for chest pain and palpitations  Gastrointestinal: Negative for abdominal distention, abdominal pain, constipation, diarrhea, nausea and vomiting  Endocrine: Negative  Genitourinary: Negative for difficulty urinating, dysuria, flank pain, frequency, hematuria and urgency  Musculoskeletal: Negative for arthralgias and myalgias  Right foot great toe pain  Pain started 6 days prior  Current pain scale 8/10  Skin: Negative  Right great toe cellulitis  Allergic/Immunologic: Negative  Neurological: Negative  Hematological: Negative  Psychiatric/Behavioral: Negative  Objective:  Vitals:    08/16/22 1419   BP: 140/70   BP Location: Left arm   Patient Position: Sitting   Cuff Size: Standard   Pulse: 87   Resp: 18   Temp: 97 8 °F (36 6 °C)   TempSrc: Temporal   SpO2: 97%   Weight: 74 7 kg (164 lb 9 6 oz)   Height: 5' 7" (1 702 m)     Body mass index is 25 78 kg/m²  Physical Exam  Vitals and nursing note reviewed  Constitutional:       Appearance: Normal appearance  He is well-developed  Comments: Overweight  HENT:      Head: Normocephalic and atraumatic  Right Ear: Tympanic membrane, ear canal and external ear normal       Left Ear: Tympanic membrane, ear canal and external ear normal       Nose: Nose normal  No congestion or rhinorrhea        Mouth/Throat:      Mouth: Mucous membranes are moist       Pharynx: No oropharyngeal exudate or posterior oropharyngeal erythema  Eyes:      Extraocular Movements: Extraocular movements intact  Conjunctiva/sclera: Conjunctivae normal       Pupils: Pupils are equal, round, and reactive to light  Cardiovascular:      Rate and Rhythm: Normal rate and regular rhythm  Pulses: Normal pulses  Heart sounds: Normal heart sounds  No murmur heard  Pulmonary:      Effort: Pulmonary effort is normal       Breath sounds: Normal breath sounds  Abdominal:      General: Bowel sounds are normal       Palpations: Abdomen is soft  Musculoskeletal:         General: Normal range of motion  Cervical back: Normal range of motion  Skin:     General: Skin is warm  Comments:  Right foot great toe redness swelling and pain with palpation  Patient noted to have toenail fungus right great toe  Neurological:      General: No focal deficit present  Mental Status: He is alert and oriented to person, place, and time     Psychiatric:         Mood and Affect: Mood normal          Behavior: Behavior normal

## 2022-08-14 NOTE — PATIENT INSTRUCTIONS
Hypertension   WHAT YOU NEED TO KNOW:   What is hypertension? Hypertension is high blood pressure  Your blood pressure is the force of your blood moving against the walls of your arteries  Hypertension causes your blood pressure to get so high that your heart has to work much harder than normal  This can damage your heart  The cause of hypertension may not be known  This is called essential or primary hypertension  Hypertension caused by another medical condition, such as kidney disease, is called secondary hypertension  What do I need to know about the stages of hypertension? Normal blood pressure is 119/79 or lower   Your healthcare provider may only check your blood pressure each year if it stays at a normal level  Elevated blood pressure is 120/79 to 129/79   This is sometimes called prehypertension  Your healthcare provider may suggest lifestyle changes to help lower your blood pressure to a normal level  He or she may then check it again in 3 to 6 months  Stage 1 hypertension is 130/80  to 139/89   Your provider may recommend lifestyle changes, medication, and checks every 3 to 6 months until your blood pressure is controlled  Stage 2 hypertension is 140/90 or higher   Your provider will recommend lifestyle changes and have you take 2 kinds of hypertension medicines  You will also need to have your blood pressure checked monthly until it is controlled  What increases my risk for hypertension? Age older than 54 years (men) or 72 (women)    Stress, or a family history of hypertension or heart disease    Obesity, lack of exercise, or too many high-sodium foods    Use of tobacco, alcohol, or illegal drugs    A medical condition, such as diabetes, kidney disease, thyroid disease, or adrenal gland disorder    Certain medicines, such as steroids or birth control pills    What are the signs and symptoms of hypertension?   You may have no signs or symptoms, or you may have any of the following:  Headache    Blurred vision    Chest pain    Dizziness or weakness    Trouble breathing    Nosebleeds    How is hypertension diagnosed? Your healthcare provider will take your blood pressure at several visits  You may also need to check your blood pressure at home  The provider will examine you and ask what medicines you take  He or she will also ask if you have a family history of high blood pressure and about any health conditions you have  He or she will also check your blood pressure and weight and examine your heart, lungs, and eyes  You may need any of the following tests: An ambulatory blood pressure monitor (ABPM)  is a device that you wear  ABPM measures your blood pressure while you do your regular daily activities  It records your blood pressure every 15 to 30 minutes during the day  It also records your blood pressure every 15 minutes to 1 hour at night  The recorded blood pressures help your healthcare provider know if you have hypertension not seen at your appointment  Blood tests  may help healthcare providers find the cause of your hypertension  Blood tests can also help find other health problems caused by hypertension  Urine tests  will be done to check your kidney function  Kidney problems can increase your risk for hypertension  Which medicines are used to treat hypertension? Antihypertensives  may be used to help lower your blood pressure  Several kinds of medicines are available  Your healthcare provider will choose medicines based on the kind of hypertension you have  You may need more than one type of medicine  Take the medicine exactly as directed  Diuretics  help decrease extra fluid that collects in your body  This will help lower your blood pressure  You may urinate more often while you take this medicine  Cholesterol medicine  helps lower your cholesterol level   A low cholesterol level helps prevent heart disease and makes it easier to control your blood pressure  What can I do to manage hypertension? Check your blood pressure at home  Avoid smoking, caffeine, and exercise at least 30 minutes before checking your blood pressure  Sit and rest for 5 minutes before you take your blood pressure  Extend your arm and support it on a flat surface  Your arm should be at the same level as your heart  Follow the directions that came with your blood pressure monitor  Check your blood pressure 2 times, 1 minute apart, before you take your medicine in the morning  Also check your blood pressure before your evening meal  Keep a record of your readings and bring it to your follow-up visits  Ask your healthcare provider what your blood pressure should be  Manage any other health conditions you have  Health conditions such as diabetes can increase your risk for hypertension  Follow your healthcare provider's instructions and take all your medicines as directed  Ask about all medicines  Certain medicines can increase your blood pressure  Examples include oral birth control pills, decongestants, herbal supplements, and NSAIDs, such as ibuprofen  Your healthcare provider can tell you which medicines are safe for you to take  This includes prescription and over-the-counter medicines  What lifestyle changes can I make to manage hypertension? Your healthcare provider may recommend you work with a team to manage hypertension  The team may include medical experts such as a dietitian, an exercise or physical therapist, and a behavior therapist  Your family members may be included in helping you create lifestyle changes  Limit sodium (salt) as directed  Too much sodium can affect your fluid balance  Check labels to find low-sodium or no-salt-added foods  Some low-sodium foods use potassium salts for flavor  Too much potassium can also cause health problems  Your healthcare provider will tell you how much sodium and potassium are safe for you to have in a day   He or she may recommend that you limit sodium to 2,300 mg a day  Follow the meal plan recommended by your healthcare provider  A dietitian or your provider can give you more information on low-sodium plans or the DASH (Dietary Approaches to Stop Hypertension) eating plan  The DASH plan is low in sodium, processed sugar, unhealthy fats, and total fat  It is high in potassium, calcium, and fiber  These can be found in vegetables, fruit, and whole-grain foods  Be physically active throughout the day  Physical activity, such as exercise, can help control your blood pressure and your weight  Be physically active for at least 30 minutes per day, on most days of the week  Include aerobic activity, such as walking or riding a bicycle  Also include strength training at least 2 times each week  Your healthcare providers can help you create a physical activity plan  Decrease stress  This may help lower your blood pressure  Learn ways to relax, such as deep breathing or listening to music  Limit alcohol as directed  Alcohol can increase your blood pressure  A drink of alcohol is 12 ounces of beer, 5 ounces of wine, or 1½ ounces of liquor  Do not smoke  Nicotine and other chemicals in cigarettes and cigars can increase your blood pressure and also cause lung damage  Ask your healthcare provider for information if you currently smoke and need help to quit  E-cigarettes or smokeless tobacco still contain nicotine  Talk to your healthcare provider before you use these products  Call your local emergency number [phone] in the [address] [facility]) or have someone call if:   You have chest pain      You have any of the following signs of a heart attack:      Squeezing, pressure, or pain in your chest    You may  also have any of the following:     Discomfort or pain in your back, neck, jaw, stomach, or arm    Shortness of breath    Nausea or vomiting    Lightheadedness or a sudden cold sweat    You become confused or have trouble speaking  You suddenly feel lightheaded or have trouble breathing  When should I seek immediate care? You have a severe headache or vision loss  You have weakness in an arm or leg  When should I call my doctor? You feel faint, dizzy, confused, or drowsy  You have been taking your blood pressure medicine but your pressure is higher than your provider says it should be  You have questions or concerns about your condition or care  CARE AGREEMENT:   You have the right to help plan your care  Learn about your health condition and how it may be treated  Discuss treatment options with your healthcare providers to decide what care you want to receive  You always have the right to refuse treatment  The above information is an  only  It is not intended as medical advice for individual conditions or treatments  Talk to your doctor, nurse or pharmacist before following any medical regimen to see if it is safe and effective for you  © Copyright [employer] 2022 Information is for End User's use only and may not be sold, redistributed or otherwise used for commercial purposes  All illustrations and images included in CareNotes® are the copyrighted property of A D A M , Inc  or [address] [place], [place] [address]akhil Sheehan   Prediabetes   WHAT YOU NEED TO KNOW:   What is prediabetes? Prediabetes is a blood glucose (sugar) level that is higher than normal  It is not high enough to be considered diabetes  Prediabetes increases your risk for type 2 diabetes and heart disease, especially if you have high blood pressure or high cholesterol  What increases my risk for prediabetes?    Being overweight or obese, with a body mass index (BMI) of 25 or higher (23 or higher if you are  American)    Lack of physical activity    Older age    Family history of diabetes (parent or sibling)    A history of heart disease, gestational diabetes, or polycystic ovary syndrome (PCOS)    High blood pressure or cholesterol levels    Being [place] American, , , [place] American, or     In children, having a mother with diabetes or gestational diabetes mellitus (GDM) during the pregnancy    What are the signs and symptoms of prediabetes? Prediabetes may not cause any symptoms  How is prediabetes diagnosed? Blood tests can help diagnose prediabetes even if no signs or symptoms have started  Adults are tested starting at age 39, or at 28 with a high risk for diabetes  Children who are overweight or obese are tested at the start of puberty, or as early as 10 years  How do I prevent or delay type 2 diabetes? Healthy choices work best to delay or prevent type 2 diabetes  You may be given the following guidelines from your healthcare provider:  Get regular physical activity  Adults should get at least 150 minutes (2 5 hours) of moderate physical activity every week  Spread the amount of activity over at least 3 days a week  Do not skip more than 2 days in a row  Children should get at least 60 minutes of moderate physical activity on most days of the week  Examples of moderate physical activity include brisk walking, running, and swimming  Do not sit for longer than 30 minutes at a time  Work with your healthcare provider to create a plan for physical activity  Lose weight if you are overweight  A weight loss of 7% of your body weight can help  Your healthcare provider can tell you what weight is healthy for you  He or she can help you create a weight loss plan  Eat healthy foods  Eat a variety of fruits and vegetables  Eat whole-grain foods more often  Choose dairy foods, meat, and other protein foods that are low in fat  Eat fewer sweets, such as candy, cookies, regular soda, and sweetened drinks  You can also decrease calories by eating smaller portion sizes  Work with your healthcare provider or dietitian to develop a meal plan that is right for you           Take medicine as directed  Your healthcare provider may give diabetes medicine if you are at high risk for diabetes  You may also need medicines for high blood pressure and high cholesterol  Follow up with your healthcare provider as directed  You will need to return every year to get tested for diabetes  Do not smoke  Do not use e-cigarettes or smokeless tobacco in place of cigarettes or to help you quit  They still contain nicotine  Ask your healthcare provider for information if you currently smoke and need help quitting  Start with small goals and work your way up  You may need to start with 3 days of physical activity  You can add a day after 3 weeks or so of activity  Make a goal of losing 5 pounds at first  Swap a piece of fruit for dessert or sweets  Start with 2 fruits in a week and increase it weekly  These are suggestions  Talk with healthcare providers about making a plan that is right for you  When should I call my doctor? You have more hunger or thirst than usual     You are urinating more often than usual     You are always exhausted  You have blurred vision  You have questions or concerns about your condition or care  CARE AGREEMENT:   You have the right to help plan your care  Learn about your health condition and how it may be treated  Discuss treatment options with your healthcare providers to decide what care you want to receive  You always have the right to refuse treatment  The above information is an  only  It is not intended as medical advice for individual conditions or treatments  Talk to your doctor, nurse or pharmacist before following any medical regimen to see if it is safe and effective for you  © Copyright [employer] 2022 Information is for End User's use only and may not be sold, redistributed or otherwise used for commercial purposes   All illustrations and images included in CareNotes® are the copyrighted property of [employer] M , Inc  or [employer]  gastroesophageal reflux disease Rheumatoid Arthritis   WHAT YOU NEED TO KNOW:   What is rheumatoid arthritis? Rheumatoid arthritis (RA) is a long-term autoimmune disease that causes inflammation and damage to your joints  RA causes your body's immune system to attack the synovial membrane (lining) in your joints  RA can also affect other organs, such as your eyes, heart, or lungs  It may also increase your risk for osteoporosis (weakened bones)  What increases my risk for RA? Being a woman    A family history of RA    Age between 27and 61years old    Smoking cigarettes    What are the signs and symptoms of RA? Joint pain and stiffness that lasts longer than 1 hour    Swollen joints in the same joint on both sides of your body    Loss of joint movement    Firm, round nodules (growths) on your joints    Fatigue or muscle weakness    Loss of appetite or weight loss    How is RA diagnosed? Blood tests  may be used to check for signs of infection or inflammation  X-ray or MRI  pictures may be taken of the bones and tissues in your joints  You may be given contrast liquid as a shot into the joint to help your joint show up better  Tell the healthcare provider if you have ever had an allergic reaction to contrast liquid  Do not enter the MRI room with anything metal  Metal can cause serious injury  Tell the healthcare provider if you have any metal in or on your body  Arthrocentesis  is a procedure used to drain fluid out of a joint  The fluid is tested for infection or other problems that can cause arthritis  Synovial biopsy  may be used if your joint fluid cannot be drained or if you have signs of an infection  A piece of tissue is removed from the lining of a joint  The tissue is tested for possible causes of your arthritis  How is RA treated? The goal of treatment within the first year is remission (no pain or inflammation)   If full remission cannot be reached, the goal is as few arthritis flares as possible  Early treatment can also help prevent or slow joint damage  Treatment may change after the first year, depending on how your body responds  Medicines: Your healthcare provider may start with 1 medicine and add medicines if you continue to have symptoms  He or she may instead start with a combination of medicines  The medicines may be stopped one at a time as you reach and maintain remission  Antirheumatics  help slow the progress of RA, and reduce pain, stiffness, and inflammation  NSAIDs , such as ibuprofen, help decrease swelling, pain, and fever  NSAIDs can cause stomach bleeding or kidney problems in certain people  If you take blood thinner medicine, always ask your healthcare provider if NSAIDs are safe for you  Always read the medicine label and follow directions  Steroid medicine  helps reduce swelling and pain  Biologic therapy  helps decrease joint swelling, pain, and stiffness  These medicines increase the risk of serious infection  Your healthcare provider will need to monitor you closely while you are taking these medicines  Surgery  may be done to take out all or part of the joint and put in an artificial joint  This may help reduce pain and repair the joint  Surgery may also be done if you have a joint infection or if the bones in your spine are pressing on nerves  What can I do to manage my symptoms? Rest when needed  Rest is important if your joints are painful  Limit your activities until your symptoms improve  Gradually start your normal activities when you can do them without pain  Avoid motions and activities that cause strain on your joints, such as heavy exercise and lifting  Use ice or heat  Both can help decrease swelling and pain  Ice may also help prevent tissue damage  Use an ice pack, or put crushed ice in a plastic bag  Cover it with a towel and place it on your joint for 15 to 20 minutes every hour or as directed   You can apply heat for 20 minutes every 2 hours  Heat treatment includes hot packs, heat lamps, warm baths, or showers  Elevate your joint  Elevation helps reduce swelling and pain  Raise your joint above the level of your heart as often as you can  Prop your painful joint on pillows to keep it above your heart comfortably  What can I do to manage RA? Talk to your healthcare providers about your arthritis medicines  Some medicines may only be needed when you have arthritis pain  You may need to take other medicines every day to prevent arthritis from getting worse  Your healthcare providers will help you understand all your medicines and when to take them  It is important to take the medicines as directed, even if you start to feel better  You can continue to have joint damage and inflammation even if you do not feel it  Eat a variety of healthy foods  Healthy foods include fruits, vegetables, whole-grain breads, low-fat dairy products, beans, lean meats, and fish  Ask if you need to be on a special diet  A diet rich in calcium and vitamin D may decrease your risk of osteoporosis  Foods high in calcium include milk, cheese, broccoli, and tofu  Vitamin D may be found in meat, fish, fortified milk, cereal and bread  Ask if you need calcium or vitamin D supplements  Maintain a healthy weight  This may help decrease strain on joints in your back, knees, ankles, and feet  Ask your healthcare provider what a healthy weight is for you  Ask him or her to help you create a weight loss plan if you are overweight  Exercise can help you maintain a healthy weight  Go to physical or occupational therapy as directed  Physical activity can help relieve pain and stiffness  A physical therapist can teach you exercises to improve flexibility and range of motion  You may also be shown non-weight-bearing exercises that are safe for your joints, such as swimming   An occupational therapist can help you learn to do your daily activities when your joints are stiff or sore  Do not smoke  Nicotine and other chemicals in cigarettes and cigars can damage your bones and joints  Ask your healthcare provider for information if you currently smoke and need help to quit  E-cigarettes or smokeless tobacco still contain nicotine  Talk to your healthcare provider before you use these products  What do I need to know about RA medicines and pregnancy? Men:  Talk to your doctor about your RA and the medicines you take  Some medicines may keep your partner from getting pregnant  Talk to your doctor if you and your partner are discussing pregnancy  Women:  Talk to your gynecologist about contraceptives  Tell him or her about your RA and what medicines you take  He or she will tell you what the best contraceptive for will be  Not all contraceptives are effective if you have RA and are taking certain medicines  You may not be able to breastfeed if you are taking certain RA medicines  What support devices can help manage RA? Orthotic shoes or insoles  help support your feet when you walk  Crutches, a cane, or a walker  may help decrease your risk for falling  They also decrease stress on affected joints  Devices to prevent falls  include raised toilet seats and bathtub bars to help you get up from sitting  Handrails can be placed in areas where you need balance and support  Devices to help with support and rest  include splints to wear on your hands and a firm pillow while you sleep  Use a pillow that is firm enough to support your neck and head  When should I call my doctor? You have a fever  You have increased joint swelling, pain, or redness  Your skin is itchy, swollen, or has a rash  Your symptoms are getting worse, even with treatment  You have questions or concerns about your condition or care  CARE AGREEMENT:   You have the right to help plan your care   Learn about your health condition and how it may be treated  Discuss treatment options with your healthcare providers to decide what care you want to receive  You always have the right to refuse treatment  The above information is an  only  It is not intended as medical advice for individual conditions or treatments  Talk to your doctor, nurse or pharmacist before following any medical regimen to see if it is safe and effective for you  © Copyright [address] 2022 Information is for End User's use only and may not be sold, redistributed or otherwise used for commercial purposes  All illustrations and images included in CareNotes® are the copyrighted property of [employer]  or [facility] [employer]   MRSA (Methicillin-Resistant Staphylococcus Aureus)   WHAT YOU NEED TO KNOW:   What is MRSA? MRSA (methicillin-resistant Staphylococcus aureus) is a strain of staph bacteria that can cause infection  Usually, antibiotics are used to kill bacteria  MRSA bacteria are resistant to the common antibiotics used to treat Staph infections  This makes MRSA hard to treat  MRSA most commonly causes a skin or soft tissue infection  Bacteria may get into your skin or soft tissue through a cut, sore, or incision  MRSA may spread to your blood, lungs, heart, and bone  What increases my risk for MRSA? Touching the infected skin of someone who has MRSA    Living in the same household as someone with MRSA    History of MRSA infection     Use of personal items such as towels, razors, or clothes of someone with MRSA    Touching items such as doorknobs that have MRSA bacteria on it    Being in crowded places where germs can be spread such as hospitals,  facilities, or locker rooms    Taking antibiotics frequently, stopping antibiotics, or missing doses of antibiotics    What else do I need to know about MRSA infections? You can have an active MRSA infection or you can be a carrier of MRSA bacteria and not have symptoms    Active MRSA infection on your skin makes you contagious  This means your infection can spread to another person  MRSA spreads if the person or pet touches something that comes in contact with your infection  For example, MRSA can be transferred on towels, wash cloths, and clothes  As a carrier of MRSA bacteria, you can spread the bacteria through your skin and your nose  Bathe daily and wash your hands frequently  Cover your mouth and nose when you sneeze and cough  These actions help to keep from spreading the bacteria to others  What are the signs and symptoms of MRSA skin infection? Skin sores     Bumps on your skin that are red and painful    A cut or incision that is red, swollen, and filled with pus    Fluid-filled blisters    Fever    How is MRSA diagnosed? Your healthcare provider will ask you about recent antibiotic use and other possible risks  Tell him or her when your symptoms started  Tell the provider if anyone close to you has had an infection  You may need any of the following:  A wound culture  may show MRSA bacteria  Your healthcare provider will swab your wound  He will send the swab to the to be tested  He may need to make an incision and drain your wound or abscess to get a sample  Blood and urine tests  may show if MRSA bacteria are inside of your body  How is MRSA treated? Some MRSA infections of the skin can be treated at home  Other MRSA infections need to be treated in the hospital  Treatment can help prevent the spread of MRSA infection to other parts of your body  Treatments may include: Antibiotics  may help treat the bacterial infection  Take all of your antibiotics until they are finished  Incision and drainage  of a wound, a sore or an abscess may be needed  A provider will open and drain infected fluid and pus  This helps remove bacteria from your wound so it can heal     How can I prevent the spread of active MRSA infection?   Do the following if you have an active MRSA infection:  Wash your hands often  Wash your hands several times each day  Wash after you use the bathroom, change a child's diaper, and before you prepare or eat food  Use soap and water every time  Rub your soapy hands together, lacing your fingers  Wash the front and back of your hands, and in between your fingers  Use the fingers of one hand to scrub under the fingernails of the other hand  Wash for at least 20 seconds  Rinse with warm, running water for several seconds  Then dry your hands with a clean towel or paper towel  Use hand  that contains alcohol if soap and water are not available  Do not touch your eyes, nose, or mouth without washing your hands first          Do not touch sores  Do not poke or squeeze sores  This can make the infection go deeper into your tissue  Cover infected sores with a bandage  Make sure the sore is completely covered during activities that may cause skin to skin contact with another person  Examples include sports such as wrestling or football  Put an extra bandage on a sore that is draining fluid  This helps keep infected drainage off surfaces that others can touch  Do not share personal items with others  This includes washcloths, towels, uniforms, clothes, and razors  Do not use public pools, hot tubs, or therapy pools until your infection has healed  Your infected sore can spread the infection to another person through the water  Tell all healthcare providers that you have a MRSA infection  If you are admitted to the hospital, you may be placed in a private room  Healthcare providers will wear gowns and gloves when they come into your room  They will also wash their hands often and clean your room well  Your visitors may also be asked to wear a gown and gloves  All of these practices help prevent the spread of MRSA to healthcare providers and other patients  What do I need to know about MRSA in my home? MRSA can stay on surfaces for weeks   It is important to keep others safe by doing the following:  Clean surfaces often  Use a disinfecting wipe, a single-use sponge, or a cloth you can wash and reuse  Use disinfecting  if you do not have wipes  You can create a disinfecting  by mixing 1 part bleach with 10 parts water  In the kitchen, clean countertops, cooking surfaces, and the fronts and insides of the microwave and refrigerator  In the bathroom, clean the toilet, the area around the toilet, the sink, the area around the sink, and faucets  Clean surfaces in the person's room, such as a desk or dresser  Wash dishes and silverware in a  or in hot water  Do not share unwashed dishes or silverware with anyone  Wash used sheets, towels, and clothes with water and laundry detergent  Put dirty laundry in the washer immediately  Put it in a plastic bag if you are not able to wash it immediately  You do no need to wash this laundry separately from other laundry  Use the warmest water possible for the type of clothing  Wash your hands after you touch dirty laundry and before you handle clean laundry  Dry laundry completely in a warm or hot dryer  When should I seek immediate care? You develop new symptoms such as a cough or fever during or after treatment for MRSA infection  When should I call my doctor ? Your symptoms do not get better within 2 days of treatment, or they get worse  Your symptoms return after treatment  You have questions and concerns about your condition or care  CARE AGREEMENT:   You have the right to help plan your care  Learn about your health condition and how it may be treated  Discuss treatment options with your healthcare providers to decide what care you want to receive  You always have the right to refuse treatment  The above information is an  only  It is not intended as medical advice for individual conditions or treatments   Talk to your doctor, nurse or pharmacist before following any medical regimen to see if it is safe and effective for you  © Copyright [employer] 2022 Information is for End User's use only and may not be sold, redistributed or otherwise used for commercial purposes  All illustrations and images included in CareNotes® are the copyrighted property of A D A M , Inc  or [facility]  Cellulitis   WHAT YOU NEED TO KNOW:   What is cellulitis? Cellulitis is a skin infection caused by bacteria  Cellulitis is common and can become severe  Cellulitis usually appears on the lower legs  It can also appear on the arms, face, and other areas  Cellulitis develops when bacteria enter a crack or break in your skin, such as a scratch, bite, or cut  What are the signs and symptoms of cellulitis? Signs and symptoms usually appear on one side of your body  You may have any of the following:  A fever    A red, warm, swollen area on your skin    Pain when the area is touched    Red spots, bumps, or blisters that may drain pus    Bumpy, raised skin that feels like an orange peel    How is cellulitis diagnosed? Your healthcare provider may know you have cellulitis by looking at your skin  You may need blood tests to show what kind of bacteria are causing your infection  Other tests may be needed to see how much the infection has spread  How is cellulitis treated? You should start to see improvement in 3 days  If your cellulitis is severe, you may need IV antibiotics in the hospital  If cellulitis is not treated, the infection can spread through your body and become life-threatening  You may need any of the following medicines:  Antibiotics  help treat the bacterial infection  Acetaminophen  decreases pain and fever  It is available without a doctor's order  Ask how much to take and how often to take it  Follow directions   Read the labels of all other medicines you are using to see if they also contain acetaminophen, or ask your doctor or pharmacist  Acetaminophen can cause liver damage if not taken correctly  Do not use more than 4 grams (4,000 milligrams) total of acetaminophen in one day  NSAIDs , such as ibuprofen, help decrease swelling, pain, and fever  This medicine is available with or without a doctor's order  NSAIDs can cause stomach bleeding or kidney problems in certain people  If you take blood thinner medicine, always ask your healthcare provider if NSAIDs are safe for you  Always read the medicine label and follow directions  How can I manage my symptoms? Wash the area with soap and water every day  Gently pat dry  Use bandages if directed by your healthcare provider  Elevate the area above the level of your heart  as often as you can  This will help decrease swelling and pain  Prop the area on pillows or blankets to keep it elevated comfortably  Place a cool, damp cloth on the area  Use clean cloths and clean water  You can do this as often as you need to  Cool, damp cloths may help decrease pain  Apply cream or ointment as directed  These help protect the area  Most over-the-counter products, such as petroleum jelly, are good to use  Ask your healthcare provider about specific creams or ointments you should use  How can I help prevent cellulitis? Do not scratch bug bites or areas of injury  You increase your risk for cellulitis by scratching these areas  Do not share personal items, such as towels, clothing, and razors  Clean exercise equipment  with germ-killing  before and after you use it  Treat athlete's foot  This can help prevent the spread of a bacterial skin infection  Wash your hands often  Use soap and water  Wash your hands after you use the bathroom, change a child's diapers, or sneeze  Wash your hands before you prepare or eat food  Use lotion to prevent dry, cracked skin  When should I seek immediate care? Your wound gets larger and more painful      You feel a crackling under your skin when you touch it  You have purple dots or bumps on your skin, or you see bleeding under your skin  You see red streaks coming from the infected area  When should I call my doctor? The red, warm, swollen area gets larger  Your fever or pain does not go away or gets worse  The area does not get smaller after 3 days of antibiotics  You have questions or concerns about your condition or care  CARE AGREEMENT:   You have the right to help plan your care  Learn about your health condition and how it may be treated  Discuss treatment options with your healthcare providers to decide what care you want to receive  You always have the right to refuse treatment  The above information is an  only  It is not intended as medical advice for individual conditions or treatments  Talk to your doctor, nurse or pharmacist before following any medical regimen to see if it is safe and effective for you  © Copyright [employer] 2022 Information is for End User's use only and may not be sold, redistributed or otherwise used for commercial purposes  All illustrations and images included in CareNotes® are the copyrighted property of [employer]  or [facility]  Nail Fungus   WHAT YOU NEED TO KNOW:   What is nail fungus? Nail fungus, or onychomycosis, is a fungal infection in your toenail or fingernail  Nail fungus is more common in toenails than fingernails  The cause of the infection may not be known  What increases my risk for nail fungus? Athlete's foot    Age 61 years or older    Conditions such as diabetes, circulation problems, or a weak immune system    Wearing heavy work boots that make your feet warm and sweaty    Walking barefoot in locker rooms or public showers    Working in a job where your hands are often wet (dishwashers, house )    An unhealthy nail or underlying nail deformity    What are the signs and symptoms of nail fungus?    Nails that curl up or down or are misshapen    Discolored (often white, yellow, or brown) nails    Fragile or cracked nails    Thick nails or nails with rough, jagged edges    Nail that is  from the nail bed    Tenderness or pain in the affected nail    How is nail fungus diagnosed? Your healthcare provider can usually diagnose nail fungus by examining your nails  A small nail clipping may be examined or sent to a lab to test for infection  How is nail fungus treated? Antifungal medicine is a pill that treats a fungal infection  You may need to take this medicine for up to 12 weeks  Topical treatments include creams and polishes that you apply to the top of your nail  You may need to use topical treatments for up to 1 year before you see positive results  Ask your healthcare provider for more information about antifungal medicine  How can I manage my symptoms? Use antifungal sprays or powders  You can buy these at your local drugstore  Keep your nails short  and file down any thick areas  Use separate nail trimmers and files for infected nails and healthy nails  If you go to a salon to get your nails done, bring your own nail files and trimmers  How can I help prevent nail fungus? Dry your feet with a towel  or hair dryer after you bathe  Do not wear tight-fitting shoes  or shoes that pinch your toes  Avoid shoes made from rubber or plastic  Wear socks that absorb moisture  This includes socks make of wool, nylon, or polypropylene  Do not wear cotton socks  Change your socks if they are damp from sweat or your feet get wet  Put on dry, clean socks every day  Do not go barefoot  in locker rooms or public showers  Do not use nail polish  or artificial nails such as acrylic or gel nails  Wear gloves that are waterproof  if you work with water  When should I contact my healthcare provider? You have questions or concerns about your condition or care    CARE AGREEMENT:   You have the right to help plan your care  Learn about your health condition and how it may be treated  Discuss treatment options with your healthcare providers to decide what care you want to receive  You always have the right to refuse treatment  The above information is an  only  It is not intended as medical advice for individual conditions or treatments  Talk to your doctor, nurse or pharmacist before following any medical regimen to see if it is safe and effective for you  © Copyright [employer] 2022 Information is for End User's use only and may not be sold, redistributed or otherwise used for commercial purposes   All illustrations and images included in CareNotes® are the copyrighted property of [employer]  or [address] [place] [address] [facility]

## 2022-08-15 ENCOUNTER — APPOINTMENT (OUTPATIENT)
Dept: LAB | Facility: CLINIC | Age: 62
End: 2022-08-15
Payer: COMMERCIAL

## 2022-08-15 DIAGNOSIS — R79.82 ELEVATED C-REACTIVE PROTEIN: ICD-10-CM

## 2022-08-15 DIAGNOSIS — M10.9 GOUT OF MULTIPLE SITES, UNSPECIFIED CAUSE, UNSPECIFIED CHRONICITY: ICD-10-CM

## 2022-08-15 DIAGNOSIS — R70.0 ELEVATED SED RATE: ICD-10-CM

## 2022-08-15 DIAGNOSIS — R73.03 PREDIABETES: ICD-10-CM

## 2022-08-15 DIAGNOSIS — Z13.89 SCREENING FOR RHEUMATIC DISORDER: ICD-10-CM

## 2022-08-15 DIAGNOSIS — E87.6 HYPOKALEMIA: ICD-10-CM

## 2022-08-15 DIAGNOSIS — I10 PRIMARY HYPERTENSION: ICD-10-CM

## 2022-08-15 LAB
ALBUMIN SERPL BCP-MCNC: 4.2 G/DL (ref 3.5–5)
ALP SERPL-CCNC: 66 U/L (ref 34–104)
ALT SERPL W P-5'-P-CCNC: 19 U/L (ref 7–52)
ANION GAP SERPL CALCULATED.3IONS-SCNC: 6 MMOL/L (ref 4–13)
AST SERPL W P-5'-P-CCNC: 16 U/L (ref 13–39)
BASOPHILS # BLD AUTO: 0.03 THOUSANDS/ΜL (ref 0–0.1)
BASOPHILS NFR BLD AUTO: 1 % (ref 0–1)
BILIRUB SERPL-MCNC: 0.72 MG/DL (ref 0.2–1)
BILIRUB UR QL STRIP: NEGATIVE
BUN SERPL-MCNC: 14 MG/DL (ref 5–25)
CALCIUM SERPL-MCNC: 10 MG/DL (ref 8.4–10.2)
CHLORIDE SERPL-SCNC: 105 MMOL/L (ref 96–108)
CLARITY UR: CLEAR
CO2 SERPL-SCNC: 28 MMOL/L (ref 21–32)
COLOR UR: NORMAL
CREAT SERPL-MCNC: 1.23 MG/DL (ref 0.6–1.3)
CRP SERPL QL: 11.2 MG/L
EOSINOPHIL # BLD AUTO: 0.08 THOUSAND/ΜL (ref 0–0.61)
EOSINOPHIL NFR BLD AUTO: 2 % (ref 0–6)
ERYTHROCYTE [DISTWIDTH] IN BLOOD BY AUTOMATED COUNT: 14.8 % (ref 11.6–15.1)
ERYTHROCYTE [SEDIMENTATION RATE] IN BLOOD: 51 MM/HOUR (ref 0–19)
GFR SERPL CREATININE-BSD FRML MDRD: 62 ML/MIN/1.73SQ M
GLUCOSE P FAST SERPL-MCNC: 98 MG/DL (ref 65–99)
GLUCOSE UR STRIP-MCNC: NEGATIVE MG/DL
HCT VFR BLD AUTO: 51.4 % (ref 36.5–49.3)
HGB BLD-MCNC: 16.6 G/DL (ref 12–17)
HGB UR QL STRIP.AUTO: NEGATIVE
IMM GRANULOCYTES # BLD AUTO: 0.02 THOUSAND/UL (ref 0–0.2)
IMM GRANULOCYTES NFR BLD AUTO: 1 % (ref 0–2)
KETONES UR STRIP-MCNC: NEGATIVE MG/DL
LEUKOCYTE ESTERASE UR QL STRIP: NEGATIVE
LYMPHOCYTES # BLD AUTO: 1.43 THOUSANDS/ΜL (ref 0.6–4.47)
LYMPHOCYTES NFR BLD AUTO: 35 % (ref 14–44)
MCH RBC QN AUTO: 27.9 PG (ref 26.8–34.3)
MCHC RBC AUTO-ENTMCNC: 32.3 G/DL (ref 31.4–37.4)
MCV RBC AUTO: 87 FL (ref 82–98)
MONOCYTES # BLD AUTO: 0.44 THOUSAND/ΜL (ref 0.17–1.22)
MONOCYTES NFR BLD AUTO: 11 % (ref 4–12)
NEUTROPHILS # BLD AUTO: 2.1 THOUSANDS/ΜL (ref 1.85–7.62)
NEUTS SEG NFR BLD AUTO: 50 % (ref 43–75)
NITRITE UR QL STRIP: NEGATIVE
NRBC BLD AUTO-RTO: 0 /100 WBCS
PH UR STRIP.AUTO: 5 [PH]
PLATELET # BLD AUTO: 227 THOUSANDS/UL (ref 149–390)
PMV BLD AUTO: 10.6 FL (ref 8.9–12.7)
POTASSIUM SERPL-SCNC: 4.9 MMOL/L (ref 3.5–5.3)
PROT SERPL-MCNC: 8.1 G/DL (ref 6.4–8.4)
PROT UR STRIP-MCNC: NEGATIVE MG/DL
RBC # BLD AUTO: 5.94 MILLION/UL (ref 3.88–5.62)
SODIUM SERPL-SCNC: 139 MMOL/L (ref 135–147)
SP GR UR STRIP.AUTO: 1.01 (ref 1–1.03)
URATE SERPL-MCNC: 6.2 MG/DL (ref 3.5–8.5)
UROBILINOGEN UR STRIP-ACNC: <2 MG/DL
WBC # BLD AUTO: 4.1 THOUSAND/UL (ref 4.31–10.16)

## 2022-08-15 PROCEDURE — 81003 URINALYSIS AUTO W/O SCOPE: CPT | Performed by: NURSE PRACTITIONER

## 2022-08-15 PROCEDURE — 85025 COMPLETE CBC W/AUTO DIFF WBC: CPT

## 2022-08-15 PROCEDURE — 80053 COMPREHEN METABOLIC PANEL: CPT

## 2022-08-15 PROCEDURE — 85652 RBC SED RATE AUTOMATED: CPT

## 2022-08-15 PROCEDURE — 86430 RHEUMATOID FACTOR TEST QUAL: CPT

## 2022-08-15 PROCEDURE — 84550 ASSAY OF BLOOD/URIC ACID: CPT

## 2022-08-15 PROCEDURE — 83036 HEMOGLOBIN GLYCOSYLATED A1C: CPT

## 2022-08-15 PROCEDURE — 86140 C-REACTIVE PROTEIN: CPT

## 2022-08-15 PROCEDURE — 36415 COLL VENOUS BLD VENIPUNCTURE: CPT

## 2022-08-16 ENCOUNTER — OFFICE VISIT (OUTPATIENT)
Dept: FAMILY MEDICINE CLINIC | Facility: CLINIC | Age: 62
End: 2022-08-16
Payer: COMMERCIAL

## 2022-08-16 ENCOUNTER — HOSPITAL ENCOUNTER (OUTPATIENT)
Dept: RADIOLOGY | Facility: HOSPITAL | Age: 62
Discharge: HOME | End: 2022-08-16
Payer: COMMERCIAL

## 2022-08-16 VITALS
DIASTOLIC BLOOD PRESSURE: 70 MMHG | SYSTOLIC BLOOD PRESSURE: 140 MMHG | RESPIRATION RATE: 18 BRPM | HEART RATE: 87 BPM | WEIGHT: 164.6 LBS | HEIGHT: 67 IN | OXYGEN SATURATION: 97 % | TEMPERATURE: 97.8 F | BODY MASS INDEX: 25.83 KG/M2

## 2022-08-16 DIAGNOSIS — E66.3 OVERWEIGHT WITH BODY MASS INDEX (BMI) OF 26 TO 26.9 IN ADULT: ICD-10-CM

## 2022-08-16 DIAGNOSIS — L03.031 CELLULITIS OF GREAT TOE OF RIGHT FOOT: ICD-10-CM

## 2022-08-16 DIAGNOSIS — M10.9 GOUT OF MULTIPLE SITES, UNSPECIFIED CAUSE, UNSPECIFIED CHRONICITY: ICD-10-CM

## 2022-08-16 DIAGNOSIS — I10 PRIMARY HYPERTENSION: ICD-10-CM

## 2022-08-16 DIAGNOSIS — B35.1 TOENAIL FUNGUS: ICD-10-CM

## 2022-08-16 DIAGNOSIS — E87.6 HYPOKALEMIA: ICD-10-CM

## 2022-08-16 DIAGNOSIS — Z09 FOLLOW-UP EXAM AFTER TREATMENT: ICD-10-CM

## 2022-08-16 DIAGNOSIS — M79.674 PAIN OF GREAT TOE, RIGHT: Primary | ICD-10-CM

## 2022-08-16 DIAGNOSIS — R79.82 ELEVATED C-REACTIVE PROTEIN: ICD-10-CM

## 2022-08-16 DIAGNOSIS — R73.03 PREDIABETES: ICD-10-CM

## 2022-08-16 DIAGNOSIS — R70.0 ELEVATED SED RATE: ICD-10-CM

## 2022-08-16 DIAGNOSIS — R73.01 IMPAIRED FASTING GLUCOSE: ICD-10-CM

## 2022-08-16 LAB
EST. AVERAGE GLUCOSE BLD GHB EST-MCNC: 126 MG/DL
HBA1C MFR BLD: 6 %
RHEUMATOID FACT SER QL LA: NEGATIVE

## 2022-08-16 PROCEDURE — 99215 OFFICE O/P EST HI 40 MIN: CPT | Performed by: NURSE PRACTITIONER

## 2022-08-16 PROCEDURE — 73630 X-RAY EXAM OF FOOT: CPT

## 2022-08-16 PROCEDURE — 3077F SYST BP >= 140 MM HG: CPT | Performed by: NURSE PRACTITIONER

## 2022-08-16 PROCEDURE — 3725F SCREEN DEPRESSION PERFORMED: CPT | Performed by: NURSE PRACTITIONER

## 2022-08-16 PROCEDURE — 3078F DIAST BP <80 MM HG: CPT | Performed by: NURSE PRACTITIONER

## 2022-08-16 RX ORDER — AMOXICILLIN 875 MG/1
875 TABLET, COATED ORAL 2 TIMES DAILY
Qty: 28 TABLET | Refills: 0 | Status: SHIPPED | OUTPATIENT
Start: 2022-08-16 | End: 2022-08-30

## 2022-08-16 RX ORDER — IBUPROFEN 800 MG/1
800 TABLET ORAL EVERY 8 HOURS PRN
Qty: 30 TABLET | Refills: 0 | Status: SHIPPED | OUTPATIENT
Start: 2022-08-16

## 2022-08-16 NOTE — ASSESSMENT & PLAN NOTE
C-reactive protein Slightly elevated 11 2  Patient noted of COVID-19 in the past 2/2021  Possibly secondary to allergies

## 2022-08-16 NOTE — ASSESSMENT & PLAN NOTE
Patient presents for follow-up evaluation of blood pressure after treatment with antihypertensive medications

## 2022-08-16 NOTE — ASSESSMENT & PLAN NOTE
BMI currently is 25 78 kg/M2  Goal BMI 25 kg/M2  Patient counseled proper diet, nutrition and exercise  Recheck BMI next office visit

## 2022-08-16 NOTE — ASSESSMENT & PLAN NOTE
Recent hemoglobin A1c 6 0 within normal limits  Patient maintain a low carb, low starch low sugar diet  Will continue to monitor at this time

## 2022-08-16 NOTE — ASSESSMENT & PLAN NOTE
Patient order for amoxicillin 875 mg p o  B i d  For 14 days  Instructed on use of warm compresses to toe  We will order x-ray for further evaluation

## 2022-08-16 NOTE — ASSESSMENT & PLAN NOTE
Patient referred to podiatry for evaluation of toenail fungus on the right great toe  May use Lamisil or Tinactin antifungal spray or cream routinely

## 2022-08-16 NOTE — ASSESSMENT & PLAN NOTE
Patient noted have history of gout  Patient has allopurinol 100 mg p o  Daily at home for routine maintenance  Also given colchicine in the emergency room  Indicates that is not helped his pain  Patient also has taken ibuprofen 600 mg as needed, however is concerned about his kidney function so would like to obtain from use  Jeancarlos Boswell Recent uric acid with normal limits  Uric acid  Order: [id number]   Status: Final result     Visible to patient: Yes (seen)     Next appt: 09/06/2022 at 10:00 AM in Sleep Medicine (Onita Emiliano, NI)     Dx: Gout of multiple sites, unspecified c      2 Result Notes    Component Ref Range & Units 8/15/22  3:34 PM 6/17/20  9:46 AM   Uric Acid 3 5 - 8 5 mg/dL 6 2  7 3 R, CM    Comment: Specimen collection should occur prior to Metamizole administration due to the potential for falsely depressed results                Specimen Collected: 08/15/22  3:34 PM Last Resulted: 08/15/22  4:22 PM

## 2022-08-16 NOTE — ASSESSMENT & PLAN NOTE
BP currently evaluated  BP currently 140/70  The maintained on Apresoline 10 mg p o  B i d  and Diovan 40 mg p o  Daily  Patient presents for BP recheck this visit  Patient maintain low-sodium diet  Patient also encouraged exercise 3 times per week for 75 minutes of moderate cardiovascular activity

## 2022-08-16 NOTE — ASSESSMENT & PLAN NOTE
Hemoglobin A1c 6 0  Patient in prediabetic range  Consult currently on proper diet, nutrition and exercise

## 2022-08-16 NOTE — ASSESSMENT & PLAN NOTE
Sed rate still elevated at 51  Patient noted to have history of COVID-19 2/2021  Possibly secondary to allergies

## 2022-08-16 NOTE — ASSESSMENT & PLAN NOTE
Recent potassium level 4 9  Patient currently on potassium chloride 20 mEq p o  Daily  Patient courage to eat foods and wrist with potassium  Will continue to monitor routinely

## 2022-08-24 DIAGNOSIS — I10 PRIMARY HYPERTENSION: ICD-10-CM

## 2022-08-24 RX ORDER — CHLORTHALIDONE 25 MG/1
TABLET ORAL
Qty: 90 TABLET | Refills: 1 | Status: SHIPPED | OUTPATIENT
Start: 2022-08-24 | End: 2022-09-21

## 2022-08-31 DIAGNOSIS — I10 PRIMARY HYPERTENSION: ICD-10-CM

## 2022-09-01 RX ORDER — HYDRALAZINE HYDROCHLORIDE 10 MG/1
TABLET, FILM COATED ORAL
Qty: 180 TABLET | Refills: 1 | Status: SHIPPED | OUTPATIENT
Start: 2022-09-01

## 2022-09-03 DIAGNOSIS — K22.2 ESOPHAGEAL RING: ICD-10-CM

## 2022-09-03 DIAGNOSIS — K21.9 GASTROESOPHAGEAL REFLUX DISEASE WITHOUT ESOPHAGITIS: ICD-10-CM

## 2022-09-03 RX ORDER — OMEPRAZOLE 20 MG/1
CAPSULE, DELAYED RELEASE ORAL
Qty: 90 CAPSULE | Refills: 0 | Status: SHIPPED | OUTPATIENT
Start: 2022-09-03 | End: 2022-09-26 | Stop reason: SDUPTHER

## 2022-09-26 ENCOUNTER — HOSPITAL ENCOUNTER (OUTPATIENT)
Dept: GASTROENTEROLOGY | Facility: AMBULARY SURGERY CENTER | Age: 62
Setting detail: OUTPATIENT SURGERY
Discharge: HOME | End: 2022-09-26
Attending: INTERNAL MEDICINE
Payer: COMMERCIAL

## 2022-09-26 ENCOUNTER — ANESTHESIA EVENT (OUTPATIENT)
Dept: GASTROENTEROLOGY | Facility: AMBULARY SURGERY CENTER | Age: 62
End: 2022-09-26

## 2022-09-26 ENCOUNTER — ANESTHESIA (OUTPATIENT)
Dept: GASTROENTEROLOGY | Facility: AMBULARY SURGERY CENTER | Age: 62
End: 2022-09-26

## 2022-09-26 VITALS
TEMPERATURE: 97.8 F | RESPIRATION RATE: 16 BRPM | WEIGHT: 165 LBS | BODY MASS INDEX: 25.84 KG/M2 | SYSTOLIC BLOOD PRESSURE: 141 MMHG | OXYGEN SATURATION: 95 % | DIASTOLIC BLOOD PRESSURE: 86 MMHG | HEART RATE: 76 BPM

## 2022-09-26 DIAGNOSIS — K22.2 ESOPHAGEAL RING: ICD-10-CM

## 2022-09-26 DIAGNOSIS — K21.9 GASTROESOPHAGEAL REFLUX DISEASE WITHOUT ESOPHAGITIS: ICD-10-CM

## 2022-09-26 DIAGNOSIS — R13.10 DYSPHAGIA, UNSPECIFIED TYPE: ICD-10-CM

## 2022-09-26 PROCEDURE — 43248 EGD GUIDE WIRE INSERTION: CPT | Performed by: INTERNAL MEDICINE

## 2022-09-26 PROCEDURE — C1769 GUIDE WIRE: HCPCS

## 2022-09-26 RX ORDER — SODIUM CHLORIDE, SODIUM LACTATE, POTASSIUM CHLORIDE, CALCIUM CHLORIDE 600; 310; 30; 20 MG/100ML; MG/100ML; MG/100ML; MG/100ML
INJECTION, SOLUTION INTRAVENOUS CONTINUOUS PRN
Status: DISCONTINUED | OUTPATIENT
Start: 2022-09-26 | End: 2022-09-26

## 2022-09-26 RX ORDER — ONDANSETRON 2 MG/ML
4 INJECTION INTRAMUSCULAR; INTRAVENOUS ONCE AS NEEDED
Status: CANCELLED | OUTPATIENT
Start: 2022-09-26

## 2022-09-26 RX ORDER — LIDOCAINE HYDROCHLORIDE 10 MG/ML
INJECTION, SOLUTION EPIDURAL; INFILTRATION; INTRACAUDAL; PERINEURAL AS NEEDED
Status: DISCONTINUED | OUTPATIENT
Start: 2022-09-26 | End: 2022-09-26

## 2022-09-26 RX ORDER — SODIUM CHLORIDE, SODIUM LACTATE, POTASSIUM CHLORIDE, CALCIUM CHLORIDE 600; 310; 30; 20 MG/100ML; MG/100ML; MG/100ML; MG/100ML
125 INJECTION, SOLUTION INTRAVENOUS CONTINUOUS
Status: CANCELLED | OUTPATIENT
Start: 2022-09-26

## 2022-09-26 RX ORDER — PROPOFOL 10 MG/ML
INJECTION, EMULSION INTRAVENOUS AS NEEDED
Status: DISCONTINUED | OUTPATIENT
Start: 2022-09-26 | End: 2022-09-26

## 2022-09-26 RX ORDER — OMEPRAZOLE 20 MG/1
40 CAPSULE, DELAYED RELEASE ORAL DAILY
Qty: 90 CAPSULE | Refills: 2 | Status: SHIPPED | OUTPATIENT
Start: 2022-09-26

## 2022-09-26 RX ORDER — SODIUM CHLORIDE, SODIUM LACTATE, POTASSIUM CHLORIDE, CALCIUM CHLORIDE 600; 310; 30; 20 MG/100ML; MG/100ML; MG/100ML; MG/100ML
125 INJECTION, SOLUTION INTRAVENOUS CONTINUOUS
Status: DISCONTINUED | OUTPATIENT
Start: 2022-09-26 | End: 2022-09-30 | Stop reason: HOSPADM

## 2022-09-26 RX ADMIN — PROPOFOL 150 MG: 10 INJECTION, EMULSION INTRAVENOUS at 11:04

## 2022-09-26 RX ADMIN — PROPOFOL 50 MG: 10 INJECTION, EMULSION INTRAVENOUS at 11:15

## 2022-09-26 RX ADMIN — PROPOFOL 50 MG: 10 INJECTION, EMULSION INTRAVENOUS at 11:19

## 2022-09-26 RX ADMIN — PROPOFOL 50 MG: 10 INJECTION, EMULSION INTRAVENOUS at 11:11

## 2022-09-26 RX ADMIN — LIDOCAINE HYDROCHLORIDE 50 MG: 10 INJECTION, SOLUTION EPIDURAL; INFILTRATION; INTRACAUDAL; PERINEURAL at 11:04

## 2022-09-26 RX ADMIN — PROPOFOL 75 MG: 10 INJECTION, EMULSION INTRAVENOUS at 11:05

## 2022-09-26 RX ADMIN — PROPOFOL 50 MG: 10 INJECTION, EMULSION INTRAVENOUS at 11:08

## 2022-09-26 RX ADMIN — SODIUM CHLORIDE, SODIUM LACTATE, POTASSIUM CHLORIDE, AND CALCIUM CHLORIDE: .6; .31; .03; .02 INJECTION, SOLUTION INTRAVENOUS at 11:00

## 2022-09-26 NOTE — ANESTHESIA PREPROCEDURE EVALUATION
Procedure:  EGD    Relevant Problems   ANESTHESIA (within normal limits)      CARDIO   (+) Hypertension      ENDO (within normal limits)      GI/HEPATIC   (+) Gastroesophageal reflux disease without esophagitis      /RENAL   (+) YOAV (acute kidney injury) ([facility] [address] )      MUSCULOSKELETAL   (+) Acute gout of right elbow   (+) DDD (degenerative disc disease), cervical   (+) Gout of multiple sites   (+) Osteoarthritis of left elbow   (+) Spondylosis of cervical region without myelopathy or radiculopathy      PULMONARY   (+) JADYN on CPAP   (+) Pneumonia due to COVID-19 virus      Other   (+) Mediastinal lymphadenopathy        Physical Exam    Airway    Mallampati score: II  TM Distance: >3 FB  Neck ROM: full     Dental   No notable dental hx     Cardiovascular  Rhythm: regular, Rate: normal,     Pulmonary  Breath sounds clear to auscultation,     Other Findings        Anesthesia Plan  ASA Score- 2     Anesthesia Type- IV sedation with anesthesia with ASA Monitors  Additional Monitors:   Airway Plan:           Plan Factors-Exercise tolerance (METS): >4 METS  Chart reviewed  Existing labs reviewed  Patient summary reviewed  Patient is not a current smoker  Induction-     Postoperative Plan-     Informed Consent- Anesthetic plan and risks discussed with patient  I personally reviewed this patient with the CRNA  Discussed and agreed on the Anesthesia Plan with the LEAH Bennett

## 2022-09-26 NOTE — H&P
History and Physical - [facility]  Harriet Lucia  57 y o  male MRN: [id number]    HPI: Harriet Lucia  is a 57y o  year old male who presents with dysphagia, schatzki's ring  Review of Systems    Historical Information   Past Medical History:   Diagnosis Date    Arthritis     Asthma     Back pain     Colon polyp     COVID-19 1/21/2021 1/11/2021    CPAP (continuous positive airway pressure) dependence     Gastroesophageal reflux disease without esophagitis 7/16/2020    GERD (gastroesophageal reflux disease)     Gout of multiple sites 7/16/2020    Hiatal hernia     High blood pressure     Hypertension     Impaired fasting glucose 6/18/2020    Lipoma of neck     Mediastinal lymphadenopathy     Osteoporosis     Pneumonia due to COVID-19 virus 1/25/2021    Right hand pain 7/16/2020    Sleep apnea     Sleep disorder      Past Surgical History:   Procedure Laterality Date    BACK SURGERY      COLONOSCOPY  10/07/2016    5 years (per dominique)    COLONOSCOPY      EGD  03/31/2017    ELBOW SURGERY Left 04/09/2018    open arthrotomy, capsulectomy, loose body removal (per dominique)    HERNIA REPAIR  35/97/6324    umbilical with mesh (per dominique)    LUMBAR FUSION  07/11/2018    VT BRONCHOSCOPY NEEDLE BX TRACHEA MAIN STEM&/BRON N/A 4/28/2020    Procedure: ENDOBRONCHIAL ULTRASOUND (EBUS) WITH BIOPSY;  Surgeon: Marilee Torres MD;  [facility];  Service: Thoracic    VT [address] N/A 4/28/2020    Procedure: BRONCHOSCOPY FLEXIBLE;  Surgeon: Marilee Torres MD;  [facility];  Service: Thoracic    ROTATOR CUFF REPAIR Bilateral     TONSILLECTOMY  01/01/1980    UPPER GASTROINTESTINAL ENDOSCOPY       Social History   Social History     Substance and Sexual Activity   Alcohol Use Yes    Alcohol/week: 7 0 standard drinks    Types: 7 Shots of liquor per week    Comment: one glass per night       Social History     Substance and Sexual Activity   Drug Use No     Social History     Tobacco Use   Smoking Status Former Smoker    Packs/day: 0 50    Years: 6 00    Pack years: 3 00    Types: Cigarettes    Quit date: Yara Owusu Years since quittin 7   Smokeless Tobacco Never Used     Family History   Problem Relation Age of Onset    Hypertension Mother     Hypertension Father        Meds/Allergies     (Not in a hospital admission)      Allergies   Allergen Reactions    Nuts - Food Allergy Shortness Of Breath    Peanut Oil - Food Allergy Anaphylaxis     Anything using peanuts    Caffeine - Food Allergy Other (See Comments)     jittery    Codeine Other (See Comments)     Dizzy, light headed  Body of balance      Dust Mite Extract Sneezing    Latex Rash     Allergic to products, wears cotton underwear    Pollen Extract Sneezing     Grass, Pollen    Pork-Derived Products - Food Allergy GI Intolerance     Red meat as well is hard to digest       Objective     BP (!) 190/99   Pulse 96   Temp 97 8 °F (36 6 °C) (Temporal)   Resp 16   Wt 74 8 kg (165 lb)   SpO2 98%   BMI 25 84 kg/m²       PHYSICAL EXAM    Gen: NAD  CV: RRR  CHEST: Clear  ABD: soft, NT/ND  EXT: no edema  Neuro: AAO      ASSESSMENT/PLAN:  This is a 57y o  year old male here for dysphagia, schatzki's ring    PLAN:   Procedure: egd

## 2022-09-26 NOTE — ANESTHESIA POSTPROCEDURE EVALUATION
Post-Op Assessment Note    CV Status:  Stable  Pain Score: 0    Pain management: adequate     Mental Status:  Sleepy and arousable   Hydration Status:  Stable   PONV Controlled:  None   Airway Patency:  Patent      Post Op Vitals Reviewed: Yes      Staff: CRNA   Comments: spontaneously breathing, protecting airway, vss, fully endorsed to recovery w/o AC        No complications documented      BP     Temp      Pulse     Resp      SpO2  99

## 2022-10-12 PROBLEM — U07.1 PNEUMONIA DUE TO COVID-19 VIRUS: Status: RESOLVED | Noted: 2021-01-25 | Resolved: 2022-10-12

## 2022-10-12 PROBLEM — J12.82 PNEUMONIA DUE TO COVID-19 VIRUS: Status: RESOLVED | Noted: 2021-01-25 | Resolved: 2022-10-12

## 2022-10-24 ENCOUNTER — TELEPHONE (OUTPATIENT)
Dept: HEMATOLOGY ONCOLOGY | Facility: MEDICAL CENTER | Age: 62
End: 2022-10-24

## 2022-10-24 NOTE — TELEPHONE ENCOUNTER
Spoke with patient and informed him that Rachel Pena would like for him to have labs completed prior to appointment on 10/25  Patient stated he will have them done tomorrow

## 2022-10-25 ENCOUNTER — OFFICE VISIT (OUTPATIENT)
Dept: HEMATOLOGY ONCOLOGY | Facility: CLINIC | Age: 62
End: 2022-10-25
Payer: COMMERCIAL

## 2022-10-25 ENCOUNTER — APPOINTMENT (OUTPATIENT)
Dept: LAB | Facility: CLINIC | Age: 62
End: 2022-10-25
Payer: COMMERCIAL

## 2022-10-25 VITALS
OXYGEN SATURATION: 97 % | BODY MASS INDEX: 26.06 KG/M2 | WEIGHT: 166 LBS | TEMPERATURE: 98.4 F | HEART RATE: 89 BPM | HEIGHT: 67 IN | DIASTOLIC BLOOD PRESSURE: 100 MMHG | SYSTOLIC BLOOD PRESSURE: 160 MMHG | RESPIRATION RATE: 16 BRPM

## 2022-10-25 DIAGNOSIS — R59.0 MEDIASTINAL LYMPHADENOPATHY: ICD-10-CM

## 2022-10-25 DIAGNOSIS — D17.0 LIPOMA OF NECK: Primary | ICD-10-CM

## 2022-10-25 LAB
ALBUMIN SERPL BCP-MCNC: 3.9 G/DL (ref 3.5–5)
ALP SERPL-CCNC: 71 U/L (ref 34–104)
ALT SERPL W P-5'-P-CCNC: 27 U/L (ref 7–52)
ANION GAP SERPL CALCULATED.3IONS-SCNC: 6 MMOL/L (ref 4–13)
AST SERPL W P-5'-P-CCNC: 19 U/L (ref 13–39)
BASOPHILS # BLD AUTO: 0.04 THOUSANDS/ÂΜL (ref 0–0.1)
BASOPHILS NFR BLD AUTO: 1 % (ref 0–1)
BILIRUB SERPL-MCNC: 0.74 MG/DL (ref 0.2–1)
BUN SERPL-MCNC: 11 MG/DL (ref 5–25)
CALCIUM SERPL-MCNC: 9.3 MG/DL (ref 8.4–10.2)
CHLORIDE SERPL-SCNC: 106 MMOL/L (ref 96–108)
CO2 SERPL-SCNC: 29 MMOL/L (ref 21–32)
CREAT SERPL-MCNC: 1.26 MG/DL (ref 0.6–1.3)
EOSINOPHIL # BLD AUTO: 0.06 THOUSAND/ÂΜL (ref 0–0.61)
EOSINOPHIL NFR BLD AUTO: 1 % (ref 0–6)
ERYTHROCYTE [DISTWIDTH] IN BLOOD BY AUTOMATED COUNT: 14.8 % (ref 11.6–15.1)
GFR SERPL CREATININE-BSD FRML MDRD: 59 ML/MIN/1.73SQ M
GLUCOSE P FAST SERPL-MCNC: 107 MG/DL (ref 65–99)
HCT VFR BLD AUTO: 51.2 % (ref 36.5–49.3)
HGB BLD-MCNC: 16 G/DL (ref 12–17)
IMM GRANULOCYTES # BLD AUTO: 0.02 THOUSAND/UL (ref 0–0.2)
IMM GRANULOCYTES NFR BLD AUTO: 1 % (ref 0–2)
LDH SERPL-CCNC: 162 U/L (ref 140–271)
LYMPHOCYTES # BLD AUTO: 1.5 THOUSANDS/ÂΜL (ref 0.6–4.47)
LYMPHOCYTES NFR BLD AUTO: 35 % (ref 14–44)
MCH RBC QN AUTO: 26.9 PG (ref 26.8–34.3)
MCHC RBC AUTO-ENTMCNC: 31.3 G/DL (ref 31.4–37.4)
MCV RBC AUTO: 86 FL (ref 82–98)
MONOCYTES # BLD AUTO: 0.35 THOUSAND/ÂΜL (ref 0.17–1.22)
MONOCYTES NFR BLD AUTO: 8 % (ref 4–12)
NEUTROPHILS # BLD AUTO: 2.37 THOUSANDS/ÂΜL (ref 1.85–7.62)
NEUTS SEG NFR BLD AUTO: 54 % (ref 43–75)
NRBC BLD AUTO-RTO: 0 /100 WBCS
PLATELET # BLD AUTO: 171 THOUSANDS/UL (ref 149–390)
PMV BLD AUTO: 11.2 FL (ref 8.9–12.7)
POTASSIUM SERPL-SCNC: 4.5 MMOL/L (ref 3.5–5.3)
PROT SERPL-MCNC: 7.7 G/DL (ref 6.4–8.4)
RBC # BLD AUTO: 5.94 MILLION/UL (ref 3.88–5.62)
SODIUM SERPL-SCNC: 141 MMOL/L (ref 135–147)
WBC # BLD AUTO: 4.34 THOUSAND/UL (ref 4.31–10.16)

## 2022-10-25 PROCEDURE — 99214 OFFICE O/P EST MOD 30 MIN: CPT | Performed by: INTERNAL MEDICINE

## 2022-10-25 PROCEDURE — 85025 COMPLETE CBC W/AUTO DIFF WBC: CPT

## 2022-10-25 PROCEDURE — 83615 LACTATE (LD) (LDH) ENZYME: CPT

## 2022-10-25 PROCEDURE — 36415 COLL VENOUS BLD VENIPUNCTURE: CPT

## 2022-10-25 PROCEDURE — 80053 COMPREHEN METABOLIC PANEL: CPT

## 2022-10-25 NOTE — PROGRESS NOTES
[address] [facility]  1960  [address] [facility] [address]  [facility] [id number]    DISCUSSION/SUMMARY:    23-year-old male with a somewhat complicated history of adenopathy in the neck and the mediastinum  Patient also has a right occipital (presumed) lipoma  Presently Mr Rhonda Bautista feels okay, clinically there are no signs of progressive adenopathy  Blood work in August 2022 was okay/acceptable  The sed rate and CRP were elevated - etiology unclear  At this moment, there is no evidence of any primary hematology disorder  Other causes for the elevated C-reactive protein and sed rate need to be investigated  From a hematology standpoint, the plan is to continue with surveillance  We rediscussed what to monitor for as far as fevers, chills, night sweats, enlarging lymph nodes, worsening respiratory issues, persistent/continuous weight loss etc     Patient is to return in 12 months  If at that time, patient continues to have no symptoms or concerning clinical findings, patient will be discharged from Hematology  Mr Rhonda Bautista knows to call the hematology/oncology office if there are any other questions or concerns  Patient also knows that he needs to follow up with his PCP for the elevated blood pressure  Carefully review your medication list and verify that the list is accurate and up-to-date  Please call the hematology/oncology office if there are medications missing from the list, medications on the list that you are not currently taking or if there is a dosage or instruction that is different from how you're taking that medication      Patient goals and areas of care:  Monitor for progressive adenopathy  Barriers to care: none  Patient is able to self-care   _____________________________________________________________________________________    Chief Complaint   Patient presents with   • Follow-up   • Cervical adenopathy   • History of cervical adenopathy, history of mediastinal [place]     History of Present Illness:  58-year-old male with history of adenopathy previously followed by Dr Abdi Tyler  In 2017 patient developed nonspecific right cervical adenopathy  Biopsy demonstrated atypical cellular changes  Flow cytometry did not demonstrate any evidence of a B or T-cell lymphoma  The enlarged lymph nodes subsequently resolved  In 2019 patient felt a posterior cervical lump - clinically lesion was consistent with a lipoma  The plan has been surveillance  The lesion has not enlarged  April 2020 PET-CT demonstrated FDG avid adenopathy in the mediastinum  Bronchoscopy +mediastinal biopsies did not demonstrate any evidence for malignancy  The plan has been surveillance also  In February 2021 patient developed COVID  CT scan during hospitalization showed mediastinal adenopathy, stable from before  Patient had a relatively uncomplicated course and was discharged  Since that time, the plan has been surveillance  Patient returns for follow-up  Mr Denise Perez states feeling well, baseline  No fevers, chills or sweats  Appetite is good, weight is stable  No pain control issues  No shortness of breath or dyspnea on exertion, no chest pain or pressure  No headaches, blurred vision or dizziness  No GI or  issues  No problems with excessive bruising or bleeding  Routine health maintenance and medical care is up-to-date  Patient states that he has sleep apnea and is on CPAP - patient finds it difficult to tolerate the machine at night  Review of Systems   Constitutional: Negative  HENT: Negative  Eyes: Negative  Respiratory: Negative  Cardiovascular: Negative  Gastrointestinal: Negative  Endocrine: Negative  Genitourinary: Negative  Musculoskeletal: Negative  Skin: Negative  Allergic/Immunologic: Negative  Neurological: Negative  Hematological: Negative  Psychiatric/Behavioral: Negative  All other systems reviewed and are negative      Patient Active Problem List   Diagnosis   • Mediastinal lymphadenopathy   • Spondylosis of cervical region without myelopathy or radiculopathy   • Osteoarthritis of left elbow   • Palpitations   • JADYN on CPAP   • History of elbow surgery   • Foraminal stenosis of cervical region   • DDD (degenerative disc disease), cervical   • Impaired fasting glucose   • Gout of multiple sites   • Gastroesophageal reflux disease without esophagitis   • Other insomnia   • Lipoma of neck   • Screening for blood or protein in urine   • COVID-19 virus infection   • YOAV (acute kidney injury) ([facility] [address] )   • Elevated liver enzymes   • Hypertension   • Recurrent cellulitis of lower extremity   • Hypokalemia   • Effusion, right elbow   • Acute gout of right elbow   • Overweight with body mass index (BMI) of 26 to 26 9 in adult   • Elevated C-reactive protein   • Elevated sed rate   • Prediabetes   • Follow-up exam after treatment   • Cellulitis of great toe of right foot   • Toenail fungus     Past Medical History:   Diagnosis Date   • Arthritis    • Asthma    • Back pain    • Colon polyp    • COVID-19 1/21/2021 1/11/2021   • CPAP (continuous positive airway pressure) dependence    • Gastroesophageal reflux disease without esophagitis 7/16/2020   • GERD (gastroesophageal reflux disease)    • Gout of multiple sites 7/16/2020   • Hiatal hernia    • High blood pressure    • Hypertension    • Impaired fasting glucose 6/18/2020   • Lipoma of neck    • Mediastinal lymphadenopathy    • Osteoporosis    • Pneumonia due to COVID-19 virus 1/25/2021   • Right hand pain 7/16/2020   • Sleep apnea    • Sleep disorder      Past Surgical History:   Procedure Laterality Date   • BACK SURGERY     • COLONOSCOPY  10/07/2016    5 years (per dominique)   • COLONOSCOPY     • EGD  03/31/2017   • ELBOW SURGERY Left 04/09/2018    open arthrotomy, capsulectomy, loose body removal (per dominique)   • HERNIA REPAIR  01/16/2019 umbilical with mesh (per dominique)   • LUMBAR FUSION  2018   • SC BRONCHOSCOPY NEEDLE BX TRACHEA MAIN STEM&/BRON N/A 2020    Procedure: ENDOBRONCHIAL ULTRASOUND (EBUS) WITH BIOPSY;  Surgeon: Bruce Valdivia MD;  [facility];  Service: Thoracic   • SC [address] N/A 2020    Procedure: Jarred Backer;  Surgeon: Bruce Valdivia MD;  [facility];  Service: Thoracic   • ROTATOR CUFF REPAIR Bilateral    • TONSILLECTOMY  1980   • UPPER GASTROINTESTINAL ENDOSCOPY       Family History   Problem Relation Age of Onset   • Hypertension Mother    • Hypertension Father      Social History     Socioeconomic History   • Marital status: /Civil Union     Spouse name: Not on file   • Number of children: Not on file   • Years of education: Not on file   • Highest education level: Not on file   Occupational History   • Occupation: unemployed   Tobacco Use   • Smoking status: Former Smoker     Packs/day: 0 50     Years: 6 00     Pack years: 3 00     Types: Cigarettes     Quit date:      Years since quittin 8   • Smokeless tobacco: Never Used   Vaping Use   • Vaping Use: Never used   Substance and Sexual Activity   • Alcohol use: Yes     Alcohol/week: 7 0 standard drinks     Types: 7 Shots of liquor per week     Comment: one glass per night  • Drug use: No   • Sexual activity: Yes   Other Topics Concern   • Not on file   Social History Narrative    Most recent tobacco use screenin2020      Do you currently or have you served in the [employer]:  No      Were you activated, into active duty, as a member of the [employer] or as a Reservist:  No      Sexual orientation:  Heterosexual      Exercise level:   Moderate      Diet:  Regular      General stress level:  Medium      Caffeine intake:  Occasional      Guns present in home:  No      Seat belts used routinely:  Yes      Smoke alarm in home:  Yes      Advance directive:  No      Passive smoke exposure:  No      Live alone or with others:  with others      Are you currently employed:  No      Asbestos exposure:  No      TB exposure:  No      Environmental exposure:  No      Animal exposure:  Yes 1 dog     (per dominique)     Social Determinants of Health     Financial Resource Strain: Not on file   Food Insecurity: Not on file   Transportation Needs: Not on file   Physical Activity: Not on file   Stress: Not on file   Social Connections: Not on file   Intimate Partner Violence: Not on file   Housing Stability: Not on file       Current Outpatient Medications:   •  allopurinol (ZYLOPRIM) 100 mg tablet, Take 1 tablet (100 mg total) by mouth daily, Disp: 90 tablet, Rfl: 3  •  clotrimazole (LOTRIMIN) 1 % cream, Apply topically 2 (two) times a day as needed (as needed), Disp: 30 g, Rfl: 0  •  hydrALAZINE (APRESOLINE) 10 mg tablet, TAKE 1 TABLET TWICE DAILY, Disp: 180 tablet, Rfl: 1  •  ibuprofen (MOTRIN) 800 mg tablet, Take 1 tablet (800 mg total) by mouth every 8 (eight) hours as needed for mild pain or moderate pain (Right foot great toe pain), Disp: 30 tablet, Rfl: 0  •  ipratropium (ATROVENT) 0 06 % nasal spray, 2 sprays into each nostril 4 (four) times a day, Disp: 262 mL, Rfl: 0  •  ketoconazole (NIZORAL) 2 % shampoo, Apply topically twice weekly for 8 week and then as needed, Disp: 120 mL, Rfl: 0  •  omeprazole (PriLOSEC) 20 mg delayed release capsule, Take 2 capsules (40 mg total) by mouth daily, Disp: 90 capsule, Rfl: 2  •  potassium chloride (K-DUR,KLOR-CON) 20 mEq tablet, TAKE 1 TABLET EVERY DAY, Disp: 90 tablet, Rfl: 1  •  valsartan (DIOVAN) 40 mg tablet, Take 1 tablet (40 mg total) by mouth daily, Disp: 90 tablet, Rfl: 3  •  fluticasone (FLONASE) 50 mcg/act nasal spray, 2 sprays into each nostril daily, Disp: 1 Bottle, Rfl: 5  •  gabapentin (NEURONTIN) 100 mg capsule, Take 1 capsule (100 mg total) by mouth daily at bedtime, Disp: 90 capsule, Rfl: 1    Allergies   Allergen Reactions   • Nuts - Food Allergy Shortness Of Breath   • Peanut Oil - Food Allergy Anaphylaxis     Anything using peanuts   • Caffeine - Food Allergy Other (See Comments)     jittery   • Codeine Other (See Comments)     Dizzy, light headed  Body of balance     • Dust Mite Extract Sneezing   • Latex Rash     Allergic to products, wears cotton underwear   • Pollen Extract Sneezing     Grass, Pollen   • Pork-Derived Products - Food Allergy GI Intolerance     Red meat as well is hard to digest       Vitals:    10/25/22 1303   BP: 160/100   Pulse: 89   Resp: 16   Temp: 98 4 °F (36 9 °C)   SpO2: 97%     Physical Exam  Constitutional:       Appearance: He is well-developed  Comments: Well-nourished middle-aged male, no respiratory distress, no signs of pain   HENT:      Head: Normocephalic and atraumatic  Right Ear: External ear normal       Left Ear: External ear normal    Eyes:      Conjunctiva/sclera: Conjunctivae normal       Pupils: Pupils are equal, round, and reactive to light  Neck:      Comments: Supple, patient has a soft tissue tumor in the right occipital area, consistent with lipoma (see lymph nodes below)  Cardiovascular:      Rate and Rhythm: Normal rate and regular rhythm  Heart sounds: Normal heart sounds  Pulmonary:      Effort: Pulmonary effort is normal       Breath sounds: Normal breath sounds  Comments: Clear bilaterally  Abdominal:      General: Bowel sounds are normal       Palpations: Abdomen is soft  Comments: +bowel sounds, nontender, soft, no hepatosplenomegaly, no guarding   Musculoskeletal:         General: Normal range of motion  Cervical back: Normal range of motion and neck supple  Skin:     General: Skin is warm  Comments: Warm, moist, good color, no petechiae or ecchymoses   Neurological:      Mental Status: He is alert and oriented to person, place, and time  Deep Tendon Reflexes: Reflexes are normal and symmetric     Psychiatric:         Behavior: Behavior normal          Thought Content: Thought content normal          Judgment: Judgment normal      Extremities:  No lower extremity edema bilaterally, no cords, pulses are 1+  Lymphatics:  Few shotty, nontender, movable, cervical lymph nodes bilaterally, no axillary, supraclavicular, infraclavicular, pre/postauricular or occipital adenopathy bilaterally    Labs    08/15/2022 WBC = 4 1 hemoglobin = 16 6 hematocrit = 51 4 platelet = 594 neutrophil = 50% bands = 1% lymphocytes = 35% monocyte = 11% BUN = 14 creatinine = 1 23 LFTs WNL calcium = 10 0 ESR = 51 CRP = 11 2    01/23/2022 BUN = 13 creatinine = 1 04 calcium = 9 5 alkaline phosphatase = 53 total bilirubin = 0 7 AST = 24 ALT = 24    01/21/2022 WBC = 5 3 hemoglobin = 14 2 hematocrit = 42 5 platelet count = 033 neutrophil = 64% lymphocytes = 23% monocyte = 11%    Imaging    03/11/2021 CTA chest PE study    IMPRESSION:  Right and left paraesophageal enlarged lymph nodes are seen measuring 2 1 cm on the right and 1 9 cm and the left    Right hilar adenopathy is noted  These are seen on the prior noncontrast CT chest from 2/17/2021  Dependent parenchymal changes are seen in the lung bases bilaterally  No pulmonary embolism  No effusion, airspace disease or pneumothorax  Recommend follow-up to resolution  04/09/2020 PET-CT    CHEST:     There are 2 FDG avid lymph nodes in the superior mediastinum adjacent to the esophagus        The lymph node to the right of the upper esophagus demonstrates SUV max of 3 5  This measures 2 1 x 1 4 cm image 70 series 3        The lymph node on the left demonstrates SUV max of 5 4  This measures 2 4 x 1 9 cm image 75 series 3      There is focal FDG uptake at the level of the upper esophagus at this level, SUV max of 3 8  See image 64 series 1200 of the PET/CT fusion images  Questionable wall thickening here on CT      No FDG avid hilar lymph nodes  CT images: Scattered coronary artery calcifications    Scattered linear atelectasis bilaterally  IMPRESSION:  1   FDG avid lymph nodes in the neck and upper mediastinum suspicious for hypermetabolic malignancy  2  Focal FDG uptake at the upper esophagus adjacent to the FDG avid lymphadenopathy, underlying esophageal lesion should be excluded  3   No findings for hypermetabolic malignancy in the abdomen or pelvis  Pathology    Case Report   Non-gynecologic Cytology                          Case: [id number]                                   Authorizing Provider: Panchito Ashby MD      Collected:           04/28/2020 1306               Ordering Location:     [address]      Received:            04/28/2020 [facility]                                      Hospital Operating Room                                                       Pathologist:           Stanford East DO                                                      Specimens:   A) - Mass, paratracheal                                                                              B) - Mass, paratracheal                                                                              C) - Mass, paratracheal, cell block                                                        Final Diagnosis   A , B , and C  Paratracheal Mass, FNAB (ThinPrep, Smear, and Cell Block Preparations):  - Negative for malignancy  - Benign bronchial epithelial cells, mixed lymphocytes, and macrophages present  See Note  Electronically signed by Stanford East DO on 5/5/2020 at 12:26 PM   Note    Concurrent flow cytometry ([facility] Specimen [id number]) is negative for findings of B- or T-cell lymphoma, and favors reactive hyperplasia  See scanned full report in the Media tab in Epic      Clinical and radiologic correlation recommended to ensure that the targeted lesional area was adequately sampled

## 2022-11-03 ENCOUNTER — RA CDI HCC (OUTPATIENT)
Dept: OTHER | Facility: HOSPITAL | Age: 62
End: 2022-11-03

## 2022-11-03 NOTE — PROGRESS NOTES
[facility]  coding opportunities       Chart reviewed, no opportunity found:   [address]        Patients Insurance     Medicare Insurance: [facility]

## 2022-11-05 PROBLEM — Z23 ENCOUNTER FOR IMMUNIZATION: Status: ACTIVE | Noted: 2021-01-11

## 2022-11-05 PROBLEM — Z01.818 PREOPERATIVE GENERAL PHYSICAL EXAMINATION: Status: ACTIVE | Noted: 2021-01-11

## 2022-11-05 PROBLEM — Z13.220 SCREENING, LIPID: Status: ACTIVE | Noted: 2021-01-11

## 2022-11-05 PROBLEM — Z00.8 EXAM FOR POPULATION SURVEY: Status: ACTIVE | Noted: 2022-11-05

## 2022-11-09 RX ORDER — KETOROLAC TROMETHAMINE 5 MG/ML
1 SOLUTION OPHTHALMIC 4 TIMES DAILY PRN
COMMUNITY
Start: 2022-10-13

## 2022-11-09 RX ORDER — OFLOXACIN 3 MG/ML
1 SOLUTION/ DROPS OPHTHALMIC AS NEEDED
COMMUNITY
Start: 2022-10-13

## 2022-11-09 RX ORDER — PREDNISOLONE ACETATE 10 MG/ML
1 SUSPENSION/ DROPS OPHTHALMIC AS NEEDED
COMMUNITY
Start: 2022-10-21

## 2022-11-22 DIAGNOSIS — M10.9 GOUT OF MULTIPLE SITES, UNSPECIFIED CAUSE, UNSPECIFIED CHRONICITY: ICD-10-CM

## 2022-11-22 DIAGNOSIS — I10 PRIMARY HYPERTENSION: ICD-10-CM

## 2022-11-22 RX ORDER — ALLOPURINOL 100 MG/1
100 TABLET ORAL DAILY
Qty: 90 TABLET | Refills: 3 | Status: SHIPPED | OUTPATIENT
Start: 2022-11-22

## 2022-11-22 RX ORDER — VALSARTAN 40 MG/1
40 TABLET ORAL DAILY
Qty: 90 TABLET | Refills: 3 | Status: SHIPPED | OUTPATIENT
Start: 2022-11-22

## 2022-11-22 NOTE — TELEPHONE ENCOUNTER
Fax received from pharmacy requesting refills on the following medication  Allopurinol 100 mg TAB and valsartan 40 mg TAB  Medications sent for refill at this time

## 2022-12-18 DIAGNOSIS — I10 PRIMARY HYPERTENSION: ICD-10-CM

## 2022-12-19 RX ORDER — VALSARTAN 40 MG/1
TABLET ORAL
Qty: 90 TABLET | Refills: 3 | Status: SHIPPED | OUTPATIENT
Start: 2022-12-19

## 2023-01-04 PROBLEM — Z00.8 EXAM FOR POPULATION SURVEY: Status: RESOLVED | Noted: 2022-11-05 | Resolved: 2023-01-04

## 2023-01-05 NOTE — PROGRESS NOTES
BMI Counseling: Body mass index is 26 72 kg/m²  The BMI is above normal  Nutrition recommendations include decreasing portion sizes, encouraging healthy choices of fruits and vegetables, decreasing fast food intake, consuming healthier snacks, limiting drinks that contain sugar, moderation in carbohydrate intake, increasing intake of lean protein, reducing intake of saturated and trans fat and reducing intake of cholesterol  Exercise recommendations include vigorous physical activity 75 minutes/week, exercising 3-5 times per week, obtaining a gym membership and strength training exercises  No pharmacotherapy was ordered  Rationale for BMI follow-up plan is due to patient being overweight or obese  Depression Screening and Follow-up Plan: Patient was screened for depression during today's encounter  They screened negative with a PHQ-2 score of 0  Lung Cancer Screening Shared Decision Making: I discussed with him that he is a candidate for lung cancer CT screening  The following Shared Decision-Making points were covered:  1  Benefits of screening were discussed, including the rates of reduction in death from lung cancer and other causes  Harms of screening were reviewed, including false positive tests, radiation exposure levels, risks of invasive procedures, risks of complications of screening, and risk of overdiagnosis  2  I counseled on the importance of adherence to annual lung cancer LDCT screening, impact of co-morbidities, and ability or willingness to undergo diagnosis and treatment    3  I counseled on the importance of maintaining abstinence as a former smoker or was counseled on the importance of smoking cessation if a current smoker    Review of Eligibility Criteria: He meets all of the criteria for Lung Cancer Screening    - He is 57 y o    - He has 20 pack year tobacco history and is a current smoker or has quit within the past 15 years  - He presents no signs or symptoms of lung cancer    After discussion, the patient decided to elect lung cancer screening  Assessment/Plan:         Problem List Items Addressed This Visit        Respiratory    JADYN on CPAP     Patient use CPAP as directed  Cardiovascular and Mediastinum    Hypertension     Patient blood pressure stable at 140/74  Patient current maintained on hydralazine 10 mg p o  2 times daily  Instructed to maintain a low-sodium diet  Recheck blood pressure next office visit  Goal blood pressure below 130/70  Genitourinary    YOAV (acute kidney injury) ([facility] [address] )     BMP ordered for evaluation of renal function  Other    Overweight with body mass index (BMI) of 26 to 26 9 in adult     BMI 26 72 kg/M2  Patient counseled on proper diet, nutrition and exercise  Goal BMI 25 kg/M2  Recheck in my next office visit  Prediabetes     Patient counseled on proper diet with low starch, low-carb and low sugar  Prior hemoglobin A1c 6 0  Exam for population survey     CBC with differential  BMP ordered  Relevant Orders    CBC and differential (Completed)    Basic metabolic panel (Completed)    Preoperative clearance     Recent lab work and EKGs reviewed and patient cleared for surgery at this time  Preop clearance for 1/19/2023  Other Visit Diagnoses     Acute pain of right knee    -  Primary    Relevant Orders    XR knee 4+ vw right injury    Preop cardiovascular exam        Relevant Orders    ECG 12 lead            Subjective:      Patient ID: Tammy Baker  is a 57 y o  male  Patient is a 57year old male present for preoperative clearance for left eye cataracts surgery 1/19/2023  Patient has a history of GERD, obstructive of sleep apnea, pretension, mediastinal lymphedema,, DJD, right elbow effusion, osteoarthritis of the left elbow, cervical stenosis, follow-up of the neck, kidney injury, COVID-19 history, doubt, palpitations and prediabetes    He is currently cleared for left eye cataract surgery on 1/19/2023  Recent work and EKG reviewed  The following portions of the patient's history were reviewed and updated as appropriate:   Past Medical History:  He has a past medical history of Arthritis, Asthma, Back pain, Colon polyp, COVID-19 (1/21/2021), CPAP (continuous positive airway pressure) dependence, Gastroesophageal reflux disease without esophagitis (7/16/2020), GERD (gastroesophageal reflux disease), Gout of multiple sites (7/16/2020), Hiatal hernia, High blood pressure, Hypertension, Impaired fasting glucose (6/18/2020), Lipoma of neck, Mediastinal lymphadenopathy, Osteoporosis, Pneumonia due to COVID-19 virus (1/25/2021), Right hand pain (7/16/2020), Sleep apnea, and Sleep disorder  ,  _______________________________________________________________________  Medical Problems:  does not have any pertinent problems on file ,  _______________________________________________________________________  Past Surgical History:   has a past surgical history that includes Lumbar fusion (07/11/2018); EGD (03/31/2017); Tonsillectomy (01/01/1980); Colonoscopy (10/07/2016); Elbow surgery (Left, 04/09/2018); Hernia repair (01/16/2019); Rotator cuff repair (Bilateral); Colonoscopy; Back surgery; pr brnchsc incl fluor gdnce dx w/cell washg spx (N/A, 4/28/2020); pr bronchoscopy needle bx trachea main stem&/bron (N/A, 4/28/2020); and Upper gastrointestinal endoscopy  ,  _______________________________________________________________________  Family History:  family history includes Hypertension in his father and mother ,  _______________________________________________________________________  Social History:   reports that he quit smoking about 43 years ago  His smoking use included cigarettes  He has a 3 00 pack-year smoking history  He has never used smokeless tobacco  He reports current alcohol use of about 7 0 standard drinks per week   He reports that he does not use drugs ,  _______________________________________________________________________  Allergies:  is allergic to nuts - food allergy, peanut oil - food allergy, caffeine - food allergy, codeine, dust mite extract, latex, pollen extract, and pork-derived products - food allergy     _______________________________________________________________________  Current Outpatient Medications   Medication Sig Dispense Refill   • allopurinol (ZYLOPRIM) 100 mg tablet Take 1 tablet (100 mg total) by mouth daily 90 tablet 3   • clotrimazole (LOTRIMIN) 1 % cream Apply topically 2 (two) times a day as needed (as needed) 30 g 0   • fluticasone (FLONASE) 50 mcg/act nasal spray 2 sprays into each nostril daily 1 Bottle 5   • gabapentin (NEURONTIN) 100 mg capsule Take 1 capsule (100 mg total) by mouth daily at bedtime 90 capsule 1   • hydrALAZINE (APRESOLINE) 10 mg tablet TAKE 1 TABLET TWICE DAILY 180 tablet 1   • ibuprofen (MOTRIN) 800 mg tablet Take 1 tablet (800 mg total) by mouth every 8 (eight) hours as needed for mild pain or moderate pain (Right foot great toe pain) 30 tablet 0   • ipratropium (ATROVENT) 0 06 % nasal spray 2 sprays into each nostril 4 (four) times a day 262 mL 0   • ketoconazole (NIZORAL) 2 % shampoo Apply topically twice weekly for 8 week and then as needed 120 mL 0   • ketorolac (ACULAR) 0 5 % ophthalmic solution INSTILL 1 DROP IN LEFT EYE FOUR TIMES DAILY  START ON 11/14/22     • ofloxacin (OCUFLOX) 0 3 % ophthalmic solution INSTILL 1 DROP IN LEFT EYE FOUR TIMES DAILY  START ON 11/14/22     • omeprazole (PriLOSEC) 20 mg delayed release capsule Take 2 capsules (40 mg total) by mouth daily 90 capsule 2   • potassium chloride (K-DUR,KLOR-CON) 20 mEq tablet TAKE 1 TABLET EVERY DAY 90 tablet 1   • prednisoLONE acetate (PRED FORTE) 1 % ophthalmic suspension      • valsartan (DIOVAN) 40 mg tablet TAKE 1 TABLET(40 MG) BY MOUTH DAILY 90 tablet 3     No current facility-administered medications for this visit  _______________________________________________________________________  Review of Systems   Constitutional: Negative for activity change, appetite change, chills, fatigue, fever and unexpected weight change  HENT: Negative for congestion, ear discharge, ear pain, nosebleeds, postnasal drip, rhinorrhea, sinus pressure, sinus pain, sneezing, sore throat and voice change  Eyes: Negative for pain, redness and visual disturbance  Respiratory: Negative for cough, chest tightness, shortness of breath and wheezing  Cardiovascular: Negative for chest pain and palpitations  Gastrointestinal: Negative for abdominal distention, abdominal pain, constipation, diarrhea, nausea and vomiting  Endocrine: Negative  Genitourinary: Negative for difficulty urinating, dysuria, flank pain, frequency, hematuria and urgency  Musculoskeletal: Negative for arthralgias and myalgias  Skin: Negative  Allergic/Immunologic: Negative  Neurological: Negative  Hematological: Negative  Psychiatric/Behavioral: Negative  Objective:  Vitals:    01/10/23 0946   BP: 140/74   BP Location: Right arm   Patient Position: Sitting   Cuff Size: Standard   Pulse: 73   Resp: 16   Temp: (!) 97 4 °F (36 3 °C)   TempSrc: Temporal   SpO2: 97%   Weight: 77 4 kg (170 lb 9 6 oz)   Height: 5' 7" (1 702 m)     Body mass index is 26 72 kg/m²  Physical Exam  Vitals and nursing note reviewed  Constitutional:       Appearance: Normal appearance  He is well-developed  He is obese  HENT:      Head: Normocephalic and atraumatic  Right Ear: Tympanic membrane, ear canal and external ear normal       Left Ear: Tympanic membrane, ear canal and external ear normal       Nose: Nose normal  No congestion or rhinorrhea  Mouth/Throat:      Mouth: Mucous membranes are moist       Pharynx: No oropharyngeal exudate or posterior oropharyngeal erythema  Eyes:      Extraocular Movements: Extraocular movements intact  Conjunctiva/sclera: Conjunctivae normal       Pupils: Pupils are equal, round, and reactive to light  Cardiovascular:      Rate and Rhythm: Normal rate and regular rhythm  Pulses: Normal pulses  Heart sounds: Normal heart sounds  No murmur heard  Pulmonary:      Effort: Pulmonary effort is normal       Breath sounds: Normal breath sounds  Abdominal:      General: Bowel sounds are normal       Palpations: Abdomen is soft  Musculoskeletal:         General: Normal range of motion  Cervical back: Normal range of motion  Skin:     General: Skin is warm  Capillary Refill: Capillary refill takes less than 2 seconds  Neurological:      General: No focal deficit present  Mental Status: He is alert and oriented to person, place, and time     Psychiatric:         Mood and Affect: Mood normal          Behavior: Behavior normal

## 2023-01-05 NOTE — PATIENT INSTRUCTIONS
Cataracts   WHAT YOU NEED TO KNOW:   What are cataracts? A cataract is a clouding of the eye lens  The lens works with the cornea to bend light as it passes through to the retina  It is normally clear  A cloudy lens makes it hard for light to pass through  Your vision may be cloudy, hazy, and blurred  You may develop a cataract in one or both eyes  What increases my risk for cataracts? Age 72 years or older    A medical condition such as diabetes, low blood calcium, or high blood pressure    Your eyes being exposed to sunlight or x-rays    Long-term steroid use    Alcohol use, or smoking cigarettes    Family history of cataracts    What are the signs and symptoms of cataracts? Increasing loss of vision sharpness    Cloudy, foggy, fuzzy, or hazy blurring of vision    A halo appears around lights    Headlights and sun may seem very bright    Double vision    Colors appear faded    Loss of vision       How are cataracts diagnosed? Your healthcare provider may notice a cloudy appearance when looking at your eyes  He or she may also do any of the following:  A visual acuity test  is used to check your vision, eye pressure, and eye movements  Ophthalmoscopy  is used to see the back of your eyes  Eyedrops may be used to dilate your pupils  A slit-lamp test  is used to look into your eye with a microscope with a strong light  How are cataracts treated? You may need a different prescription for your glasses in the early stages of cataracts  Cataract lens removal surgery is a very common surgery  Your lens will be removed and replaced with an artificial lens  How can I protect my eyes? Wear sunglasses  to protect your eyes from the sunlight and prevent eye discomfort  Make sure the sunglasses have UV protection  Do not smoke  Cigarette smoking increases your risk for cataracts  Ask your healthcare provider for information if you currently smoke and need help to quit   E-cigarettes or smokeless tobacco still contain nicotine  Talk to your healthcare provider before you use these products  When should I seek immediate care? You suddenly lose your eyesight  You feel a sudden, sharp pain in your eye  When should I call my doctor? You have questions or concerns about your condition or care  CARE AGREEMENT:   You have the right to help plan your care  Learn about your health condition and how it may be treated  Discuss treatment options with your healthcare providers to decide what care you want to receive  You always have the right to refuse treatment  The above information is an  only  It is not intended as medical advice for individual conditions or treatments  Talk to your doctor, nurse or pharmacist before following any medical regimen to see if it is safe and effective for you  © Copyright [employer] 2022 Information is for End User's use only and may not be sold, redistributed or otherwise used for commercial purposes  All illustrations and images included in CareNotes® are the copyrighted property of A D A M , Inc  or Shirley Sheehan   Cataract Removal   WHAT YOU NEED TO KNOW:   What do I need to know about cataract removal?  Cataract removal is a procedure to remove a cloudy lens from your eye  An artificial lens called an intraocular lens (IOL) is put in its place  This will improve your vision  The procedure is usually done on one eye at a time  Your healthcare provider will wait about 4 weeks before removing your other cataract  How do I prepare for a cataract removal?   Your healthcare provider will measure your eye's length and how much it curves  He or she will also check your vision  This helps him or her choose the best IOL to put in your eye when the cataract is removed  Your healthcare provider may tell you to stop taking anticoagulants 1 to 2 weeks before your procedure   Your provider may give you antibiotic eyedrops to use for a few days before your procedure to prevent infection  Make arrangements for someone to drive you home after your procedure  What will happen during my procedure? Your healthcare provider may put drops in your eye to dilate your pupil  This makes it easier for healthcare providers to put in the IOL without damaging your eye  He or she may also use eyedrops to numb your eyes  Your healthcare provider may instead give you a shot of numbing medicine beside, under, or in your eye  You will also be given a pill to relax you and reduce your anxiety  After your pupil is fully dilated, a tool will hold your eye open to prevent blinking  A small incision will be made in your cornea (clear covering over your iris)  A tiny instrument will be placed next to the cloudy lens  This instrument uses sound waves to break the lens into small pieces that are suctioned out  The IOL will then be placed in this area  The incision will close on its own  Antibiotic and anti-inflammatory drops may be placed in your eye  What will happen after the procedure? An eye shield  may be used to cover your eye and protect it from damage  Eyedrops  will be given to help prevent infection and decrease inflammation  What are the risks of cataract removal? You may develop an eye infection and bleeding inside the eye  Your retina may swell, or a piece may break off  This is called detachment  You may go blind  You may develop glaucoma (increased pressure inside your eye)  You may have swelling in and damage to your cornea  Sometimes the area where the IOL is placed also clouds up  This may happen months or even years after the procedure  CARE AGREEMENT:   You have the right to help plan your care  Learn about your health condition and how it may be treated  Discuss treatment options with your healthcare providers to decide what care you want to receive  You always have the right to refuse treatment   The above information is an  only  It is not intended as medical advice for individual conditions or treatments  Talk to your doctor, nurse or pharmacist before following any medical regimen to see if it is safe and effective for you  © Copyright [employer] 2022 Information is for End User's use only and may not be sold, redistributed or otherwise used for commercial purposes  All illustrations and images included in CareNotes® are the copyrighted property of [employer] , Inc  or [facility] Marlon Aguila   P R I C E  Treatment   WHAT YOU NEED TO KNOW:   What is P R I C E  treatment? P R I C E  treatment is a 5-step process used to decrease swelling and pain caused by an injury  P R I C E  stands for protect, rest, ice, compress, and elevate  Start P R I C E  within 24 to 48 hours of an injury  How do I use P R I C E  treatment? Protect  your injury from more damage  Support the injured area with a brace or splint  Your healthcare provider will tell you how long to use the brace or splint  Rest  your injured area as directed  You may need to stop using, or keep weight off, the injury for 48 hours or longer  Your healthcare provider may recommend crutches or another device  Return to your usual activities as directed  Apply ice  on your injured area for 15 to 20 minutes every 4 hours or as directed  Use an ice pack, or put crushed ice in a plastic bag  Cover the bag with a towel before you apply it to your skin  Ice helps prevent tissue damage and decreases swelling and pain  Compress  (keep pressure on) the injured area  Compression will help decrease swelling and support the injured area  Use an elastic bandage, air stirrup, splint, or sling as directed  If you use an elastic bandage, make sure the bandage is not too tight  You should be able to slip 2 fingers between the bandage and your skin  Elevate  the injured area above the level of your heart as often as you can  This will help decrease swelling and pain   Prop the injured area on pillows or blankets to keep it elevated comfortably  When should I seek immediate care? Your pain is severe  You have severe swelling or deformity  You have numbness in the injured area  When should I call my doctor? Your pain and swelling do not go away after a few days  You have questions or concerns about your condition or care  CARE AGREEMENT:   You have the right to help plan your care  Learn about your health condition and how it may be treated  Discuss treatment options with your healthcare providers to decide what care you want to receive  You always have the right to refuse treatment  The above information is an  only  It is not intended as medical advice for individual conditions or treatments  Talk to your doctor, nurse or pharmacist before following any medical regimen to see if it is safe and effective for you  © Copyright [address] 2022 Information is for End User's use only and may not be sold, redistributed or otherwise used for commercial purposes  All illustrations and images included in CareNotes® are the copyrighted property of A D A M , Inc  or [employer] [facility]  Knee Pain   WHAT YOU NEED TO KNOW:   Knee pain may start suddenly, or it may be a long-term problem  You may have pain on the side, front, or back of your knee  You may have knee stiffness and swelling  You may hear popping sounds or feel like your knee is giving way or locking up as you walk  You may feel pain when you sit, stand, walk, or climb up and down stairs  Knee pain can be caused by conditions such as obesity, inflammation, or strains or tears in ligaments or tendons  DISCHARGE INSTRUCTIONS:   Return to the emergency department if:   Your pain is worse, even after treatment  You cannot bend or straighten your leg completely  The swelling around your knee does not go down even with treatment  Your knee is painful and hot to the touch      Contact your healthcare provider if:   You have questions or concerns about your condition or care  Medicines: You may need any of the following:  NSAIDs  help decrease swelling and pain or fever  This medicine is available with or without a doctor's order  NSAIDs can cause stomach bleeding or kidney problems in certain people  If you take blood thinner medicine, always ask your healthcare provider if NSAIDs are safe for you  Always read the medicine label and follow directions  Acetaminophen  decreases pain and fever  It is available without a doctor's order  Ask how much to take and how often to take it  Follow directions  Read the labels of all other medicines you are using to see if they also contain acetaminophen, or ask your doctor or pharmacist  Acetaminophen can cause liver damage if not taken correctly  Do not use more than 4 grams (4,000 milligrams) total of acetaminophen in one day  Prescription pain medicine  may be given  Ask your healthcare provider how to take this medicine safely  Some prescription pain medicines contain acetaminophen  Do not take other medicines that contain acetaminophen without talking to your healthcare provider  Too much acetaminophen may cause liver damage  Prescription pain medicine may cause constipation  Ask your healthcare provider how to prevent or treat constipation  Take your medicine as directed  Contact your healthcare provider if you think your medicine is not helping or if you have side effects  Tell him or her if you are allergic to any medicine  Keep a list of the medicines, vitamins, and herbs you take  Include the amounts, and when and why you take them  Bring the list or the pill bottles to follow-up visits  Carry your medicine list with you in case of an emergency  What you can do to manage your symptoms:   Rest your knee so it can heal   Limit activities that increase your pain  Do low-impact exercises, such as walking or swimming       Apply ice to help reduce swelling and pain  Use an ice pack, or put crushed ice in a plastic bag  Cover it with a towel before you apply it to your knee  Apply ice for 15 to 20 minutes every hour, or as directed  Apply compression to help reduce swelling  Use a brace or bandage only as directed  Elevate your knee to help decrease pain and swelling  Elevate your knee while you are sitting or lying down  Prop your leg on pillows to keep your knee above the level of your heart  Prevent your knee from moving as directed  Your healthcare provider may put on a cast or splint  You may need to wear a leg brace to stabilize your knee  A leg brace can be adjusted to increase your range of motion as your knee heals  What you can do to prevent knee pain:   Maintain a healthy weight  Extra weight increases your risk for knee pain  Ask your healthcare provider how much you should weigh  He or she can help you create a safe weight loss plan if you need to lose weight  Exercise or train properly  Use the correct equipment for sports  Wear shoes that provide good support  Check your posture often as you exercise, play sports, or train for an event  This can help prevent stress and strain on your knees  Rest between sessions so you do not overwork your knees  Follow up with your healthcare provider within 24 hours or as directed: You may need follow-up treatments, such as steroid injections to decrease pain  Write down your questions so you remember to ask them during your visits  © Copyright [employer] 2022 Information is for End User's use only and may not be sold, redistributed or otherwise used for commercial purposes  All illustrations and images included in CareNotes® are the copyrighted property of A D A M , Inc  or Shirley Romano  The above information is an  only  It is not intended as medical advice for individual conditions or treatments   Talk to your doctor, nurse or pharmacist before following any medical regimen to see if it is safe and effective for you

## 2023-01-10 ENCOUNTER — HOSPITAL ENCOUNTER (OUTPATIENT)
Dept: RADIOLOGY | Facility: HOSPITAL | Age: 63
Discharge: HOME | End: 2023-01-10

## 2023-01-10 ENCOUNTER — OFFICE VISIT (OUTPATIENT)
Dept: LAB | Facility: CLINIC | Age: 63
End: 2023-01-10

## 2023-01-10 ENCOUNTER — CONSULT (OUTPATIENT)
Dept: FAMILY MEDICINE CLINIC | Facility: CLINIC | Age: 63
End: 2023-01-10

## 2023-01-10 VITALS
HEART RATE: 73 BPM | OXYGEN SATURATION: 97 % | SYSTOLIC BLOOD PRESSURE: 140 MMHG | BODY MASS INDEX: 26.78 KG/M2 | WEIGHT: 170.6 LBS | DIASTOLIC BLOOD PRESSURE: 74 MMHG | RESPIRATION RATE: 16 BRPM | TEMPERATURE: 97.4 F | HEIGHT: 67 IN

## 2023-01-10 DIAGNOSIS — Z01.810 PREOP CARDIOVASCULAR EXAM: ICD-10-CM

## 2023-01-10 DIAGNOSIS — G47.33 OSA ON CPAP: ICD-10-CM

## 2023-01-10 DIAGNOSIS — I10 PRIMARY HYPERTENSION: ICD-10-CM

## 2023-01-10 DIAGNOSIS — M25.561 ACUTE PAIN OF RIGHT KNEE: ICD-10-CM

## 2023-01-10 DIAGNOSIS — E66.3 OVERWEIGHT WITH BODY MASS INDEX (BMI) OF 26 TO 26.9 IN ADULT: ICD-10-CM

## 2023-01-10 DIAGNOSIS — Z01.818 PREOPERATIVE CLEARANCE: ICD-10-CM

## 2023-01-10 DIAGNOSIS — M25.561 ACUTE PAIN OF RIGHT KNEE: Primary | ICD-10-CM

## 2023-01-10 DIAGNOSIS — Z99.89 OSA ON CPAP: ICD-10-CM

## 2023-01-10 DIAGNOSIS — R73.03 PREDIABETES: ICD-10-CM

## 2023-01-10 DIAGNOSIS — Z00.8 EXAM FOR POPULATION SURVEY: ICD-10-CM

## 2023-01-10 DIAGNOSIS — N17.9 AKI (ACUTE KIDNEY INJURY) (HCC): ICD-10-CM

## 2023-01-10 LAB
ATRIAL RATE: 66 BPM
P AXIS: 53 DEGREES
PR INTERVAL: 144 MS
QRS AXIS: -29 DEGREES
QRSD INTERVAL: 70 MS
QT INTERVAL: 382 MS
QTC INTERVAL: 400 MS
T WAVE AXIS: -6 DEGREES
VENTRICULAR RATE: 66 BPM

## 2023-01-10 NOTE — ASSESSMENT & PLAN NOTE
Recent lab work and EKGs reviewed and patient cleared for surgery at this time  Preop clearance for 1/19/2023

## 2023-01-10 NOTE — ASSESSMENT & PLAN NOTE
BMI 26 72 kg/M2  Patient counseled on proper diet, nutrition and exercise  Goal BMI 25 kg/M2  Recheck in my next office visit

## 2023-01-10 NOTE — ASSESSMENT & PLAN NOTE
Patient blood pressure stable at 140/74  Patient current maintained on hydralazine 10 mg p o  2 times daily  Instructed to maintain a low-sodium diet  Recheck blood pressure next office visit  Goal blood pressure below 130/70

## 2023-01-11 ENCOUNTER — APPOINTMENT (OUTPATIENT)
Dept: LAB | Facility: HOSPITAL | Age: 63
End: 2023-01-11

## 2023-01-11 DIAGNOSIS — Z00.8 EXAM FOR POPULATION SURVEY: ICD-10-CM

## 2023-01-11 LAB
ANION GAP SERPL CALCULATED.3IONS-SCNC: 9 MMOL/L (ref 4–13)
BASOPHILS # BLD AUTO: 0.03 THOUSANDS/ÂΜL (ref 0–0.1)
BASOPHILS NFR BLD AUTO: 1 % (ref 0–1)
BUN SERPL-MCNC: 12 MG/DL (ref 5–25)
CALCIUM SERPL-MCNC: 9.9 MG/DL (ref 8.4–10.2)
CHLORIDE SERPL-SCNC: 101 MMOL/L (ref 96–108)
CO2 SERPL-SCNC: 28 MMOL/L (ref 21–32)
CREAT SERPL-MCNC: 1.14 MG/DL (ref 0.6–1.3)
EOSINOPHIL # BLD AUTO: 0.06 THOUSAND/ÂΜL (ref 0–0.61)
EOSINOPHIL NFR BLD AUTO: 2 % (ref 0–6)
ERYTHROCYTE [DISTWIDTH] IN BLOOD BY AUTOMATED COUNT: 14.8 % (ref 11.6–15.1)
GFR SERPL CREATININE-BSD FRML MDRD: 67 ML/MIN/1.73SQ M
GLUCOSE P FAST SERPL-MCNC: 120 MG/DL (ref 65–99)
HCT VFR BLD AUTO: 52.7 % (ref 36.5–49.3)
HGB BLD-MCNC: 17.1 G/DL (ref 12–17)
IMM GRANULOCYTES # BLD AUTO: 0.01 THOUSAND/UL (ref 0–0.2)
IMM GRANULOCYTES NFR BLD AUTO: 0 % (ref 0–2)
LYMPHOCYTES # BLD AUTO: 1.13 THOUSANDS/ÂΜL (ref 0.6–4.47)
LYMPHOCYTES NFR BLD AUTO: 35 % (ref 14–44)
MCH RBC QN AUTO: 27.4 PG (ref 26.8–34.3)
MCHC RBC AUTO-ENTMCNC: 32.4 G/DL (ref 31.4–37.4)
MCV RBC AUTO: 84 FL (ref 82–98)
MONOCYTES # BLD AUTO: 0.32 THOUSAND/ÂΜL (ref 0.17–1.22)
MONOCYTES NFR BLD AUTO: 10 % (ref 4–12)
NEUTROPHILS # BLD AUTO: 1.69 THOUSANDS/ÂΜL (ref 1.85–7.62)
NEUTS SEG NFR BLD AUTO: 52 % (ref 43–75)
NRBC BLD AUTO-RTO: 0 /100 WBCS
PLATELET # BLD AUTO: 174 THOUSANDS/UL (ref 149–390)
PMV BLD AUTO: 11.3 FL (ref 8.9–12.7)
POTASSIUM SERPL-SCNC: 5.1 MMOL/L (ref 3.5–5.3)
RBC # BLD AUTO: 6.25 MILLION/UL (ref 3.88–5.62)
SODIUM SERPL-SCNC: 138 MMOL/L (ref 135–147)
WBC # BLD AUTO: 3.24 THOUSAND/UL (ref 4.31–10.16)

## 2023-01-16 DIAGNOSIS — M17.11 OSTEOARTHRITIS OF RIGHT KNEE, UNSPECIFIED OSTEOARTHRITIS TYPE: Primary | ICD-10-CM

## 2023-01-16 NOTE — PROGRESS NOTES
Referral for orthopedics due to right knee pain and recent x-ray results  IMPRESSION:     No acute osseous abnormality      Severe osteoarthritic degenerative changes at the medial and patellofemoral compartments

## 2023-02-21 ENCOUNTER — OFFICE VISIT (OUTPATIENT)
Dept: OBGYN CLINIC | Facility: HOSPITAL | Age: 63
End: 2023-02-21

## 2023-02-21 VITALS
SYSTOLIC BLOOD PRESSURE: 170 MMHG | BODY MASS INDEX: 26.84 KG/M2 | HEART RATE: 91 BPM | HEIGHT: 67 IN | DIASTOLIC BLOOD PRESSURE: 100 MMHG | WEIGHT: 171 LBS

## 2023-02-21 DIAGNOSIS — M17.12 PRIMARY OSTEOARTHRITIS OF LEFT KNEE: ICD-10-CM

## 2023-02-21 DIAGNOSIS — M17.11 OSTEOARTHRITIS OF RIGHT KNEE, UNSPECIFIED OSTEOARTHRITIS TYPE: Primary | ICD-10-CM

## 2023-02-21 RX ORDER — BETAMETHASONE SODIUM PHOSPHATE AND BETAMETHASONE ACETATE 3; 3 MG/ML; MG/ML
12 INJECTION, SUSPENSION INTRA-ARTICULAR; INTRALESIONAL; INTRAMUSCULAR; SOFT TISSUE
Status: COMPLETED | OUTPATIENT
Start: 2023-02-21 | End: 2023-02-21

## 2023-02-21 RX ORDER — BUPIVACAINE HYDROCHLORIDE 2.5 MG/ML
4 INJECTION, SOLUTION INFILTRATION; PERINEURAL
Status: COMPLETED | OUTPATIENT
Start: 2023-02-21 | End: 2023-02-21

## 2023-02-21 RX ADMIN — BETAMETHASONE SODIUM PHOSPHATE AND BETAMETHASONE ACETATE 12 MG: 3; 3 INJECTION, SUSPENSION INTRA-ARTICULAR; INTRALESIONAL; INTRAMUSCULAR; SOFT TISSUE at 09:03

## 2023-02-21 RX ADMIN — BUPIVACAINE HYDROCHLORIDE 4 ML: 2.5 INJECTION, SOLUTION INFILTRATION; PERINEURAL at 09:03

## 2023-02-21 NOTE — PROGRESS NOTES
Orthopaedic Surgery - Office Note  Shaun Ruiz  (60 y o  male)   : 1960   MRN: [id number]  Encounter Date: 2023    Chief Complaint   Patient presents with   • Right Knee - Pain       Assessment / Plan  Bilateral knee osteoarthritis    · Patient has bilateral knee pain attributable to knee osteoarthritis  Patient usually follows with Dr Candy Cartagena, but presents today for second opinion  Treatment options were discussed with patient  At this time patient elects to continue with nonoperative management  Bilateral knee corticosteroid injections were offered and administered  These were well-tolerated  Patient was counseled that he would be a surgical candidate for total knee arthroplasty when nonsurgical treatment fails to provide adequate relief for him  · CSI of bilateral knee joint was performed  · Activity as tolerated  · Home exercise program reviewed  · Anti-inflammatories or Tylenol prn pain  Return in about 3 months (around 2023)  History of Present Illness  Shaun Ruiz  is a 57 y o  male who presents with bilateral knee pain  Patient reports the knee pain has been going on for years  Pain is worse on the right knee than the left  Pain is localized to the medial knee, does not radiate, and is moderate to severe in intensity  Pain is worse with activity or motion, better with rest   No numbness or tingling in the extremity  Patient has previously been treated by Dr Candy Cartagena, and has received a home exercise program, corticosteroid injections, and viscosupplementation  Review of Systems  Pertinent items are noted in HPI  All other systems were reviewed and are negative  Physical Exam  /100   Pulse 91   Ht 5' 7" (1 702 m)   Wt 77 6 kg (171 lb)   BMI 26 78 kg/m²   Cons: Appears well  No apparent distress  Psych: Alert  Oriented x3  Mood and affect normal   Eyes: PERRLA, EOMI  Resp: Normal effort  No audible wheezing or stridor  CV: Palpable pulse    No discernable arrhythmia  No LE edema  Lymph:  No palpable cervical, axillary, or inguinal lymphadenopathy  Skin: Warm  No palpable masses  No visible lesions  Neuro: Normal muscle tone  Normal and symmetric DTR's  MSK Right knee  • Skin intact, no erythema or ecchymosis  • Small effsion  • TTP over the medial joint line  • Knee ROM 0 to 110  • Stable to valgus/varus, Lachman, post drawer  • Motor intact hip flexion, knee flex/ext, ankle dorsiflexion/plantarflexion, EHL/FHL  • SILT L3-S1  • Foot warm and well perfused, 2 sec cap refill    MSK Left knee  • Skin intact, no erythema or ecchymosis  • No effusion  • TTP over the medial joint line  • Knee ROM 0 to 110  • Stable to valgus/varus, Lachman, post drawer  • Motor intact hip flexion, knee flex/ext, ankle dorsiflexion/plantarflexion, EHL/FHL  • SILT L3-S1  • Foot warm and well perfused, 2 sec cap refill      Studies Reviewed  XR of right knee - severe right knee osteoarthritis, worst in the medial compartment  Also with proximal tib-fib degenerative changes    Large joint arthrocentesis: bilateral knee  Universal Protocol:  Consent: Verbal consent obtained  Risks and benefits: risks, benefits and alternatives were discussed  Consent given by: patient  Patient understanding: patient states understanding of the procedure being performed  Patient consent: the patient's understanding of the procedure matches consent given  Procedure consent: procedure consent matches procedure scheduled  Relevant documents: relevant documents present and verified  Test results: test results available and properly labeled  Site marked: the operative site was marked  Radiology Images displayed and confirmed   If images not available, report reviewed: imaging studies available  Required items: required blood products, implants, devices, and special equipment available  Patient identity confirmed: verbally with patient    Supporting Documentation  Indications: pain   Procedure Details  Location: knee - bilateral knee  Preparation: Patient was prepped and draped in the usual sterile fashion  Needle size: 22 G  Approach: anterolateral    Medications (Right): 4 mL bupivacaine 0 25 %; 12 mg betamethasone acetate-betamethasone sodium phosphate 6 (3-3) mg/mLMedications (Left): 4 mL bupivacaine 0 25 %; 12 mg betamethasone acetate-betamethasone sodium phosphate 6 (3-3) mg/mL   Patient tolerance: patient tolerated the procedure well with no immediate complications  Dressing:  Sterile dressing applied             Medical, Surgical, Family, and Social History  The patient's medical history, family history, and social history, were reviewed and updated as appropriate      Past Medical History:   Diagnosis Date   • Arthritis    • Asthma    • Back pain    • Colon polyp    • COVID-19 1/21/2021 1/11/2021   • CPAP (continuous positive airway pressure) dependence    • Gastroesophageal reflux disease without esophagitis 7/16/2020   • GERD (gastroesophageal reflux disease)    • Gout of multiple sites 7/16/2020   • Hiatal hernia    • High blood pressure    • Hypertension    • Impaired fasting glucose 6/18/2020   • Lipoma of neck    • Mediastinal lymphadenopathy    • Osteoporosis    • Pneumonia due to COVID-19 virus 1/25/2021   • Right hand pain 7/16/2020   • Sleep apnea    • Sleep disorder        Past Surgical History:   Procedure Laterality Date   • BACK SURGERY     • COLONOSCOPY  10/07/2016    5 years (per dominique)   • COLONOSCOPY     • EGD  03/31/2017   • ELBOW SURGERY Left 04/09/2018    open arthrotomy, capsulectomy, loose body removal (per dominique)   • HERNIA REPAIR  46/47/8463    umbilical with mesh (per dominique)   • LUMBAR FUSION  07/11/2018   • GA [address] INCL FLUOR GDNCE DX W/CELL WASHG SPX N/A 4/28/2020    Procedure: BRONCHOSCOPY FLEXIBLE;  Surgeon: Giovanny Macias MD;  Location: [facility];  Service: Thoracic   • GA BRONCHOSCOPY NEEDLE BX TRACHEA MAIN STEM&/BRON N/A 4/28/2020    Procedure: ENDOBRONCHIAL ULTRASOUND (EBUS) WITH BIOPSY;  Surgeon: Mary Irizarry MD;  Location: [facility];  Service: Thoracic   • ROTATOR CUFF REPAIR Bilateral    • TONSILLECTOMY  1980   • UPPER GASTROINTESTINAL ENDOSCOPY         Family History   Problem Relation Age of Onset   • Hypertension Mother    • Hypertension Father        Social History     Occupational History   • Occupation: unemployed   Tobacco Use   • Smoking status: Former     Packs/day: 0 50     Years: 6 00     Pack years: 3 00     Types: Cigarettes     Quit date:      Years since quittin 1   • Smokeless tobacco: Never   Vaping Use   • Vaping Use: Never used   Substance and Sexual Activity   • Alcohol use: Yes     Alcohol/week: 7 0 standard drinks     Types: 7 Shots of liquor per week     Comment: one glass per night     • Drug use: No   • Sexual activity: Yes       Allergies   Allergen Reactions   • Nuts - Food Allergy Shortness Of Breath   • Peanut Oil - Food Allergy Anaphylaxis     Anything using peanuts   • Caffeine - Food Allergy Other (See Comments)     jittery   • Codeine Other (See Comments)     Dizzy, light headed  Body of balance     • Dust Mite Extract Sneezing   • Latex Rash     Allergic to products, wears cotton underwear   • Pollen Extract Sneezing     Grass, Pollen   • Pork-Derived Products - Food Allergy GI Intolerance     Red meat as well is hard to digest         Current Outpatient Medications:   •  allopurinol (ZYLOPRIM) 100 mg tablet, Take 1 tablet (100 mg total) by mouth daily, Disp: 90 tablet, Rfl: 3  •  azelastine (ASTELIN) 0 1 % nasal spray, 1-2 sprays into each nostril 2 (two) times a day, Disp: , Rfl:   •  clotrimazole (LOTRIMIN) 1 % cream, Apply topically 2 (two) times a day as needed (as needed), Disp: 30 g, Rfl: 0  •  fluticasone (FLONASE) 50 mcg/act nasal spray, 2 sprays into each nostril daily, Disp: 1 Bottle, Rfl: 5  •  gabapentin (NEURONTIN) 100 mg capsule, Take 1 capsule (100 mg total) by mouth daily at bedtime, Disp: 90 capsule, Rfl: 1  •  hydrALAZINE (APRESOLINE) 10 mg tablet, TAKE 1 TABLET TWICE DAILY, Disp: 180 tablet, Rfl: 1  •  ibuprofen (MOTRIN) 800 mg tablet, Take 1 tablet (800 mg total) by mouth every 8 (eight) hours as needed for mild pain or moderate pain (Right foot great toe pain), Disp: 30 tablet, Rfl: 0  •  ipratropium (ATROVENT) 0 06 % nasal spray, 2 sprays into each nostril 4 (four) times a day, Disp: 262 mL, Rfl: 0  •  ketoconazole (NIZORAL) 2 % shampoo, Apply topically twice weekly for 8 week and then as needed, Disp: 120 mL, Rfl: 0  •  ketorolac (ACULAR) 0 5 % ophthalmic solution, INSTILL 1 DROP IN LEFT EYE FOUR TIMES DAILY  START ON 11/14/22, Disp: , Rfl:   •  loratadine (CLARITIN) 10 mg tablet, Take 10 mg by mouth daily, Disp: , Rfl:   •  ofloxacin (OCUFLOX) 0 3 % ophthalmic solution, INSTILL 1 DROP IN LEFT EYE FOUR TIMES DAILY   START ON 11/14/22, Disp: , Rfl:   •  omeprazole (PriLOSEC) 20 mg delayed release capsule, Take 2 capsules (40 mg total) by mouth daily, Disp: 90 capsule, Rfl: 2  •  potassium chloride (K-DUR,KLOR-CON) 20 mEq tablet, TAKE 1 TABLET EVERY DAY, Disp: 90 tablet, Rfl: 1  •  prednisoLONE acetate (PRED FORTE) 1 % ophthalmic suspension, , Disp: , Rfl:   •  valsartan (DIOVAN) 40 mg tablet, TAKE 1 TABLET(40 MG) BY MOUTH DAILY, Disp: 90 tablet, Rfl: 3      Pro Cadena MD    Scribe Attestation    I,:   am acting as a scribe while in the presence of the attending physician :       I,:   personally performed the services described in this documentation    as scribed in my presence :

## 2023-03-11 PROBLEM — Z00.8 EXAM FOR POPULATION SURVEY: Status: RESOLVED | Noted: 2022-11-05 | Resolved: 2023-03-11

## 2023-05-02 ENCOUNTER — RA CDI HCC (OUTPATIENT)
Dept: OTHER | Facility: HOSPITAL | Age: 63
End: 2023-05-02

## 2023-05-02 PROBLEM — H26.9 CATARACT OF RIGHT EYE: Status: ACTIVE | Noted: 2023-05-02

## 2023-05-02 NOTE — PROGRESS NOTES
[facility]  coding opportunities       Chart reviewed, no opportunity found:   [address]        Patients Insurance     Medicare Insurance: [facility]

## 2023-05-09 ENCOUNTER — TELEPHONE (OUTPATIENT)
Dept: FAMILY MEDICINE CLINIC | Facility: CLINIC | Age: 63
End: 2023-05-09

## 2023-05-12 ENCOUNTER — OFFICE VISIT (OUTPATIENT)
Dept: FAMILY MEDICINE CLINIC | Facility: CLINIC | Age: 63
End: 2023-05-12

## 2023-05-12 VITALS
OXYGEN SATURATION: 98 % | DIASTOLIC BLOOD PRESSURE: 94 MMHG | BODY MASS INDEX: 26.59 KG/M2 | WEIGHT: 169.4 LBS | HEIGHT: 67 IN | TEMPERATURE: 98.4 F | SYSTOLIC BLOOD PRESSURE: 170 MMHG | HEART RATE: 91 BPM

## 2023-05-12 DIAGNOSIS — I10 PRIMARY HYPERTENSION: ICD-10-CM

## 2023-05-12 DIAGNOSIS — R73.03 PREDIABETES: ICD-10-CM

## 2023-05-12 DIAGNOSIS — Z01.818 PREOPERATIVE CLEARANCE: ICD-10-CM

## 2023-05-12 DIAGNOSIS — Z01.810 PREOP CARDIOVASCULAR EXAM: Primary | ICD-10-CM

## 2023-05-12 DIAGNOSIS — Z00.8 EXAM FOR POPULATION SURVEY: ICD-10-CM

## 2023-05-12 DIAGNOSIS — Z13.220 SCREENING, LIPID: ICD-10-CM

## 2023-05-12 DIAGNOSIS — Z13.89 SCREENING FOR BLOOD OR PROTEIN IN URINE: ICD-10-CM

## 2023-05-12 RX ORDER — VALSARTAN 80 MG/1
40 TABLET ORAL DAILY
Qty: 90 TABLET | Refills: 3 | Status: SHIPPED | OUTPATIENT
Start: 2023-05-12

## 2023-05-12 NOTE — ASSESSMENT & PLAN NOTE
Pressure slightly elevated 160/80 patient to take losartan 80 mg p o  daily dose increased to 40 mg at this time  Patient also to maintain Apresoline 10 mg p o  daily  Also to use CPAP machine routinely as well as exercise encouraged 3-5 times per week for at least 75 minutes of cardiovascular activity

## 2023-05-12 NOTE — PATIENT INSTRUCTIONS
Prediabetes   WHAT YOU NEED TO KNOW:   What is prediabetes? Prediabetes is a blood glucose (sugar) level that is higher than normal  It is not high enough to be considered diabetes  Prediabetes increases your risk for type 2 diabetes and heart disease  The risk is highest if you have high blood pressure or high cholesterol  What increases my risk for prediabetes? Being overweight or obese, with a body mass index (BMI) of 25 or higher (23 or higher if you are  American)    Lack of physical activity    Older age    Family history of diabetes (parent or sibling)    A history of heart disease, gestational diabetes, or polycystic ovary syndrome (PCOS)    High blood pressure or cholesterol levels    Being [place] American, , , [place] American, or     In children, having a mother with diabetes or gestational diabetes mellitus (GDM) during the pregnancy    What are the signs and symptoms of prediabetes? Prediabetes may not cause any symptoms  How is prediabetes diagnosed? Blood tests can help diagnose prediabetes even if no signs or symptoms have started  This is also called screening  Adults who are overweight or obese are usually tested every 3 years, starting at age 28  Your healthcare provider may recommend screening starting at an earlier or later age, depending on your overall risk for diabetes  Children who are at risk for diabetes may be tested  The following may be used to diagnose prediabetes:  A fasting plasma glucose test  may be done to check your blood sugar level after you have not eaten for 8 hours  A 2-hour plasma glucose test  starts with a blood sugar level check after you have not eaten for 8 hours  You are then given a glucose drink  Your blood sugar level is checked after 2 hours  A hemoglobin A1c  is a blood test that measures your average blood sugar level for the past 2 to 3 months  An A1c of 5 7% to 6 4% means you have prediabetes      How do I prevent or delay type 2 diabetes? Healthy choices work best to delay or prevent type 2 diabetes  You may be given the following guidelines from your healthcare provider:  Get regular physical activity  Adults should get at least 150 minutes (2 5 hours) of moderate physical activity every week  Spread the amount of activity over at least 3 days a week  Do not skip more than 2 days in a row  Children should get at least 60 minutes of moderate physical activity on most days of the week  Examples of moderate physical activity include brisk walking, running, and swimming  Do not sit for longer than 30 minutes at a time  Work with your healthcare provider to create a plan for physical activity  Lose weight if you are overweight  A weight loss of 7% of your body weight can help  Your healthcare provider can tell you what weight is healthy for you  He or she can help you create a weight loss plan  Eat healthy foods  Eat a variety of fruits and vegetables  Eat whole-grain foods more often  Choose dairy foods, meat, and other protein foods that are low in fat  Eat fewer sweets, such as candy, cookies, regular soda, and sweetened drinks  You can also decrease calories by eating smaller portion sizes  Work with your healthcare provider or dietitian to develop a meal plan that is right for you  Take medicine as directed  Your healthcare provider may give diabetes medicine if you are at high risk for diabetes  You may also need medicines for high blood pressure and high cholesterol  Follow up with your healthcare provider as directed  You will need to return every year to get tested for diabetes  Do not smoke  Do not use e-cigarettes or smokeless tobacco in place of cigarettes or to help you quit  They still contain nicotine  Ask your healthcare provider for information if you currently smoke and need help quitting  Start with small goals and work your way up    You may need to start with 3 days of physical activity  You can add a day after 3 weeks or so of activity  Make a goal of losing 5 pounds at first  Talk with healthcare providers about making a plan that is right for you  When should I call my doctor? You have more hunger or thirst than usual     You are urinating more often than usual     You are always exhausted  You have blurred vision  You have questions or concerns about your condition or care  CARE AGREEMENT:   You have the right to help plan your care  Learn about your health condition and how it may be treated  Discuss treatment options with your healthcare providers to decide what care you want to receive  You always have the right to refuse treatment  The above information is an  only  It is not intended as medical advice for individual conditions or treatments  Talk to your doctor, nurse or pharmacist before following any medical regimen to see if it is safe and effective for you  © Copyright Malaika Manuel 2022 Information is for End User's use only and may not be sold, redistributed or otherwise used for commercial purposes  Cataract Removal   WHAT YOU NEED TO KNOW:   What do I need to know about cataract removal?  Cataract removal is a procedure to remove a cloudy lens from your eye  An artificial lens called an intraocular lens (IOL) is put in its place  This will improve your vision  The procedure is usually done on one eye at a time  Your healthcare provider will wait about 4 weeks before removing your other cataract  How do I prepare for a cataract removal?   Your healthcare provider will measure your eye's length and how much it curves  He or she will also check your vision  This helps him or her choose the best IOL to put in your eye when the cataract is removed  Your healthcare provider may tell you to stop taking anticoagulants 1 to 2 weeks before your procedure   Your provider may give you antibiotic eyedrops to use for a few days before your procedure to prevent infection  Make arrangements for someone to drive you home after your procedure  What will happen during my procedure? Your healthcare provider may put drops in your eye to dilate your pupil  This makes it easier for healthcare providers to put in the IOL without damaging your eye  He or she may also use eyedrops to numb your eyes  Your healthcare provider may instead give you a shot of numbing medicine beside, under, or in your eye  You will also be given a pill to relax you and reduce your anxiety  After your pupil is fully dilated, a tool will hold your eye open to prevent blinking  A small incision will be made in your cornea (clear covering over your iris)  A tiny instrument will be placed next to the cloudy lens  This instrument uses sound waves to break the lens into small pieces that are suctioned out  The IOL will then be placed in this area  The incision will close on its own  Antibiotic and anti-inflammatory drops may be placed in your eye  What will happen after the procedure? An eye shield  may be used to cover your eye and protect it from damage  Eyedrops  will be given to help prevent infection and decrease inflammation  What are the risks of cataract removal? You may develop an eye infection and bleeding inside the eye  Your retina may swell, or a piece may break off  This is called detachment  You may go blind  You may develop glaucoma (increased pressure inside your eye)  You may have swelling in and damage to your cornea  Sometimes the area where the IOL is placed also clouds up  This may happen months or even years after the procedure  CARE AGREEMENT:   You have the right to help plan your care  Learn about your health condition and how it may be treated  Discuss treatment options with your healthcare providers to decide what care you want to receive  You always have the right to refuse treatment  The above information is an  only   It is not intended as medical advice for individual conditions or treatments  Talk to your doctor, nurse or pharmacist before following any medical regimen to see if it is safe and effective for you  © Copyright Cara Hooper 2022 Information is for End User's use only and may not be sold, redistributed or otherwise used for commercial purposes

## 2023-05-12 NOTE — ASSESSMENT & PLAN NOTE
Patient to maintain a low carbohydrate, low starch nd low glucose diet  Hemoglobin A1C  Status: Final result      Contains abnormal data Hemoglobin A1C  Order: [id number]   Status: Final result      Visible to patient: Yes (not seen)      Next appt: 06/08/2023 at 09:30 AM in Family Medicine [facility], [address])      Dx: Prediabetes      1 Result Note  Component Ref Range & Units 8/15/22  3:34 PM 1/21/22  5:51 AM 11/5/21 12:29 PM 6/17/20  9:46 AM   Hemoglobin A1C Normal 3 8-5 6%; PreDiabetic 5 7-6 4%;  Diabetic >=6 5%; Glycemic control for adults with diabetes <7 0% % 6 0 High   6 2 High  R, CM  6 3 High   6 2 High  R, CM    EAG mg/dl 126  131 R  134

## 2023-05-12 NOTE — PROGRESS NOTES
BMI Counseling: Body mass index is 26 53 kg/m²  The BMI is above normal  Nutrition recommendations include decreasing portion sizes, encouraging healthy choices of fruits and vegetables, decreasing fast food intake, consuming healthier snacks, limiting drinks that contain sugar, moderation in carbohydrate intake, increasing intake of lean protein, reducing intake of saturated and trans fat and reducing intake of cholesterol  Exercise recommendations include vigorous physical activity 75 minutes/week, exercising 3-5 times per week, obtaining a gym membership and strength training exercises  No pharmacotherapy was ordered  Rationale for BMI follow-up plan is due to patient being overweight or obese  Lung Cancer Screening Shared Decision Making: I discussed with him that he is a candidate for lung cancer CT screening  The following Shared Decision-Making points were covered:  1  Benefits of screening were discussed, including the rates of reduction in death from lung cancer and other causes  Harms of screening were reviewed, including false positive tests, radiation exposure levels, risks of invasive procedures, risks of complications of screening, and risk of overdiagnosis  2  I counseled on the importance of adherence to annual lung cancer LDCT screening, impact of co-morbidities, and ability or willingness to undergo diagnosis and treatment  3  I counseled on the importance of maintaining abstinence as a former smoker or was counseled on the importance of smoking cessation if a current smoker    Review of Eligibility Criteria: He meets all of the criteria for Lung Cancer Screening    - He is 57 y o    - He has 20 pack year tobacco history and is a current smoker or has quit within the past 15 years  - He presents no signs or symptoms of lung cancer    After discussion, the patient decided to elect lung cancer screening         Assessment/Plan:         Problem List Items Addressed This Visit Cardiovascular and Mediastinum    Hypertension     Pressure slightly elevated 160/80 patient to take losartan 80 mg p o  daily dose increased to 40 mg at this time  Patient also to maintain Apresoline 10 mg p o  daily  Also to use CPAP machine routinely as well as exercise encouraged 3-5 times per week for at least 75 minutes of cardiovascular activity  Relevant Medications    valsartan (DIOVAN) 80 mg tablet       Other    Preop cardiovascular exam     Patients prior EKG reviewed  Patient to have repeat EKG before Ortho surgery  Prediabetes     Patient to maintain a low carbohydrate, low starch nd low glucose diet  Hemoglobin A1C  Status: Final result      Contains abnormal data Hemoglobin A1C  Order: [id number]   Status: Final result      Visible to patient: Yes (not seen)      Next appt: 06/08/2023 at 09:30 AM in [facility], [address] [place] [address])      Dx: Prediabetes      1 Result Note  Component Ref Range & Units 8/15/22  3:34 PM 1/21/22  5:51 AM 11/5/21 12:29 PM 6/17/20  9:46 AM   Hemoglobin A1C Normal 3 8-5 6%; PreDiabetic 5 7-6 4%; Diabetic >=6 5%; Glycemic control for adults with diabetes <7 0% % 6 0 High   6 2 High  R, CM  6 3 High   6 2 High  R, CM    EAG mg/dl 126  131 R  134                              Relevant Orders    Hemoglobin A1C    Exam for population survey     CBC with diff and cmp ordered  Relevant Orders    CBC and differential    Comprehensive metabolic panel    Preoperative clearance - Primary     Labs reviewed, patient cleared for right eye lens implant and cataracts surgery  Subjective:      Patient ID: Ruth Maldonado  is a 57 y o  male  Patient is a 57year old male present for evaluation for preoperative eye surgery  Patient for right eye cataract extraction with intraocular lens implant 5/25/2023        The following portions of the patient's history were reviewed and updated as appropriate:   Past Medical History:  He has a past medical history of Arthritis, Asthma, Back pain, Colon polyp, COVID-19 (1/21/2021), CPAP (continuous positive airway pressure) dependence, Gastroesophageal reflux disease without esophagitis (7/16/2020), GERD (gastroesophageal reflux disease), Gout of multiple sites (7/16/2020), Hiatal hernia, High blood pressure, Hypertension, Impaired fasting glucose (6/18/2020), Lipoma of neck, Mediastinal lymphadenopathy, Osteoporosis, Pneumonia due to COVID-19 virus (1/25/2021), Right hand pain (7/16/2020), Sleep apnea, and Sleep disorder  ,  _______________________________________________________________________  Medical Problems:  does not have any pertinent problems on file ,  _______________________________________________________________________  Past Surgical History:   has a past surgical history that includes Lumbar fusion (07/11/2018); EGD (03/31/2017); Tonsillectomy (01/01/1980); Colonoscopy (10/07/2016); Elbow surgery (Left, 04/09/2018); Hernia repair (01/16/2019); Rotator cuff repair (Bilateral); Colonoscopy; Back surgery; pr brncc incl fluor gdnce dx w/cell washg spx (N/A, 4/28/2020); pr bronchoscopy needle bx trachea main stem&/bron (N/A, 4/28/2020); and Upper gastrointestinal endoscopy  ,  _______________________________________________________________________  Family History:  family history includes Hypertension in his father and mother ,  _______________________________________________________________________  Social History:   reports that he quit smoking about 43 years ago  His smoking use included cigarettes  He has a 3 00 pack-year smoking history  He has never used smokeless tobacco  He reports current alcohol use of about 7 0 standard drinks per week  He reports that he does not use drugs  ,  _______________________________________________________________________  Allergies:  is allergic to nuts - food allergy, peanut oil - food allergy, caffeine - food allergy, codeine, dust mite extract, latex, pollen extract, and pork-derived products - food allergy     _______________________________________________________________________  Current Outpatient Medications   Medication Sig Dispense Refill   • allopurinol (ZYLOPRIM) 100 mg tablet Take 1 tablet (100 mg total) by mouth daily 90 tablet 3   • azelastine (ASTELIN) 0 1 % nasal spray 1-2 sprays into each nostril 2 (two) times a day     • clotrimazole (LOTRIMIN) 1 % cream Apply topically 2 (two) times a day as needed (as needed) 30 g 0   • fluticasone (FLONASE) 50 mcg/act nasal spray 2 sprays into each nostril daily 1 Bottle 5   • gabapentin (NEURONTIN) 100 mg capsule Take 1 capsule (100 mg total) by mouth daily at bedtime 90 capsule 1   • hydrALAZINE (APRESOLINE) 10 mg tablet TAKE 1 TABLET TWICE DAILY 180 tablet 1   • ibuprofen (MOTRIN) 800 mg tablet Take 1 tablet (800 mg total) by mouth every 8 (eight) hours as needed for mild pain or moderate pain (Right foot great toe pain) 30 tablet 0   • ipratropium (ATROVENT) 0 06 % nasal spray 2 sprays into each nostril 4 (four) times a day 262 mL 0   • ketoconazole (NIZORAL) 2 % shampoo Apply topically twice weekly for 8 week and then as needed 120 mL 0   • ketorolac (ACULAR) 0 5 % ophthalmic solution INSTILL 1 DROP IN LEFT EYE FOUR TIMES DAILY  START ON 11/14/22     • loratadine (CLARITIN) 10 mg tablet Take 10 mg by mouth daily     • ofloxacin (OCUFLOX) 0 3 % ophthalmic solution INSTILL 1 DROP IN LEFT EYE FOUR TIMES DAILY  START ON 11/14/22     • omeprazole (PriLOSEC) 20 mg delayed release capsule Take 2 capsules (40 mg total) by mouth daily 90 capsule 2   • prednisoLONE acetate (PRED FORTE) 1 % ophthalmic suspension      • valsartan (DIOVAN) 80 mg tablet Take 0 5 tablets (40 mg total) by mouth daily 90 tablet 3   • potassium chloride (K-DUR,KLOR-CON) 20 mEq tablet TAKE 1 TABLET EVERY DAY (Patient not taking: Reported on 5/12/2023) 90 tablet 1     No current facility-administered medications for this visit  "    _______________________________________________________________________  Review of Systems   Constitutional: Negative for activity change, appetite change, chills, fatigue, fever and unexpected weight change  HENT: Negative for congestion, ear discharge, ear pain, nosebleeds, postnasal drip, rhinorrhea, sinus pressure, sinus pain, sneezing, sore throat and voice change  Eyes: Negative for pain, redness and visual disturbance  Respiratory: Negative for cough, chest tightness, shortness of breath and wheezing  Cardiovascular: Negative for chest pain and palpitations  Gastrointestinal: Negative for abdominal distention, abdominal pain, constipation, diarrhea, nausea and vomiting  Endocrine: Negative  Genitourinary: Negative for difficulty urinating, dysuria, flank pain, frequency, hematuria and urgency  Musculoskeletal: Negative for arthralgias and myalgias  Skin: Negative  Allergic/Immunologic: Negative  Neurological: Negative  Hematological: Negative  Psychiatric/Behavioral: Negative  Objective:  Vitals:    05/12/23 1003   BP: 170/94   BP Location: Left arm   Patient Position: Sitting   Cuff Size: Standard   Pulse: 91   Temp: 98 4 °F (36 9 °C)   TempSrc: Temporal   SpO2: 98%   Weight: 76 8 kg (169 lb 6 4 oz)   Height: 5' 7\" (1 702 m)     Body mass index is 26 53 kg/m²  Physical Exam  Vitals and nursing note reviewed  Constitutional:       Appearance: Normal appearance  He is well-developed  Comments: Overweight  HENT:      Head: Normocephalic and atraumatic  Right Ear: Tympanic membrane, ear canal and external ear normal       Left Ear: Tympanic membrane, ear canal and external ear normal       Nose: Nose normal  No congestion or rhinorrhea  Mouth/Throat:      Mouth: Mucous membranes are moist       Pharynx: No oropharyngeal exudate or posterior oropharyngeal erythema  Eyes:      Extraocular Movements: Extraocular movements intact        " Conjunctiva/sclera: Conjunctivae normal       Pupils: Pupils are equal, round, and reactive to light  Cardiovascular:      Rate and Rhythm: Normal rate and regular rhythm  Pulses: Normal pulses  Heart sounds: Normal heart sounds  No murmur heard  Pulmonary:      Effort: Pulmonary effort is normal       Breath sounds: Normal breath sounds  Abdominal:      General: Bowel sounds are normal       Palpations: Abdomen is soft  Musculoskeletal:         General: Normal range of motion  Cervical back: Normal range of motion  Skin:     General: Skin is warm  Capillary Refill: Capillary refill takes less than 2 seconds  Neurological:      General: No focal deficit present  Mental Status: He is alert and oriented to person, place, and time     Psychiatric:         Mood and Affect: Mood normal          Behavior: Behavior normal

## 2023-06-06 PROBLEM — Z01.811 PREOP RESPIRATORY EXAM: Status: ACTIVE | Noted: 2021-01-11

## 2023-06-06 PROBLEM — D72.819 LEUKOPENIA: Status: ACTIVE | Noted: 2023-06-06

## 2023-06-06 NOTE — PATIENT INSTRUCTIONS
Prediabetes   WHAT YOU NEED TO KNOW:   What is prediabetes? Prediabetes is a blood glucose (sugar) level that is higher than normal  It is not high enough to be considered diabetes  Prediabetes increases your risk for type 2 diabetes and heart disease  The risk is highest if you have high blood pressure or high cholesterol  What increases my risk for prediabetes? Being overweight or obese, with a body mass index (BMI) of 25 or higher (23 or higher if you are  American)    Lack of physical activity    Older age    Family history of diabetes (parent or sibling)    A history of heart disease, gestational diabetes, or polycystic ovary syndrome (PCOS)    High blood pressure or cholesterol levels    Being [place] American, , , [place] American, or     In children, having a mother with diabetes or gestational diabetes mellitus (GDM) during the pregnancy    What are the signs and symptoms of prediabetes? Prediabetes may not cause any symptoms  How is prediabetes diagnosed? Blood tests can help diagnose prediabetes even if no signs or symptoms have started  This is also called screening  Adults who are overweight or obese are usually tested every 3 years, starting at age 28  Your healthcare provider may recommend screening starting at an earlier or later age, depending on your overall risk for diabetes  Children who are at risk for diabetes may be tested  The following may be used to diagnose prediabetes:  A fasting plasma glucose test  may be done to check your blood sugar level after you have not eaten for 8 hours  A 2-hour plasma glucose test  starts with a blood sugar level check after you have not eaten for 8 hours  You are then given a glucose drink  Your blood sugar level is checked after 2 hours  A hemoglobin A1c  is a blood test that measures your average blood sugar level for the past 2 to 3 months  An A1c of 5 7% to 6 4% means you have prediabetes      How do I prevent or delay type 2 diabetes? Healthy choices work best to delay or prevent type 2 diabetes  You may be given the following guidelines from your healthcare provider:  Get regular physical activity  Adults should get at least 150 minutes (2 5 hours) of moderate physical activity every week  Spread the amount of activity over at least 3 days a week  Do not skip more than 2 days in a row  Children should get at least 60 minutes of moderate physical activity on most days of the week  Examples of moderate physical activity include brisk walking, running, and swimming  Do not sit for longer than 30 minutes at a time  Work with your healthcare provider to create a plan for physical activity  Lose weight if you are overweight  A weight loss of 7% of your body weight can help  Your healthcare provider can tell you what weight is healthy for you  He or she can help you create a weight loss plan  Eat healthy foods  Eat a variety of fruits and vegetables  Eat whole-grain foods more often  Choose dairy foods, meat, and other protein foods that are low in fat  Eat fewer sweets, such as candy, cookies, regular soda, and sweetened drinks  You can also decrease calories by eating smaller portion sizes  Work with your healthcare provider or dietitian to develop a meal plan that is right for you  Take medicine as directed  Your healthcare provider may give diabetes medicine if you are at high risk for diabetes  You may also need medicines for high blood pressure and high cholesterol  Follow up with your healthcare provider as directed  You will need to return every year to get tested for diabetes  Do not smoke  Do not use e-cigarettes or smokeless tobacco in place of cigarettes or to help you quit  They still contain nicotine  Ask your healthcare provider for information if you currently smoke and need help quitting  Start with small goals and work your way up    You may need to start with 3 days of physical activity  You can add a day after 3 weeks or so of activity  Make a goal of losing 5 pounds at first  Talk with healthcare providers about making a plan that is right for you  When should I call my doctor? You have more hunger or thirst than usual     You are urinating more often than usual     You are always exhausted  You have blurred vision  You have questions or concerns about your condition or care  CARE AGREEMENT:   You have the right to help plan your care  Learn about your health condition and how it may be treated  Discuss treatment options with your healthcare providers to decide what care you want to receive  You always have the right to refuse treatment  The above information is an  only  It is not intended as medical advice for individual conditions or treatments  Talk to your doctor, nurse or pharmacist before following any medical regimen to see if it is safe and effective for you  © Copyright [facility] 2022 Information is for End User's use only and may not be sold, redistributed or otherwise used for commercial purposes  Hypertension   WHAT YOU NEED TO KNOW:   What is hypertension? Hypertension is high blood pressure  Your blood pressure is the force of your blood moving against the walls of your arteries  Hypertension causes your blood pressure to get so high that your heart has to work much harder than normal  This can damage your heart  Hypertension that does not respond to medicines and lifestyle changes is called resistant hypertension  Hypertension is considered chronic when it continues for 3 months or longer  What do I need to know about the stages of hypertension? Your healthcare provider will give you a blood pressure goal based on your age, health, and risk for cardiovascular disease  The following are general guidelines on the stages of hypertension:  Normal blood pressure is 119/79 or lower    Your healthcare provider may only check your blood pressure each year if it stays at a normal level  Elevated blood pressure is 120/79 to 129/79   This is sometimes called prehypertension  Your healthcare provider may suggest lifestyle changes to help lower your blood pressure to a normal level  He or she may then check it again in 3 to 6 months  Stage 1 hypertension is 130/80  to 139/89   Your provider may recommend lifestyle changes, medication, and checks every 3 to 6 months until your blood pressure is controlled  Stage 2 hypertension is 140/90 or higher   Your provider will recommend lifestyle changes and have you take 2 kinds of hypertension medicines  You will also need to have your blood pressure checked monthly until it is controlled  What increases my risk for hypertension? The cause of hypertension may not be known  This is called essential or primary hypertension  Hypertension caused by another medical condition, such as kidney disease, is called secondary hypertension  Any of the following can increase your risk:  Age older than 54 years (men) or 72 (women)    Stress, or a family history of hypertension or heart disease    Obesity, lack of exercise, or too many high-sodium foods    Use of tobacco, alcohol, or illegal drugs    A medical condition, such as diabetes, kidney disease, thyroid disease, or adrenal gland disorder    Certain medicines, such as steroids or birth control pills    What are the signs and symptoms of hypertension? You may have no signs or symptoms, or you may have any of the following:  Headache    Blurred vision    Chest pain    Dizziness or weakness    Trouble breathing    Nosebleeds    How is hypertension diagnosed? Your healthcare provider will take your blood pressure at several visits  You may also need to check your blood pressure at home  The provider will examine you and ask about medicines you take   He or she will also ask if you have a family history of high blood pressure and about any health conditions you have  He or she will also check your blood pressure and weight and examine your heart, lungs, and eyes  You may need any of the following tests: An ambulatory blood pressure monitor (ABPM)  is a device that you wear  ABPM measures your blood pressure while you do your regular daily activities  It records your blood pressure every 15 to 30 minutes during the day  It also records your blood pressure every 15 minutes to 1 hour at night  The recorded blood pressures help your healthcare provider know if you have hypertension not seen at your appointment  Blood tests  may help healthcare providers find the cause of your hypertension  Blood tests can also help find other health problems caused by hypertension  Urine tests  will be done to check your kidney function  Kidney problems can increase your risk for hypertension  Which medicines are used to treat hypertension? Antihypertensives  may be used to help lower your blood pressure  Several kinds of medicines are available  Your healthcare provider will choose medicines based on the kind of hypertension you have  You may need more than one type of medicine  Take the medicine exactly as directed  Diuretics  help decrease extra fluid that collects in your body  This will help lower your blood pressure  You may urinate more often while you take this medicine  Cholesterol medicine  helps lower your cholesterol level  A low cholesterol level helps prevent heart disease and makes it easier to control your blood pressure  What can I do to manage hypertension? Check your blood pressure at home  Do not smoke, have caffeine, or exercise for at least 30 minutes before you check your blood pressure  Sit and rest for 5 minutes before you check your blood pressure  Extend your arm and support it on a flat surface  Your arm should be at the same level as your heart  Follow the directions that came with your blood pressure monitor   Check your blood pressure 2 times, 1 minute apart, before you take your medicine in the morning  Also check your blood pressure before your evening meal  Keep a record of your readings and bring it to your follow-up visits  Ask your healthcare provider what your blood pressure should be  Manage any other health conditions you have  Health conditions such as diabetes can increase your risk for hypertension  Follow your healthcare provider's instructions and take all your medicines as directed  Ask about all medicines  Certain medicines can increase your blood pressure  Examples include oral birth control pills, decongestants, herbal supplements, and NSAIDs, such as ibuprofen  Your healthcare provider can tell you which medicines are safe for you to take  This includes prescription and over-the-counter medicines  What lifestyle changes can I make to manage hypertension? Your healthcare provider may recommend you work with a team to manage hypertension  The team may include medical experts such as a dietitian, an exercise or physical therapist, and a behavior therapist  Your family members may be included in helping you create lifestyle changes  Limit sodium (salt) as directed  Too much sodium can affect your fluid balance  Check labels to find low-sodium or no-salt-added foods  Some low-sodium foods use potassium salts for flavor  Too much potassium can also cause health problems  Your healthcare provider will tell you how much sodium and potassium are safe for you to have in a day  He or she may recommend that you limit sodium to 2,300 mg a day  Follow the meal plan recommended by your healthcare provider  A dietitian or your provider can give you more information on low-sodium plans or the DASH (Dietary Approaches to Stop Hypertension) eating plan  The DASH plan is low in sodium, processed sugar, unhealthy fats, and total fat  It is high in potassium, calcium, and fiber   These can be found in vegetables, fruit, and whole-grain foods  Be physically active throughout the day  Physical activity, such as exercise, can help control your blood pressure and your weight  Be physically active for at least 30 minutes per day, on most days of the week  Include aerobic activity, such as walking or riding a bicycle  Also include strength training at least 2 times each week  Your healthcare providers can help you create a physical activity plan  Decrease stress  This may help lower your blood pressure  Learn ways to relax, such as deep breathing or listening to music  Limit alcohol as directed  Alcohol can increase your blood pressure  A drink of alcohol is 12 ounces of beer, 5 ounces of wine, or 1½ ounces of liquor  Do not smoke  Nicotine and other chemicals in cigarettes and cigars can increase your blood pressure and also cause lung damage  Ask your healthcare provider for information if you currently smoke and need help to quit  E-cigarettes or smokeless tobacco still contain nicotine  Talk to your healthcare provider before you use these products  Call your local emergency number [phone] in the [address] [facility]) or have someone call if:   You have chest pain  You have any of the following signs of a heart attack:      Squeezing, pressure, or pain in your chest    You may  also have any of the following:     Discomfort or pain in your back, neck, jaw, stomach, or arm    Shortness of breath    Nausea or vomiting    Lightheadedness or a sudden cold sweat    You become confused or have trouble speaking  You suddenly feel lightheaded or have trouble breathing  When should I seek immediate care? You have a severe headache or vision loss  You have weakness in an arm or leg  When should I call my doctor? You feel faint, dizzy, confused, or drowsy  You have been taking your blood pressure medicine but your pressure is higher than your provider says it should be      You have questions or concerns about your condition or care  CARE AGREEMENT:   You have the right to help plan your care  Learn about your health condition and how it may be treated  Discuss treatment options with your healthcare providers to decide what care you want to receive  You always have the right to refuse treatment  The above information is an  only  It is not intended as medical advice for individual conditions or treatments  Talk to your doctor, nurse or pharmacist before following any medical regimen to see if it is safe and effective for you  © Copyright [employer] 2022 Information is for End User's use only and may not be sold, redistributed or otherwise used for commercial purposes  GERD (Gastroesophageal Reflux Disease)   WHAT YOU NEED TO KNOW:   What is gastroesophageal reflux disease (GERD)? GERD is reflux that happens more than 2 times a week for a few weeks  Reflux means acid and food in your stomach back up into your esophagus  GERD can cause other health problems over time if it is not treated  What causes GERD? GERD often happens because the lower muscle (sphincter) of the esophagus does not close properly  The sphincter normally opens to let food into the stomach  It then closes to keep food and stomach acid in the stomach  If the sphincter does not close properly, stomach acid and food back up (reflux) into the esophagus  The following may increase your risk for GERD:  Certain foods such as spicy foods, chocolate, foods that contain caffeine, peppermint, and fried foods    Hiatal hernia    Certain medicines such as calcium channel blockers (used to treat high blood pressure), allergy medicines, sedatives, or antidepressants    Pregnancy, obesity, or scleroderma    Lying down after a meal    Drinking alcohol or smoking cigarettes    What are the signs and symptoms of GERD?    Heartburn (burning pain in your chest)    Pain after meals that spreads to your neck, jaw, or shoulder    Pain that gets better when you change positions    Bitter or acid taste in your mouth    A dry cough    Trouble swallowing or pain with swallowing    Hoarseness or a sore throat    Burping or hiccups    Feeling full soon after you start eating    How is GERD diagnosed? Your healthcare provider will ask about your symptoms and when they started  Tell your provider about other medical conditions you have, your eating habits, and your activities  You may also need any of the following: An esophageal pH test  measures the amount of stomach acid that reaches your esophagus  A small probe is used to check the amount  The probe may stay attached to the catheter  If so, the catheter is taped to your nose to hold it in place  Sometimes the probe is wireless, so the catheter is removed after the probe is placed  An endoscopy  is a procedure used to look at the inside of your esophagus and stomach  An endoscope is a bendable tube with a light and camera on the end  Your healthcare provider may remove a small sample of tissue and send it to a lab for tests  X-ray  pictures may be taken of your stomach and intestines (bowel)  You may be given a chalky liquid to drink before the pictures are taken  This liquid helps your stomach and intestines show up better on the x-rays  Pressure and function  tests of your esophagus can help find problems such as a hiatal hernia  How is GERD treated? Medicines  are used to decrease stomach acid  Medicine may also be used to help your lower esophageal sphincter and stomach contract (tighten) more  Surgery  is done to wrap the upper part of the stomach around the esophageal sphincter  This will strengthen the sphincter and prevent reflux  How can I manage GERD? Do not have foods or drinks that may increase heartburn  These include chocolate, peppermint, fried or fatty foods, drinks that contain caffeine, or carbonated drinks (soda)   Other foods include spicy foods, onions, tomatoes, and tomato-based foods  Do not have foods or drinks that can irritate your esophagus, such as citrus fruits, juices, and alcohol  Do not eat large meals  When you eat a lot of food at one time, your stomach needs more acid to digest it  Eat 6 small meals each day instead of 3 large ones, and eat slowly  Do not eat meals 2 to 3 hours before bedtime  Elevate the head of your bed  Place 6-inch blocks under the head of your bed frame  You may also use more than one pillow under your head and shoulders while you sleep  Maintain a healthy weight  If you are overweight, weight loss may help relieve symptoms of GERD  Do not smoke  Smoking weakens the lower esophageal sphincter and increases the risk of GERD  Ask your healthcare provider for information if you currently smoke and need help to quit  E-cigarettes or smokeless tobacco still contain nicotine  Talk to your healthcare provider before you use these products  Do not put pressure on your abdomen  Pressure pushes acid up into your esophagus  Do not wear clothing that is tight around your waist  Do not bend over  Bend at the knees if you need to pick something up  Call your local emergency number (911 in the [address] [facility]) if:   You have severe chest pain and sudden trouble breathing  When should I seek immediate care? You have trouble breathing after you vomit  You have trouble swallowing, or pain with swallowing  Your bowel movements are black, bloody, or tarry-looking  Your vomit looks like coffee grounds or has blood in it  When should I call my doctor? You feel full and cannot burp or vomit  You vomit large amounts, or you vomit often  You are losing weight without trying  Your symptoms get worse or do not improve with treatment  You have questions or concerns about your condition or care  CARE AGREEMENT:   You have the right to help plan your care  Learn about your health condition and how it may be treated  Discuss treatment options with your healthcare providers to decide what care you want to receive  You always have the right to refuse treatment  The above information is an  only  It is not intended as medical advice for individual conditions or treatments  Talk to your doctor, nurse or pharmacist before following any medical regimen to see if it is safe and effective for you  © Copyright [employer] Vann 2022 Information is for End User's use only and may not be sold, redistributed or otherwise used for commercial purposes  Complete Blood Count   WHAT YOU NEED TO KNOW:   What is a complete blood count (CBC)? A CBC is a commonly ordered blood test  It measures the levels of different cells in your blood  A CBC measures the following:  Red blood cells (RBCs)  carry oxygen from the lungs and carbon dioxide (CO2) from tissues to the lungs  CO2 is a waste product that leaves the body through the lungs  The following may also be measured with RBC levels:     Hemoglobin (Hg)  is the main part of the red blood cell  It helps carry oxygen and CO2  Hematocrit (Hct)  measures how much of your total blood count is RBCs  It is measured as a percentage  White blood cells (WBCs)  help fight infections  There are different kinds of WBCs  The levels of each kind of WBC give healthcare providers information about your immune system or infection  Platelets  are cells that help your blood clot and stop bleeding  Why do I need a CBC? A CBC may be done to check your overall health  A CBC may be done during a routine check-up or before surgery  A CBC will help your healthcare provider check for certain conditions  It will also make sure your blood can clot well enough before surgery or procedures  A CBC may be done to diagnose a medical condition  You may need a CBC if you feel weak, tired, or bruise or bleed easily  You may also need a CBC if you have pain, fever, or swelling   A CBC will help diagnose the cause of these symptoms  A CBC may be done to monitor a medical condition  You may need a CBC if you have been diagnosed with a blood or autoimmune disorder  A CBC will also help monitor your condition during an illness, after an injury, or after surgery  A CBC may be done to monitor treatment  You may need a CBC to monitor your health if you take medicines that increase or decrease your blood cell levels  You may also need a CBC during cancer treatment  This is done to make sure your bone marrow is making enough healthy blood cells  How do I prepare for the test? You do not need to do anything to prepare for a CBC  The blood test may be done before or after eating and at any time of the day  What medical conditions may a CBC help diagnose? A CBC may be done with other tests to help diagnose certain medical conditions  Examples include: Anemia (low red blood cell levels)    Thrombocytopenia (low platelet levels)    Sickle cell disease    Infection or an autoimmune disease    Heart disease    Allergies    Problems with how the body makes or gets rid of blood cells    CARE AGREEMENT:   You have the right to help plan your care  Learn about your health condition and how it may be treated  Discuss treatment options with your healthcare providers to decide what care you want to receive  You always have the right to refuse treatment  The above information is an  only  It is not intended as medical advice for individual conditions or treatments  Talk to your doctor, nurse or pharmacist before following any medical regimen to see if it is safe and effective for you  © Copyright [facility] 2022 Information is for End User's use only and may not be sold, redistributed or otherwise used for commercial purposes  Knee Replacement   WHAT YOU NEED TO KNOW:   What do I need to know about knee replacement? Knee replacement is surgery to replace all or part of your knee joint   It is also called knee arthroplasty  How do I prepare for knee replacement? Weeks before your surgery: Your healthcare provider will check your overall health  He or she will ask about your current knee pain or stiffness  Tell your provider how pain or stiffness affects your daily activities or ability to play sports  He or she may also ask about fatigue, anxiety, or depression you may have  Some medicines will need to be stopped weeks before surgery  These medicines include blood thinning medicine, such as aspirin and ibuprofen  It also includes some antirheumatic medicines  Make sure your healthcare provider knows all medicines you are taking  Also ask how long before surgery to stop taking them  Your healthcare provider may have you do exercises to strengthen your leg muscles before surgery  You may need x-rays to help your healthcare provider plan your surgery  Ask about any other tests you may need  The night before and the day of surgery: Your healthcare provider may tell you not to eat or drink anything after midnight on the day of your surgery  You will be told what medicines you can or cannot take the morning of surgery  What will happen during knee replacement? You may be given general anesthesia to keep you asleep and free from pain during surgery  You may instead be given regional anesthesia, such as spinal or epidural anesthesia, or a peripheral nerve block  Regional anesthesia keeps you numb from the waist down, but you will be awake during surgery  Your surgeon will make an incision over your knee joint  He or she will remove the damaged parts of your knee joint and replace them with a knee implant  The knee implant may be made of metal and plastic  Your surgeon may secure it with medical cement  Your surgeon will move the muscles and other tissues around your joint back into place  He or she will close your incision with stitches or staples   He or she may use strips of medical tape and a bandage to cover your wound  What should I expect after knee replacement? It is normal to have increased stiffness and pain after surgery  Your pain and stiffness should get better with exercise  Do not get out of bed until your healthcare provider says it is okay  The physical therapist will help you walk after your surgery  When you walk the same day after surgery, it helps decrease pain and improves the function of your knee  You may use crutches or a walker  You may be in the hospital 1 to 4 days, or you may go home shortly after surgery  Your healthcare provider may talk to you about rehabilitation you can do at home  A physical therapist can teach you exercises to help strengthen your knee and prevent stiffness  You may also need occupational therapy to teach you the best ways to bathe and dress  You may  be given a joint replacement ID card  The card will tell which joint was replaced and when it was replaced  Tell all healthcare providers about your joint replacement surgery  What are the risks of knee replacement? You may bleed more than expected or get an infection  Nerves or blood vessels may be damaged during surgery  After surgery, your knee may be stiff or numb  You may continue to have knee pain  You may get a blood clot in your leg  This may become life-threatening  Your implant may get loose or move out of place  The implant may get worn out over time and need to be replaced  CARE AGREEMENT:   You have the right to help plan your care  Learn about your health condition and how it may be treated  Discuss treatment options with your healthcare providers to decide what care you want to receive  You always have the right to refuse treatment  The above information is an  only  It is not intended as medical advice for individual conditions or treatments   Talk to your doctor, nurse or pharmacist before following any medical regimen to see if it is safe and effective for you  © Copyright [employer] 2022 Information is for End User's use only and may not be sold, redistributed or otherwise used for commercial purposes

## 2023-06-06 NOTE — PROGRESS NOTES
BMI Counseling: Body mass index is 26 56 kg/m²  The BMI is above normal  Nutrition recommendations include decreasing portion sizes, encouraging healthy choices of fruits and vegetables, decreasing fast food intake, consuming healthier snacks, limiting drinks that contain sugar, moderation in carbohydrate intake, increasing intake of lean protein, reducing intake of saturated and trans fat and reducing intake of cholesterol  Exercise recommendations include vigorous physical activity 75 minutes/week, exercising 3-5 times per week, obtaining a gym membership and strength training exercises  Rationale for BMI follow-up plan is due to patient being overweight or obese  Depression Screening and Follow-up Plan: Patient was screened for depression during today's encounter  They screened negative with a PHQ-2 score of 0  Lung Cancer Screening Shared Decision Making: I discussed with him that he is a candidate for lung cancer CT screening  The following Shared Decision-Making points were covered:  1  Benefits of screening were discussed, including the rates of reduction in death from lung cancer and other causes  Harms of screening were reviewed, including false positive tests, radiation exposure levels, risks of invasive procedures, risks of complications of screening, and risk of overdiagnosis  2  I counseled on the importance of adherence to annual lung cancer LDCT screening, impact of co-morbidities, and ability or willingness to undergo diagnosis and treatment    3  I counseled on the importance of maintaining abstinence as a former smoker or was counseled on the importance of smoking cessation if a current smoker    Review of Eligibility Criteria: He meets all of the criteria for Lung Cancer Screening    - He is 57 y o    - He has 20 pack year tobacco history and is a current smoker or has quit within the past 15 years  - He presents no signs or symptoms of lung cancer    After discussion, the patient decided to elect lung cancer screening  Assessment/Plan:         Problem List Items Addressed This Visit        Cardiovascular and Mediastinum    Hypertension     BP currently 140/90  Patient to maintain a low-sodium diet and exercise 3-5 times per week for at least 75 minutes of cardiovascular activity  Patient currently maintained on valsartan (Diovan) 80 mg p o  daily, and hydralazine 10 mg p o  daily  Recheck BP next office visit  Other    Preop respiratory exam     Patient for preop chest x-ray at this time  Relevant Orders    XR chest pa & lateral    Overweight with body mass index (BMI) of 26 to 26 9 in adult     BMI 26 56 kg/M2  Goal BMI 25 kg/M2  Patient counseled on proper diet, nutrition and exercise  Recheck BMI next office visit  Elevated sed rate     Patient noted to have elevated sed rate  Prior history of arthritis noted  Prediabetes     Patient to have hemoglobin A1c drawn fasting  Lab work not completed at this time  Prior hemoglobin A1c reviewed  Contains abnormal data HEMOGLOBIN A1C  Order: [id number]   Status: Edited Result - FINAL      Next appt: 10/26/2023 at 11:00 AM in Hematology and Oncology Laurie Murray MD)        important suggestion  Newer results are available  Click to view them now              Component Ref Range & Units 1/21/22  5:51 AM 11/5/21 12:29 PM 6/17/20  9:46 AM   Hemoglobin A1C <5 7 % 6 2 High   6 3 High  R  6 2 High  R, CM    Comment: Reference Range   Non-diabetic                     <5 7   Pre-diabetic                     5 7-6 4   Diabetic                         >=6 5   ADA target for diabetic control  <=7   eAG, EST AVG Glucose <154 mg/dL 131  134 R                Specimen Collected: 01/21/22  5:51 AM    Performed By: [facility] ([id number]) Last Resulted: 01/21/22  9:28 AM   Received From: [id number]  Result Received: 06/06/23  8:24 AM                       Preoperative clearance - Primary     Patient for EKG in office for evaluation preop cardiovascular status  Prior ischemic changes noted on current EKG compared to prior EKG  Patient noted to have left atrial enlargement  Refer to cardiology for clearance for surgery at this time  Leukopenia     Patient ordered for hematology oncology for evaluation of decreased white blood cells  White blood cell count 3 2  Relevant Orders    Ambulatory Referral to Hematology / Oncology   Other Visit Diagnoses     Sinus congestion        Relevant Medications    ipratropium (ATROVENT) 0 06 % nasal spray            Subjective:      Patient ID: Delfino Zarate  is a 57 y o  male  Patient is a 69-year-old male present for evaluation of osteoarthritis of the right knee, scheduled for surgery of the right knee with total arthroplasty 6/19/2023  Recent chest x-ray - wnl,, recent lab work within normal limits  WBC count slightly low at 3 2  Patient  Follow-up with Hematology Oncology after surgery  EKG in office indicates infarct of undetermined age  Left atrial enlargement noted  Patient has history of prior palpitations secondary to medication  Currently Cardiology for clearance for total knee replacement 6/19/2023 obtained  Patient cleared for surgery        The following portions of the patient's history were reviewed and updated as appropriate:   Past Medical History:  He has a past medical history of Arthritis, Asthma, Back pain, Colon polyp, COVID-19 (1/21/2021), CPAP (continuous positive airway pressure) dependence, Gastroesophageal reflux disease without esophagitis (7/16/2020), GERD (gastroesophageal reflux disease), Gout of multiple sites (7/16/2020), Hiatal hernia, High blood pressure, Hypertension, Impaired fasting glucose (6/18/2020), Lipoma of neck, Mediastinal lymphadenopathy, Osteoporosis, Pneumonia due to COVID-19 virus (1/25/2021), Right hand pain (7/16/2020), Sleep apnea, and Sleep disorder  ,  _______________________________________________________________________  Medical Problems:  does not have any pertinent problems on file ,  _______________________________________________________________________  Past Surgical History:   has a past surgical history that includes Lumbar fusion (07/11/2018); EGD (03/31/2017); Tonsillectomy (01/01/1980); Colonoscopy (10/07/2016); Elbow surgery (Left, 04/09/2018); Hernia repair (01/16/2019); Rotator cuff repair (Bilateral); Colonoscopy; Back surgery; pr brnchsc incl fluor gdnce dx w/cell washg spx (N/A, 4/28/2020); pr bronchoscopy needle bx trachea main stem&/bron (N/A, 4/28/2020); and Upper gastrointestinal endoscopy  ,  _______________________________________________________________________  Family History:  family history includes Hypertension in his father and mother ,  _______________________________________________________________________  Social History:   reports that he quit smoking about 43 years ago  His smoking use included cigarettes  He has a 3 00 pack-year smoking history  He has never used smokeless tobacco  He reports current alcohol use of about 7 0 standard drinks of alcohol per week  He reports that he does not use drugs  ,  _______________________________________________________________________  Allergies:  is allergic to nuts - food allergy, peanut oil - food allergy, caffeine - food allergy, codeine, dust mite extract, latex, pollen extract, and pork-derived products - food allergy     _______________________________________________________________________  Current Outpatient Medications   Medication Sig Dispense Refill   • allopurinol (ZYLOPRIM) 100 mg tablet Take 1 tablet (100 mg total) by mouth daily 90 tablet 3   • azelastine (ASTELIN) 0 1 % nasal spray 1-2 sprays into each nostril as needed     • clotrimazole (LOTRIMIN) 1 % cream Apply topically 2 (two) times a day as needed (as needed) 30 g 0   • fluticasone (FLONASE) 50 mcg/act nasal spray 2 sprays into each nostril daily (Patient taking differently: 2 sprays into each nostril as needed) 1 Bottle 5   • gabapentin (NEURONTIN) 100 mg capsule Take 1 capsule (100 mg total) by mouth daily at bedtime 90 capsule 1   • hydrALAZINE (APRESOLINE) 10 mg tablet TAKE 1 TABLET TWICE DAILY 180 tablet 1   • ibuprofen (MOTRIN) 800 mg tablet Take 1 tablet (800 mg total) by mouth every 8 (eight) hours as needed for mild pain or moderate pain (Right foot great toe pain) 30 tablet 0   • ipratropium (ATROVENT) 0 06 % nasal spray 2 sprays into each nostril as needed for rhinitis 15 mL 0   • ketoconazole (NIZORAL) 2 % shampoo Apply topically twice weekly for 8 week and then as needed 120 mL 0   • ketorolac (ACULAR) 0 5 % ophthalmic solution Administer 1 drop into the left eye 4 (four) times a day as needed     • loratadine (CLARITIN) 10 mg tablet Take 10 mg by mouth daily     • mupirocin (BACTROBAN) 2 % ointment Apply topically 2 (two) times a day     • ofloxacin (OCUFLOX) 0 3 % ophthalmic solution Administer 1 drop into the left eye as needed     • omeprazole (PriLOSEC) 20 mg delayed release capsule Take 2 capsules (40 mg total) by mouth daily (Patient taking differently: Take 40 mg by mouth as needed) 90 capsule 2   • prednisoLONE acetate (PRED FORTE) 1 % ophthalmic suspension Administer 1 drop to both eyes as needed     • valsartan (DIOVAN) 80 mg tablet Take 0 5 tablets (40 mg total) by mouth daily 90 tablet 3   • potassium chloride (K-DUR,KLOR-CON) 20 mEq tablet TAKE 1 TABLET EVERY DAY (Patient not taking: Reported on 5/12/2023) 90 tablet 1     No current facility-administered medications for this visit      _______________________________________________________________________  Review of Systems   Constitutional: Negative for activity change, appetite change, chills, fatigue, fever and unexpected weight change     HENT: Negative for congestion, ear discharge, ear pain, nosebleeds, postnasal drip, rhinorrhea, "sinus pressure, sinus pain, sneezing, sore throat and voice change  Eyes: Negative for pain, redness and visual disturbance  Respiratory: Negative for cough, chest tightness, shortness of breath and wheezing  Cardiovascular: Negative for chest pain and palpitations  Gastrointestinal: Negative for abdominal distention, abdominal pain, constipation, diarrhea, nausea and vomiting  Endocrine: Negative  Genitourinary: Negative for difficulty urinating, dysuria, flank pain, frequency, hematuria and urgency  Musculoskeletal: Positive for arthralgias  Negative for myalgias  Right knee pain is scaled at 4/10  Patient scheduled for knee surgery 6/19/2023  Skin: Negative  Allergic/Immunologic: Negative  Neurological: Negative  Hematological: Negative  Psychiatric/Behavioral: Negative  Objective:  Vitals:    06/08/23 0935   BP: 140/90   BP Location: Left arm   Patient Position: Sitting   Cuff Size: Standard   Pulse: 84   Resp: 17   Temp: 99 1 °F (37 3 °C)   TempSrc: Temporal   SpO2: 98%   Weight: 76 9 kg (169 lb 9 6 oz)   Height: 5' 7\" (1 702 m)     Body mass index is 26 56 kg/m²  Physical Exam  Vitals and nursing note reviewed  Constitutional:       Appearance: Normal appearance  He is well-developed  Comments: Overweight  HENT:      Head: Normocephalic and atraumatic  Right Ear: Tympanic membrane, ear canal and external ear normal       Left Ear: Tympanic membrane, ear canal and external ear normal       Nose: Nose normal  No congestion or rhinorrhea  Mouth/Throat:      Mouth: Mucous membranes are moist       Pharynx: No oropharyngeal exudate or posterior oropharyngeal erythema  Eyes:      Extraocular Movements: Extraocular movements intact  Conjunctiva/sclera: Conjunctivae normal       Pupils: Pupils are equal, round, and reactive to light  Cardiovascular:      Rate and Rhythm: Normal rate and regular rhythm  Pulses: Normal pulses        Heart " sounds: Normal heart sounds  No murmur heard  Pulmonary:      Effort: Pulmonary effort is normal       Breath sounds: Normal breath sounds  Abdominal:      General: Bowel sounds are normal       Palpations: Abdomen is soft  Musculoskeletal:         General: Tenderness present  Normal range of motion  Cervical back: Normal range of motion  Comments: Tenderness with palpation and fluid of the lower knee cap  Skin:     General: Skin is warm  Neurological:      General: No focal deficit present  Mental Status: He is alert and oriented to person, place, and time     Psychiatric:         Mood and Affect: Mood normal          Behavior: Behavior normal

## 2023-06-08 ENCOUNTER — CONSULT (OUTPATIENT)
Dept: FAMILY MEDICINE CLINIC | Facility: CLINIC | Age: 63
End: 2023-06-08
Payer: COMMERCIAL

## 2023-06-08 ENCOUNTER — HOSPITAL ENCOUNTER (OUTPATIENT)
Dept: RADIOLOGY | Facility: HOSPITAL | Age: 63
Discharge: HOME | End: 2023-06-08
Payer: COMMERCIAL

## 2023-06-08 ENCOUNTER — OFFICE VISIT (OUTPATIENT)
Dept: CARDIOLOGY CLINIC | Facility: CLINIC | Age: 63
End: 2023-06-08
Payer: COMMERCIAL

## 2023-06-08 VITALS
HEART RATE: 85 BPM | BODY MASS INDEX: 26.67 KG/M2 | SYSTOLIC BLOOD PRESSURE: 142 MMHG | WEIGHT: 169.9 LBS | OXYGEN SATURATION: 98 % | HEIGHT: 67 IN | DIASTOLIC BLOOD PRESSURE: 78 MMHG

## 2023-06-08 VITALS
SYSTOLIC BLOOD PRESSURE: 140 MMHG | RESPIRATION RATE: 17 BRPM | BODY MASS INDEX: 26.62 KG/M2 | DIASTOLIC BLOOD PRESSURE: 90 MMHG | OXYGEN SATURATION: 98 % | WEIGHT: 169.6 LBS | HEART RATE: 84 BPM | TEMPERATURE: 99.1 F | HEIGHT: 67 IN

## 2023-06-08 DIAGNOSIS — I10 PRIMARY HYPERTENSION: ICD-10-CM

## 2023-06-08 DIAGNOSIS — Z01.810 PREOP CARDIOVASCULAR EXAM: ICD-10-CM

## 2023-06-08 DIAGNOSIS — Z01.811 PREOP RESPIRATORY EXAM: ICD-10-CM

## 2023-06-08 DIAGNOSIS — R73.03 PREDIABETES: ICD-10-CM

## 2023-06-08 DIAGNOSIS — D72.819 LEUKOPENIA, UNSPECIFIED TYPE: ICD-10-CM

## 2023-06-08 DIAGNOSIS — R70.0 ELEVATED SED RATE: ICD-10-CM

## 2023-06-08 DIAGNOSIS — R09.81 SINUS CONGESTION: ICD-10-CM

## 2023-06-08 DIAGNOSIS — I10 ESSENTIAL (PRIMARY) HYPERTENSION: Primary | ICD-10-CM

## 2023-06-08 DIAGNOSIS — E66.3 OVERWEIGHT WITH BODY MASS INDEX (BMI) OF 26 TO 26.9 IN ADULT: ICD-10-CM

## 2023-06-08 DIAGNOSIS — Z01.818 PREOPERATIVE CLEARANCE: Primary | ICD-10-CM

## 2023-06-08 PROCEDURE — 99204 OFFICE O/P NEW MOD 45 MIN: CPT | Performed by: INTERNAL MEDICINE

## 2023-06-08 PROCEDURE — PREOP: Performed by: NURSE PRACTITIONER

## 2023-06-08 PROCEDURE — 93000 ELECTROCARDIOGRAM COMPLETE: CPT | Performed by: NURSE PRACTITIONER

## 2023-06-08 PROCEDURE — 71046 X-RAY EXAM CHEST 2 VIEWS: CPT

## 2023-06-08 NOTE — ASSESSMENT & PLAN NOTE
BMI 26 56 kg/M2  Goal BMI 25 kg/M2  Patient counseled on proper diet, nutrition and exercise  Recheck BMI next office visit

## 2023-06-08 NOTE — PROGRESS NOTES
Cardiology Consultation     Judd Stevenson  [id number]  1960  HEART [facility] VASCULAR [facility] [address] [address]  1  Essential (primary) hypertension        2  Preop cardiovascular exam  Ambulatory Referral to Cardiology        Patient Active Problem List   Diagnosis   • Mediastinal lymphadenopathy   • Spondylosis of cervical region without myelopathy or radiculopathy   • Osteoarthritis of left elbow   • Palpitations   • JADYN on CPAP   • History of elbow surgery   • Foraminal stenosis of cervical region   • DDD (degenerative disc disease), cervical   • Impaired fasting glucose   • Gout of multiple sites   • Gastroesophageal reflux disease without esophagitis   • Other insomnia   • Lipoma of neck   • Preop respiratory exam   • COVID-19 virus infection   • YOAV (acute kidney injury) ([facility] [address] )   • Elevated liver enzymes   • Hypertension   • Recurrent cellulitis of lower extremity   • Hypokalemia   • Effusion, right elbow   • Acute gout of right elbow   • Overweight with body mass index (BMI) of 26 to 26 9 in adult   • Elevated C-reactive protein   • Elevated sed rate   • Prediabetes   • Follow-up exam after treatment   • Cellulitis of great toe of right foot   • Toenail fungus   • Exam for population survey   • Preoperative clearance   • Cataract of right eye   • Leukopenia       HPI patient is here for cardiac evaluation and preoperative clearance  Patient has HTN on medical therapy, losartan hydrochlorothiazide  He has JADYN  He has history of tobacco use  He requires LTKA which is scheduled for June 19, 2023  Reji Farris Patient had an echocardiogram done March 2021 which demonstrated LVEF of 65% with mild LVH  There was no significant valve disease  48-hour HM done January 2018 demonstrated predominant NSR with rare PVCs and PACs  Patient had been seen by cardiology at [facility] August 2021    Notation made about noncompliance with amlodipine and Toprol at that time  Patient had been on Toprol in reference to PVCs noted on a HM done in [place] in 2015  He had prior follow-up with [place] cardiology group  Nuclear stress test with the OS group 2015 was a normal study  Patient has had no chest pain or significant dyspnea  EKG done today done by PCP demonstrated NSR with LAD with nonspecific T wave change in lead V2  There was no significant interval change compared with prior tracing done 2021 except axis was more leftward  PMH-  Past Medical History:   Diagnosis Date   • Arthritis    • Asthma    • Back pain    • Colon polyp    • COVID-19 2021   • CPAP (continuous positive airway pressure) dependence    • Gastroesophageal reflux disease without esophagitis 2020   • GERD (gastroesophageal reflux disease)    • Gout of multiple sites 2020   • Hiatal hernia    • High blood pressure    • Hypertension    • Impaired fasting glucose 2020   • Lipoma of neck    • Mediastinal lymphadenopathy    • Osteoporosis    • Pneumonia due to COVID-19 virus 2021   • Right hand pain 2020   • Sleep apnea    • Sleep disorder         SOCIAL HISTORY-  Social History     Socioeconomic History   • Marital status: /Civil Union     Spouse name: Not on file   • Number of children: Not on file   • Years of education: Not on file   • Highest education level: Not on file   Occupational History   • Occupation: unemployed   Tobacco Use   • Smoking status: Former     Packs/day: 0 50     Years: 6 00     Total pack years: 3 00     Types: Cigarettes     Quit date:      Years since quittin 4   • Smokeless tobacco: Never   Vaping Use   • Vaping Use: Never used   Substance and Sexual Activity   • Alcohol use: Yes     Alcohol/week: 7 0 standard drinks of alcohol     Types: 7 Shots of liquor per week     Comment: one glass per night     • Drug use: No   • Sexual activity: Yes   Other Topics Concern   • Not on file   Social History Narrative    Most recent tobacco use screenin2020      Do you currently or have you served in the [employer]:  No      Were you activated, into active duty, as a member of the [employer] or as a Reservist:  No      Sexual orientation:  Heterosexual      Exercise level:   Moderate      Diet:  Regular      General stress level:  Medium      Caffeine intake:  Occasional      Guns present in home:  No      Seat belts used routinely:  Yes      Smoke alarm in home:  Yes      Advance directive:  No      Passive smoke exposure:  No      Live alone or with others:  with others      Are you currently employed:  No      Asbestos exposure:  No      TB exposure:  No      Environmental exposure:  No      Animal exposure:  Yes 1 dog     (per dominique)     Social Determinants of Health     Financial Resource Strain: Not on file   Food Insecurity: Not on file   Transportation Needs: Not on file   Physical Activity: Not on file   Stress: Not on file   Social Connections: Not on file   Intimate Partner Violence: Not on file   Housing Stability: Not on file        FAMILY HISTORY-  Family History   Problem Relation Age of Onset   • Hypertension Mother    • Hypertension Father        SURGICAL HISTORY-  Past Surgical History:   Procedure Laterality Date   • BACK SURGERY     • COLONOSCOPY  10/07/2016    5 years (per dominique)   • COLONOSCOPY     • EGD  2017   • ELBOW SURGERY Left 2018    open arthrotomy, capsulectomy, loose body removal (per dominique)   • HERNIA REPAIR  8062    umbilical with mesh (per dominique)   • LUMBAR FUSION  2018   • OK [address] INCL FLUOR GDNCE DX W/CELL WASHG SPX N/A 2020    Procedure: BRONCHOSCOPY FLEXIBLE;  Surgeon: Selene Villeda MD;  [facility];  Service: Thoracic   • OK BRONCHOSCOPY NEEDLE BX TRACHEA MAIN STEM&/BRON N/A 2020    Procedure: ENDOBRONCHIAL ULTRASOUND (EBUS) WITH BIOPSY;  Surgeon: Andrea Santoyo Misa Armando MD;  Location: [facility];  Service: Thoracic   • ROTATOR CUFF REPAIR Bilateral    • TONSILLECTOMY  01/01/1980   • UPPER GASTROINTESTINAL ENDOSCOPY           Current Outpatient Medications:   •  allopurinol (ZYLOPRIM) 100 mg tablet, Take 1 tablet (100 mg total) by mouth daily, Disp: 90 tablet, Rfl: 3  •  azelastine (ASTELIN) 0 1 % nasal spray, 1-2 sprays into each nostril as needed, Disp: , Rfl:   •  clotrimazole (LOTRIMIN) 1 % cream, Apply topically 2 (two) times a day as needed (as needed), Disp: 30 g, Rfl: 0  •  fluticasone (FLONASE) 50 mcg/act nasal spray, 2 sprays into each nostril daily (Patient taking differently: 2 sprays into each nostril as needed), Disp: 1 Bottle, Rfl: 5  •  gabapentin (NEURONTIN) 100 mg capsule, Take 1 capsule (100 mg total) by mouth daily at bedtime, Disp: 90 capsule, Rfl: 1  •  hydrALAZINE (APRESOLINE) 10 mg tablet, TAKE 1 TABLET TWICE DAILY, Disp: 180 tablet, Rfl: 1  •  ibuprofen (MOTRIN) 800 mg tablet, Take 1 tablet (800 mg total) by mouth every 8 (eight) hours as needed for mild pain or moderate pain (Right foot great toe pain), Disp: 30 tablet, Rfl: 0  •  ipratropium (ATROVENT) 0 06 % nasal spray, 2 sprays into each nostril 4 (four) times a day (Patient taking differently: 2 sprays into each nostril as needed), Disp: 262 mL, Rfl: 0  •  ketoconazole (NIZORAL) 2 % shampoo, Apply topically twice weekly for 8 week and then as needed, Disp: 120 mL, Rfl: 0  •  ketorolac (ACULAR) 0 5 % ophthalmic solution, Administer 1 drop into the left eye 4 (four) times a day as needed, Disp: , Rfl:   •  loratadine (CLARITIN) 10 mg tablet, Take 10 mg by mouth daily, Disp: , Rfl:   •  mupirocin (BACTROBAN) 2 % ointment, Apply topically 2 (two) times a day, Disp: , Rfl:   •  ofloxacin (OCUFLOX) 0 3 % ophthalmic solution, Administer 1 drop into the left eye as needed, Disp: , Rfl:   •  omeprazole (PriLOSEC) 20 mg delayed release capsule, Take 2 capsules (40 mg total) by mouth daily (Patient "taking differently: Take 40 mg by mouth as needed), Disp: 90 capsule, Rfl: 2  •  prednisoLONE acetate (PRED FORTE) 1 % ophthalmic suspension, Administer 1 drop to both eyes as needed, Disp: , Rfl:   •  valsartan (DIOVAN) 80 mg tablet, Take 0 5 tablets (40 mg total) by mouth daily, Disp: 90 tablet, Rfl: 3  •  potassium chloride (K-DUR,KLOR-CON) 20 mEq tablet, TAKE 1 TABLET EVERY DAY (Patient not taking: Reported on 5/12/2023), Disp: 90 tablet, Rfl: 1  Allergies   Allergen Reactions   • Nuts - Food Allergy Shortness Of Breath   • Peanut Oil - Food Allergy Anaphylaxis     Anything using peanuts   • Caffeine - Food Allergy Other (See Comments)     jittery   • Codeine Other (See Comments)     Dizzy, light headed  Body of balance     • Dust Mite Extract Sneezing   • Latex Rash     Allergic to products, wears cotton underwear   • Pollen Extract Sneezing     Grass, Pollen   • Pork-Derived Products - Food Allergy GI Intolerance     Red meat as well is hard to digest     Vitals:    06/08/23 1443   BP: 142/78   BP Location: Left arm   Patient Position: Sitting   Cuff Size: Standard   Pulse: 85   SpO2: 98%   Weight: 77 1 kg (169 lb 14 4 oz)   Height: 5' 7\" (1 702 m)         Review of Systems:  Review of Systems   All other systems reviewed and are negative  Physical Exam:  Physical Exam  Vitals reviewed  Constitutional:       Appearance: He is well-developed  HENT:      Head: Normocephalic and atraumatic  Cardiovascular:      Rate and Rhythm: Normal rate  Heart sounds: Normal heart sounds  Pulmonary:      Effort: Pulmonary effort is normal       Breath sounds: Normal breath sounds  Musculoskeletal:      Cervical back: Normal range of motion  Skin:     General: Skin is warm and dry  Neurological:      Mental Status: He is alert and oriented to person, place, and time  Discussion/Summary: Asymptomatic from a cardiac point of view  His EKG is stable compared to a prior tracing    He is okay to " proceed with left knee surgery from a cardiac point of view  I have asked him to call if I can be of further assistance otherwise I will see him as needed going forward

## 2023-06-08 NOTE — ASSESSMENT & PLAN NOTE
Patient to have hemoglobin A1c drawn fasting  Lab work not completed at this time  Prior hemoglobin A1c reviewed  Contains abnormal data HEMOGLOBIN A1C  Order: [id number]   Status: Edited Result - FINAL      Next appt: 10/26/2023 at 11:00 AM in Hematology and Oncology Lan Sarmiento MD)        important suggestion  Newer results are available  Click to view them now              Component Ref Range & Units 1/21/22  5:51 AM 11/5/21 12:29 PM 6/17/20  9:46 AM   Hemoglobin A1C <5 7 % 6 2 High   6 3 High  R  6 2 High  R, CM    Comment: Reference Range   Non-diabetic                     <5 7   Pre-diabetic                     5 7-6 4   Diabetic                         >=6 5   ADA target for diabetic control  <=7   eAG, EST AVG Glucose <154 mg/dL 131  134 R                Specimen Collected: 01/21/22  5:51 AM    Performed By: [facility] ([id number]) Last Resulted: 01/21/22  9:28 AM   Received From: [address]  Result Received: 06/06/23  8:24 AM

## 2023-06-08 NOTE — ASSESSMENT & PLAN NOTE
Patient ordered for hematology oncology for evaluation of decreased white blood cells  White blood cell count 3 2

## 2023-06-08 NOTE — ASSESSMENT & PLAN NOTE
Patient for EKG in office for evaluation preop cardiovascular status  Prior ischemic changes noted on current EKG compared to prior EKG  Patient noted to have left atrial enlargement  Refer to cardiology for clearance for surgery at this time

## 2023-06-08 NOTE — ASSESSMENT & PLAN NOTE
BP currently 140/90  Patient to maintain a low-sodium diet and exercise 3-5 times per week for at least 75 minutes of cardiovascular activity  Patient currently maintained on valsartan (Diovan) 80 mg p o  daily, and hydralazine 10 mg p o  daily  Recheck BP next office visit

## 2023-06-09 ENCOUNTER — VBI (OUTPATIENT)
Dept: ADMINISTRATIVE | Facility: OTHER | Age: 63
End: 2023-06-09

## 2023-06-09 ENCOUNTER — TELEPHONE (OUTPATIENT)
Dept: FAMILY MEDICINE CLINIC | Facility: CLINIC | Age: 63
End: 2023-06-09

## 2023-06-09 ENCOUNTER — TELEPHONE (OUTPATIENT)
Dept: HEMATOLOGY ONCOLOGY | Facility: CLINIC | Age: 63
End: 2023-06-09

## 2023-06-09 RX ORDER — IPRATROPIUM BROMIDE 42 UG/1
2 SPRAY, METERED NASAL AS NEEDED
Qty: 15 ML | Refills: 0 | Status: SHIPPED | OUTPATIENT
Start: 2023-06-09 | End: 2024-06-08

## 2023-06-09 NOTE — TELEPHONE ENCOUNTER
Appointment Schedule   Who are you speaking with? Patient   If it is not the patient, are they listed on an active communication consent form? N/A   Which provider is the appointment scheduled with? Dr Cris Verde   At which location is the appointment scheduled for? [place]   When is the appointment scheduled? Please list date and time 9/5/23 9:00AM   What is the reason for this appointment? Received referral for new dx,Leukopenia, unspecified type  I offered the patient 6/22 at 1:00PM in [place] but the patient declined stating he would like to schedule after July/August due to getting knee surgery  Did patient voice understanding of the details of this appointment? Yes   Was the no show policy reviewed with patient?  Yes

## 2023-06-09 NOTE — TELEPHONE ENCOUNTER
Hi, this is Mehnaz Race  I'm calling from [facility] surgery scheduling  I'm calling in reference to a mutual patient  Luz Corrales 's date of birth is [birth year]  He was seen yesterday for a medical clearance  So I just need the medical clearance faxed over as well as the EKG tracing that was done in the office  I did see that you guys had referred him to cardiology and I was able to view that note in care everywhere  So I've already printed that and scanned it in because the cardiologist, Doctor Lucila Serrano did clear him for surgery  So I just need the medical clearance and EKG  Tracing from your office, the fax number here is four, eight, four, six, five, six, zero, zero seven two  Again it's four, eight, four, six, five, six, zero, zero, seven, two  And my call back number for any questions is four, eight, four, six, five, six, zero, zero, six, nine, option two, four, eight four, six, five six zero zero six nine option two  Thanks so much and have a nice weekend

## 2023-06-13 PROBLEM — R09.81 SINUS CONGESTION: Status: ACTIVE | Noted: 2023-06-13

## 2023-06-15 ENCOUNTER — TELEPHONE (OUTPATIENT)
Dept: FAMILY MEDICINE CLINIC | Facility: CLINIC | Age: 63
End: 2023-06-15

## 2023-06-15 NOTE — TELEPHONE ENCOUNTER
Hi this is Cheo Martinez  I'm calling from [facility] surgery scheduling  I'm calling again in regards to [facility] 's date of birth [de-identified] [birth year] [de-identified]  I was able to receive the fax that you alizeelle sent over with the medical clearance and his EKG tracing  However the date of the medical exam was written down as May twelfth of twenty twenty three  The patient was seen for medical clearance on Catherine eighth of twenty twenty three and the O R is requesting that the patient gets an updated medical clearance which I explained to them he doesn't need one  The appointment for medical clearance was done on June eighth  So if there's any way that someone can re fax that form with the correct exam date on there so that I can rescan that in for our [facility] staff, I would really appreciate it  So I just need the medical clearance form that you jarvis sent over to be dated as Catherine eighth of twenty twenty three  And the fax number here is [phone] and my call back number for any questions is [phone], option 2  Thank you  You received a voice mail from [phone]

## 2023-07-11 ENCOUNTER — TELEPHONE (OUTPATIENT)
Dept: FAMILY MEDICINE CLINIC | Facility: CLINIC | Age: 63
End: 2023-07-11

## 2023-07-11 DIAGNOSIS — J30.89 ENVIRONMENTAL AND SEASONAL ALLERGIES: ICD-10-CM

## 2023-07-11 PROBLEM — Z00.8 EXAM FOR POPULATION SURVEY: Status: RESOLVED | Noted: 2022-11-05 | Resolved: 2023-07-11

## 2023-07-11 RX ORDER — FLUTICASONE PROPIONATE 50 MCG
2 SPRAY, SUSPENSION (ML) NASAL AS NEEDED
Qty: 15.8 ML | Refills: 0 | Status: SHIPPED | OUTPATIENT
Start: 2023-07-11 | End: 2023-08-10

## 2023-07-11 NOTE — TELEPHONE ENCOUNTER
Pt. Called in and requested to please send a refill for fluticasone (FLONASE) 50 mcg/act nasal spray  Please send to [facility], [facility] [address]

## 2023-07-12 DIAGNOSIS — J30.89 ENVIRONMENTAL AND SEASONAL ALLERGIES: ICD-10-CM

## 2023-07-12 RX ORDER — FLUTICASONE PROPIONATE 50 MCG
SPRAY, SUSPENSION (ML) NASAL
Qty: 48 G | OUTPATIENT
Start: 2023-07-12

## 2023-08-22 ENCOUNTER — VBI (OUTPATIENT)
Dept: ADMINISTRATIVE | Facility: OTHER | Age: 63
End: 2023-08-22

## 2023-08-22 NOTE — TELEPHONE ENCOUNTER
08/22/23 11:30 AM     [facility] [employer] SmartForm used to document caregap status.     Scottie Dawson MA Stable

## 2023-08-30 DIAGNOSIS — D17.0 LIPOMA OF NECK: Primary | ICD-10-CM

## 2023-08-30 DIAGNOSIS — D72.819 LEUKOPENIA, UNSPECIFIED TYPE: ICD-10-CM

## 2023-08-31 ENCOUNTER — TELEPHONE (OUTPATIENT)
Dept: HEMATOLOGY ONCOLOGY | Facility: CLINIC | Age: 63
End: 2023-08-31

## 2023-08-31 NOTE — TELEPHONE ENCOUNTER
I spoke with the patient in regards to lab orders being placed in the system for him to have completed prior to his appt on 9/5/23 at 9:00 am. Patient states he will try to have them completed tomorrow.

## 2023-09-01 ENCOUNTER — APPOINTMENT (OUTPATIENT)
Dept: LAB | Facility: CLINIC | Age: 63
End: 2023-09-01
Payer: COMMERCIAL

## 2023-09-01 DIAGNOSIS — D72.819 LEUKOPENIA, UNSPECIFIED TYPE: ICD-10-CM

## 2023-09-01 DIAGNOSIS — D17.0 LIPOMA OF NECK: ICD-10-CM

## 2023-09-01 LAB
ALBUMIN SERPL BCP-MCNC: 4.1 G/DL (ref 3.5–5)
ALP SERPL-CCNC: 76 U/L (ref 34–104)
ALT SERPL W P-5'-P-CCNC: 16 U/L (ref 7–52)
ANION GAP SERPL CALCULATED.3IONS-SCNC: 11 MMOL/L
AST SERPL W P-5'-P-CCNC: 16 U/L (ref 13–39)
BASOPHILS # BLD AUTO: 0.03 THOUSANDS/ÂΜL (ref 0–0.1)
BASOPHILS NFR BLD AUTO: 1 % (ref 0–1)
BILIRUB SERPL-MCNC: 0.6 MG/DL (ref 0.2–1)
BUN SERPL-MCNC: 10 MG/DL (ref 5–25)
CALCIUM SERPL-MCNC: 9.7 MG/DL (ref 8.4–10.2)
CHLORIDE SERPL-SCNC: 106 MMOL/L (ref 96–108)
CO2 SERPL-SCNC: 24 MMOL/L (ref 21–32)
CREAT SERPL-MCNC: 1.11 MG/DL (ref 0.6–1.3)
EOSINOPHIL # BLD AUTO: 0.04 THOUSAND/ÂΜL (ref 0–0.61)
EOSINOPHIL NFR BLD AUTO: 1 % (ref 0–6)
ERYTHROCYTE [DISTWIDTH] IN BLOOD BY AUTOMATED COUNT: 14 % (ref 11.6–15.1)
GFR SERPL CREATININE-BSD FRML MDRD: 69 ML/MIN/1.73SQ M
GLUCOSE SERPL-MCNC: 115 MG/DL (ref 65–140)
HCT VFR BLD AUTO: 48.8 % (ref 36.5–49.3)
HGB BLD-MCNC: 15.1 G/DL (ref 12–17)
IMM GRANULOCYTES # BLD AUTO: 0.01 THOUSAND/UL (ref 0–0.2)
IMM GRANULOCYTES NFR BLD AUTO: 0 % (ref 0–2)
LYMPHOCYTES # BLD AUTO: 1.56 THOUSANDS/ÂΜL (ref 0.6–4.47)
LYMPHOCYTES NFR BLD AUTO: 36 % (ref 14–44)
MCH RBC QN AUTO: 26.3 PG (ref 26.8–34.3)
MCHC RBC AUTO-ENTMCNC: 30.9 G/DL (ref 31.4–37.4)
MCV RBC AUTO: 85 FL (ref 82–98)
MONOCYTES # BLD AUTO: 0.43 THOUSAND/ÂΜL (ref 0.17–1.22)
MONOCYTES NFR BLD AUTO: 10 % (ref 4–12)
NEUTROPHILS # BLD AUTO: 2.26 THOUSANDS/ÂΜL (ref 1.85–7.62)
NEUTS SEG NFR BLD AUTO: 52 % (ref 43–75)
NRBC BLD AUTO-RTO: 0 /100 WBCS
PLATELET # BLD AUTO: 142 THOUSANDS/UL (ref 149–390)
PMV BLD AUTO: 10.8 FL (ref 8.9–12.7)
POTASSIUM SERPL-SCNC: 3.9 MMOL/L (ref 3.5–5.3)
PROT SERPL-MCNC: 8.1 G/DL (ref 6.4–8.4)
RBC # BLD AUTO: 5.74 MILLION/UL (ref 3.88–5.62)
SODIUM SERPL-SCNC: 141 MMOL/L (ref 135–147)
WBC # BLD AUTO: 4.33 THOUSAND/UL (ref 4.31–10.16)

## 2023-09-01 PROCEDURE — 36415 COLL VENOUS BLD VENIPUNCTURE: CPT

## 2023-09-01 PROCEDURE — 85025 COMPLETE CBC W/AUTO DIFF WBC: CPT

## 2023-09-01 PROCEDURE — 80053 COMPREHEN METABOLIC PANEL: CPT

## 2023-09-04 NOTE — PROGRESS NOTES
[id number] [facility]  1960  [address] [facility] HEMATOLOGY ONCOLOGY SPECIALISTS [place] [address]  HEMATOLOGY/ONCOLOGY CONSULTATION REPORT    DISCUSSION/SUMMARY:    67-year-old male with history of cervical and mediastinal adenopathy for years. Presently Mr. HEBERT [facility] feels well and clinically there are no concerning findings, no evidence of any enlarging adenopathy. (The presumed) lipoma on the back of the neck has not changed in size. Recent blood work was good/acceptable. We rediscussed options. The plan is to continue with surveillance. No additional biopsies or scans are needed unless patient has new signs or symptoms. Patient's prior PCP recently left, new PCP office visit has been scheduled. Besides the routine health maintenance and medical care, patient and Dr. Ching Castillo can monitor for any enlarging adenopathy or other symptoms. Return to hematology is now on an as-needed basis but Mr. HEBERT [facility] knows to call the hematology/oncology office if there are any other questions or concerns. Carefully review your medication list and verify that the list is accurate and up-to-date. Please call the hematology/oncology office if there are medications missing from the list, medications on the list that you are not currently taking or if there is a dosage or instruction that is different from how you're taking that medication. Patient goals and areas of care: Adenopathy surveillance  Barriers to care: none  Patient is able to self-care  ______________________________________________________________________________________    Chief Complaint   Patient presents with   • Follow-up   • History of adenopathy     History of Present Illness: 67-year-old male previously seen at [facility]. Patient has a complicated history of adenopathy in the neck and mediastinum. Patient also with a (presumed) right occipital lipoma.   Patient first presented in 2017 with right cervical adenopathy. Biopsy of the area demonstrated atypical cellular changes. There is never been any evidence for lymphoma. A PET/CT in April 2020 demonstrated FDG avid adenopathy in the mediastinum. Bronchoscopy with mediastinal biopsies did not demonstrate any evidence for malignancy also. In February 2021 patient was admitted because of [facility]. Once again patient's CT demonstrated mediastinal adenopathy. Patient eventually recovered from the COVID infection was discharged. Presently Mr. Vega states feeling well, same as before. Lipoma has not changed in size. No cervical problems, no respiratory issues. Appetite is good, weight is stable. No fevers, chills or night sweats. No new or enlarging lymph nodes in the neck or elsewhere. Routine health maintenance and medical care is up-to-date. Patient continues to be active. Review of Systems   Constitutional: Negative. HENT: Negative. Eyes: Negative. Respiratory: Negative. Cardiovascular: Negative. Gastrointestinal: Negative. Endocrine: Negative. Genitourinary: Negative. Musculoskeletal: Negative. Skin: Negative. Allergic/Immunologic: Negative. Neurological: Negative. Hematological: Negative. Psychiatric/Behavioral: Negative. All other systems reviewed and are negative.     Patient Active Problem List   Diagnosis   • Mediastinal lymphadenopathy   • Spondylosis of cervical region without myelopathy or radiculopathy   • Osteoarthritis of left elbow   • Palpitations   • JADYN on CPAP   • History of elbow surgery   • Foraminal stenosis of cervical region   • DDD (degenerative disc disease), cervical   • Impaired fasting glucose   • Gout of multiple sites   • Gastroesophageal reflux disease without esophagitis   • Other insomnia   • Lipoma of neck   • Preop respiratory exam   • COVID-19 virus infection   • YOAV (acute kidney injury) ([address])   • Elevated liver enzymes   • Hypertension   • Recurrent cellulitis of lower extremity   • Hypokalemia   • Effusion, right elbow   • Acute gout of right elbow   • Overweight with body mass index (BMI) of 26 to 26.9 in adult   • Elevated C-reactive protein   • Elevated sed rate   • Prediabetes   • Follow-up exam after treatment   • Cellulitis of great toe of right foot   • Toenail fungus   • Preoperative clearance   • Cataract of right eye   • Leukopenia   • Sinus congestion     Past Medical History:   Diagnosis Date   • Arthritis    • Asthma    • Back pain    • Colon polyp    • COVID-19 1/21/2021 1/11/2021   • CPAP (continuous positive airway pressure) dependence    • Gastroesophageal reflux disease without esophagitis 7/16/2020   • GERD (gastroesophageal reflux disease)    • Gout of multiple sites 7/16/2020   • Hiatal hernia    • High blood pressure    • Hypertension    • Impaired fasting glucose 6/18/2020   • Lipoma of neck    • Mediastinal lymphadenopathy    • Osteoporosis    • Pneumonia due to COVID-19 virus 1/25/2021   • Right hand pain 7/16/2020   • Sleep apnea    • Sleep disorder      Past Surgical History:   Procedure Laterality Date   • BACK SURGERY     • COLONOSCOPY  10/07/2016    5 years (per dominique)   • COLONOSCOPY     • EGD  03/31/2017   • ELBOW SURGERY Left 04/09/2018    open arthrotomy, capsulectomy, loose body removal (per dominique)   • HERNIA REPAIR  13/85/1708    umbilical with mesh (per dominique)   • LUMBAR FUSION  07/11/2018   •  [address] INCL FLUOR GDNCE DX W/CELL WASHG [facility] N/A 4/28/2020    Procedure: BRONCHOSCOPY FLEXIBLE;  Surgeon: Deanna Shi MD;  Location: [facility];  Service: Thoracic   • [place] BRONCHOSCOPY NEEDLE BX TRACHEA MAIN STEM&/BRON N/A 4/28/2020    Procedure: ENDOBRONCHIAL ULTRASOUND (EBUS) WITH BIOPSY;  Surgeon: Deanna Shi MD;  Location: [facility];  Service: Thoracic   • ROTATOR CUFF REPAIR Bilateral    • TONSILLECTOMY  01/01/1980   • UPPER GASTROINTESTINAL ENDOSCOPY       Family History   Problem Relation Age of Onset   • Hypertension Mother    • Hypertension Father      Social History     Socioeconomic History   • Marital status: /Civil Union     Spouse name: Not on file   • Number of children: Not on file   • Years of education: Not on file   • Highest education level: Not on file   Occupational History   • Occupation: unemployed   Tobacco Use   • Smoking status: Former     Packs/day: 0.50     Years: 6.00     Total pack years: 3.00     Types: Cigarettes     Quit date:      Years since quittin.7   • Smokeless tobacco: Never   Vaping Use   • Vaping Use: Never used   Substance and Sexual Activity   • Alcohol use: Yes     Alcohol/week: 7.0 standard drinks of alcohol     Types: 7 Shots of liquor per week     Comment: one glass per night. • Drug use: No   • Sexual activity: Yes   Other Topics Concern   • Not on file   Social History Narrative    Most recent tobacco use screenin2020      Do you currently or have you served in the [address] [place] [address]:  No      Were you activated, into active duty, as a member of the [employer] or as a Reservist:  No      Sexual orientation:  Heterosexual      Exercise level:   Moderate      Diet:  Regular      General stress level:  Medium      Caffeine intake:  Occasional      Guns present in home:  No      Seat belts used routinely:  Yes      Smoke alarm in home:  Yes      Advance directive:  No      Passive smoke exposure:  No      Live alone or with others:  with others      Are you currently employed:  No      Asbestos exposure:  No      TB exposure:  No      Environmental exposure:  No      Animal exposure:  Yes 1 dog     (per dominique)     Social Determinants of Health     Financial Resource Strain: Not on file   Food Insecurity: Not on file   Transportation Needs: Not on file   Physical Activity: Not on file   Stress: Not on file   Social Connections: Not on file   Intimate Partner Violence: Not on file   Housing Stability: Not on file       Current Outpatient Medications: •  allopurinol (ZYLOPRIM) 100 mg tablet, Take 1 tablet (100 mg total) by mouth daily, Disp: 90 tablet, Rfl: 3  •  azelastine (ASTELIN) 0.1 % nasal spray, 1-2 sprays into each nostril as needed, Disp: , Rfl:   •  clotrimazole (LOTRIMIN) 1 % cream, Apply topically 2 (two) times a day as needed (as needed), Disp: 30 g, Rfl: 0  •  fluticasone (FLONASE) 50 mcg/act nasal spray, 2 sprays into each nostril as needed for rhinitis, Disp: 15.8 mL, Rfl: 0  •  gabapentin (NEURONTIN) 100 mg capsule, Take 1 capsule (100 mg total) by mouth daily at bedtime (Patient taking differently: Take 100 mg by mouth if needed), Disp: 90 capsule, Rfl: 1  •  hydrALAZINE (APRESOLINE) 10 mg tablet, TAKE 1 TABLET TWICE DAILY, Disp: 180 tablet, Rfl: 1  •  ibuprofen (MOTRIN) 800 mg tablet, Take 1 tablet (800 mg total) by mouth every 8 (eight) hours as needed for mild pain or moderate pain (Right foot great toe pain), Disp: 30 tablet, Rfl: 0  •  ipratropium (ATROVENT) 0.06 % nasal spray, 2 sprays into each nostril as needed for rhinitis, Disp: 15 mL, Rfl: 0  •  ketoconazole (NIZORAL) 2 % shampoo, Apply topically twice weekly for 8 week and then as needed, Disp: 120 mL, Rfl: 0  •  ketorolac (ACULAR) 0.5 % ophthalmic solution, Administer 1 drop into the left eye 4 (four) times a day as needed, Disp: , Rfl:   •  mupirocin (BACTROBAN) 2 % ointment, Apply topically 2 (two) times a day, Disp: , Rfl:   •  ofloxacin (OCUFLOX) 0.3 % ophthalmic solution, Administer 1 drop into the left eye as needed, Disp: , Rfl:   •  omeprazole (PriLOSEC) 20 mg delayed release capsule, Take 2 capsules (40 mg total) by mouth daily (Patient taking differently: Take 40 mg by mouth as needed), Disp: 90 capsule, Rfl: 2  •  prednisoLONE acetate (PRED FORTE) 1 % ophthalmic suspension, Administer 1 drop to both eyes as needed, Disp: , Rfl:   •  valsartan (DIOVAN) 80 mg tablet, Take 0.5 tablets (40 mg total) by mouth daily, Disp: 90 tablet, Rfl: 3  •  loratadine (CLARITIN) 10 mg tablet, Take 10 mg by mouth daily (Patient not taking: Reported on 9/5/2023), Disp: , Rfl:   •  Omega-3 Fatty Acids (fish oil) 1,000 mg, Take 1,200 mg by mouth, Disp: , Rfl:   •  potassium chloride (K-DUR,KLOR-CON) 20 mEq tablet, TAKE 1 TABLET EVERY DAY (Patient not taking: Reported on 5/12/2023), Disp: 90 tablet, Rfl: 1    Allergies   Allergen Reactions   • Nuts - Food Allergy Shortness Of Breath   • Peanut Oil - Food Allergy Anaphylaxis     Anything using peanuts   • Caffeine - Food Allergy Other (See Comments)     jittery   • Codeine Other (See Comments)     Dizzy, light headed  Body of balance     • Dust Mite Extract Sneezing   • Latex Rash     Allergic to products, wears cotton underwear   • Pollen Extract Sneezing     Grass, Pollen   • Pork-Derived Products - Food Allergy GI Intolerance     Red meat as well is hard to digest     Vitals:    09/05/23 0907   BP: (!) 198/96   Pulse: 100   Resp: 18   SpO2: 97%     Physical Exam  Constitutional:       Appearance: He is well-developed. HENT:      Head: Normocephalic and atraumatic. Right Ear: External ear normal.      Left Ear: External ear normal.   Eyes:      Conjunctiva/sclera: Conjunctivae normal.      Pupils: Pupils are equal, round, and reactive to light. Cardiovascular:      Rate and Rhythm: Normal rate and regular rhythm. Heart sounds: Normal heart sounds. Pulmonary:      Effort: Pulmonary effort is normal.      Breath sounds: Normal breath sounds. Abdominal:      General: Bowel sounds are normal.      Palpations: Abdomen is soft. Comments: + Bowel sounds, nontender, no hepatosplenomegaly, no guarding   Musculoskeletal:         General: Normal range of motion. Cervical back: Normal range of motion and neck supple. Skin:     General: Skin is warm. Comments: Warm, moist, good color, no petechiae or ecchymoses   Neurological:      Mental Status: He is alert and oriented to person, place, and time.       Deep Tendon Reflexes: Reflexes are normal and symmetric. Psychiatric:         Behavior: Behavior normal.         Thought Content: Thought content normal.         Judgment: Judgment normal.     Extremities: No lower extremity mid bilaterally, no cords, pulses are 1+  Lymphatics: No adenopathy in the neck, pre-/postauricular area, occipital area, supraclavicular area, infraclavicular area, axilla and groin bilaterally    Labs        9/1/2023 BUN = 10 creatinine = 1.11 calcium = 9.7 LFTs WNL    08/15/2022 WBC = 4.1 hemoglobin = 16.6 hematocrit = 51.4 platelet = 657 neutrophil = 50% bands = 1% lymphocytes = 35% monocyte = 11% BUN = 14 creatinine = 1.23 LFTs WNL calcium = 10.0 ESR = 51 CRP = 11.2    Imaging    3/1/2021 CTA chest PE study    IMPRESSION:    Right and left paraesophageal enlarged lymph nodes are seen measuring 2.1 cm on the right and 1.9 cm and the left. . Right hilar adenopathy is noted. These are seen on the prior noncontrast CT chest from 2/17/2021. Dependent parenchymal changes are seen in the lung bases bilaterally. No pulmonary embolism. No effusion, airspace disease or pneumothorax. 04/09/2020 PET-CT     CHEST:     There are 2 FDG avid lymph nodes in the superior mediastinum adjacent to the esophagus.       The lymph node to the right of the upper esophagus demonstrates SUV max of 3.5.  This measures 2.1 x 1.4 cm image 70 series 3.       The lymph node on the left demonstrates SUV max of 5.4.  This measures 2.4 x 1.9 cm image 75 series 3.     There is focal FDG uptake at the level of the upper esophagus at this level, SUV max of 3.8.  See image 64 series 1200 of the PET/CT fusion images.  Questionable wall thickening here on CT.     No FDG avid hilar lymph nodes. CT images: Scattered coronary artery calcifications.  Scattered linear atelectasis bilaterally.      IMPRESSION:  1.  FDG avid lymph nodes in the neck and upper mediastinum suspicious for hypermetabolic malignancy.    2.  Focal FDG uptake at the upper esophagus adjacent to the FDG avid lymphadenopathy, underlying esophageal lesion should be excluded.    3.  No findings for hypermetabolic malignancy in the abdomen or pelvis. Pathology        Case Report   Non-gynecologic Cytology                          Case: [id number]                                   Authorizing Provider: Madeline Jeffrey MD      Collected:           04/28/2020 1306               Ordering Location:     [address] Joselito Sukhjinder      Received:            04/28/2020 1414                                      Hospital Operating Room                                                       Pathologist:           Diego Shahid DO                                                      Specimens:   A) - Mass, paratracheal                                                                              B) - Mass, paratracheal                                                                              C) - Mass, paratracheal, cell block                                                         Final Diagnosis   A., B., and C. Paratracheal Mass, FNAB (ThinPrep, Smear, and Cell Block Preparations):  - Negative for malignancy. - Benign bronchial epithelial cells, mixed lymphocytes, and macrophages present. See Note. Electronically signed by Diego Shahid DO on 5/5/2020 at 12:26 PM   Note     Concurrent flow cytometry ([facility] Specimen [id number]) is negative for findings of B- or T-cell lymphoma, and favors reactive hyperplasia.  See scanned full report in the Media tab in Epic.     Clinical and radiologic correlation recommended to ensure that the targeted lesional area was adequately sampled.

## 2023-09-05 ENCOUNTER — OFFICE VISIT (OUTPATIENT)
Dept: HEMATOLOGY ONCOLOGY | Facility: CLINIC | Age: 63
End: 2023-09-05
Payer: COMMERCIAL

## 2023-09-05 VITALS
OXYGEN SATURATION: 97 % | RESPIRATION RATE: 18 BRPM | HEIGHT: 67 IN | DIASTOLIC BLOOD PRESSURE: 96 MMHG | WEIGHT: 165 LBS | SYSTOLIC BLOOD PRESSURE: 198 MMHG | BODY MASS INDEX: 25.9 KG/M2 | HEART RATE: 100 BPM

## 2023-09-05 DIAGNOSIS — D72.819 LEUKOPENIA, UNSPECIFIED TYPE: ICD-10-CM

## 2023-09-05 PROCEDURE — 99214 OFFICE O/P EST MOD 30 MIN: CPT | Performed by: INTERNAL MEDICINE

## 2023-09-05 PROCEDURE — 99204 OFFICE O/P NEW MOD 45 MIN: CPT | Performed by: INTERNAL MEDICINE

## 2023-09-05 RX ORDER — CHLORAL HYDRATE 500 MG
1200 CAPSULE ORAL
COMMUNITY

## 2023-09-14 DIAGNOSIS — M10.9 GOUT OF MULTIPLE SITES, UNSPECIFIED CAUSE, UNSPECIFIED CHRONICITY: ICD-10-CM

## 2023-09-14 RX ORDER — ALLOPURINOL 100 MG/1
100 TABLET ORAL DAILY
Qty: 90 TABLET | Refills: 2 | Status: SHIPPED | OUTPATIENT
Start: 2023-09-14

## 2023-10-19 ENCOUNTER — TELEPHONE (OUTPATIENT)
Dept: PULMONOLOGY | Facility: CLINIC | Age: 63
End: 2023-10-19

## 2023-10-25 ENCOUNTER — OFFICE VISIT (OUTPATIENT)
Dept: PULMONOLOGY | Facility: CLINIC | Age: 63
End: 2023-10-25
Payer: COMMERCIAL

## 2023-10-25 VITALS
HEART RATE: 72 BPM | TEMPERATURE: 98.2 F | WEIGHT: 170 LBS | DIASTOLIC BLOOD PRESSURE: 84 MMHG | OXYGEN SATURATION: 98 % | HEIGHT: 67 IN | RESPIRATION RATE: 18 BRPM | BODY MASS INDEX: 26.68 KG/M2 | SYSTOLIC BLOOD PRESSURE: 136 MMHG

## 2023-10-25 DIAGNOSIS — I10 PRIMARY HYPERTENSION: ICD-10-CM

## 2023-10-25 DIAGNOSIS — R59.0 MEDIASTINAL LYMPHADENOPATHY: ICD-10-CM

## 2023-10-25 DIAGNOSIS — G47.33 OSA ON CPAP: Primary | ICD-10-CM

## 2023-10-25 DIAGNOSIS — G47.09 OTHER INSOMNIA: ICD-10-CM

## 2023-10-25 PROCEDURE — 3079F DIAST BP 80-89 MM HG: CPT | Performed by: INTERNAL MEDICINE

## 2023-10-25 PROCEDURE — 3075F SYST BP GE 130 - 139MM HG: CPT | Performed by: INTERNAL MEDICINE

## 2023-10-25 PROCEDURE — 99215 OFFICE O/P EST HI 40 MIN: CPT | Performed by: INTERNAL MEDICINE

## 2023-10-25 NOTE — ASSESSMENT & PLAN NOTE
He had mediastinal lymphadenopathy diagnosed in 2017 and had underwent bronchoscopy and biopsy according to him. He was put on prednisone for few months by his pulmonologist at that time. His CT scan of the chest had shown  multiple lymphadenopathy including mediastinal and supraclavicular. He was found to have right posterior neck lymphadenopathy and underwent biopsy which did not show any evidence of malignancy. In April 2020 also he underwent EBUS biopsy for mediastinal lymphadenopathy which was FDG avid. The results from this biopsy were also not suggestive of any malignancy. He follows with Dr. Kenan Lewis now. He denies any weight loss or anorexia or fever or chills.

## 2023-10-25 NOTE — ASSESSMENT & PLAN NOTE
He has history of insomnia for a long time. 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 His sleep is still interrupted. Elisabeth Larose He tried melatonin before. Elisabeth Larose He is not using oxycodone regularly. currently he has mainly sleep maintenance insomnia. I have advised him regarding sleep hygiene. I have counseled him regarding alcohol use which could also be interfering significantly with his sleep.

## 2023-10-25 NOTE — PROGRESS NOTES
Assessment/Plan:    JADYN on CPAP  Mr. Gogo Stahl has obstructive sleep apnea. . He had a repeat sleep study in view of his persistent insomnia on Feb 15 2019 and it showed moderate obstructive sleep apnea with oxygen desaturation and sleep fragmentation. During the same study it was determined that his sleep apnea can be treated with CPAP 7 cm of water. He was subsequently started on CPAP therapy and found it beneficial. he felt less tired and sleepy during the day. His sleep was also less interrupted. 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 The snoring and reported apneic events had resolved. He had hypertension as comorbidity. His CPAP compliance has been poor. I have highlighted to him the need for using CPAP regularly and the problems with untreated sleep apnea. He also has significant insomnia. I have instructed regarding sleep hygiene  I counseled him regarding alcohol use. I have asked him to contact his DME regarding regular supplies for his CPAP therapy. I had a long discussion with him and answered all his questions. Other insomnia  He has history of insomnia for a long time. 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 His sleep is still interrupted. Bee Martinez He tried melatonin before. Bee Martinez He is not using oxycodone regularly. currently he has mainly sleep maintenance insomnia. I have advised him regarding sleep hygiene. I have counseled him regarding alcohol use which could also be interfering significantly with his sleep. Mediastinal lymphadenopathy  He had mediastinal lymphadenopathy diagnosed in 2017 and had underwent bronchoscopy and biopsy according to him. He was put on prednisone for few months by his pulmonologist at that time. His CT scan of the chest had shown  multiple lymphadenopathy including mediastinal and supraclavicular.   He was found to have right posterior neck lymphadenopathy and underwent biopsy which did not show any evidence of malignancy. In April 2020 also he underwent EBUS biopsy for mediastinal lymphadenopathy which was FDG avid. The results from this biopsy were also not suggestive of any malignancy. He follows with Dr. Rosemary Waldron now. He denies any weight loss or anorexia or fever or chills. Hypertension  History of hypertension and currently this is controlled with valsartan. Diagnoses and all orders for this visit:    JADYN on CPAP  -     PAP DME Resupply/Reorder    Mediastinal lymphadenopathy    Primary hypertension    Other insomnia          Subjective:      Patient ID: Sara Weber. is a 70 y.o. male. Mr. Gogo Stahl. is coming for a follow-up after more than 2 years. He has history of obstructive sleep apnea and was on CPAP therapy. He also has mediastinal lymphadenopathy which has been on follow-up per Dr. Hemalatha Sullivan. He has sleep maintenance insomnia. He usually goes to sleep around 1:00 and wakes up around 6:00. He is chronically sleep deprived. He consumes alcohol 3-4 times a week around 7 PM.  Does not believe that alcohol is interfering with his sleep apnea and insomnia. He states that he worked as a for a long time as a  and that interfered with his sleep. He has retired since 6 to 7 years. He had a recent knee replacement. Denied any significant shortness of breath cough or phlegm or wheeze or chest pain. He does not smoke. I reviewed his CPAP compliance records and his CPAP compliance has been poor. His residual AHI however was low. He stated that he has been having problems with the mask as he gets itching from the latex. He has not spoken to the DME about it. I have advised him to do that. He states that his machine broke recently and is not able to use it for the past few days. He requests a new machine. He has not been getting supplies regularly through his DME.   For his mediastinal lymphadenopathy which was biopsied before, he has been on surveillance with Dr. Dino Resendiz. He denied any weight loss or anorexia fever or chills. No chest pain no palpitations. He has ambulatory dysfunction and uses a cane. The following portions of the patient's history were reviewed and updated as appropriate: allergies, current medications, past family history, past medical history, past social history, past surgical history, and problem list.    Review of Systems   Constitutional:  Positive for fatigue. Negative for appetite change, chills, fever and unexpected weight change. HENT:  Positive for rhinorrhea and sneezing. Negative for hearing loss, sore throat and trouble swallowing. Eyes:  Negative for visual disturbance. Respiratory:  Negative for cough, chest tightness, shortness of breath and wheezing. Cardiovascular:  Negative for chest pain, palpitations and leg swelling. Gastrointestinal:  Negative for abdominal pain, constipation, nausea and vomiting. Genitourinary:  Negative for dysuria, frequency and urgency. Musculoskeletal:  Positive for arthralgias and gait problem (uses cane). Skin:  Negative for rash. Allergic/Immunologic: Positive for environmental allergies. Neurological:  Negative for dizziness, seizures, syncope, light-headedness and headaches. Psychiatric/Behavioral:  Positive for sleep disturbance. Negative for agitation and confusion. The patient is nervous/anxious. Objective:      /84 (BP Location: Left arm, Patient Position: Sitting, Cuff Size: Standard)   Pulse 72   Temp 98.2 °F (36.8 °C) (Tympanic)   Resp 18   Ht 5' 7" (1.702 m)   Wt 77.1 kg (170 lb)   SpO2 98%   BMI 26.63 kg/m²          Physical Exam  Vitals reviewed. Constitutional:       General: He is not in acute distress. Appearance: He is not ill-appearing, toxic-appearing or diaphoretic. HENT:      Head: Normocephalic.       Mouth/Throat:      Mouth: Mucous membranes are moist.   Eyes:      General: No scleral icterus. Conjunctiva/sclera: Conjunctivae normal.   Cardiovascular:      Rate and Rhythm: Normal rate and regular rhythm. Heart sounds: Normal heart sounds. No murmur heard. Pulmonary:      Effort: Pulmonary effort is normal. No respiratory distress. Breath sounds: Normal breath sounds. No stridor. No wheezing, rhonchi or rales. Chest:      Chest wall: No tenderness. Abdominal:      General: Bowel sounds are normal.      Palpations: Abdomen is soft. Tenderness: There is no abdominal tenderness. There is no guarding. Musculoskeletal:      Cervical back: Neck supple. No rigidity. Right lower leg: No edema. Left lower leg: No edema. Lymphadenopathy:      Cervical: No cervical adenopathy. Skin:     Coloration: Skin is not jaundiced or pale. Findings: No rash. Neurological:      Mental Status: He is alert and oriented to person, place, and time. Gait: Gait normal.   Psychiatric:         Mood and Affect: Mood normal.         Behavior: Behavior normal.         Thought Content: Thought content normal.         Judgment: Judgment normal.      I Spent 45 minutes of time taking care of this patient with complex medical issues. Patient was coming for a follow-up after a long time of more than 2 years. The majority of this time was spent directly with the patient counseling as well as coordinating care.

## 2023-10-25 NOTE — ASSESSMENT & PLAN NOTE
Mr. Lucie Harris has obstructive sleep apnea. . He had a repeat sleep study in view of his persistent insomnia on Feb 15 2019 and it showed moderate obstructive sleep apnea with oxygen desaturation and sleep fragmentation. During the same study it was determined that his sleep apnea can be treated with CPAP 7 cm of water. He was subsequently started on CPAP therapy and found it beneficial. he felt less tired and sleepy during the day. His sleep was also less interrupted. 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 The snoring and reported apneic events had resolved. He had hypertension as comorbidity. His CPAP compliance has been poor. I have highlighted to him the need for using CPAP regularly and the problems with untreated sleep apnea. He also has significant insomnia. I have instructed regarding sleep hygiene  I counseled him regarding alcohol use. I have asked him to contact his DME regarding regular supplies for his CPAP therapy. I had a long discussion with him and answered all his questions.

## 2023-10-30 LAB

## 2023-11-15 ENCOUNTER — VBI (OUTPATIENT)
Dept: ADMINISTRATIVE | Facility: OTHER | Age: 63
End: 2023-11-15

## 2023-11-16 LAB

## 2023-11-24 LAB

## 2023-11-26 DIAGNOSIS — I10 PRIMARY HYPERTENSION: ICD-10-CM

## 2023-11-27 RX ORDER — HYDRALAZINE HYDROCHLORIDE 10 MG/1
TABLET, FILM COATED ORAL
Qty: 180 TABLET | Refills: 0 | Status: SHIPPED | OUTPATIENT
Start: 2023-11-27

## 2023-12-15 LAB

## 2023-12-28 LAB

## 2024-01-23 ENCOUNTER — VBI (OUTPATIENT)
Dept: ADMINISTRATIVE | Facility: OTHER | Age: 64
End: 2024-01-23

## 2024-02-07 ENCOUNTER — OFFICE VISIT (OUTPATIENT)
Dept: PULMONOLOGY | Facility: CLINIC | Age: 64
End: 2024-02-07
Payer: COMMERCIAL

## 2024-02-07 VITALS
TEMPERATURE: 99.1 F | WEIGHT: 176.4 LBS | BODY MASS INDEX: 27.69 KG/M2 | DIASTOLIC BLOOD PRESSURE: 100 MMHG | OXYGEN SATURATION: 98 % | HEART RATE: 80 BPM | HEIGHT: 67 IN | SYSTOLIC BLOOD PRESSURE: 145 MMHG

## 2024-02-07 DIAGNOSIS — G47.33 OSA ON CPAP: Primary | ICD-10-CM

## 2024-02-07 DIAGNOSIS — G47.09 OTHER INSOMNIA: ICD-10-CM

## 2024-02-07 DIAGNOSIS — R59.0 MEDIASTINAL LYMPHADENOPATHY: ICD-10-CM

## 2024-02-07 PROCEDURE — 99214 OFFICE O/P EST MOD 30 MIN: CPT | Performed by: INTERNAL MEDICINE

## 2024-02-07 RX ORDER — SENNOSIDES 8.6 MG
650 CAPSULE ORAL
COMMUNITY
End: 2024-02-12

## 2024-02-07 NOTE — ASSESSMENT & PLAN NOTE
He has history of insomnia for a long time. 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 His sleep is still interrupted.. He tried melatonin before.. He is not using oxycodone regularly. currently he has mainly sleep maintenance insomnia. I have advised him regarding sleep hygiene.  I have counseled him regarding alcohol use which could also be interfering significantly with his sleep.  Will consider him for doxepin if his insomnia does not improve.

## 2024-02-07 NOTE — PROGRESS NOTES
Assessment/Plan:    JADYN on CPAP  Mr. Rik Vega Jr has obstructive sleep apnea.. He had a repeat sleep study in view of his persistent insomnia on Feb 15 2019 and it showed moderate obstructive sleep apnea with oxygen desaturation and sleep fragmentation. During the same study it was determined that his sleep apnea can be treated with CPAP 7 cm of water. He was subsequently started on CPAP therapy and found it beneficial. he felt less tired and sleepy during the day. His sleep was also less interrupted. 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 The snoring and reported apneic events had resolved. He had hypertension as comorbidity. His CPAP compliance has been poor.   I have highlighted to him the need for using CPAP regularly and the problems with untreated sleep apnea. He also has significant insomnia. I have instructed regarding sleep hygiene  I counseled him regarding alcohol use.  I ordered a mask fit as he is having problems with the mask now.  I had a long discussion with him and answered all his questions.  We will consider him also for a repeat sleep study if he continues to have symptoms.    Other insomnia  He has history of insomnia for a long time. 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 His sleep is still interrupted.. He tried melatonin before.. He is not using oxycodone regularly. currently he has mainly sleep maintenance insomnia. I have advised him regarding sleep hygiene.  I have counseled him regarding alcohol use which could also be interfering significantly with his sleep.  Will consider him for doxepin if his insomnia does not improve.    Mediastinal lymphadenopathy  He had mediastinal lymphadenopathy diagnosed in 2017 and had underwent bronchoscopy and biopsy according to him.  He was put on prednisone for few months by his pulmonologist at that time.  His CT scan of the chest had shown  multiple  lymphadenopathy including mediastinal and supraclavicular.  He was found to have right posterior neck lymphadenopathy and underwent biopsy which did not show any evidence of malignancy.  In April 2020 also he underwent EBUS biopsy for mediastinal lymphadenopathy which was FDG avid.  The results from this biopsy were also not suggestive of any malignancy.  He follows with Dr. Hagan now.  He denies any weight loss or anorexia or fever or chills.          Diagnoses and all orders for this visit:    JADYN on CPAP  -     Mask fitting only; Future    Other insomnia    Mediastinal lymphadenopathy    Other orders  -     acetaminophen (TYLENOL) 650 mg CR tablet; Take 650 mg by mouth          Subjective:      Patient ID: Rik Vega Jr. is a 63 y.o. male.    Mr. Rik Vega Jr. has come for follow-up for his obstructive sleep apnea on CPAP therapy.  He has a new machine now and he states doing that he is using this more regularly.  I reviewed his CPAP compliance records over the past couple of months and his overall compliance is 58% and his 4-hour compliance is 55%.  His residual AHI was 1.8 on CPAP pressure of 7 cm of water.  He stated that he is not comfortable with the mask and pressure.  He has occasional leak.  He also has sleep maintenance insomnia.  He consumes alcohol 3-4 times a week.  He denied any chest pain or palpitations.  No swelling of feet.  No significant shortness of breath or cough or phlegm.  He feels sleepy and occasionally tired during the day.  I have encouraged him to use the CPAP more regularly and adequately.  His insomnia could be secondary to sleep apnea.  If it tests not respond to adequate CPAP therapy, he may benefit from doxepin.  He has hypertension and is currently on treatment with valsartan.  His blood pressure was found to be elevated in the office today and I advised him to contact his primary care physician for adjustment of his antihypertensive medication.            The following  "portions of the patient's history were reviewed and updated as appropriate: allergies, current medications, past family history, past medical history, past social history, past surgical history, and problem list.    Review of Systems   Constitutional:  Negative for chills, fatigue and fever.   HENT:  Positive for rhinorrhea. Negative for hearing loss, sneezing and trouble swallowing.    Eyes:  Negative for visual disturbance.   Respiratory:  Negative for cough, shortness of breath and wheezing.    Cardiovascular:  Negative for chest pain, palpitations and leg swelling.   Gastrointestinal:  Negative for abdominal pain, constipation, diarrhea, nausea and vomiting.   Genitourinary:  Negative for dysuria, frequency and urgency.   Musculoskeletal:  Positive for back pain. Negative for arthralgias.   Skin:  Negative for rash.   Allergic/Immunologic: Positive for environmental allergies.   Neurological:  Negative for dizziness, syncope, light-headedness and headaches.   Psychiatric/Behavioral:  Positive for sleep disturbance. Negative for agitation and confusion. The patient is not nervous/anxious.          Objective:      /100 (BP Location: Right arm, Patient Position: Sitting)   Pulse 80   Temp 99.1 °F (37.3 °C) (Tympanic)   Ht 5' 7\" (1.702 m)   Wt 80 kg (176 lb 6.4 oz)   SpO2 98%   BMI 27.63 kg/m²          Physical Exam  Vitals reviewed.   Constitutional:       General: He is not in acute distress.     Appearance: He is not ill-appearing, toxic-appearing or diaphoretic.   HENT:      Head: Normocephalic.      Mouth/Throat:      Mouth: Mucous membranes are moist.   Eyes:      General: No scleral icterus.     Conjunctiva/sclera: Conjunctivae normal.   Cardiovascular:      Rate and Rhythm: Normal rate and regular rhythm.      Heart sounds: Normal heart sounds. No murmur heard.  Pulmonary:      Effort: Pulmonary effort is normal. No respiratory distress.      Breath sounds: Normal breath sounds. No stridor. No " wheezing, rhonchi or rales.   Abdominal:      General: Bowel sounds are normal.      Palpations: Abdomen is soft.      Tenderness: There is no abdominal tenderness. There is no guarding.   Musculoskeletal:      Cervical back: Neck supple. No rigidity.      Right lower leg: No edema.      Left lower leg: No edema.   Lymphadenopathy:      Cervical: No cervical adenopathy.   Skin:     Coloration: Skin is not jaundiced or pale.      Findings: No rash.   Neurological:      Mental Status: He is alert and oriented to person, place, and time.      Gait: Gait normal.   Psychiatric:         Mood and Affect: Mood normal.         Behavior: Behavior normal.         Thought Content: Thought content normal.         Judgment: Judgment normal.      I spent 30 minutes of time during care of this patient with complex medical issues.  The majority of this time was spent directly with the patient counseling as well as coordinating care,

## 2024-02-07 NOTE — ASSESSMENT & PLAN NOTE
Mr. Rik Vega Jr has obstructive sleep apnea.. He had a repeat sleep study in view of his persistent insomnia on Feb 15 2019 and it showed moderate obstructive sleep apnea with oxygen desaturation and sleep fragmentation. During the same study it was determined that his sleep apnea can be treated with CPAP 7 cm of water. He was subsequently started on CPAP therapy and found it beneficial. he felt less tired and sleepy during the day. His sleep was also less interrupted. 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 The snoring and reported apneic events had resolved. He had hypertension as comorbidity. His CPAP compliance has been poor.   I have highlighted to him the need for using CPAP regularly and the problems with untreated sleep apnea. He also has significant insomnia. I have instructed regarding sleep hygiene  I counseled him regarding alcohol use.  I ordered a mask fit as he is having problems with the mask now.  I had a long discussion with him and answered all his questions.  We will consider him also for a repeat sleep study if he continues to have symptoms.

## 2024-02-07 NOTE — ASSESSMENT & PLAN NOTE
He had mediastinal lymphadenopathy diagnosed in 2017 and had underwent bronchoscopy and biopsy according to him.  He was put on prednisone for few months by his pulmonologist at that time.  His CT scan of the chest had shown  multiple lymphadenopathy including mediastinal and supraclavicular.  He was found to have right posterior neck lymphadenopathy and underwent biopsy which did not show any evidence of malignancy.  In April 2020 also he underwent EBUS biopsy for mediastinal lymphadenopathy which was FDG avid.  The results from this biopsy were also not suggestive of any malignancy.  He follows with Dr. Hagan now.  He denies any weight loss or anorexia or fever or chills.

## 2024-02-08 DIAGNOSIS — I10 PRIMARY HYPERTENSION: ICD-10-CM

## 2024-02-08 RX ORDER — HYDRALAZINE HYDROCHLORIDE 10 MG/1
TABLET, FILM COATED ORAL
Qty: 180 TABLET | Refills: 1 | Status: SHIPPED | OUTPATIENT
Start: 2024-02-08 | End: 2024-02-12

## 2024-02-12 ENCOUNTER — OFFICE VISIT (OUTPATIENT)
Dept: FAMILY MEDICINE CLINIC | Facility: CLINIC | Age: 64
End: 2024-02-12
Payer: COMMERCIAL

## 2024-02-12 VITALS
HEIGHT: 67 IN | SYSTOLIC BLOOD PRESSURE: 160 MMHG | OXYGEN SATURATION: 97 % | BODY MASS INDEX: 27.62 KG/M2 | DIASTOLIC BLOOD PRESSURE: 100 MMHG | HEART RATE: 86 BPM | WEIGHT: 176 LBS

## 2024-02-12 DIAGNOSIS — R59.0 MEDIASTINAL LYMPHADENOPATHY: ICD-10-CM

## 2024-02-12 DIAGNOSIS — Z13.29 SCREENING FOR THYROID DISORDER: ICD-10-CM

## 2024-02-12 DIAGNOSIS — D69.6 PLATELETS DECREASED (HCC): ICD-10-CM

## 2024-02-12 DIAGNOSIS — I10 PRIMARY HYPERTENSION: Primary | ICD-10-CM

## 2024-02-12 DIAGNOSIS — G47.09 OTHER INSOMNIA: ICD-10-CM

## 2024-02-12 DIAGNOSIS — R03.0 ELEVATED BLOOD PRESSURE READING: ICD-10-CM

## 2024-02-12 DIAGNOSIS — R79.82 ELEVATED C-REACTIVE PROTEIN: ICD-10-CM

## 2024-02-12 DIAGNOSIS — K21.9 GASTROESOPHAGEAL REFLUX DISEASE WITHOUT ESOPHAGITIS: ICD-10-CM

## 2024-02-12 DIAGNOSIS — G47.33 OSA ON CPAP: ICD-10-CM

## 2024-02-12 DIAGNOSIS — R73.03 PREDIABETES: ICD-10-CM

## 2024-02-12 DIAGNOSIS — I10 PRIMARY HYPERTENSION: ICD-10-CM

## 2024-02-12 DIAGNOSIS — D17.0 LIPOMA OF NECK: ICD-10-CM

## 2024-02-12 DIAGNOSIS — Z13.0 SCREENING FOR DEFICIENCY ANEMIA: ICD-10-CM

## 2024-02-12 DIAGNOSIS — R70.0 ELEVATED SED RATE: ICD-10-CM

## 2024-02-12 DIAGNOSIS — E78.2 MIXED HYPERLIPIDEMIA: ICD-10-CM

## 2024-02-12 DIAGNOSIS — M10.9 GOUT OF MULTIPLE SITES, UNSPECIFIED CAUSE, UNSPECIFIED CHRONICITY: ICD-10-CM

## 2024-02-12 PROBLEM — M25.421 EFFUSION, RIGHT ELBOW: Status: RESOLVED | Noted: 2022-01-21 | Resolved: 2024-02-12

## 2024-02-12 PROBLEM — Z01.818 PREOPERATIVE CLEARANCE: Status: RESOLVED | Noted: 2023-01-10 | Resolved: 2024-02-12

## 2024-02-12 PROBLEM — U07.1 COVID-19 VIRUS INFECTION: Status: RESOLVED | Noted: 2021-01-21 | Resolved: 2024-02-12

## 2024-02-12 PROBLEM — L03.031 CELLULITIS OF GREAT TOE OF RIGHT FOOT: Status: RESOLVED | Noted: 2022-08-16 | Resolved: 2024-02-12

## 2024-02-12 PROBLEM — D72.819 LEUKOPENIA: Status: RESOLVED | Noted: 2023-06-06 | Resolved: 2024-02-12

## 2024-02-12 PROBLEM — R09.81 SINUS CONGESTION: Status: RESOLVED | Noted: 2023-06-13 | Resolved: 2024-02-12

## 2024-02-12 PROBLEM — H26.9 CATARACT OF RIGHT EYE: Status: RESOLVED | Noted: 2023-05-02 | Resolved: 2024-02-12

## 2024-02-12 PROBLEM — N17.9 AKI (ACUTE KIDNEY INJURY) (HCC): Status: RESOLVED | Noted: 2021-01-21 | Resolved: 2024-02-12

## 2024-02-12 PROBLEM — E87.6 HYPOKALEMIA: Status: RESOLVED | Noted: 2022-01-23 | Resolved: 2024-02-12

## 2024-02-12 PROBLEM — B35.1 TOENAIL FUNGUS: Status: RESOLVED | Noted: 2022-08-16 | Resolved: 2024-02-12

## 2024-02-12 PROBLEM — Z01.811 PREOP RESPIRATORY EXAM: Status: RESOLVED | Noted: 2021-01-11 | Resolved: 2024-02-12

## 2024-02-12 PROBLEM — Z98.890 HISTORY OF ELBOW SURGERY: Status: RESOLVED | Noted: 2018-10-10 | Resolved: 2024-02-12

## 2024-02-12 PROBLEM — Z09 FOLLOW-UP EXAM AFTER TREATMENT: Status: RESOLVED | Noted: 2022-08-14 | Resolved: 2024-02-12

## 2024-02-12 PROBLEM — L03.119 RECURRENT CELLULITIS OF LOWER EXTREMITY: Status: RESOLVED | Noted: 2022-01-19 | Resolved: 2024-02-12

## 2024-02-12 PROCEDURE — 99204 OFFICE O/P NEW MOD 45 MIN: CPT | Performed by: FAMILY MEDICINE

## 2024-02-12 RX ORDER — NIFEDIPINE 30 MG/1
TABLET, EXTENDED RELEASE ORAL
Qty: 90 TABLET | Refills: 0 | Status: SHIPPED | OUTPATIENT
Start: 2024-02-12

## 2024-02-12 RX ORDER — NIFEDIPINE 30 MG/1
30 TABLET, EXTENDED RELEASE ORAL DAILY
Qty: 30 TABLET | Refills: 0 | Status: SHIPPED | OUTPATIENT
Start: 2024-02-12 | End: 2024-02-12

## 2024-02-12 NOTE — PATIENT INSTRUCTIONS
Please start taking nifedipine 30 mg daily.  I have placed him information about the medication below just in case he would like to review it.    Please discontinue the hydralazine.  I do not believe that this is helping you to the best of his ability.    Please continue the valsartan 80 mg for right now.  Hopefully the new medication will be synergistic with it to help further reduce blood pressure.    Please obtain the labs that I have ordered for you.  These should be fasting, no food or drink except for water for 8 to 12 hours before.  The easiest way to do this is to roll out of bed and go in the morning.    I will follow-up with you over the phone for your labs unless there is a lot going on, then I might have you come back sooner just to check in and see how you are doing.    Please continue to take your blood pressure daily.  Can be at different times during the day it does not matter but write them down so I can give you a call next week to review them with the new medication.  If for any reason you have blood pressure problems or symptoms please do not hesitate to reach out to our office at [phone]    Nifedipine (By mouth)   Nifedipine ([id number])  Treats high blood pressure and angina (chest pain). This medicine is a calcium channel blocker.   Brand Name(s): Afeditab CR, Procardia, Procardia XL   There may be other brand names for this medicine.  When This Medicine Should Not Be Used:   This medicine is not right for everyone. Do not use it if you had an allergic reaction to nifedipine.  How to Use This Medicine:   Capsule, Liquid Filled Capsule, Long Acting Tablet  Take your medicine as directed. Your dose may need to be changed several times to find what works best for you.  It is best to take this medicine on an empty stomach.  Swallow the capsule or tablet whole. Do not break, crush, or chew it.  If you take the extended-release tablet, part of the tablet may pass into your stools. This is  normal and is nothing to worry about.  Missed dose: Take a dose as soon as you remember. If it is almost time for your next dose, wait until then and take a regular dose. Do not take extra medicine to make up for a missed dose.  Store the medicine in a closed container at room temperature, away from heat, moisture, and direct light.  Drugs and Foods to Avoid:   Ask your doctor or pharmacist before using any other medicine, including over-the-counter medicines, vitamins, and herbal products.  Some foods and medicines can affect how nifedipine works. Tell your doctor if you are using the following:   Cimetidine, clarithromycin, digoxin, erythromycin, fentanyl, fluconazole, fluoxetine, indinavir, itraconazole, nefazodone, nelfinavir, phenytoin, quinidine, ranitidine, saquinavir  Beta-blockers  Blood thinner (such as warfarin)  Do not eat grapefruit or drink grapefruit juice while you are using this medicine.  Warnings While Using This Medicine:   Tell your doctor if you are pregnant or breastfeeding, or if you have stomach problems, heart failure, coronary artery disease, or recently had a heart attack.  This medicine may cause the following problems:   Heart failure  Worsening chest pain or increased risk of heart attack  Stomach or bowel blockage or ulcers  This medicine could lower your blood pressure too much, especially when you first use it or if you are dehydrated. Stand or sit up slowly if you feel lightheaded or dizzy.  Do not stop using this medicine without asking your doctor, even if you feel well. This medicine will not cure high blood pressure, but it will help keep it in normal range. You may have to take blood pressure medicine for the rest of your life.  Tell any doctor or dentist who treats you that you are using this medicine. You may need to stop using this medicine several days before you have surgery or medical tests.  Tell any doctor or dentist who treats you that you are using this medicine.  This medicine may affect certain medical test results.  Your doctor will do lab tests at regular visits to check on the effects of this medicine. Keep all appointments.  Keep all medicine out of the reach of children. Never share your medicine with anyone.  Possible Side Effects While Using This Medicine:   Call your doctor right away if you notice any of these side effects:  Allergic reaction: Itching or hives, swelling in your face or hands, swelling or tingling in your mouth or throat, chest tightness, trouble breathing  Blistering, peeling, red skin rash  Chest pain that may spread, trouble breathing, nausea, unusual sweating, fainting  Fast, pounding, or uneven heartbeat  Lightheadedness, dizziness, or fainting  Rapid weight gain, swelling in your hands, ankles, or feet  Stomach pain or bloating, nausea, vomiting, weight loss  If you notice these less serious side effects, talk with your doctor:   Headache  Muscle cramps or tremors  Warmth or redness in your face, neck, arms, or upper chest  If you notice other side effects that you think are caused by this medicine, tell your doctor.   Call your doctor for medical advice about side effects. You may report side effects to FDA at 9-996-FDA-3388  © Copyright [employer] 2023 Information is for End User's use only and may not be sold, redistributed or otherwise used for commercial purposes.  The above information is an  only. It is not intended as medical advice for individual conditions or treatments. Talk to your doctor, nurse or pharmacist before following any medical regimen to see if it is safe and effective for you.

## 2024-02-12 NOTE — PROGRESS NOTES
New Patient Outpatient Note    HPI:     Rik Vega Jr., 63 y.o. male  presents today as a new patient to network over on [facility].  The patient is currently on several medications for blood pressure including hydralazine 10 mg twice daily and valsartan 80 mg daily.  He has discontinued many of the other medications that were previously prescribed, these were unnecessary and he did not feel that they were helping him in any way.  He is currently using his CPAP, but continues to have issues with insomnia.  He previously was scheduled for a different type of sleep study that would look at other parasomnias outside of JADYN.  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  Even during the day when he attempts to take naps, it is quiet, there is no other distractions and he still finds it difficult.  He feels that this is directly affecting his blood pressure as well.    Family Hx  UTD in chart    Past Medical History:   Diagnosis Date    Acute gout of right elbow 01/23/2022    Arthritis     Asthma     Back pain     Cataract of right eye 05/02/2023    Cellulitis of great toe of right foot 08/16/2022    Colon polyp     COVID-19 01/21/2021 1/11/2021    COVID-19 virus infection 01/21/2021 1/11/2021    CPAP (continuous positive airway pressure) dependence     Effusion, right elbow 01/21/2022    Gastroesophageal reflux disease without esophagitis 07/16/2020    GERD (gastroesophageal reflux disease)     Gout of multiple sites 07/16/2020    Gout of multiple sites 07/16/2020    Hiatal hernia     High blood pressure     History of elbow surgery 10/10/2018    Hypertension     Impaired fasting glucose 06/18/2020    Leukopenia 06/06/2023    Lipoma of neck     Mediastinal lymphadenopathy     Osteoporosis     Pneumonia due to COVID-19 virus 01/25/2021    Recurrent cellulitis of lower extremity 01/19/2022     Right hand pain 07/16/2020    Sleep apnea     Sleep disorder         Past Surgical History:   Procedure Laterality Date    BACK SURGERY      COLONOSCOPY  10/07/2016    5 years (per dominique)    COLONOSCOPY      EGD  03/31/2017    ELBOW SURGERY Left 04/09/2018    open arthrotomy, capsulectomy, loose body removal (per dominique)    HERNIA REPAIR  01/16/2019    umbilical with mesh (per dominique)    LUMBAR FUSION  07/11/2018    DC BRNCC INCL FLUOR GDNCE DX W/CELL WASHG SPX N/A 04/28/2020    Procedure: BRONCHOSCOPY FLEXIBLE;  Surgeon: Patrick Sams MD;  [facility];  Service: Thoracic    DC BRONCHOSCOPY NEEDLE BX TRACHEA MAIN STEM&/BRON N/A 04/28/2020    Procedure: ENDOBRONCHIAL ULTRASOUND (EBUS) WITH BIOPSY;  Surgeon: Patrick Sams MD;  [facility];  Service: Thoracic    REPLACEMENT TOTAL KNEE Right     ROTATOR CUFF REPAIR Bilateral     TONSILLECTOMY  01/01/1980    UPPER GASTROINTESTINAL ENDOSCOPY            Current Outpatient Medications:     NIFEdipine (PROCARDIA XL) 30 mg 24 hr tablet, Take 1 tablet (30 mg total) by mouth daily, Disp: 30 tablet, Rfl: 0    valsartan (DIOVAN) 80 mg tablet, Take 0.5 tablets (40 mg total) by mouth daily, Disp: 90 tablet, Rfl: 3    fluticasone (FLONASE) 50 mcg/act nasal spray, 2 sprays into each nostril as needed for rhinitis, Disp: 15.8 mL, Rfl: 0    ibuprofen (MOTRIN) 800 mg tablet, Take 1 tablet (800 mg total) by mouth every 8 (eight) hours as needed for mild pain or moderate pain (Right foot great toe pain), Disp: 30 tablet, Rfl: 0     SOCIAL:   Currently living with: Wife, children x3  Stable food: Yes  Safe At Home: Yes  Hobbies:  Music, sports  Profession/ employment: retired    SEXUAL HISTORY:   Preference: Women  Sexually Active:  Yes  Birth Control:  None    Psychological History  Psychiatric history: None  History of inpatient:  None  Current Therapy/ Provider:  none  Current Medications:  None    Substance History  Smoking: former  Alcohol Use: 10  drinks per week  Substance use:  none      ROS:   Review of Systems   Constitutional:  Negative for chills, fever and unexpected weight change.   HENT:  Positive for postnasal drip and sinus pressure. Negative for congestion, ear discharge, ear pain, hearing loss, rhinorrhea, sinus pain and sore throat.    Eyes:  Positive for visual disturbance (reading, cheaters).   Respiratory:  Positive for cough (intermittent). Negative for chest tightness and shortness of breath.    Cardiovascular:  Positive for palpitations (intermittent). Negative for chest pain.   Gastrointestinal:  Negative for abdominal pain, constipation, diarrhea, nausea and vomiting.   Genitourinary:  Negative for dysuria and frequency.   Skin:  Negative for rash and wound.   Neurological:  Negative for dizziness, light-headedness and headaches.   Psychiatric/Behavioral:  Negative for self-injury and suicidal ideas.       OBJECTIVE  Vitals:    02/12/24 0921   BP: 160/100   Pulse: 86   SpO2: 97%        Physical Exam  Constitutional:       General: He is not in acute distress.     Appearance: Normal appearance. He is not ill-appearing, toxic-appearing or diaphoretic.   HENT:      Head: Normocephalic and atraumatic.      Right Ear: Tympanic membrane, ear canal and external ear normal. There is no impacted cerumen.      Left Ear: Tympanic membrane, ear canal and external ear normal. There is no impacted cerumen.      Nose: Nose normal. No congestion or rhinorrhea.      Mouth/Throat:      Mouth: Mucous membranes are moist.      Pharynx: Oropharynx is clear. No oropharyngeal exudate or posterior oropharyngeal erythema.   Eyes:      General:         Right eye: No discharge.         Left eye: No discharge.      Conjunctiva/sclera: Conjunctivae normal.      Pupils: Pupils are equal, round, and reactive to light.   Cardiovascular:      Rate and Rhythm: Normal rate and regular rhythm.      Heart sounds: Normal heart sounds. No murmur heard.     No friction rub. No  gallop.   Pulmonary:      Effort: Pulmonary effort is normal. No respiratory distress.      Breath sounds: Normal breath sounds. No stridor. No wheezing, rhonchi or rales.   Abdominal:      General: Bowel sounds are normal. There is no distension.      Palpations: Abdomen is soft.      Tenderness: There is no abdominal tenderness.   Musculoskeletal:      Cervical back: Neck supple. No tenderness.   Lymphadenopathy:      Cervical: No cervical adenopathy.   Skin:     General: Skin is warm.      Coloration: Skin is not jaundiced or pale.      Findings: No bruising, erythema or lesion.   Neurological:      Mental Status: He is alert.      Sensory: No sensory deficit (light touch in all 4 extremities).      Motor: No weakness (5/5 in all 4 extremities).      Deep Tendon Reflexes: Reflexes normal (2/4 intact, C5, C6, L4, S1).            ASSESSMENT AND PLAN   Rik was seen today for establish care.    Diagnoses and all orders for this visit:    Primary hypertension  Elevated blood pressure reading  Patient presents with elevated blood pressure.  Will discontinue hydralazine secondary to ineffectiveness with Diovan to adequately treat his symptoms.  Will switch hydralazine with nifedipine 30 mg daily.  He is to monitor his blood pressure at home and inform us of any new or worsening conditions.  He is not having any headache, nausea, vomiting, lightheadedness, dizziness, changes to vision.    -     Comprehensive metabolic panel; Future  -     NIFEdipine (PROCARDIA XL) 30 mg 24 hr tablet; Take 1 tablet (30 mg total) by mouth daily    Gout of multiple sites, unspecified cause, unspecified chronicity   patient has a history of gout in multiple sites.  He was taking allopurinol at the first sign of exacerbation.  This could precipitate the symptoms becoming worse.  Recommended indomethacin or colchicine due to normal kidney function.  He is to call with any new symptoms    JADYN on CPAP  Mediastinal lymphadenopathy  Other  insomnia  Following with pulmonology.  They are actively titrating his CPAP, and will need to follow-up with sleep medicine for possible other parasomnia.  He has a history of mediastinal lymphadenopathy that was previously biopsied.  He is pulmonology to follow.    Gastroesophageal reflux disease without esophagitis  Patient has been monitoring his oral intake.  With avoiding specific triggers and eating only foods that agree with him he has avoided any further complications.    Prediabetes  History of hemoglobin A1c elevation into the prediabetic range.Will follow-up with fasting sugar and A1c.  -     Hemoglobin A1C; Future  -     Comprehensive metabolic panel; Future    Elevated sed rate  Elevated C-reactive protein  Unknown cause for elevated sed rate and CRP.  Patient previously had inflammation secondary to meat consumption and lactose.  Will follow-up since he has performed a restrictive diet.  -     Sedimentation rate, automated; Future  -     C-reactive protein; Future    Screening for deficiency anemia  Screening for thyroid disorder  Recommend screening for iron deficiency anemia and thyroid dysfunction.   -     CBC and differential; Future  -     TSH, 3rd generation with Free T4 reflex; Future    Lipoma of neck  Mixed hyperlipidemia  Patient has known lipoma of the neck.  Has not been getting any bigger nor is it causing any symptoms.  Will continue to monitor Blood cholesterol and treat appropriately.  If the lipoma get enlarges and starts to cause symptoms, will recommend removal.  -     Lipid panel; Future    Platelets decreased (HCC)  No history of decreased platelets.  Recommended follow-up in case it was suppressed due to virus or other etiology.             DO Dahiana Santiago [facility]  2/12/2024 1:13 PM

## 2024-02-13 LAB
DME PARACHUTE DELIVERY DATE ACTUAL: NORMAL
DME PARACHUTE DELIVERY DATE REQUESTED: NORMAL
DME PARACHUTE ITEM DESCRIPTION: NORMAL
DME PARACHUTE ORDER STATUS: NORMAL
DME PARACHUTE SUPPLIER NAME: NORMAL
DME PARACHUTE SUPPLIER PHONE: NORMAL

## 2024-02-19 ENCOUNTER — TELEPHONE (OUTPATIENT)
Age: 64
End: 2024-02-19

## 2024-03-08 DIAGNOSIS — J30.89 ENVIRONMENTAL AND SEASONAL ALLERGIES: ICD-10-CM

## 2024-03-08 RX ORDER — FLUTICASONE PROPIONATE 50 MCG
2 SPRAY, SUSPENSION (ML) NASAL AS NEEDED
Qty: 16 G | Refills: 5 | Status: SHIPPED | OUTPATIENT
Start: 2024-03-08 | End: 2024-03-08

## 2024-03-08 RX ORDER — FLUTICASONE PROPIONATE 50 MCG
SPRAY, SUSPENSION (ML) NASAL
Qty: 48 G | Refills: 1 | Status: SHIPPED | OUTPATIENT
Start: 2024-03-08

## 2024-03-08 NOTE — TELEPHONE ENCOUNTER
Reason for call:   [x] Refill   [] Prior Auth  [] Other:     Office:   [x] PCP/Provider -   [] Specialty/Provider -     Medication:   Fluticasone 50mcg nasal spray- 2 sprays into each nostril as needed for rhinitis     [facility]: [facility]    Does the patient have enough for 3 days?   [] Yes   [x] No - Send as HP to POD

## 2024-03-21 DIAGNOSIS — R03.0 ELEVATED BLOOD PRESSURE READING: ICD-10-CM

## 2024-03-21 DIAGNOSIS — I10 PRIMARY HYPERTENSION: ICD-10-CM

## 2024-03-21 RX ORDER — NIFEDIPINE 30 MG/1
30 TABLET, EXTENDED RELEASE ORAL DAILY
Qty: 90 TABLET | Refills: 1 | Status: SHIPPED | OUTPATIENT
Start: 2024-03-21

## 2024-03-21 NOTE — TELEPHONE ENCOUNTER
Reason for call:   [x] Refill   [] Prior Auth  [] Other:     Office:   [x] PCP/Provider -   [] Specialty/Provider -     Medication: Procardia    Dose/Frequency: 30 mg 24 hr tablet    Quantity: #90    [facility]    Does the patient have enough for 3 days?   [] Yes   [x] No - Send as HP to POD

## 2024-03-29 ENCOUNTER — TELEPHONE (OUTPATIENT)
Dept: SLEEP CENTER | Facility: CLINIC | Age: 64
End: 2024-03-29

## 2024-03-29 NOTE — TELEPHONE ENCOUNTER
Spoke with patient regarding scheduling a sleep medicine consult- patient stated he seeing Dr. Bryant to manage his JADYN but would like to switch doctors because he feels his insomnia is not being managed.   I advised patient I would look into scheduling about an appointment, I then left a voicemail for patient with upcoming appointments at [place] and [place].

## 2024-04-03 ENCOUNTER — TELEPHONE (OUTPATIENT)
Age: 64
End: 2024-04-03

## 2024-04-03 DIAGNOSIS — I10 PRIMARY HYPERTENSION: ICD-10-CM

## 2024-04-03 RX ORDER — VALSARTAN 80 MG/1
80 TABLET ORAL DAILY
Qty: 90 TABLET | Refills: 0 | Status: SHIPPED | OUTPATIENT
Start: 2024-04-03

## 2024-04-03 NOTE — TELEPHONE ENCOUNTER
I have sent 90-day prescription of the 80 mg tablets over to the pharmacy.  He requires follow-up in the office, we recently changed medication and he will need to at least follow-up within the next 8 to 12 weeks.  Please have patient's schedule within this timeframe and inform him that I have sent over the proper dosage for 90 days.  Thank you.

## 2024-04-03 NOTE — TELEPHONE ENCOUNTER
Patient called the RX Refill Line. Message is being forwarded to the office.     Patient is requesting a new script for   valsartan (DIOVAN) 80 mg tablet Take 0.5 tablets (40 mg total) by mouth daily.  He was given a year supply on 05/12/2023 but he was taking a whole pill instead of a half a pill and ran out.  He wants a new script with directions as 1 pill (not half) sent to [facility].         Please contact patient at  [phone] with any questions.

## 2024-05-15 ENCOUNTER — RA CDI HCC (OUTPATIENT)
Dept: OTHER | Facility: HOSPITAL | Age: 64
End: 2024-05-15

## 2024-07-09 DIAGNOSIS — I10 PRIMARY HYPERTENSION: ICD-10-CM

## 2024-07-09 RX ORDER — VALSARTAN 80 MG/1
80 TABLET ORAL DAILY
Qty: 90 TABLET | Refills: 0 | Status: SHIPPED | OUTPATIENT
Start: 2024-07-09

## 2024-07-26 ENCOUNTER — APPOINTMENT (OUTPATIENT)
Dept: RADIOLOGY | Facility: CLINIC | Age: 64
End: 2024-07-26
Payer: COMMERCIAL

## 2024-07-26 ENCOUNTER — OFFICE VISIT (OUTPATIENT)
Dept: URGENT CARE | Facility: CLINIC | Age: 64
End: 2024-07-26
Payer: COMMERCIAL

## 2024-07-26 ENCOUNTER — APPOINTMENT (OUTPATIENT)
Dept: URGENT CARE | Facility: CLINIC | Age: 64
End: 2024-07-26
Payer: COMMERCIAL

## 2024-07-26 VITALS
OXYGEN SATURATION: 97 % | TEMPERATURE: 98.4 F | DIASTOLIC BLOOD PRESSURE: 79 MMHG | HEART RATE: 84 BPM | SYSTOLIC BLOOD PRESSURE: 166 MMHG | RESPIRATION RATE: 18 BRPM

## 2024-07-26 DIAGNOSIS — S29.011A MUSCLE STRAIN OF CHEST WALL, INITIAL ENCOUNTER: Primary | ICD-10-CM

## 2024-07-26 DIAGNOSIS — R07.81 RIB PAIN ON RIGHT SIDE: ICD-10-CM

## 2024-07-26 PROCEDURE — 71101 X-RAY EXAM UNILAT RIBS/CHEST: CPT

## 2024-07-26 RX ORDER — LIDOCAINE 50 MG/G
1 PATCH TOPICAL DAILY
Qty: 10 PATCH | Refills: 0 | Status: SHIPPED | OUTPATIENT
Start: 2024-07-26

## 2024-07-26 NOTE — PROGRESS NOTES
[facility]        NAME: Rik Vega Jr. is a 63 y.o. male  : 1960    MRN: [id number]  DATE: 2024  TIME: 1:21 PM    There were no vitals taken for this visit.    Assessment and Plan   No primary diagnosis found.  No diagnosis found.      Patient Instructions       Follow up with PCP in 3-5 days.  Proceed to  ER if symptoms worsen.    Chief Complaint   No chief complaint on file.        History of Present Illness       Pt with 4-5 weeks of right lower rib pain ,  no known trauma         Review of Systems   Review of Systems   Constitutional: Negative.    HENT: Negative.     Eyes: Negative.    Respiratory: Negative.     Cardiovascular: Negative.    Gastrointestinal: Negative.    Endocrine: Negative.    Genitourinary: Negative.    Musculoskeletal: Negative.    Skin: Negative.    Allergic/Immunologic: Negative.    Neurological: Negative.    Hematological: Negative.    Psychiatric/Behavioral: Negative.     All other systems reviewed and are negative.        Current Medications       Current Outpatient Medications:     fluticasone (FLONASE) 50 mcg/act nasal spray, INSTILL 2 SPRAYS INTO EACH NOSTRIL DAILY AS NEEDED, Disp: 48 g, Rfl: 1    ibuprofen (MOTRIN) 800 mg tablet, Take 1 tablet (800 mg total) by mouth every 8 (eight) hours as needed for mild pain or moderate pain (Right foot great toe pain), Disp: 30 tablet, Rfl: 0    NIFEdipine (PROCARDIA XL) 30 mg 24 hr tablet, Take 1 tablet (30 mg total) by mouth daily, Disp: 90 tablet, Rfl: 1    valsartan (DIOVAN) 80 mg tablet, TAKE 1 TABLET(80 MG) BY MOUTH DAILY, Disp: 90 tablet, Rfl: 0    Current Allergies     Allergies as of 2024 - Reviewed 2024   Allergen Reaction Noted    Nuts - food allergy Shortness Of Breath 2018    Peanut oil - food allergy Anaphylaxis 2018    Caffeine - food allergy Other (See Comments) 2021    Codeine Other (See Comments) 2018    Dust mite extract Sneezing and Other (See Comments)  01/16/2019    Latex Rash 08/07/2017    Pollen extract Sneezing and Other (See Comments) 06/09/2021    Pork-derived products - food allergy GI Intolerance 09/21/2022    Lactose - food allergy GI Intolerance 09/01/2023            The following portions of the patient's history were reviewed and updated as appropriate: allergies, current medications, past family history, past medical history, past social history, past surgical history and problem list.     Past Medical History:   Diagnosis Date    Acute gout of right elbow 01/23/2022    Arthritis     Asthma     Back pain     Cataract of right eye 05/02/2023    Cellulitis of great toe of right foot 08/16/2022    Colon polyp     COVID-19 01/21/2021 1/11/2021    COVID-19 virus infection 01/21/2021 1/11/2021    CPAP (continuous positive airway pressure) dependence     Effusion, right elbow 01/21/2022    Gastroesophageal reflux disease without esophagitis 07/16/2020    GERD (gastroesophageal reflux disease)     Gout of multiple sites 07/16/2020    Gout of multiple sites 07/16/2020    Hiatal hernia     High blood pressure     History of elbow surgery 10/10/2018    Hypertension     Impaired fasting glucose 06/18/2020    Leukopenia 06/06/2023    Lipoma of neck     Mediastinal lymphadenopathy     Osteoporosis     Pneumonia due to COVID-19 virus 01/25/2021    Recurrent cellulitis of lower extremity 01/19/2022    Right hand pain 07/16/2020    Sleep apnea     Sleep disorder        Past Surgical History:   Procedure Laterality Date    BACK SURGERY      COLONOSCOPY  10/07/2016    5 years (per dominique)    COLONOSCOPY      EGD  03/31/2017    ELBOW SURGERY Left 04/09/2018    open arthrotomy, capsulectomy, loose body removal (per dominique)    HERNIA REPAIR  01/16/2019    umbilical with mesh (per dominique)    LUMBAR FUSION  07/11/2018    UT BRNCHSC INCL FLUOR GDNCE DX W/CELL WASHG SPX N/A 04/28/2020    Procedure: BRONCHOSCOPY FLEXIBLE;  Surgeon: Patrick Sams MD;  Location: [facility]  OR;  Service: Thoracic    [place] BRONCHOSCOPY NEEDLE BX TRACHEA MAIN STEM&/BRON N/A 04/28/2020    Procedure: ENDOBRONCHIAL ULTRASOUND (EBUS) WITH BIOPSY;  Surgeon: Patrick Sams MD;  [facility];  Service: Thoracic    REPLACEMENT TOTAL KNEE Right     ROTATOR CUFF REPAIR Bilateral     TONSILLECTOMY  01/01/1980    UPPER GASTROINTESTINAL ENDOSCOPY         Family History   Problem Relation Age of Onset    Hypertension Mother     Arthritis Mother     Hypertension Father     Arthritis Father     Colon cancer Neg Hx     Testicular cancer Neg Hx     Prostate cancer Neg Hx          Medications have been verified.        Objective   There were no vitals taken for this visit.       Physical Exam     Physical Exam  Vitals and nursing note reviewed.   Constitutional:       Appearance: Normal appearance. He is normal weight.   HENT:      Head: Normocephalic and atraumatic.   Cardiovascular:      Rate and Rhythm: Normal rate and regular rhythm.      Pulses: Normal pulses.      Heart sounds: Normal heart sounds.   Pulmonary:      Effort: Pulmonary effort is normal.      Breath sounds: Normal breath sounds.      Comments: Right lower rib  mid clavicular line pain  no swelling no ecchymosis   Abdominal:      General: Bowel sounds are normal.      Palpations: Abdomen is soft.      Comments: No cva pain no ruq pain    Musculoskeletal:         General: Normal range of motion.      Cervical back: Normal range of motion and neck supple.   Skin:     General: Skin is warm.   Neurological:      Mental Status: He is alert and oriented to person, place, and time.

## 2024-09-24 DIAGNOSIS — I10 PRIMARY HYPERTENSION: ICD-10-CM

## 2024-09-24 DIAGNOSIS — R03.0 ELEVATED BLOOD PRESSURE READING: ICD-10-CM

## 2024-09-24 RX ORDER — NIFEDIPINE 30 MG/1
30 TABLET, EXTENDED RELEASE ORAL DAILY
Qty: 90 TABLET | Refills: 0 | Status: SHIPPED | OUTPATIENT
Start: 2024-09-24

## 2024-09-24 NOTE — TELEPHONE ENCOUNTER
Patient been out of medication since yesterday.    Reason for call:   [x] Refill   [] Prior Auth  [] Other:     Office:   [x] PCP/Provider -   [] Specialty/Provider -     Medication:  NIFEdipine (PROCARDIA XL) 30 mg 24 hr tablet    Dose/Frequency:  Take 1 tablet (30 mg total) by mouth daily,     Quantity: 90    Pharmacy: [id number] [id number]      Does the patient have enough for 3 days?   [] Yes   [x] No - Send as HP to POD

## 2024-09-24 NOTE — TELEPHONE ENCOUNTER
Patient needs an appointment. Please contact the patient to schedule an appointment. Last office visit:

## 2024-09-26 ENCOUNTER — OFFICE VISIT (OUTPATIENT)
Dept: GASTROENTEROLOGY | Facility: CLINIC | Age: 64
End: 2024-09-26
Payer: COMMERCIAL

## 2024-09-26 VITALS
HEART RATE: 79 BPM | BODY MASS INDEX: 27.97 KG/M2 | HEIGHT: 67 IN | SYSTOLIC BLOOD PRESSURE: 167 MMHG | DIASTOLIC BLOOD PRESSURE: 97 MMHG | WEIGHT: 178.2 LBS

## 2024-09-26 DIAGNOSIS — K21.9 HIATAL HERNIA WITH GERD: ICD-10-CM

## 2024-09-26 DIAGNOSIS — R13.19 ESOPHAGEAL DYSPHAGIA: Primary | ICD-10-CM

## 2024-09-26 DIAGNOSIS — K44.9 HIATAL HERNIA WITH GERD: ICD-10-CM

## 2024-09-26 PROCEDURE — 99214 OFFICE O/P EST MOD 30 MIN: CPT | Performed by: PHYSICIAN ASSISTANT

## 2024-09-26 RX ORDER — OMEPRAZOLE 40 MG/1
40 CAPSULE, DELAYED RELEASE ORAL DAILY
Qty: 30 CAPSULE | Refills: 5 | Status: SHIPPED | OUTPATIENT
Start: 2024-09-26

## 2024-09-26 NOTE — PATIENT INSTRUCTIONS
Scheduled date of EGD(as of today): 10/29/24  Physician performing EGD: Dr. Moscoso  Location of EGD: [facility]  Instructions reviewed with patient by: N.M.  Clearances: YVONNE

## 2024-09-26 NOTE — PROGRESS NOTES
"Ambulatory Visit  Name: Rik Vega Jr.      : 1960      MRN: [id number]  Encounter Provider: Ozzy Newsome PA-C  Encounter Date: 2024   Encounter department: [facility]    Assessment & Plan  Esophageal dysphagia  Patient appears to be experiencing recurrent symptoms of esophageal dysphagia which have been responsive to dilation therapy in the past, has also been off acid reducing therapy for some time, likely experiencing recurrent stricture at this time, and/or possibly reflux mediated globus sensation    -Plan for EGD    -Procedure was explained in detail to the patient at this time including associated risks and benefits, risks including but not limited to infection, perforation and bleeding    -Will also start back on PPI therapy for the meantime, sending prescription for omeprazole 40 mg once daily to patient's pharmacy    -Advised patient regarding dietary and lifestyle modification strategies for the mitigation of GERD    -Recommend exercising basic swallow precautions, chew food thoroughly, take small bites, pace himself with eating, exercise particular caution with meats, pastas, breads and rice.  Orders:    EGD; Future    omeprazole (PriLOSEC) 40 MG capsule; Take 1 capsule (40 mg total) by mouth daily    Hiatal hernia with GERD  See \"esophageal dysphagia\"; GERD is likely contributing to dysphagia process, he has been off PPI therapy for some time and in the long-term has been experiencing heartburn in addition to his dysphagia  Orders:    EGD; Future    omeprazole (PriLOSEC) 40 MG capsule; Take 1 capsule (40 mg total) by mouth daily      History of Present Illness     Rik Vega Jr. is a 64 y.o. male with history of hypertension who presents for follow-up, he was last seen by our service in  after he had had EGD done a couple of times for dysphagia, he had undergone dilation for dysphagia to solid foods.  Last EGD was September " 2022.    Patient says that lately he has been having issues again where it feels like his throat closes up with swallowing certain solid foods, particularly with rice, pasta and bread.  He does not have dysphagia to liquids.  Symptoms have been generally worsening lately, he denies any unintentional weight loss, or any abdominal pain.  Denies any nausea or vomiting.  He says he has been having more acid reflux and heartburn lately.  He is not taking any acid reducing medication at this time.  He feels like EGD dilation was temporarily helpful for his issues last time he was evaluated by us, but is hoping for a more long-term solution.      He otherwise denies any disturbances in his bowel habits or stool appearance including any rectal bleeding or melena.  His last colonoscopy in March 2022 saw the removal of 4 polyps for which a 5-year follow-up had been recommended.      Review of Systems   Constitutional:  Negative for activity change, appetite change, chills, fatigue, fever and unexpected weight change.   HENT:  Negative for congestion, nosebleeds, rhinorrhea, sore throat and trouble swallowing.    Eyes:  Negative for pain, itching and visual disturbance.   Respiratory:  Negative for cough, shortness of breath and wheezing.    Cardiovascular:  Negative for chest pain, palpitations and leg swelling.   Gastrointestinal: Negative.    Endocrine: Negative for cold intolerance, heat intolerance and polyuria.   Genitourinary:  Negative for difficulty urinating, dysuria, flank pain and hematuria.   Musculoskeletal:  Negative for arthralgias, back pain, myalgias and neck pain.   Skin:  Negative for color change, pallor and rash.   Neurological:  Negative for dizziness, speech difficulty, weakness, numbness and headaches.   Hematological:  Does not bruise/bleed easily.   Psychiatric/Behavioral:  Negative for dysphoric mood and sleep disturbance. The patient is not nervous/anxious.            Objective     /97 (BP  "Location: Left arm, Patient Position: Sitting, Cuff Size: Standard)   Pulse 79   Ht 5' 7\" (1.702 m)   Wt 80.8 kg (178 lb 3.2 oz)   BMI 27.91 kg/m²     Physical Exam  Constitutional:       General: He is not in acute distress.     Appearance: He is well-developed. He is not diaphoretic.   HENT:      Head: Normocephalic and atraumatic.   Eyes:      Conjunctiva/sclera: Conjunctivae normal.      Pupils: Pupils are equal, round, and reactive to light.   Cardiovascular:      Rate and Rhythm: Normal rate and regular rhythm.      Heart sounds: Normal heart sounds. No murmur heard.     No friction rub. No gallop.   Pulmonary:      Effort: Pulmonary effort is normal. No respiratory distress.      Breath sounds: Normal breath sounds. No stridor. No wheezing or rales.   Abdominal:      General: Bowel sounds are normal. There is no distension.      Palpations: Abdomen is soft. There is no mass.      Tenderness: There is no abdominal tenderness. There is no guarding or rebound.   Musculoskeletal:         General: No tenderness. Normal range of motion.      Cervical back: Normal range of motion and neck supple.   Skin:     General: Skin is warm and dry.      Coloration: Skin is not pale.      Findings: No erythema or rash.   Neurological:      Mental Status: He is alert and oriented to person, place, and time.   Psychiatric:         Behavior: Behavior normal.         "

## 2024-10-15 ENCOUNTER — ANESTHESIA (OUTPATIENT)
Dept: ANESTHESIOLOGY | Facility: HOSPITAL | Age: 64
End: 2024-10-15

## 2024-10-15 ENCOUNTER — ANESTHESIA EVENT (OUTPATIENT)
Dept: ANESTHESIOLOGY | Facility: HOSPITAL | Age: 64
End: 2024-10-15

## 2024-10-23 ENCOUNTER — TELEPHONE (OUTPATIENT)
Dept: GASTROENTEROLOGY | Facility: CLINIC | Age: 64
End: 2024-10-23

## 2024-10-23 NOTE — TELEPHONE ENCOUNTER
Left message confirming procedure for 10/29 with Dr. Moscoso. He will need a  and will be called day prior with arrival time. If he needs his procedure instructions, they are in his my chart. However, for this procedure he is to have nothing to eat after midnight Monday except he may have clear liquids up to 4 hours prior. Any questions, he may call.

## 2024-10-25 ENCOUNTER — TELEPHONE (OUTPATIENT)
Age: 64
End: 2024-10-25

## 2024-10-25 NOTE — TELEPHONE ENCOUNTER
Patient calling to see if he needs to fast for labs, advised per lipid panel fasting is required 10-12 hours or longer, nfa

## 2024-10-29 ENCOUNTER — ANESTHESIA EVENT (OUTPATIENT)
Dept: GASTROENTEROLOGY | Facility: AMBULARY SURGERY CENTER | Age: 64
End: 2024-10-29
Payer: COMMERCIAL

## 2024-10-29 ENCOUNTER — HOSPITAL ENCOUNTER (OUTPATIENT)
Dept: GASTROENTEROLOGY | Facility: AMBULARY SURGERY CENTER | Age: 64
Setting detail: OUTPATIENT SURGERY
Discharge: HOME | End: 2024-10-29
Attending: INTERNAL MEDICINE
Payer: COMMERCIAL

## 2024-10-29 VITALS
TEMPERATURE: 97.3 F | SYSTOLIC BLOOD PRESSURE: 163 MMHG | HEART RATE: 80 BPM | DIASTOLIC BLOOD PRESSURE: 84 MMHG | RESPIRATION RATE: 18 BRPM | OXYGEN SATURATION: 99 %

## 2024-10-29 DIAGNOSIS — K44.9 HIATAL HERNIA WITH GERD: ICD-10-CM

## 2024-10-29 DIAGNOSIS — K21.9 HIATAL HERNIA WITH GERD: ICD-10-CM

## 2024-10-29 DIAGNOSIS — R13.19 ESOPHAGEAL DYSPHAGIA: ICD-10-CM

## 2024-10-29 PROCEDURE — 88305 TISSUE EXAM BY PATHOLOGIST: CPT | Performed by: PATHOLOGY

## 2024-10-29 PROCEDURE — 43239 EGD BIOPSY SINGLE/MULTIPLE: CPT | Performed by: INTERNAL MEDICINE

## 2024-10-29 PROCEDURE — 43248 EGD GUIDE WIRE INSERTION: CPT | Performed by: INTERNAL MEDICINE

## 2024-10-29 RX ORDER — SODIUM CHLORIDE, SODIUM LACTATE, POTASSIUM CHLORIDE, CALCIUM CHLORIDE 600; 310; 30; 20 MG/100ML; MG/100ML; MG/100ML; MG/100ML
100 INJECTION, SOLUTION INTRAVENOUS CONTINUOUS
Status: DISCONTINUED | OUTPATIENT
Start: 2024-10-29 | End: 2024-11-02 | Stop reason: HOSPADM

## 2024-10-29 RX ORDER — LIDOCAINE HYDROCHLORIDE 20 MG/ML
INJECTION, SOLUTION EPIDURAL; INFILTRATION; INTRACAUDAL; PERINEURAL AS NEEDED
Status: DISCONTINUED | OUTPATIENT
Start: 2024-10-29 | End: 2024-10-29

## 2024-10-29 RX ORDER — PROPOFOL 10 MG/ML
INJECTION, EMULSION INTRAVENOUS AS NEEDED
Status: DISCONTINUED | OUTPATIENT
Start: 2024-10-29 | End: 2024-10-29

## 2024-10-29 RX ADMIN — PROPOFOL 20 MG: 10 INJECTION, EMULSION INTRAVENOUS at 12:26

## 2024-10-29 RX ADMIN — PROPOFOL 50 MG: 10 INJECTION, EMULSION INTRAVENOUS at 12:20

## 2024-10-29 RX ADMIN — PROPOFOL 50 MG: 10 INJECTION, EMULSION INTRAVENOUS at 12:24

## 2024-10-29 RX ADMIN — PROPOFOL 50 MG: 10 INJECTION, EMULSION INTRAVENOUS at 12:18

## 2024-10-29 RX ADMIN — LIDOCAINE HYDROCHLORIDE 80 MG: 20 INJECTION, SOLUTION EPIDURAL; INFILTRATION; INTRACAUDAL; PERINEURAL at 12:17

## 2024-10-29 RX ADMIN — SODIUM CHLORIDE, SODIUM LACTATE, POTASSIUM CHLORIDE, AND CALCIUM CHLORIDE: .6; .31; .03; .02 INJECTION, SOLUTION INTRAVENOUS at 12:14

## 2024-10-29 RX ADMIN — PROPOFOL 100 MG: 10 INJECTION, EMULSION INTRAVENOUS at 12:17

## 2024-10-29 RX ADMIN — PROPOFOL 50 MG: 10 INJECTION, EMULSION INTRAVENOUS at 12:22

## 2024-10-29 NOTE — ANESTHESIA PREPROCEDURE EVALUATION
Procedure:  EGD    Relevant Problems   ANESTHESIA (within normal limits)      CARDIO   (+) Hypertension      ENDO (within normal limits)      GI/HEPATIC   (+) Gastroesophageal reflux disease without esophagitis      MUSCULOSKELETAL   (+) DDD (degenerative disc disease), cervical   (+) Osteoarthritis of left elbow   (+) Spondylosis of cervical region without myelopathy or radiculopathy      PULMONARY   (+) JADYN on CPAP      Other   (+) Mediastinal lymphadenopathy        Physical Exam    Airway    Mallampati score: II  TM Distance: >3 FB  Neck ROM: full     Dental   No notable dental hx     Cardiovascular  Rhythm: regular, Rate: normal, Cardiovascular exam normal    Pulmonary  Pulmonary exam normal Breath sounds clear to auscultation    Other Findings        Anesthesia Plan  ASA Score- 2     Anesthesia Type- IV sedation with anesthesia with ASA Monitors.         Additional Monitors:     Airway Plan:            Plan Factors-Exercise tolerance (METS): >4 METS.    Chart reviewed.   Existing labs reviewed. Patient summary reviewed.    Patient is not a current smoker.              Induction-     Postoperative Plan-         Informed Consent- Anesthetic plan and risks discussed with patient.  I personally reviewed this patient with the CRNA. Discussed and agreed on the Anesthesia Plan with the CRNA..

## 2024-10-29 NOTE — H&P
History and Physical - [facility]  Rik Vega Jr. 64 y.o. male MRN: [id number]    HPI: Rik Vega Jr. is a 64 y.o. year old male who presents with GERD and dysphagia      Review of Systems    Historical Information   Past Medical History:   Diagnosis Date    Acute gout of right elbow 01/23/2022    Arthritis     Asthma     Back pain     Cataract of right eye 05/02/2023    Cellulitis of great toe of right foot 08/16/2022    Colon polyp     COVID-19 01/21/2021 1/11/2021    COVID-19 virus infection 01/21/2021 1/11/2021    CPAP (continuous positive airway pressure) dependence     Effusion, right elbow 01/21/2022    Gastroesophageal reflux disease without esophagitis 07/16/2020    GERD (gastroesophageal reflux disease)     Gout of multiple sites 07/16/2020    Gout of multiple sites 07/16/2020    Hiatal hernia     High blood pressure     History of elbow surgery 10/10/2018    Hypertension     Impaired fasting glucose 06/18/2020    Leukopenia 06/06/2023    Lipoma of neck     Mediastinal lymphadenopathy     Osteoporosis     Pneumonia due to COVID-19 virus 01/25/2021    Recurrent cellulitis of lower extremity 01/19/2022    Right hand pain 07/16/2020    Sleep apnea     Sleep disorder      Past Surgical History:   Procedure Laterality Date    BACK SURGERY      COLONOSCOPY  10/07/2016    5 years (per dominique)    COLONOSCOPY      EGD  03/31/2017    ELBOW SURGERY Left 04/09/2018    open arthrotomy, capsulectomy, loose body removal (per dominique)    HERNIA REPAIR  01/16/2019    umbilical with mesh (per dominique)    LUMBAR FUSION  07/11/2018    [facility] INCL FLUOR GDNCE DX W/CELL WASHG SPX N/A 04/28/2020    Procedure: BRONCHOSCOPY FLEXIBLE;  Surgeon: Patrick Sams MD;  Location: [facility];  Service: Thoracic    SC BRONCHOSCOPY NEEDLE BX TRACHEA MAIN STEM&/BRON N/A 04/28/2020    Procedure: ENDOBRONCHIAL ULTRASOUND (EBUS) WITH BIOPSY;  Surgeon: Patrick Sams MD;  Location: [facility];   Service: Thoracic    REPLACEMENT TOTAL KNEE Right     ROTATOR CUFF REPAIR Bilateral     TONSILLECTOMY  1980    UPPER GASTROINTESTINAL ENDOSCOPY       Social History   Social History     Substance and Sexual Activity   Alcohol Use Yes    Alcohol/week: 10.0 standard drinks of alcohol    Types: 10 Shots of liquor per week     Social History     Substance and Sexual Activity   Drug Use No     Social History     Tobacco Use   Smoking Status Former    Current packs/day: 0.00    Average packs/day: 0.5 packs/day for 6.0 years (3.0 ttl pk-yrs)    Types: Cigarettes    Start date:     Quit date: 1984    Years since quittin.8   Smokeless Tobacco Never     Family History   Problem Relation Age of Onset    Hypertension Mother     Arthritis Mother     Hypertension Father     Arthritis Father     Colon cancer Neg Hx     Testicular cancer Neg Hx     Prostate cancer Neg Hx        Meds/Allergies     Not in a hospital admission.    Allergies   Allergen Reactions    Nuts - Food Allergy Shortness Of Breath    Peanut Oil - Food Allergy Anaphylaxis     Anything using peanuts    Caffeine - Food Allergy Other (See Comments)     jittery    Codeine Other (See Comments)     Dizzy, light headed  Body of balance      Dust Mite Extract Sneezing and Other (See Comments)     Sneezing, congestion    Latex Rash     Allergic to products, wears cotton underwear    Pollen Extract Sneezing and Other (See Comments)     Grass, Pollen  Grass, Pollen-sneezing, congestion    Pork-Derived Products - Food Allergy GI Intolerance     Red meat as well is hard to digest    Lactose - Food Allergy GI Intolerance       Objective     BP (!) 194/93   Pulse 76   Temp (!) 97.3 °F (36.3 °C)   Resp 18   SpO2 98%       PHYSICAL EXAM    Gen: NAD  CV: RRR  CHEST: Clear  ABD: soft, NT/ND  EXT: no edema  Neuro: AAO      ASSESSMENT/PLAN:  This is a 64 y.o. year old male here for GERD and dysphagia    PLAN:   Procedure: egd

## 2024-10-29 NOTE — ANESTHESIA POSTPROCEDURE EVALUATION
Post-Op Assessment Note    CV Status:  Stable  Pain Score: 0    Pain management: adequate       Mental Status:  Sleepy   Hydration Status:  Stable   PONV Controlled:  None   Airway Patency:  Patent     Post Op Vitals Reviewed: Yes    No anethesia notable event occurred.    Staff: Anesthesiologist, CRNA           Last Filed PACU Vitals:  Vitals Value Taken Time   Temp     Pulse 80    /78    Resp 14    SpO2 98        Modified Loc:  No data recorded

## 2024-10-30 PROBLEM — Z98.890 HISTORY OF ESOPHAGOGASTRODUODENOSCOPY (EGD): Status: ACTIVE | Noted: 2024-10-30

## 2024-10-31 ENCOUNTER — TELEPHONE (OUTPATIENT)
Dept: FAMILY MEDICINE CLINIC | Facility: CLINIC | Age: 64
End: 2024-10-31

## 2024-10-31 ENCOUNTER — APPOINTMENT (OUTPATIENT)
Dept: LAB | Facility: CLINIC | Age: 64
End: 2024-10-31
Payer: COMMERCIAL

## 2024-10-31 DIAGNOSIS — Z13.0 SCREENING FOR DEFICIENCY ANEMIA: ICD-10-CM

## 2024-10-31 DIAGNOSIS — R79.82 ELEVATED C-REACTIVE PROTEIN: ICD-10-CM

## 2024-10-31 DIAGNOSIS — Z13.29 SCREENING FOR THYROID DISORDER: ICD-10-CM

## 2024-10-31 DIAGNOSIS — E78.2 MIXED HYPERLIPIDEMIA: ICD-10-CM

## 2024-10-31 DIAGNOSIS — I10 PRIMARY HYPERTENSION: ICD-10-CM

## 2024-10-31 DIAGNOSIS — R73.03 PREDIABETES: ICD-10-CM

## 2024-10-31 LAB
ALBUMIN SERPL BCG-MCNC: 4.4 G/DL (ref 3.5–5)
ALP SERPL-CCNC: 94 U/L (ref 34–104)
ALT SERPL W P-5'-P-CCNC: 25 U/L (ref 7–52)
ANION GAP SERPL CALCULATED.3IONS-SCNC: 9 MMOL/L (ref 4–13)
AST SERPL W P-5'-P-CCNC: 21 U/L (ref 13–39)
BASOPHILS # BLD AUTO: 0.03 THOUSANDS/ΜL (ref 0–0.1)
BASOPHILS NFR BLD AUTO: 1 % (ref 0–1)
BILIRUB SERPL-MCNC: 1.25 MG/DL (ref 0.2–1)
BUN SERPL-MCNC: 15 MG/DL (ref 5–25)
CALCIUM SERPL-MCNC: 9.4 MG/DL (ref 8.4–10.2)
CHLORIDE SERPL-SCNC: 104 MMOL/L (ref 96–108)
CHOLEST SERPL-MCNC: 173 MG/DL
CO2 SERPL-SCNC: 26 MMOL/L (ref 21–32)
CREAT SERPL-MCNC: 1.32 MG/DL (ref 0.6–1.3)
CRP SERPL QL: 5.6 MG/L
EOSINOPHIL # BLD AUTO: 0.06 THOUSAND/ΜL (ref 0–0.61)
EOSINOPHIL NFR BLD AUTO: 2 % (ref 0–6)
ERYTHROCYTE [DISTWIDTH] IN BLOOD BY AUTOMATED COUNT: 14.5 % (ref 11.6–15.1)
ERYTHROCYTE [SEDIMENTATION RATE] IN BLOOD: 19 MM/HOUR (ref 0–19)
GFR SERPL CREATININE-BSD FRML MDRD: 55 ML/MIN/1.73SQ M
GLUCOSE P FAST SERPL-MCNC: 111 MG/DL (ref 65–99)
HCT VFR BLD AUTO: 51.6 % (ref 36.5–49.3)
HDLC SERPL-MCNC: 53 MG/DL
HGB BLD-MCNC: 16.4 G/DL (ref 12–17)
IMM GRANULOCYTES # BLD AUTO: 0.01 THOUSAND/UL (ref 0–0.2)
IMM GRANULOCYTES NFR BLD AUTO: 0 % (ref 0–2)
LDLC SERPL CALC-MCNC: 103 MG/DL (ref 0–100)
LYMPHOCYTES # BLD AUTO: 1.27 THOUSANDS/ΜL (ref 0.6–4.47)
LYMPHOCYTES NFR BLD AUTO: 36 % (ref 14–44)
MCH RBC QN AUTO: 27.3 PG (ref 26.8–34.3)
MCHC RBC AUTO-ENTMCNC: 31.8 G/DL (ref 31.4–37.4)
MCV RBC AUTO: 86 FL (ref 82–98)
MONOCYTES # BLD AUTO: 0.37 THOUSAND/ΜL (ref 0.17–1.22)
MONOCYTES NFR BLD AUTO: 11 % (ref 4–12)
NEUTROPHILS # BLD AUTO: 1.76 THOUSANDS/ΜL (ref 1.85–7.62)
NEUTS SEG NFR BLD AUTO: 50 % (ref 43–75)
NONHDLC SERPL-MCNC: 120 MG/DL
NRBC BLD AUTO-RTO: 0 /100 WBCS
PLATELET # BLD AUTO: 190 THOUSANDS/UL (ref 149–390)
PMV BLD AUTO: 11 FL (ref 8.9–12.7)
POTASSIUM SERPL-SCNC: 4 MMOL/L (ref 3.5–5.3)
PROT SERPL-MCNC: 8.3 G/DL (ref 6.4–8.4)
RBC # BLD AUTO: 6 MILLION/UL (ref 3.88–5.62)
SODIUM SERPL-SCNC: 139 MMOL/L (ref 135–147)
TRIGL SERPL-MCNC: 84 MG/DL
TSH SERPL DL<=0.05 MIU/L-ACNC: 1.86 UIU/ML (ref 0.45–4.5)
WBC # BLD AUTO: 3.5 THOUSAND/UL (ref 4.31–10.16)

## 2024-10-31 PROCEDURE — 85652 RBC SED RATE AUTOMATED: CPT

## 2024-10-31 PROCEDURE — 84443 ASSAY THYROID STIM HORMONE: CPT

## 2024-10-31 PROCEDURE — 80053 COMPREHEN METABOLIC PANEL: CPT

## 2024-10-31 PROCEDURE — 83036 HEMOGLOBIN GLYCOSYLATED A1C: CPT

## 2024-10-31 PROCEDURE — 80061 LIPID PANEL: CPT

## 2024-10-31 PROCEDURE — 88305 TISSUE EXAM BY PATHOLOGIST: CPT | Performed by: PATHOLOGY

## 2024-10-31 PROCEDURE — 85025 COMPLETE CBC W/AUTO DIFF WBC: CPT

## 2024-10-31 PROCEDURE — 36415 COLL VENOUS BLD VENIPUNCTURE: CPT

## 2024-10-31 PROCEDURE — 86140 C-REACTIVE PROTEIN: CPT

## 2024-10-31 NOTE — TELEPHONE ENCOUNTER
----- Message from Tung Lizama DO sent at 10/31/2024  3:45 PM EDT -----  Please call patient and inform him that I have reviewed his labs and we will discuss them at his upcoming appointment.  Please remind him of time and date to ensure follow up.     Tung Lizama DO  [facility]  10/31/2024 3:45 PM

## 2024-11-01 LAB
EST. AVERAGE GLUCOSE BLD GHB EST-MCNC: 140 MG/DL
HBA1C MFR BLD: 6.5 %

## 2024-11-06 ENCOUNTER — VBI (OUTPATIENT)
Dept: ADMINISTRATIVE | Facility: OTHER | Age: 64
End: 2024-11-06

## 2024-11-06 ENCOUNTER — OFFICE VISIT (OUTPATIENT)
Dept: FAMILY MEDICINE CLINIC | Facility: CLINIC | Age: 64
End: 2024-11-06

## 2024-11-06 VITALS
WEIGHT: 179 LBS | HEART RATE: 79 BPM | OXYGEN SATURATION: 99 % | HEIGHT: 67 IN | DIASTOLIC BLOOD PRESSURE: 90 MMHG | BODY MASS INDEX: 28.09 KG/M2 | SYSTOLIC BLOOD PRESSURE: 160 MMHG

## 2024-11-06 DIAGNOSIS — I10 PRIMARY HYPERTENSION: ICD-10-CM

## 2024-11-06 DIAGNOSIS — E11.9 TYPE 2 DIABETES MELLITUS WITHOUT COMPLICATION, WITHOUT LONG-TERM CURRENT USE OF INSULIN (HCC): ICD-10-CM

## 2024-11-06 DIAGNOSIS — D70.0 CONGENITAL NEUTROPENIA (HCC): ICD-10-CM

## 2024-11-06 DIAGNOSIS — R17 ELEVATED BILIRUBIN: Primary | ICD-10-CM

## 2024-11-06 NOTE — PATIENT INSTRUCTIONS
We reviewed your labs today.  Overall your electrolytes, cholesterol, thyroid testing, and long-term inflammation testing was normal.    There were some changes to your overall labs including a mild decrease in your kidney function.  This could be due to multiple factors including your hydration status the day that you went.  I would like to keep a close eye on this with follow-up labs in 3 months.  If you do notice that you have any decrease in urination please let me know sooner    Your total bilirubin is slightly elevated.  This can be due to multiple issues, but it is not in a dangerous range right now.  I would like to keep an eye on it, and if it still continues to creep up or if you have new or worsening pain in the right upper quadrant of the belly then we should look into this sooner.    Your fasting sugar and A1c are elevated.  Unfortunately this places you in the type diabetic range.  Your A1c is just into this category, with a value of 6.5.  Therefore we need to keep a close eye on this and you should keep an eye on your diet especially the rice, pasta, potatoes, breads, tortillas, carbs/sugars.    Lastly I do think that you have a congenital/genetic affect on your white blood cell count.  This is typical in individuals that have -American genealogy.  We will continue to monitor this as well to make sure that it does not continue to decrease.  If it does, we will need to take follow-up steps with the specialist.    Please increase the nifedipine from 1 tablet to two tablets and we will set up some blood pressures in the next few weeks.

## 2024-11-06 NOTE — PROGRESS NOTES
Outpatient Note- Follow up     HPI:     Rik Vega Jr. , 64 y.o. male  presents today for follow-up of labs.  Unfortunately the patient had several labs that required discussion today.  His kidney function has been mildly decreased at GFR of 55.  It may have been due to dehydration that day, but he also has recent diagnosis of type 2 diabetes.  That being said, his most recent A1c was 6.5, prior to this it ranged between 6.0 and 6.3, unfortunately he is now in the diabetic range.  I went over this and discussed the importance of monitoring carbohydrate intake.  This was not just the sweets but also the savory including bread, pasta, potatoes, rice.  He does note that his significant other is  and they eat a lot of rice.    Additionally there was a mild increase to CRP and his bilirubin.  Will need to continue to monitor in the next 3 months to see if there is any evidence of abnormalities in the direct/indirect bilirubin.  This may be due to biliary dysfunction versus Gilbert's syndrome.  Patient denies any stigmata or new changes of shortness of breath, chest pain, diarrhea, constipation, nausea, vomiting, increased urination, polyphasia, polydipsia.    Past Medical History:   Diagnosis Date    Acute gout of right elbow 01/23/2022    Arthritis     Asthma     Back pain     Cataract of right eye 05/02/2023    Cellulitis of great toe of right foot 08/16/2022    Colon polyp     COVID-19 01/21/2021 1/11/2021    COVID-19 virus infection 01/21/2021 1/11/2021    CPAP (continuous positive airway pressure) dependence     Effusion, right elbow 01/21/2022    Gastroesophageal reflux disease without esophagitis 07/16/2020    GERD (gastroesophageal reflux disease)     Gout of multiple sites 07/16/2020    Gout of multiple sites 07/16/2020    Hiatal hernia     High blood pressure     History of elbow surgery 10/10/2018    Hypertension     Impaired fasting glucose 06/18/2020    Leukopenia 06/06/2023    Lipoma of  neck     Mediastinal lymphadenopathy     Osteoporosis     Pneumonia due to COVID-19 virus 01/25/2021    Recurrent cellulitis of lower extremity 01/19/2022    Right hand pain 07/16/2020    Sleep apnea     Sleep disorder       ROS:   Review of Systems   See HPI    OBJECTIVE  Vitals:    11/06/24 0950   BP: 160/90   Pulse: 79   SpO2: 99%        Physical Exam   No PE performed    ASSESSMENT AND PLAN   Diagnoses and all orders for this visit:    Elevated bilirubin  Unknown etiology of elevated bilirubin.  Patient had previous right upper quadrant pain, but this has resolved, and was not around the time of labs being obtained.  Patient is to monitor right upper quadrant in case he does start to have colicky pain that might indicate underlying gallstones.  He still has his gallbladder.  It is possible that this is benign Gilbert syndrome.  Will need to follow up with labs in 3 months.   -     Bilirubin, direct; Future  -     Hepatic function panel; Future    Congenital neutropenia (HCC)  Patient has a history of low WBC count and absolute neutrophils.  I do believe that this is likely congenital.  Patient states that he has -American heritage, and we will continue to monitor for other changes to the CBC/ANC in the next 3 months.  If it continues to decrease, will likely refer to hematology and oncology for further workup and likely bone marrow biopsy  -     CBC and differential; Future    Type 2 diabetes mellitus without complication, without long-term current use of insulin (HCC)  Patient presents for review of new onset type II diabetes.  We reviewed the foods that fall into carb category.  We reviewed that he is considered type II Diabetic and this will be for the rest of his life.  He understands this concept and is appreciative for the discussion.  He will be monitoring the carb intake and we will be following up with an A1c in 3 months.  The patient is currently on a ACE/ARB.  He is not on metformin or a  statin.  His cholesterol overall is within normal limits except for a mild elevation in LDL.  Patient would like to avoid medication if possible, therefore he will be working on his diet to reduce the risk of adding more medication.  -     Hemoglobin A1C; Future    Primary hypertension  Patient's blood pressure is still significantly elevated with a 160/90.  During his procedure the anesthesia did reduce his BP down to 126/79, but following that he had a return to 160/80.  Therefore we will have the patient increase the Procardia XL dosage from 30 mg to 60 mg.  If this does not significantly improve his blood pressure we will discontinue the medication and consider hydrochlorothiazide.           Tung Lizama DO  [facility]  11/6/2024 1:11 PM

## 2024-11-06 NOTE — TELEPHONE ENCOUNTER
11/06/24 8:07 AM     Chart reviewed for BP ; nothing is submitted to the patient's insurance at this time.     Prosper Ocasio MA   [facility]

## 2024-11-09 DIAGNOSIS — R13.19 ESOPHAGEAL DYSPHAGIA: ICD-10-CM

## 2024-11-09 DIAGNOSIS — K44.9 HIATAL HERNIA WITH GERD: ICD-10-CM

## 2024-11-09 DIAGNOSIS — K21.9 HIATAL HERNIA WITH GERD: ICD-10-CM

## 2024-11-11 RX ORDER — OMEPRAZOLE 40 MG/1
40 CAPSULE, DELAYED RELEASE ORAL DAILY
Qty: 90 CAPSULE | Refills: 0 | Status: SHIPPED | OUTPATIENT
Start: 2024-11-11

## 2024-11-20 ENCOUNTER — CLINICAL SUPPORT (OUTPATIENT)
Dept: FAMILY MEDICINE CLINIC | Facility: CLINIC | Age: 64
End: 2024-11-20
Payer: COMMERCIAL

## 2024-11-20 VITALS — DIASTOLIC BLOOD PRESSURE: 80 MMHG | SYSTOLIC BLOOD PRESSURE: 160 MMHG

## 2024-11-20 DIAGNOSIS — I10 PRIMARY HYPERTENSION: Primary | ICD-10-CM

## 2024-11-20 PROCEDURE — 99211 OFF/OP EST MAY X REQ PHY/QHP: CPT

## 2024-11-20 NOTE — PROGRESS NOTES
Patient presents today for blood pressure evaluation.  We made some changes to his blood pressure medication the last time, although he has remained on Diovan 80 mg and Procardia XL 30 mg.  Combination has been giving him values that are better than the 160/100 therefore he would like to stay on this.  His main concern is his sleep at this point.  Will bring him back to discuss that at a later date.  Patient denies any major symptoms of nausea, vomiting, lightheadedness, dizziness, chest pain, shortness of breath, headache, blurry vision.

## 2024-11-27 ENCOUNTER — OFFICE VISIT (OUTPATIENT)
Dept: FAMILY MEDICINE CLINIC | Facility: CLINIC | Age: 64
End: 2024-11-27

## 2024-11-27 VITALS
HEIGHT: 67 IN | BODY MASS INDEX: 27.31 KG/M2 | DIASTOLIC BLOOD PRESSURE: 80 MMHG | SYSTOLIC BLOOD PRESSURE: 146 MMHG | OXYGEN SATURATION: 98 % | WEIGHT: 174 LBS | HEART RATE: 83 BPM | RESPIRATION RATE: 16 BRPM

## 2024-11-27 DIAGNOSIS — G47.26 SHIFT WORK SLEEP DISORDER: ICD-10-CM

## 2024-11-27 DIAGNOSIS — Z72.820 POOR SLEEP: Primary | ICD-10-CM

## 2024-11-27 DIAGNOSIS — F51.01 PRIMARY INSOMNIA: ICD-10-CM

## 2024-11-27 RX ORDER — TRAZODONE HYDROCHLORIDE 50 MG/1
25 TABLET, FILM COATED ORAL
Qty: 15 TABLET | Refills: 0 | Status: SHIPPED | OUTPATIENT
Start: 2024-11-27

## 2024-11-27 NOTE — PROGRESS NOTES
Outpatient Note- Follow up     HPI:     Rik Vega  , 64 y.o. male  presents today for insomnia/shift worker syndrome.  The patient has a significant history of working overnight, but this was over 7 years ago since this change in his schedule he has had difficulty sleeping during the times that he needs to.  He typically goes to bed around 12:30- 1:00 am, but will need to get up around 6am and the maximum amount of 4 to 5 hours typically.  He does have a history of obstructive sleep apnea.  He has been having difficulty with keeping the mask on and preventing it from leaking.  He has not been compliant on this recently due to it causing more problems and keeping him awake.    The main reason we are looking into his sleep is because his blood pressure is elevated.  He would prefer to avoid medication if possible for blood pressure.  He has been exercising more and attempting to go harder to facilitate being more tired at night to help with sleep.  He has been avoiding exercise close to the end of the day.  He denies any chest pain, shortness of breath, nausea, vomiting, lightheadedness, dizziness.  He also has no stigmata of hypertension such as headache, blurry vision, or numbness and tingling.    Past Medical History:   Diagnosis Date    Acute gout of right elbow 01/23/2022    Arthritis     Asthma     Back pain     Cataract of right eye 05/02/2023    Cellulitis of great toe of right foot 08/16/2022    Colon polyp     COVID-19 01/21/2021 1/11/2021    COVID-19 virus infection 01/21/2021 1/11/2021    CPAP (continuous positive airway pressure) dependence     Effusion, right elbow 01/21/2022    Gastroesophageal reflux disease without esophagitis 07/16/2020    GERD (gastroesophageal reflux disease)     Gout of multiple sites 07/16/2020    Gout of multiple sites 07/16/2020    Hiatal hernia     High blood pressure     History of elbow surgery 10/10/2018    Hypertension     Impaired fasting glucose 06/18/2020     Leukopenia 06/06/2023    Lipoma of neck     Mediastinal lymphadenopathy     Osteoporosis     Pneumonia due to COVID-19 virus 01/25/2021    Recurrent cellulitis of lower extremity 01/19/2022    Right hand pain 07/16/2020    Sleep apnea     Sleep disorder       ROS:   Review of Systems   See HPI    OBJECTIVE  Vitals:    11/27/24 1203   BP: 146/80   Pulse: 83   Resp: 16   SpO2: 98%        Physical Exam   No PE performed    ASSESSMENT AND PLAN   Rik was seen today for sleep apnea.  Diagnoses and all orders for this visit:    Poor sleep  Primary insomnia  Shift work sleep disorder  Patient unfortunately has been having difficulty with sleep, insomnia, and although it has been many years his shift work has significantly changed.  Because he worked overnight it has been difficult to switch from nighttime schedule to a daytime schedule.  This limits his sleep overall.  He would prefer to stick with natural methods such as melatonin, sleepy time tea, and sleep stories/music.  He has also been attempting to increase his exercise to be more tired at the end of the day.  He is open to attempting trazodone 25 mg.  Will start at the lowest dose and see if the patient has any significant side effects.  Paperwork was given for patient to review prior to starting medication to ensure that he is comfortable with it.  May need to increase dosage to 50 or 100 depending on the effect.  -     traZODone (DESYREL) 50 mg tablet; Take 0.5 tablets (25 mg total) by mouth daily at bedtime      DO Dahiana Santiago [facility]  11/27/2024 12:26 PM

## 2024-11-27 NOTE — PATIENT INSTRUCTIONS
Patient Education     Trazodone (TRAZ oh done)   Brand Names: Jonathan APO-TraZODone; APO-TraZODone D; JAMP-Trazodone; PMS-TraZODone; TEVA-TraZODone; TraZODone-100; TraZODone-150; TraZODone-50   Warning   For all patients taking this drug:   Drugs like this one have raised the chance of suicidal thoughts or actions in children and young adults. The risk may be greater in people who have had these thoughts or actions in the past. All people who take this drug need to be watched closely. Call the doctor right away if signs like depression, nervousness, restlessness, grouchiness, panic attacks, or changes in mood or actions are new or worse. Call the doctor right away if any thoughts or actions of suicide occur.  Children:   This drug is not approved for use in children. However, the doctor may decide the benefits of taking this drug outweigh the risks. If your child has been given this drug, ask the doctor for information about the benefits and risks. Talk with the doctor if you have questions about giving this drug to your child.  What is this drug used for?   It is used to treat depression.  It may be given to you for other reasons. Talk with the doctor.  What do I need to tell my doctor BEFORE I take this drug?   If you are allergic to this drug; any part of this drug; or any other drugs, foods, or substances. Tell your doctor about the allergy and what signs you had.  If you have had a recent heart attack.  If you have ever had a long QT on ECG or other heartbeat that is not normal.  If you have any of these health problems: Low magnesium levels, low potassium levels, or slow heartbeat.  If you are taking any of these drugs: Linezolid or methylene blue.  If you have taken certain drugs for depression or Parkinson's disease in the last 14 days. This includes isocarboxazid, phenelzine, tranylcypromine, selegiline, or rasagiline. Very high blood pressure may happen.  If you are taking any drugs that can cause a certain  type of heartbeat that is not normal (prolonged QT interval). There are many drugs that can do this. Ask your doctor or pharmacist if you are not sure.  This is not a list of all drugs or health problems that interact with this drug.  Tell your doctor and pharmacist about all of your drugs (prescription or OTC, natural products, vitamins) and health problems. You must check to make sure that it is safe for you to take this drug with all of your drugs and health problems. Do not start, stop, or change the dose of any drug without checking with your doctor.  What are some things I need to know or do while I take this drug?   Tell all of your health care providers that you take this drug. This includes your doctors, nurses, pharmacists, and dentists.  Avoid driving and doing other tasks or actions that call for you to be alert until you see how this drug affects you.  To lower the chance of feeling dizzy or passing out, rise slowly if you have been sitting or lying down. Be careful going up and down stairs.  Talk with your doctor before you use alcohol, marijuana or other forms of cannabis, or prescription or OTC drugs that may slow your actions.  An unsafe heartbeat that is not normal (long QT on ECG) has happened with this drug. This may raise the chance of sudden death. Talk with the doctor.  This drug may raise the chance of bleeding. Sometimes, bleeding can be life-threatening. Talk with the doctor.  Some people may have a higher chance of eye problems with this drug. Your doctor may want you to have an eye exam to see if you have a higher chance of these eye problems. Call your doctor right away if you have eye pain, change in eyesight, or swelling or redness in or around the eye.  This drug can cause low sodium levels. Very low sodium levels can be life-threatening, leading to seizures, passing out, trouble breathing, or death.  If you are 65 or older, use this drug with care. You could have more side  effects.  Tell your doctor if you are pregnant, plan on getting pregnant, or are breast-feeding. You will need to talk about the benefits and risks to you and the baby.  What are some side effects that I need to call my doctor about right away?   WARNING/CAUTION: Even though it may be rare, some people may have very bad and sometimes deadly side effects when taking a drug. Tell your doctor or get medical help right away if you have any of the following signs or symptoms that may be related to a very bad side effect:  Signs of an allergic reaction, like rash; hives; itching; red, swollen, blistered, or peeling skin with or without fever; wheezing; tightness in the chest or throat; trouble breathing, swallowing, or talking; unusual hoarseness; or swelling of the mouth, face, lips, tongue, or throat.  Signs of low sodium levels like headache, trouble focusing, memory problems, feeling confused, weakness, seizures, or change in balance.  Signs of bleeding like throwing up or coughing up blood; vomit that looks like coffee grounds; blood in the urine; black, red, or tarry stools; bleeding from the gums; abnormal vaginal bleeding; bruises without a cause or that get bigger; or bleeding you cannot stop.  Signs of high or low blood pressure like very bad headache or dizziness, passing out, or change in eyesight.  Fast or abnormal heartbeat.  Swelling.  Feeling confused.  Call your doctor right away if you have a painful erection (hard penis) or an erection that lasts for longer than 4 hours. This may happen even when you are not having sex. If this is not treated right away, it may lead to lasting sex problems and you may not be able to have sex.  A severe and sometimes deadly problem called serotonin syndrome may happen. The risk may be greater if you also take certain other drugs. Call your doctor right away if you have agitation; change in balance; confusion; hallucinations; fever; fast or abnormal heartbeat; flushing;  muscle twitching or stiffness; seizures; shivering or shaking; sweating a lot; severe diarrhea, upset stomach, or throwing up; or very bad headache.  What are some other side effects of this drug?   All drugs may cause side effects. However, many people have no side effects or only have minor side effects. Call your doctor or get medical help if any of these side effects or any other side effects bother you or do not go away:  Feeling dizzy, sleepy, tired, or weak.  Constipation, diarrhea, stomach pain, upset stomach, or throwing up.  Dry mouth.  Headache.  Feeling nervous and excitable.  Shakiness.  Muscle pain.  Stuffy nose.  Weight gain or loss.  These are not all of the side effects that may occur. If you have questions about side effects, call your doctor. Call your doctor for medical advice about side effects.  You may report side effects to your national health agency.  You may report side effects to the FDA at 1-493.450.8185. You may also report side effects at https://www.fda.gov/medwatch.  How is this drug best taken?   Use this drug as ordered by your doctor. Read all information given to you. Follow all instructions closely.  Take this drug right after a meal or light snack.  Some tablets may be split based on the dose that is needed. Follow how to break the tablet as you have been told.  Do not chew or crush.  If you feel sleepy after taking this drug, talk with your doctor. Your doctor may change your dose or when you take this drug.  Keep taking this drug as you have been told by your doctor or other health care provider, even if you feel well.  It may take several weeks to see the full effects.  Do not stop taking this drug all of a sudden without calling your doctor. You may have a greater risk of side effects. If you need to stop this drug, you will want to slowly stop it as ordered by your doctor.  What do I do if I miss a dose?   Take a missed dose as soon as you think about it.  If it is close  to the time for your next dose, skip the missed dose and go back to your normal time.  Do not take 2 doses at the same time or extra doses.  How do I store and/or throw out this drug?   Store at room temperature protected from light. Store in a dry place. Do not store in a bathroom.  Keep all drugs in a safe place. Keep all drugs out of the reach of children and pets.  Throw away unused or  drugs. Do not flush down a toilet or pour down a drain unless you are told to do so. Check with your pharmacist if you have questions about the best way to throw out drugs. There may be drug take-back programs in your area.  General drug facts   If your symptoms or health problems do not get better or if they become worse, call your doctor.  Do not share your drugs with others and do not take anyone else's drugs.  Some drugs may have another patient information leaflet. If you have any questions about this drug, please talk with your doctor, nurse, pharmacist, or other health care provider.  This drug comes with an extra patient fact sheet called a Medication Guide. Read it with care. Read it again each time this drug is refilled. If you have any questions about this drug, please talk with the doctor, pharmacist, or other health care provider.  If you think there has been an overdose, call your poison control center or get medical care right away. Be ready to tell or show what was taken, how much, and when it happened.  Consumer Information Use and Disclaimer   This generalized information is a limited summary of diagnosis, treatment, and/or medication information. It is not meant to be comprehensive and should be used as a tool to help the user understand and/or assess potential diagnostic and treatment options. It does NOT include all information about conditions, treatments, medications, side effects, or risks that may apply to a specific patient. It is not intended to be medical advice or a substitute for the medical  advice, diagnosis, or treatment of a health care provider based on the health care provider's examination and assessment of a patient's specific and unique circumstances. Patients must speak with a health care provider for complete information about their health, medical questions, and treatment options, including any risks or benefits regarding use of medications. This information does not endorse any treatments or medications as safe, effective, or approved for treating a specific patient. [employer] and its affiliates disclaim any warranty or liability relating to this information or the use thereof. The use of this information is governed by the Terms of Use, available at https://www.[employer].com/en/know/clinical-effectiveness-terms.  Last Reviewed Date   2023-01-05  Copyright   © 2024 [employer] and its affiliates and/or licensors. All rights reserved.

## 2024-12-14 NOTE — TELEPHONE ENCOUNTER
Patient's insurance states he must go to [facility], please call patient to schedule Colonoscopy/EGD  Thank you No

## 2024-12-23 DIAGNOSIS — R03.0 ELEVATED BLOOD PRESSURE READING: ICD-10-CM

## 2024-12-23 DIAGNOSIS — I10 PRIMARY HYPERTENSION: ICD-10-CM

## 2024-12-23 DIAGNOSIS — J30.89 ENVIRONMENTAL AND SEASONAL ALLERGIES: ICD-10-CM

## 2024-12-23 NOTE — TELEPHONE ENCOUNTER
Patient called to request a refill for their procardia and flonase advised a refill was requested on 12/23/24 and is pending approval. Patient verbalized understanding and is in agreement.

## 2024-12-24 ENCOUNTER — TELEPHONE (OUTPATIENT)
Dept: FAMILY MEDICINE CLINIC | Facility: CLINIC | Age: 64
End: 2024-12-24

## 2024-12-24 RX ORDER — NIFEDIPINE 30 MG/1
TABLET, EXTENDED RELEASE ORAL
Qty: 90 TABLET | Refills: 1 | Status: SHIPPED | OUTPATIENT
Start: 2024-12-24

## 2024-12-24 RX ORDER — FLUTICASONE PROPIONATE 50 MCG
SPRAY, SUSPENSION (ML) NASAL
Qty: 48 G | Refills: 1 | Status: SHIPPED | OUTPATIENT
Start: 2024-12-24

## 2024-12-24 NOTE — TELEPHONE ENCOUNTER
Patient called in regards to the status of medications flonase and procardia. Informed patient that medications were sent about a minute ago. Patient verbalized understanding.

## 2025-01-17 DIAGNOSIS — I10 PRIMARY HYPERTENSION: ICD-10-CM

## 2025-01-17 RX ORDER — VALSARTAN 80 MG/1
80 TABLET ORAL DAILY
Qty: 90 TABLET | Refills: 1 | Status: SHIPPED | OUTPATIENT
Start: 2025-01-17

## 2025-01-17 NOTE — TELEPHONE ENCOUNTER
Reason for call:   [x] Refill   [] Prior Auth  [] Other:     Office:   [x] PCP/Provider -   [] Specialty/Provider -     Medication: valsartan (DIOVAN) 80 mg tablet  TAKE 1 TABLET(80 MG) BY MOUTH DAILY      Pharmacy: [id number] [place], [place] - [phone]      Does the patient have enough for 3 days?   [] Yes   [x] No - Send as HP to POD

## 2025-02-18 ENCOUNTER — APPOINTMENT (OUTPATIENT)
Dept: LAB | Facility: CLINIC | Age: 65
End: 2025-02-18
Payer: COMMERCIAL

## 2025-02-18 DIAGNOSIS — E11.9 TYPE 2 DIABETES MELLITUS WITHOUT COMPLICATION, WITHOUT LONG-TERM CURRENT USE OF INSULIN (HCC): ICD-10-CM

## 2025-02-18 DIAGNOSIS — R17 ELEVATED BILIRUBIN: ICD-10-CM

## 2025-02-18 DIAGNOSIS — D70.0 CONGENITAL NEUTROPENIA (HCC): ICD-10-CM

## 2025-02-18 LAB
ALBUMIN SERPL BCG-MCNC: 4.3 G/DL (ref 3.5–5)
ALP SERPL-CCNC: 81 U/L (ref 34–104)
ALT SERPL W P-5'-P-CCNC: 27 U/L (ref 7–52)
AST SERPL W P-5'-P-CCNC: 21 U/L (ref 13–39)
BASOPHILS # BLD AUTO: 0.05 THOUSANDS/ΜL (ref 0–0.1)
BASOPHILS NFR BLD AUTO: 1 % (ref 0–1)
BILIRUB DIRECT SERPL-MCNC: 0.2 MG/DL (ref 0–0.2)
BILIRUB SERPL-MCNC: 0.96 MG/DL (ref 0.2–1)
EOSINOPHIL # BLD AUTO: 0.03 THOUSAND/ΜL (ref 0–0.61)
EOSINOPHIL NFR BLD AUTO: 1 % (ref 0–6)
ERYTHROCYTE [DISTWIDTH] IN BLOOD BY AUTOMATED COUNT: 14.8 % (ref 11.6–15.1)
EST. AVERAGE GLUCOSE BLD GHB EST-MCNC: 140 MG/DL
HBA1C MFR BLD: 6.5 %
HCT VFR BLD AUTO: 48.7 % (ref 36.5–49.3)
HGB BLD-MCNC: 15.1 G/DL (ref 12–17)
IMM GRANULOCYTES # BLD AUTO: 0.01 THOUSAND/UL (ref 0–0.2)
IMM GRANULOCYTES NFR BLD AUTO: 0 % (ref 0–2)
LYMPHOCYTES # BLD AUTO: 1.34 THOUSANDS/ΜL (ref 0.6–4.47)
LYMPHOCYTES NFR BLD AUTO: 37 % (ref 14–44)
MCH RBC QN AUTO: 27.4 PG (ref 26.8–34.3)
MCHC RBC AUTO-ENTMCNC: 31 G/DL (ref 31.4–37.4)
MCV RBC AUTO: 88 FL (ref 82–98)
MONOCYTES # BLD AUTO: 0.37 THOUSAND/ΜL (ref 0.17–1.22)
MONOCYTES NFR BLD AUTO: 10 % (ref 4–12)
NEUTROPHILS # BLD AUTO: 1.85 THOUSANDS/ΜL (ref 1.85–7.62)
NEUTS SEG NFR BLD AUTO: 51 % (ref 43–75)
NRBC BLD AUTO-RTO: 0 /100 WBCS
PLATELET # BLD AUTO: 187 THOUSANDS/UL (ref 149–390)
PMV BLD AUTO: 11.1 FL (ref 8.9–12.7)
PROT SERPL-MCNC: 7.7 G/DL (ref 6.4–8.4)
RBC # BLD AUTO: 5.52 MILLION/UL (ref 3.88–5.62)
WBC # BLD AUTO: 3.65 THOUSAND/UL (ref 4.31–10.16)

## 2025-02-18 PROCEDURE — 36415 COLL VENOUS BLD VENIPUNCTURE: CPT

## 2025-02-18 PROCEDURE — 83036 HEMOGLOBIN GLYCOSYLATED A1C: CPT

## 2025-02-18 PROCEDURE — 80076 HEPATIC FUNCTION PANEL: CPT

## 2025-02-18 PROCEDURE — 85025 COMPLETE CBC W/AUTO DIFF WBC: CPT

## 2025-02-19 ENCOUNTER — RESULTS FOLLOW-UP (OUTPATIENT)
Dept: FAMILY MEDICINE CLINIC | Facility: CLINIC | Age: 65
End: 2025-02-19

## 2025-02-19 DIAGNOSIS — D70.0 CONGENITAL NEUTROPENIA (HCC): ICD-10-CM

## 2025-02-19 DIAGNOSIS — R17 ELEVATED BILIRUBIN: Primary | ICD-10-CM

## 2025-02-19 DIAGNOSIS — E11.9 TYPE 2 DIABETES MELLITUS WITHOUT COMPLICATION, WITHOUT LONG-TERM CURRENT USE OF INSULIN (HCC): ICD-10-CM

## 2025-04-18 DIAGNOSIS — I10 PRIMARY HYPERTENSION: ICD-10-CM

## 2025-04-18 RX ORDER — VALSARTAN 80 MG/1
80 TABLET ORAL DAILY
Qty: 90 TABLET | Refills: 1 | Status: SHIPPED | OUTPATIENT
Start: 2025-04-18

## 2025-06-19 DIAGNOSIS — R03.0 ELEVATED BLOOD PRESSURE READING: ICD-10-CM

## 2025-06-19 DIAGNOSIS — I10 PRIMARY HYPERTENSION: ICD-10-CM

## 2025-06-20 RX ORDER — NIFEDIPINE 30 MG/1
30 TABLET, EXTENDED RELEASE ORAL DAILY
Qty: 90 TABLET | Refills: 1 | Status: SHIPPED | OUTPATIENT
Start: 2025-06-20

## 2025-07-11 DIAGNOSIS — K44.9 HIATAL HERNIA WITH GERD: ICD-10-CM

## 2025-07-11 DIAGNOSIS — R13.19 ESOPHAGEAL DYSPHAGIA: ICD-10-CM

## 2025-07-11 DIAGNOSIS — K21.9 HIATAL HERNIA WITH GERD: ICD-10-CM

## 2025-07-14 RX ORDER — OMEPRAZOLE 40 MG/1
40 CAPSULE, DELAYED RELEASE ORAL DAILY
Qty: 90 CAPSULE | Refills: 1 | Status: SHIPPED | OUTPATIENT
Start: 2025-07-14

## (undated) DEVICE — STOPCOCK 3-WAY

## (undated) DEVICE — TUBING SUCTION 5MM X 12 FT

## (undated) DEVICE — SINGLE USE SUCTION VALVE MAJ-209: Brand: SINGLE USE SUCTION VALVE (STERILE)

## (undated) DEVICE — FIRST STEP BEDSIDE KIT - STAND-UP POUCH, ENDOSCOPIC CLEANING PAD - 1 POUCH: Brand: FIRST STEP BEDSIDE KIT - STAND-UP POUCH, ENDOSCOPIC CLEANING PAD

## (undated) DEVICE — SYRINGE 20ML LL

## (undated) DEVICE — SPECIMEN CONTAINER STERILE PEEL PACK

## (undated) DEVICE — 2000CC GUARDIAN II: Brand: GUARDIAN

## (undated) DEVICE — NEEDLE TBNA EBUS 19G VIZISHOT2 FLEX

## (undated) DEVICE — MINI-FORCEPS: Brand: COREDX™

## (undated) DEVICE — HEAVY DUTY TABLE COVER: Brand: CONVERTORS

## (undated) DEVICE — SINGLE USE BIOPSY VALVE MAJ-210: Brand: SINGLE USE BIOPSY VALVE (STERILE)

## (undated) DEVICE — SYRINGE 10ML LL

## (undated) DEVICE — 3000CC GUARDIAN II: Brand: GUARDIAN

## (undated) DEVICE — UTILITY MARKER,BLACK WITH LABELS: Brand: DEVON

## (undated) DEVICE — Device: Brand: BALLOON

## (undated) DEVICE — GAUZE SPONGES,16 PLY: Brand: CURITY

## (undated) DEVICE — IV EXTENSION TUBING 33 IN

## (undated) DEVICE — ADAPTOR TRACH SWIVEL

## (undated) DEVICE — SYRINGE 10ML SLIP TIP LF